# Patient Record
Sex: MALE | Race: WHITE | NOT HISPANIC OR LATINO | Employment: OTHER | URBAN - METROPOLITAN AREA
[De-identification: names, ages, dates, MRNs, and addresses within clinical notes are randomized per-mention and may not be internally consistent; named-entity substitution may affect disease eponyms.]

---

## 2017-02-13 ENCOUNTER — RECORDS - HEALTHEAST (OUTPATIENT)
Dept: LAB | Facility: CLINIC | Age: 82
End: 2017-02-13

## 2017-02-13 LAB
CHOLEST SERPL-MCNC: 167 MG/DL
FASTING STATUS PATIENT QL REPORTED: ABNORMAL
HDLC SERPL-MCNC: 49 MG/DL
LDLC SERPL CALC-MCNC: 77 MG/DL
TRIGL SERPL-MCNC: 204 MG/DL

## 2018-06-25 ENCOUNTER — RECORDS - HEALTHEAST (OUTPATIENT)
Dept: LAB | Facility: CLINIC | Age: 83
End: 2018-06-25

## 2018-06-25 LAB
ALBUMIN SERPL-MCNC: 3.6 G/DL (ref 3.5–5)
ALP SERPL-CCNC: 73 U/L (ref 45–120)
ALT SERPL W P-5'-P-CCNC: 50 U/L (ref 0–45)
ANION GAP SERPL CALCULATED.3IONS-SCNC: 10 MMOL/L (ref 5–18)
AST SERPL W P-5'-P-CCNC: 44 U/L (ref 0–40)
BILIRUB SERPL-MCNC: 0.6 MG/DL (ref 0–1)
BUN SERPL-MCNC: 32 MG/DL (ref 8–28)
CALCIUM SERPL-MCNC: 9.4 MG/DL (ref 8.5–10.5)
CHLORIDE BLD-SCNC: 106 MMOL/L (ref 98–107)
CHOLEST SERPL-MCNC: 156 MG/DL
CO2 SERPL-SCNC: 26 MMOL/L (ref 22–31)
CREAT SERPL-MCNC: 1.31 MG/DL (ref 0.7–1.3)
ERYTHROCYTE [DISTWIDTH] IN BLOOD BY AUTOMATED COUNT: 14 % (ref 11–14.5)
FASTING STATUS PATIENT QL REPORTED: ABNORMAL
GFR SERPL CREATININE-BSD FRML MDRD: 52 ML/MIN/1.73M2
GLUCOSE BLD-MCNC: 77 MG/DL (ref 70–125)
HCT VFR BLD AUTO: 43.6 % (ref 40–54)
HDLC SERPL-MCNC: 44 MG/DL
HGB BLD-MCNC: 14.9 G/DL (ref 14–18)
LDLC SERPL CALC-MCNC: 77 MG/DL
MCH RBC QN AUTO: 32.1 PG (ref 27–34)
MCHC RBC AUTO-ENTMCNC: 34.2 G/DL (ref 32–36)
MCV RBC AUTO: 94 FL (ref 80–100)
PLATELET # BLD AUTO: 215 THOU/UL (ref 140–440)
PMV BLD AUTO: 11.3 FL (ref 8.5–12.5)
POTASSIUM BLD-SCNC: 5.3 MMOL/L (ref 3.5–5)
PROT SERPL-MCNC: 6.5 G/DL (ref 6–8)
RBC # BLD AUTO: 4.64 MILL/UL (ref 4.4–6.2)
SODIUM SERPL-SCNC: 142 MMOL/L (ref 136–145)
TRIGL SERPL-MCNC: 174 MG/DL
WBC: 8 THOU/UL (ref 4–11)

## 2018-09-17 ENCOUNTER — RECORDS - HEALTHEAST (OUTPATIENT)
Dept: ADMINISTRATIVE | Facility: OTHER | Age: 83
End: 2018-09-17

## 2018-10-05 ENCOUNTER — RECORDS - HEALTHEAST (OUTPATIENT)
Dept: ADMINISTRATIVE | Facility: OTHER | Age: 83
End: 2018-10-05

## 2018-10-12 ENCOUNTER — HOSPITAL ENCOUNTER (OUTPATIENT)
Dept: NUCLEAR MEDICINE | Facility: CLINIC | Age: 83
Discharge: HOME | End: 2018-10-12
Attending: INTERNAL MEDICINE

## 2018-10-12 ENCOUNTER — HOSPITAL ENCOUNTER (OUTPATIENT)
Dept: CARDIOLOGY | Facility: CLINIC | Age: 83
Discharge: HOME | End: 2018-10-12
Attending: INTERNAL MEDICINE

## 2018-10-12 DIAGNOSIS — R06.09 EXERTIONAL DYSPNEA: ICD-10-CM

## 2018-10-12 DIAGNOSIS — R06.09 OTHER FORMS OF DYSPNEA: ICD-10-CM

## 2018-10-12 LAB
CV STRESS CURRENT BP HE: NORMAL
CV STRESS CURRENT HR HE: 61
CV STRESS CURRENT HR HE: 65
CV STRESS CURRENT HR HE: 65
CV STRESS CURRENT HR HE: 66
CV STRESS CURRENT HR HE: 66
CV STRESS CURRENT HR HE: 76
CV STRESS CURRENT HR HE: 77
CV STRESS CURRENT HR HE: 78
CV STRESS CURRENT HR HE: 81
CV STRESS CURRENT HR HE: 85
CV STRESS CURRENT HR HE: 86
CV STRESS CURRENT HR HE: 87
CV STRESS CURRENT HR HE: 87
CV STRESS CURRENT HR HE: 88
CV STRESS CURRENT HR HE: 88
CV STRESS DEVIATION TIME HE: NORMAL
CV STRESS ECHO PERCENT HR HE: NORMAL
CV STRESS EXERCISE STAGE HE: NORMAL
CV STRESS FINAL RESTING BP HE: NORMAL
CV STRESS FINAL RESTING HR HE: 76
CV STRESS MAX HR HE: 88
CV STRESS MAX TREADMILL GRADE HE: 0
CV STRESS MAX TREADMILL SPEED HE: 0
CV STRESS PEAK DIA BP HE: NORMAL
CV STRESS PEAK SYS BP HE: NORMAL
CV STRESS PHASE HE: NORMAL
CV STRESS PROTOCOL HE: NORMAL
CV STRESS RESTING PT POSITION HE: NORMAL
CV STRESS ST DEVIATION AMOUNT HE: NORMAL
CV STRESS ST DEVIATION ELEVATION HE: NORMAL
CV STRESS ST EVELATION AMOUNT HE: NORMAL
CV STRESS TEST TYPE HE: NORMAL
CV STRESS TOTAL STAGE TIME MIN 1 HE: NORMAL
NUC STRESS EJECTION FRACTION: 70 %
STRESS ECHO BASELINE BP: NORMAL
STRESS ECHO BASELINE HR: 60
STRESS ECHO CALCULATED PERCENT HR: 67 %
STRESS ECHO LAST STRESS BP: NORMAL
STRESS ECHO LAST STRESS HR: 88

## 2018-11-01 ENCOUNTER — AMBULATORY - HEALTHEAST (OUTPATIENT)
Dept: CARDIOLOGY | Facility: CLINIC | Age: 83
End: 2018-11-01

## 2018-11-01 ENCOUNTER — RECORDS - HEALTHEAST (OUTPATIENT)
Dept: ADMINISTRATIVE | Facility: OTHER | Age: 83
End: 2018-11-01

## 2018-11-08 ENCOUNTER — OFFICE VISIT - HEALTHEAST (OUTPATIENT)
Dept: CARDIOLOGY | Facility: CLINIC | Age: 83
End: 2018-11-08

## 2018-11-08 DIAGNOSIS — R94.39 ABNORMAL NUCLEAR STRESS TEST: ICD-10-CM

## 2018-11-08 DIAGNOSIS — I10 ESSENTIAL HYPERTENSION: ICD-10-CM

## 2018-11-08 DIAGNOSIS — E78.5 DYSLIPIDEMIA: ICD-10-CM

## 2018-11-08 DIAGNOSIS — R06.09 DYSPNEA ON EXERTION: ICD-10-CM

## 2018-11-08 DIAGNOSIS — G47.33 OSA ON CPAP: ICD-10-CM

## 2018-11-08 ASSESSMENT — MIFFLIN-ST. JEOR: SCORE: 1869.63

## 2018-11-23 ENCOUNTER — COMMUNICATION - HEALTHEAST (OUTPATIENT)
Dept: CARDIOLOGY | Facility: CLINIC | Age: 83
End: 2018-11-23

## 2018-11-27 ENCOUNTER — HOSPITAL ENCOUNTER (OUTPATIENT)
Dept: CT IMAGING | Facility: CLINIC | Age: 83
Discharge: HOME | End: 2018-11-27
Attending: INTERNAL MEDICINE

## 2018-12-04 ENCOUNTER — HOSPITAL ENCOUNTER (OUTPATIENT)
Dept: CT IMAGING | Facility: CLINIC | Age: 83
Discharge: HOME | End: 2018-12-04
Attending: INTERNAL MEDICINE

## 2018-12-04 DIAGNOSIS — R06.09 DYSPNEA ON EXERTION: ICD-10-CM

## 2018-12-04 DIAGNOSIS — R94.39 ABNORMAL NUCLEAR STRESS TEST: ICD-10-CM

## 2018-12-04 DIAGNOSIS — R06.09 OTHER FORMS OF DYSPNEA: ICD-10-CM

## 2018-12-04 LAB
CREAT BLD-MCNC: 1 MG/DL
POC GFR AMER AF HE - HISTORICAL: >60 ML/MIN/1.73M2
POC GFR NON AMER AF HE - HISTORICAL: >60 ML/MIN/1.73M2

## 2018-12-04 ASSESSMENT — MIFFLIN-ST. JEOR
SCORE: 1842.41
SCORE: 1865.09

## 2018-12-05 ENCOUNTER — AMBULATORY - HEALTHEAST (OUTPATIENT)
Dept: CARDIOLOGY | Facility: CLINIC | Age: 83
End: 2018-12-05

## 2018-12-05 ENCOUNTER — COMMUNICATION - HEALTHEAST (OUTPATIENT)
Dept: CARDIOLOGY | Facility: CLINIC | Age: 83
End: 2018-12-05

## 2018-12-05 ENCOUNTER — SURGERY - HEALTHEAST (OUTPATIENT)
Dept: CARDIOLOGY | Facility: CLINIC | Age: 83
End: 2018-12-05

## 2018-12-05 ENCOUNTER — OFFICE VISIT - HEALTHEAST (OUTPATIENT)
Dept: CARDIOLOGY | Facility: CLINIC | Age: 83
End: 2018-12-05

## 2018-12-05 DIAGNOSIS — I25.119 CORONARY ARTERY DISEASE INVOLVING NATIVE CORONARY ARTERY OF NATIVE HEART WITH ANGINA PECTORIS (H): ICD-10-CM

## 2018-12-05 DIAGNOSIS — E78.5 DYSLIPIDEMIA: ICD-10-CM

## 2018-12-05 DIAGNOSIS — I10 ESSENTIAL HYPERTENSION: ICD-10-CM

## 2018-12-05 DIAGNOSIS — R06.09 DYSPNEA ON EXERTION: ICD-10-CM

## 2018-12-05 DIAGNOSIS — I71.21 ASCENDING AORTIC ANEURYSM (H): ICD-10-CM

## 2018-12-05 DIAGNOSIS — G47.33 OSA ON CPAP: ICD-10-CM

## 2018-12-05 DIAGNOSIS — R94.39 ABNORMAL NUCLEAR STRESS TEST: ICD-10-CM

## 2018-12-05 LAB
BSA FOR ECHO PROCEDURE: 2.42 M2
CCTA AORTIC ROOT ANNULUS: 4.3 CM
CV CALCIUM SCORE AGATSTON LM: 35
CV CALCIUM SCORING AGATSON LAD: 1492
CV CALCIUM SCORING AGATSTON CX: 24
CV CALCIUM SCORING AGATSTON RCA: 705
CV CALCIUM SCORING AGATSTON TOTAL: 2256
LEFT VENTRICLE HEART RATE: 70 BPM

## 2018-12-05 ASSESSMENT — MIFFLIN-ST. JEOR: SCORE: 1896.84

## 2018-12-10 ENCOUNTER — COMMUNICATION - HEALTHEAST (OUTPATIENT)
Dept: CARDIOLOGY | Facility: CLINIC | Age: 83
End: 2018-12-10

## 2018-12-10 ENCOUNTER — SURGERY - HEALTHEAST (OUTPATIENT)
Dept: CARDIOLOGY | Facility: CLINIC | Age: 83
End: 2018-12-10

## 2018-12-10 DIAGNOSIS — I25.119 CORONARY ARTERY DISEASE INVOLVING NATIVE CORONARY ARTERY OF NATIVE HEART WITH ANGINA PECTORIS (H): ICD-10-CM

## 2018-12-10 ASSESSMENT — MIFFLIN-ST. JEOR: SCORE: 1867.81

## 2018-12-14 ENCOUNTER — COMMUNICATION - HEALTHEAST (OUTPATIENT)
Dept: CARDIOLOGY | Facility: CLINIC | Age: 83
End: 2018-12-14

## 2018-12-17 ENCOUNTER — COMMUNICATION - HEALTHEAST (OUTPATIENT)
Dept: CARDIOLOGY | Facility: CLINIC | Age: 83
End: 2018-12-17

## 2018-12-18 ENCOUNTER — COMMUNICATION - HEALTHEAST (OUTPATIENT)
Dept: PULMONOLOGY | Facility: OTHER | Age: 83
End: 2018-12-18

## 2018-12-18 ENCOUNTER — RECORDS - HEALTHEAST (OUTPATIENT)
Dept: ADMINISTRATIVE | Facility: OTHER | Age: 83
End: 2018-12-18

## 2018-12-18 ENCOUNTER — AMBULATORY - HEALTHEAST (OUTPATIENT)
Dept: PULMONOLOGY | Facility: OTHER | Age: 83
End: 2018-12-18

## 2018-12-18 DIAGNOSIS — R06.02 SOB (SHORTNESS OF BREATH): ICD-10-CM

## 2019-01-25 ENCOUNTER — OFFICE VISIT - HEALTHEAST (OUTPATIENT)
Dept: PULMONOLOGY | Facility: OTHER | Age: 84
End: 2019-01-25

## 2019-01-25 ENCOUNTER — COMMUNICATION - HEALTHEAST (OUTPATIENT)
Dept: PULMONOLOGY | Facility: OTHER | Age: 84
End: 2019-01-25

## 2019-01-25 ENCOUNTER — RECORDS - HEALTHEAST (OUTPATIENT)
Dept: ADMINISTRATIVE | Facility: OTHER | Age: 84
End: 2019-01-25

## 2019-01-25 DIAGNOSIS — J98.6 ELEVATED HEMIDIAPHRAGM: ICD-10-CM

## 2019-01-25 DIAGNOSIS — T17.908A ASPIRATION INTO RESPIRATORY TRACT, INITIAL ENCOUNTER: ICD-10-CM

## 2019-01-25 DIAGNOSIS — R06.09 DYSPNEA ON EXERTION: ICD-10-CM

## 2019-01-25 ASSESSMENT — MIFFLIN-ST. JEOR: SCORE: 1907.73

## 2019-01-28 ENCOUNTER — COMMUNICATION - HEALTHEAST (OUTPATIENT)
Dept: CARDIOLOGY | Facility: CLINIC | Age: 84
End: 2019-01-28

## 2019-01-28 ENCOUNTER — AMBULATORY - HEALTHEAST (OUTPATIENT)
Dept: CARDIOLOGY | Facility: CLINIC | Age: 84
End: 2019-01-28

## 2019-01-28 ENCOUNTER — SURGERY - HEALTHEAST (OUTPATIENT)
Dept: CARDIOLOGY | Facility: CLINIC | Age: 84
End: 2019-01-28

## 2019-01-28 ENCOUNTER — HOSPITAL ENCOUNTER (OUTPATIENT)
Dept: RADIOLOGY | Facility: CLINIC | Age: 84
Discharge: HOME | End: 2019-01-28
Attending: INTERNAL MEDICINE

## 2019-01-28 ENCOUNTER — OFFICE VISIT - HEALTHEAST (OUTPATIENT)
Dept: CARDIOLOGY | Facility: CLINIC | Age: 84
End: 2019-01-28

## 2019-01-28 DIAGNOSIS — I71.21 ASCENDING AORTIC ANEURYSM (H): ICD-10-CM

## 2019-01-28 DIAGNOSIS — R06.09 DYSPNEA ON EXERTION: ICD-10-CM

## 2019-01-28 DIAGNOSIS — E78.5 DYSLIPIDEMIA: ICD-10-CM

## 2019-01-28 DIAGNOSIS — J98.6 ELEVATED HEMIDIAPHRAGM: ICD-10-CM

## 2019-01-28 DIAGNOSIS — R94.39 ABNORMAL NUCLEAR STRESS TEST: ICD-10-CM

## 2019-01-28 DIAGNOSIS — I25.119 CORONARY ARTERY DISEASE INVOLVING NATIVE CORONARY ARTERY OF NATIVE HEART WITH ANGINA PECTORIS (H): ICD-10-CM

## 2019-01-28 DIAGNOSIS — R06.09 OTHER FORMS OF DYSPNEA: ICD-10-CM

## 2019-01-28 DIAGNOSIS — I10 ESSENTIAL HYPERTENSION: ICD-10-CM

## 2019-01-28 ASSESSMENT — MIFFLIN-ST. JEOR: SCORE: 1901.38

## 2019-01-30 ENCOUNTER — COMMUNICATION - HEALTHEAST (OUTPATIENT)
Dept: CARDIOLOGY | Facility: CLINIC | Age: 84
End: 2019-01-30

## 2019-01-31 ENCOUNTER — SURGERY - HEALTHEAST (OUTPATIENT)
Dept: CARDIOLOGY | Facility: CLINIC | Age: 84
End: 2019-01-31

## 2019-01-31 ENCOUNTER — COMMUNICATION - HEALTHEAST (OUTPATIENT)
Dept: CARDIOLOGY | Facility: CLINIC | Age: 84
End: 2019-01-31

## 2019-01-31 ENCOUNTER — OFFICE VISIT - HEALTHEAST (OUTPATIENT)
Dept: CARDIOLOGY | Facility: CLINIC | Age: 84
End: 2019-01-31

## 2019-01-31 DIAGNOSIS — R94.39 ABNORMAL CARDIOVASCULAR STRESS TEST: ICD-10-CM

## 2019-01-31 DIAGNOSIS — I25.10 CORONARY ARTERY CALCIFICATION SEEN ON CAT SCAN: ICD-10-CM

## 2019-01-31 DIAGNOSIS — I27.20 PULMONARY HYPERTENSION (H): ICD-10-CM

## 2019-01-31 ASSESSMENT — MIFFLIN-ST. JEOR: SCORE: 1914.99

## 2019-02-04 ENCOUNTER — SURGERY - HEALTHEAST (OUTPATIENT)
Dept: CARDIOLOGY | Facility: CLINIC | Age: 84
End: 2019-02-04

## 2019-02-04 ASSESSMENT — MIFFLIN-ST. JEOR: SCORE: 1912.72

## 2019-02-05 ASSESSMENT — MIFFLIN-ST. JEOR: SCORE: 1888.23

## 2019-02-06 ENCOUNTER — COMMUNICATION - HEALTHEAST (OUTPATIENT)
Dept: CARDIOLOGY | Facility: CLINIC | Age: 84
End: 2019-02-06

## 2019-02-13 ENCOUNTER — OFFICE VISIT - HEALTHEAST (OUTPATIENT)
Dept: CARDIOLOGY | Facility: CLINIC | Age: 84
End: 2019-02-13

## 2019-02-13 DIAGNOSIS — I10 ESSENTIAL HYPERTENSION: ICD-10-CM

## 2019-02-13 DIAGNOSIS — E78.5 DYSLIPIDEMIA: ICD-10-CM

## 2019-02-13 DIAGNOSIS — I25.10 CORONARY ARTERY DISEASE INVOLVING NATIVE CORONARY ARTERY OF NATIVE HEART WITHOUT ANGINA PECTORIS: ICD-10-CM

## 2019-02-13 DIAGNOSIS — I25.119 CORONARY ARTERY DISEASE INVOLVING NATIVE CORONARY ARTERY OF NATIVE HEART WITH ANGINA PECTORIS (H): ICD-10-CM

## 2019-02-13 DIAGNOSIS — R06.09 DYSPNEA ON EXERTION: ICD-10-CM

## 2019-02-13 ASSESSMENT — MIFFLIN-ST. JEOR: SCORE: 1910.45

## 2019-03-04 ENCOUNTER — OFFICE VISIT - HEALTHEAST (OUTPATIENT)
Dept: PULMONOLOGY | Facility: OTHER | Age: 84
End: 2019-03-04

## 2019-03-04 DIAGNOSIS — G47.33 OSA (OBSTRUCTIVE SLEEP APNEA): ICD-10-CM

## 2019-03-08 ENCOUNTER — AMBULATORY - HEALTHEAST (OUTPATIENT)
Dept: CARDIAC REHAB | Facility: CLINIC | Age: 84
End: 2019-03-08

## 2019-03-08 DIAGNOSIS — R94.39 ABNORMAL NUCLEAR STRESS TEST: ICD-10-CM

## 2019-03-08 DIAGNOSIS — R06.09 DYSPNEA ON EXERTION: ICD-10-CM

## 2019-03-08 DIAGNOSIS — I25.10 CORONARY ARTERY CALCIFICATION SEEN ON CAT SCAN: ICD-10-CM

## 2019-03-08 DIAGNOSIS — Z95.5 S/P DRUG ELUTING CORONARY STENT PLACEMENT: ICD-10-CM

## 2019-03-11 ENCOUNTER — COMMUNICATION - HEALTHEAST (OUTPATIENT)
Dept: TELEHEALTH | Facility: CLINIC | Age: 84
End: 2019-03-11

## 2019-03-12 ENCOUNTER — AMBULATORY - HEALTHEAST (OUTPATIENT)
Dept: CARDIAC REHAB | Facility: CLINIC | Age: 84
End: 2019-03-12

## 2019-03-12 DIAGNOSIS — Z95.5 S/P DRUG ELUTING CORONARY STENT PLACEMENT: ICD-10-CM

## 2019-03-13 ENCOUNTER — AMBULATORY - HEALTHEAST (OUTPATIENT)
Dept: INTENSIVE CARE | Facility: CLINIC | Age: 84
End: 2019-03-13

## 2019-03-13 DIAGNOSIS — R09.02 HYPOXIA: ICD-10-CM

## 2019-03-14 ENCOUNTER — AMBULATORY - HEALTHEAST (OUTPATIENT)
Dept: CARDIAC REHAB | Facility: CLINIC | Age: 84
End: 2019-03-14

## 2019-03-14 DIAGNOSIS — Z95.5 S/P DRUG ELUTING CORONARY STENT PLACEMENT: ICD-10-CM

## 2019-03-18 ENCOUNTER — AMBULATORY - HEALTHEAST (OUTPATIENT)
Dept: INTENSIVE CARE | Facility: CLINIC | Age: 84
End: 2019-03-18

## 2019-03-18 DIAGNOSIS — R09.02 HYPOXIA: ICD-10-CM

## 2019-03-19 ENCOUNTER — AMBULATORY - HEALTHEAST (OUTPATIENT)
Dept: CARDIAC REHAB | Facility: CLINIC | Age: 84
End: 2019-03-19

## 2019-03-19 DIAGNOSIS — Z95.5 S/P DRUG ELUTING CORONARY STENT PLACEMENT: ICD-10-CM

## 2019-03-21 ENCOUNTER — AMBULATORY - HEALTHEAST (OUTPATIENT)
Dept: CARDIAC REHAB | Facility: CLINIC | Age: 84
End: 2019-03-21

## 2019-03-21 DIAGNOSIS — Z95.5 S/P DRUG ELUTING CORONARY STENT PLACEMENT: ICD-10-CM

## 2019-03-26 ENCOUNTER — OFFICE VISIT - HEALTHEAST (OUTPATIENT)
Dept: CARDIOLOGY | Facility: CLINIC | Age: 84
End: 2019-03-26

## 2019-03-26 ENCOUNTER — AMBULATORY - HEALTHEAST (OUTPATIENT)
Dept: CARDIAC REHAB | Facility: CLINIC | Age: 84
End: 2019-03-26

## 2019-03-26 DIAGNOSIS — G47.33 OSA ON CPAP: ICD-10-CM

## 2019-03-26 DIAGNOSIS — R06.09 DYSPNEA ON EXERTION: ICD-10-CM

## 2019-03-26 DIAGNOSIS — I25.10 CORONARY ARTERY DISEASE INVOLVING NATIVE CORONARY ARTERY OF NATIVE HEART WITHOUT ANGINA PECTORIS: ICD-10-CM

## 2019-03-26 DIAGNOSIS — I10 ESSENTIAL HYPERTENSION: ICD-10-CM

## 2019-03-26 DIAGNOSIS — Z95.5 S/P DRUG ELUTING CORONARY STENT PLACEMENT: ICD-10-CM

## 2019-03-26 ASSESSMENT — MIFFLIN-ST. JEOR: SCORE: 1910.45

## 2019-03-27 ENCOUNTER — COMMUNICATION - HEALTHEAST (OUTPATIENT)
Dept: CARDIOLOGY | Facility: CLINIC | Age: 84
End: 2019-03-27

## 2019-03-28 ENCOUNTER — AMBULATORY - HEALTHEAST (OUTPATIENT)
Dept: CARDIAC REHAB | Facility: CLINIC | Age: 84
End: 2019-03-28

## 2019-03-28 DIAGNOSIS — Z95.5 S/P DRUG ELUTING CORONARY STENT PLACEMENT: ICD-10-CM

## 2019-04-02 ENCOUNTER — AMBULATORY - HEALTHEAST (OUTPATIENT)
Dept: CARDIAC REHAB | Facility: CLINIC | Age: 84
End: 2019-04-02

## 2019-04-02 DIAGNOSIS — Z95.5 S/P DRUG ELUTING CORONARY STENT PLACEMENT: ICD-10-CM

## 2019-04-04 ENCOUNTER — AMBULATORY - HEALTHEAST (OUTPATIENT)
Dept: CARDIAC REHAB | Facility: CLINIC | Age: 84
End: 2019-04-04

## 2019-04-04 DIAGNOSIS — Z95.5 S/P DRUG ELUTING CORONARY STENT PLACEMENT: ICD-10-CM

## 2019-04-09 ENCOUNTER — AMBULATORY - HEALTHEAST (OUTPATIENT)
Dept: CARDIAC REHAB | Facility: CLINIC | Age: 84
End: 2019-04-09

## 2019-04-09 DIAGNOSIS — Z95.5 S/P CORONARY ARTERY STENT PLACEMENT: ICD-10-CM

## 2019-04-16 ENCOUNTER — AMBULATORY - HEALTHEAST (OUTPATIENT)
Dept: CARDIAC REHAB | Facility: CLINIC | Age: 84
End: 2019-04-16

## 2019-04-16 DIAGNOSIS — Z95.5 S/P CORONARY ARTERY STENT PLACEMENT: ICD-10-CM

## 2019-04-16 DIAGNOSIS — Z95.5 S/P DRUG ELUTING CORONARY STENT PLACEMENT: ICD-10-CM

## 2019-04-17 ENCOUNTER — COMMUNICATION - HEALTHEAST (OUTPATIENT)
Dept: PULMONOLOGY | Facility: OTHER | Age: 84
End: 2019-04-17

## 2019-04-18 ENCOUNTER — AMBULATORY - HEALTHEAST (OUTPATIENT)
Dept: CARDIAC REHAB | Facility: CLINIC | Age: 84
End: 2019-04-18

## 2019-04-18 DIAGNOSIS — Z95.5 S/P CORONARY ARTERY STENT PLACEMENT: ICD-10-CM

## 2019-04-18 DIAGNOSIS — Z95.5 S/P DRUG ELUTING CORONARY STENT PLACEMENT: ICD-10-CM

## 2019-04-23 ENCOUNTER — AMBULATORY - HEALTHEAST (OUTPATIENT)
Dept: CARDIAC REHAB | Facility: CLINIC | Age: 84
End: 2019-04-23

## 2019-04-23 DIAGNOSIS — Z95.5 S/P CORONARY ARTERY STENT PLACEMENT: ICD-10-CM

## 2019-04-25 ENCOUNTER — AMBULATORY - HEALTHEAST (OUTPATIENT)
Dept: CARDIAC REHAB | Facility: CLINIC | Age: 84
End: 2019-04-25

## 2019-04-25 DIAGNOSIS — Z95.5 S/P CORONARY ARTERY STENT PLACEMENT: ICD-10-CM

## 2019-04-26 ENCOUNTER — RECORDS - HEALTHEAST (OUTPATIENT)
Dept: ADMINISTRATIVE | Facility: OTHER | Age: 84
End: 2019-04-26

## 2019-04-26 ENCOUNTER — AMBULATORY - HEALTHEAST (OUTPATIENT)
Dept: CARDIOLOGY | Facility: CLINIC | Age: 84
End: 2019-04-26

## 2019-04-30 ENCOUNTER — OFFICE VISIT - HEALTHEAST (OUTPATIENT)
Dept: CARDIOLOGY | Facility: CLINIC | Age: 84
End: 2019-04-30

## 2019-04-30 ENCOUNTER — AMBULATORY - HEALTHEAST (OUTPATIENT)
Dept: CARDIAC REHAB | Facility: CLINIC | Age: 84
End: 2019-04-30

## 2019-04-30 DIAGNOSIS — E78.5 DYSLIPIDEMIA: ICD-10-CM

## 2019-04-30 DIAGNOSIS — I10 ESSENTIAL HYPERTENSION: ICD-10-CM

## 2019-04-30 DIAGNOSIS — I25.10 CORONARY ARTERY DISEASE INVOLVING NATIVE CORONARY ARTERY OF NATIVE HEART WITHOUT ANGINA PECTORIS: ICD-10-CM

## 2019-04-30 DIAGNOSIS — Z95.5 S/P CORONARY ARTERY STENT PLACEMENT: ICD-10-CM

## 2019-04-30 DIAGNOSIS — Z95.5 S/P DRUG ELUTING CORONARY STENT PLACEMENT: ICD-10-CM

## 2019-04-30 DIAGNOSIS — I51.89 DIASTOLIC DYSFUNCTION: ICD-10-CM

## 2019-04-30 DIAGNOSIS — I71.21 ASCENDING AORTIC ANEURYSM (H): ICD-10-CM

## 2019-04-30 ASSESSMENT — MIFFLIN-ST. JEOR: SCORE: 1914.99

## 2019-05-02 ENCOUNTER — AMBULATORY - HEALTHEAST (OUTPATIENT)
Dept: CARDIAC REHAB | Facility: CLINIC | Age: 84
End: 2019-05-02

## 2019-05-02 DIAGNOSIS — Z95.5 S/P DRUG ELUTING CORONARY STENT PLACEMENT: ICD-10-CM

## 2019-05-02 DIAGNOSIS — Z95.5 S/P CORONARY ARTERY STENT PLACEMENT: ICD-10-CM

## 2019-05-07 ENCOUNTER — AMBULATORY - HEALTHEAST (OUTPATIENT)
Dept: CARDIAC REHAB | Facility: CLINIC | Age: 84
End: 2019-05-07

## 2019-05-07 DIAGNOSIS — Z95.5 S/P CORONARY ARTERY STENT PLACEMENT: ICD-10-CM

## 2019-05-07 DIAGNOSIS — Z95.5 S/P DRUG ELUTING CORONARY STENT PLACEMENT: ICD-10-CM

## 2019-05-14 ENCOUNTER — AMBULATORY - HEALTHEAST (OUTPATIENT)
Dept: CARDIAC REHAB | Facility: CLINIC | Age: 84
End: 2019-05-14

## 2019-05-14 DIAGNOSIS — Z95.5 S/P CORONARY ARTERY STENT PLACEMENT: ICD-10-CM

## 2019-05-16 ENCOUNTER — AMBULATORY - HEALTHEAST (OUTPATIENT)
Dept: CARDIAC REHAB | Facility: CLINIC | Age: 84
End: 2019-05-16

## 2019-05-16 DIAGNOSIS — Z95.5 S/P CORONARY ARTERY STENT PLACEMENT: ICD-10-CM

## 2019-05-16 DIAGNOSIS — Z95.5 S/P DRUG ELUTING CORONARY STENT PLACEMENT: ICD-10-CM

## 2019-05-21 ENCOUNTER — AMBULATORY - HEALTHEAST (OUTPATIENT)
Dept: CARDIAC REHAB | Facility: CLINIC | Age: 84
End: 2019-05-21

## 2019-05-21 DIAGNOSIS — Z95.5 S/P CORONARY ARTERY STENT PLACEMENT: ICD-10-CM

## 2019-05-23 ENCOUNTER — AMBULATORY - HEALTHEAST (OUTPATIENT)
Dept: CARDIAC REHAB | Facility: CLINIC | Age: 84
End: 2019-05-23

## 2019-05-23 DIAGNOSIS — Z95.5 S/P CORONARY ARTERY STENT PLACEMENT: ICD-10-CM

## 2019-05-28 ENCOUNTER — AMBULATORY - HEALTHEAST (OUTPATIENT)
Dept: CARDIAC REHAB | Facility: CLINIC | Age: 84
End: 2019-05-28

## 2019-05-28 DIAGNOSIS — Z95.5 S/P DRUG ELUTING CORONARY STENT PLACEMENT: ICD-10-CM

## 2019-05-28 DIAGNOSIS — Z95.5 S/P CORONARY ARTERY STENT PLACEMENT: ICD-10-CM

## 2019-05-30 ENCOUNTER — AMBULATORY - HEALTHEAST (OUTPATIENT)
Dept: CARDIAC REHAB | Facility: CLINIC | Age: 84
End: 2019-05-30

## 2019-05-30 DIAGNOSIS — Z95.5 S/P CORONARY ARTERY STENT PLACEMENT: ICD-10-CM

## 2019-06-04 ENCOUNTER — RECORDS - HEALTHEAST (OUTPATIENT)
Dept: LAB | Facility: CLINIC | Age: 84
End: 2019-06-04

## 2019-06-04 LAB
ALBUMIN SERPL-MCNC: 3.6 G/DL (ref 3.5–5)
ALP SERPL-CCNC: 69 U/L (ref 45–120)
ALT SERPL W P-5'-P-CCNC: 33 U/L (ref 0–45)
ANION GAP SERPL CALCULATED.3IONS-SCNC: 8 MMOL/L (ref 5–18)
AST SERPL W P-5'-P-CCNC: 34 U/L (ref 0–40)
BILIRUB SERPL-MCNC: 0.4 MG/DL (ref 0–1)
BUN SERPL-MCNC: 20 MG/DL (ref 8–28)
CALCIUM SERPL-MCNC: 9.3 MG/DL (ref 8.5–10.5)
CHLORIDE BLD-SCNC: 99 MMOL/L (ref 98–107)
CHOLEST SERPL-MCNC: 138 MG/DL
CO2 SERPL-SCNC: 25 MMOL/L (ref 22–31)
CREAT SERPL-MCNC: 0.89 MG/DL (ref 0.7–1.3)
ERYTHROCYTE [DISTWIDTH] IN BLOOD BY AUTOMATED COUNT: 13.1 % (ref 11–14.5)
FASTING STATUS PATIENT QL REPORTED: NORMAL
GFR SERPL CREATININE-BSD FRML MDRD: >60 ML/MIN/1.73M2
GLUCOSE BLD-MCNC: 116 MG/DL (ref 70–125)
HCT VFR BLD AUTO: 45.4 % (ref 40–54)
HDLC SERPL-MCNC: 58 MG/DL
HGB BLD-MCNC: 15.1 G/DL (ref 14–18)
LDLC SERPL CALC-MCNC: 55 MG/DL
MCH RBC QN AUTO: 31 PG (ref 27–34)
MCHC RBC AUTO-ENTMCNC: 33.3 G/DL (ref 32–36)
MCV RBC AUTO: 93 FL (ref 80–100)
PLATELET # BLD AUTO: 222 THOU/UL (ref 140–440)
PMV BLD AUTO: 10.1 FL (ref 8.5–12.5)
POTASSIUM BLD-SCNC: 4.5 MMOL/L (ref 3.5–5)
PROT SERPL-MCNC: 6.2 G/DL (ref 6–8)
RBC # BLD AUTO: 4.87 MILL/UL (ref 4.4–6.2)
SODIUM SERPL-SCNC: 132 MMOL/L (ref 136–145)
TRIGL SERPL-MCNC: 126 MG/DL
WBC: 7.5 THOU/UL (ref 4–11)

## 2019-07-12 ENCOUNTER — OFFICE VISIT - HEALTHEAST (OUTPATIENT)
Dept: SLEEP MEDICINE | Facility: CLINIC | Age: 84
End: 2019-07-12

## 2019-07-12 DIAGNOSIS — G47.8 SLEEP DYSFUNCTION WITH SLEEP STAGE DISTURBANCE: ICD-10-CM

## 2019-07-12 DIAGNOSIS — G47.33 OBSTRUCTIVE SLEEP APNEA: ICD-10-CM

## 2019-07-12 ASSESSMENT — MIFFLIN-ST. JEOR: SCORE: 1928.6

## 2019-08-12 ENCOUNTER — COMMUNICATION - HEALTHEAST (OUTPATIENT)
Dept: CARDIOLOGY | Facility: CLINIC | Age: 84
End: 2019-08-12

## 2019-08-12 DIAGNOSIS — R06.09 DYSPNEA ON EXERTION: ICD-10-CM

## 2019-08-12 DIAGNOSIS — R94.39 ABNORMAL NUCLEAR STRESS TEST: ICD-10-CM

## 2019-08-22 ENCOUNTER — COMMUNICATION - HEALTHEAST (OUTPATIENT)
Dept: CARDIOLOGY | Facility: CLINIC | Age: 84
End: 2019-08-22

## 2019-09-16 ENCOUNTER — COMMUNICATION - HEALTHEAST (OUTPATIENT)
Dept: CARDIOLOGY | Facility: CLINIC | Age: 84
End: 2019-09-16

## 2019-10-08 ENCOUNTER — RECORDS - HEALTHEAST (OUTPATIENT)
Dept: LAB | Facility: CLINIC | Age: 84
End: 2019-10-08

## 2019-10-08 LAB
ANION GAP SERPL CALCULATED.3IONS-SCNC: 12 MMOL/L (ref 5–18)
BUN SERPL-MCNC: 21 MG/DL (ref 8–28)
CALCIUM SERPL-MCNC: 8.6 MG/DL (ref 8.5–10.5)
CHLORIDE BLD-SCNC: 103 MMOL/L (ref 98–107)
CHOLEST SERPL-MCNC: 126 MG/DL
CO2 SERPL-SCNC: 22 MMOL/L (ref 22–31)
CREAT SERPL-MCNC: 1.6 MG/DL (ref 0.7–1.3)
FASTING STATUS PATIENT QL REPORTED: ABNORMAL
GFR SERPL CREATININE-BSD FRML MDRD: 41 ML/MIN/1.73M2
GLUCOSE BLD-MCNC: 116 MG/DL (ref 70–125)
HDLC SERPL-MCNC: 54 MG/DL
LDLC SERPL CALC-MCNC: 40 MG/DL
MAGNESIUM SERPL-MCNC: 1.8 MG/DL (ref 1.8–2.6)
POTASSIUM BLD-SCNC: 4.3 MMOL/L (ref 3.5–5)
SODIUM SERPL-SCNC: 137 MMOL/L (ref 136–145)
TRIGL SERPL-MCNC: 162 MG/DL

## 2019-11-15 ENCOUNTER — AMBULATORY - HEALTHEAST (OUTPATIENT)
Dept: OTHER | Facility: CLINIC | Age: 84
End: 2019-11-15

## 2019-11-18 ENCOUNTER — RECORDS - HEALTHEAST (OUTPATIENT)
Dept: ADMINISTRATIVE | Facility: OTHER | Age: 84
End: 2019-11-18

## 2019-11-18 ENCOUNTER — HOME CARE/HOSPICE - HEALTHEAST (OUTPATIENT)
Dept: HOME HEALTH SERVICES | Facility: HOME HEALTH | Age: 84
End: 2019-11-18

## 2019-11-18 ENCOUNTER — AMBULATORY - HEALTHEAST (OUTPATIENT)
Dept: CARDIOLOGY | Facility: CLINIC | Age: 84
End: 2019-11-18

## 2019-11-19 ENCOUNTER — HOME CARE/HOSPICE - HEALTHEAST (OUTPATIENT)
Dept: HOME HEALTH SERVICES | Facility: HOME HEALTH | Age: 84
End: 2019-11-19

## 2019-11-20 ENCOUNTER — HOME CARE/HOSPICE - HEALTHEAST (OUTPATIENT)
Dept: HOME HEALTH SERVICES | Facility: HOME HEALTH | Age: 84
End: 2019-11-20

## 2019-11-20 RX ORDER — OXYBUTYNIN CHLORIDE 5 MG/1
5 TABLET, EXTENDED RELEASE ORAL DAILY
Status: SHIPPED | COMMUNITY
Start: 2019-10-29 | End: 2024-10-04

## 2019-11-21 ENCOUNTER — OFFICE VISIT - HEALTHEAST (OUTPATIENT)
Dept: CARDIOLOGY | Facility: CLINIC | Age: 84
End: 2019-11-21

## 2019-11-21 ENCOUNTER — HOME CARE/HOSPICE - HEALTHEAST (OUTPATIENT)
Dept: HOME HEALTH SERVICES | Facility: HOME HEALTH | Age: 84
End: 2019-11-21

## 2019-11-21 DIAGNOSIS — I71.21 ASCENDING AORTIC ANEURYSM (H): ICD-10-CM

## 2019-11-21 DIAGNOSIS — I25.10 CORONARY ARTERY DISEASE INVOLVING NATIVE CORONARY ARTERY OF NATIVE HEART WITHOUT ANGINA PECTORIS: ICD-10-CM

## 2019-11-21 DIAGNOSIS — R06.09 DYSPNEA ON EXERTION: ICD-10-CM

## 2019-11-21 DIAGNOSIS — G47.33 OSA ON CPAP: ICD-10-CM

## 2019-11-21 DIAGNOSIS — I10 ESSENTIAL HYPERTENSION: ICD-10-CM

## 2019-11-21 DIAGNOSIS — E78.5 DYSLIPIDEMIA: ICD-10-CM

## 2019-11-21 LAB
ANION GAP SERPL CALCULATED.3IONS-SCNC: 8 MMOL/L (ref 5–18)
BUN SERPL-MCNC: 17 MG/DL (ref 8–28)
CALCIUM SERPL-MCNC: 8.8 MG/DL (ref 8.5–10.5)
CHLORIDE BLD-SCNC: 102 MMOL/L (ref 98–107)
CO2 SERPL-SCNC: 24 MMOL/L (ref 22–31)
CREAT SERPL-MCNC: 0.87 MG/DL (ref 0.7–1.3)
GFR SERPL CREATININE-BSD FRML MDRD: >60 ML/MIN/1.73M2
GLUCOSE BLD-MCNC: 111 MG/DL (ref 70–125)
POTASSIUM BLD-SCNC: 4.7 MMOL/L (ref 3.5–5)
SODIUM SERPL-SCNC: 134 MMOL/L (ref 136–145)

## 2019-11-21 ASSESSMENT — MIFFLIN-ST. JEOR: SCORE: 1928.6

## 2019-11-25 ENCOUNTER — COMMUNICATION - HEALTHEAST (OUTPATIENT)
Dept: CARDIOLOGY | Facility: CLINIC | Age: 84
End: 2019-11-25

## 2020-08-29 ENCOUNTER — COMMUNICATION - HEALTHEAST (OUTPATIENT)
Dept: CARDIOLOGY | Facility: CLINIC | Age: 85
End: 2020-08-29

## 2020-08-29 DIAGNOSIS — I10 ESSENTIAL HYPERTENSION: ICD-10-CM

## 2020-09-01 ENCOUNTER — RECORDS - HEALTHEAST (OUTPATIENT)
Dept: ADMINISTRATIVE | Facility: OTHER | Age: 85
End: 2020-09-01

## 2020-09-02 ENCOUNTER — OFFICE VISIT - HEALTHEAST (OUTPATIENT)
Dept: CARDIOLOGY | Facility: CLINIC | Age: 85
End: 2020-09-02

## 2020-09-02 ENCOUNTER — COMMUNICATION - HEALTHEAST (OUTPATIENT)
Dept: CARDIOLOGY | Facility: CLINIC | Age: 85
End: 2020-09-02

## 2020-09-02 DIAGNOSIS — I50.32 CHRONIC DIASTOLIC CHF (CONGESTIVE HEART FAILURE), NYHA CLASS 3 (H): ICD-10-CM

## 2020-09-15 ENCOUNTER — RECORDS - HEALTHEAST (OUTPATIENT)
Dept: LAB | Facility: CLINIC | Age: 85
End: 2020-09-15

## 2020-09-15 LAB
ALBUMIN SERPL-MCNC: 3.9 G/DL (ref 3.5–5)
ALP SERPL-CCNC: 65 U/L (ref 45–120)
ALT SERPL W P-5'-P-CCNC: 34 U/L (ref 0–45)
ANION GAP SERPL CALCULATED.3IONS-SCNC: 11 MMOL/L (ref 5–18)
AST SERPL W P-5'-P-CCNC: 37 U/L (ref 0–40)
BASOPHILS # BLD AUTO: 0.1 THOU/UL (ref 0–0.2)
BASOPHILS NFR BLD AUTO: 1 % (ref 0–2)
BILIRUB SERPL-MCNC: 0.6 MG/DL (ref 0–1)
BUN SERPL-MCNC: 20 MG/DL (ref 8–28)
CALCIUM SERPL-MCNC: 9 MG/DL (ref 8.5–10.5)
CHLORIDE BLD-SCNC: 96 MMOL/L (ref 98–107)
CO2 SERPL-SCNC: 22 MMOL/L (ref 22–31)
CREAT SERPL-MCNC: 1.07 MG/DL (ref 0.7–1.3)
EOSINOPHIL # BLD AUTO: 0.2 THOU/UL (ref 0–0.4)
EOSINOPHIL NFR BLD AUTO: 3 % (ref 0–6)
ERYTHROCYTE [DISTWIDTH] IN BLOOD BY AUTOMATED COUNT: 13.2 % (ref 11–14.5)
GFR SERPL CREATININE-BSD FRML MDRD: >60 ML/MIN/1.73M2
GLUCOSE BLD-MCNC: 86 MG/DL (ref 70–125)
HCT VFR BLD AUTO: 46 % (ref 40–54)
HGB BLD-MCNC: 15.5 G/DL (ref 14–18)
IMM GRANULOCYTES # BLD: 0.1 THOU/UL
IMM GRANULOCYTES NFR BLD: 1 %
LYMPHOCYTES # BLD AUTO: 2.7 THOU/UL (ref 0.8–4.4)
LYMPHOCYTES NFR BLD AUTO: 32 % (ref 20–40)
MCH RBC QN AUTO: 31.9 PG (ref 27–34)
MCHC RBC AUTO-ENTMCNC: 33.7 G/DL (ref 32–36)
MCV RBC AUTO: 95 FL (ref 80–100)
MONOCYTES # BLD AUTO: 0.9 THOU/UL (ref 0–0.9)
MONOCYTES NFR BLD AUTO: 10 % (ref 2–10)
NEUTROPHILS # BLD AUTO: 4.6 THOU/UL (ref 2–7.7)
NEUTROPHILS NFR BLD AUTO: 54 % (ref 50–70)
PLATELET # BLD AUTO: 234 THOU/UL (ref 140–440)
PMV BLD AUTO: 10.1 FL (ref 8.5–12.5)
POTASSIUM BLD-SCNC: 5.2 MMOL/L (ref 3.5–5)
PROT SERPL-MCNC: 6.8 G/DL (ref 6–8)
RBC # BLD AUTO: 4.86 MILL/UL (ref 4.4–6.2)
SODIUM SERPL-SCNC: 129 MMOL/L (ref 136–145)
WBC: 8.5 THOU/UL (ref 4–11)

## 2020-12-18 ENCOUNTER — COMMUNICATION - HEALTHEAST (OUTPATIENT)
Dept: CARDIOLOGY | Facility: CLINIC | Age: 85
End: 2020-12-18

## 2020-12-23 ENCOUNTER — RECORDS - HEALTHEAST (OUTPATIENT)
Dept: LAB | Facility: CLINIC | Age: 85
End: 2020-12-23

## 2020-12-23 LAB
ALBUMIN SERPL-MCNC: 3.6 G/DL (ref 3.5–5)
ALP SERPL-CCNC: 68 U/L (ref 45–120)
ALT SERPL W P-5'-P-CCNC: 27 U/L (ref 0–45)
ANION GAP SERPL CALCULATED.3IONS-SCNC: 11 MMOL/L (ref 5–18)
AST SERPL W P-5'-P-CCNC: 33 U/L (ref 0–40)
BILIRUB SERPL-MCNC: 0.5 MG/DL (ref 0–1)
BUN SERPL-MCNC: 17 MG/DL (ref 8–28)
CALCIUM SERPL-MCNC: 8.6 MG/DL (ref 8.5–10.5)
CHLORIDE BLD-SCNC: 100 MMOL/L (ref 98–107)
CHOLEST SERPL-MCNC: 133 MG/DL
CO2 SERPL-SCNC: 23 MMOL/L (ref 22–31)
CREAT SERPL-MCNC: 0.9 MG/DL (ref 0.7–1.3)
FASTING STATUS PATIENT QL REPORTED: NORMAL
GFR SERPL CREATININE-BSD FRML MDRD: >60 ML/MIN/1.73M2
GLUCOSE BLD-MCNC: 139 MG/DL (ref 70–125)
HDLC SERPL-MCNC: 56 MG/DL
LDLC SERPL CALC-MCNC: 48 MG/DL
POTASSIUM BLD-SCNC: 4.3 MMOL/L (ref 3.5–5)
PROT SERPL-MCNC: 6.4 G/DL (ref 6–8)
SODIUM SERPL-SCNC: 134 MMOL/L (ref 136–145)
TRIGL SERPL-MCNC: 146 MG/DL

## 2021-01-05 ENCOUNTER — COMMUNICATION - HEALTHEAST (OUTPATIENT)
Dept: CARDIOLOGY | Facility: CLINIC | Age: 86
End: 2021-01-05

## 2021-01-05 DIAGNOSIS — R06.09 DYSPNEA ON EXERTION: ICD-10-CM

## 2021-01-12 ENCOUNTER — COMMUNICATION - HEALTHEAST (OUTPATIENT)
Dept: CARDIOLOGY | Facility: CLINIC | Age: 86
End: 2021-01-12

## 2021-01-12 DIAGNOSIS — I10 ESSENTIAL HYPERTENSION: ICD-10-CM

## 2021-01-12 DIAGNOSIS — I25.10 CORONARY ARTERY DISEASE INVOLVING NATIVE CORONARY ARTERY OF NATIVE HEART WITHOUT ANGINA PECTORIS: ICD-10-CM

## 2021-01-12 RX ORDER — ROSUVASTATIN CALCIUM 20 MG/1
20 TABLET, COATED ORAL AT BEDTIME
Qty: 90 TABLET | Refills: 2 | Status: SHIPPED | OUTPATIENT
Start: 2021-01-12 | End: 2021-08-31

## 2021-01-12 RX ORDER — VALSARTAN AND HYDROCHLOROTHIAZIDE 160; 25 MG/1; MG/1
1 TABLET ORAL DAILY
Qty: 90 TABLET | Refills: 2 | Status: SHIPPED | OUTPATIENT
Start: 2021-01-12 | End: 2021-08-31

## 2021-02-18 ENCOUNTER — AMBULATORY - HEALTHEAST (OUTPATIENT)
Dept: NURSING | Facility: CLINIC | Age: 86
End: 2021-02-18

## 2021-03-08 ENCOUNTER — TELEPHONE (OUTPATIENT)
Dept: SLEEP MEDICINE | Facility: CLINIC | Age: 86
End: 2021-03-08

## 2021-03-08 DIAGNOSIS — G47.33 OBSTRUCTIVE SLEEP APNEA (ADULT) (PEDIATRIC): Primary | ICD-10-CM

## 2021-03-11 ENCOUNTER — AMBULATORY - HEALTHEAST (OUTPATIENT)
Dept: NURSING | Facility: CLINIC | Age: 86
End: 2021-03-11

## 2021-03-12 ENCOUNTER — PATIENT OUTREACH (OUTPATIENT)
Dept: CARE COORDINATION | Facility: CLINIC | Age: 86
End: 2021-03-12

## 2021-03-12 DIAGNOSIS — Z65.9 PSYCHOSOCIAL PROBLEM: Primary | ICD-10-CM

## 2021-03-23 ENCOUNTER — PATIENT OUTREACH (OUTPATIENT)
Dept: CARE COORDINATION | Facility: CLINIC | Age: 86
End: 2021-03-23

## 2021-03-23 ENCOUNTER — AMBULATORY - HEALTHEAST (OUTPATIENT)
Dept: CARDIOLOGY | Facility: CLINIC | Age: 86
End: 2021-03-23

## 2021-03-23 ENCOUNTER — RECORDS - HEALTHEAST (OUTPATIENT)
Dept: ADMINISTRATIVE | Facility: OTHER | Age: 86
End: 2021-03-23

## 2021-03-23 DIAGNOSIS — Z65.9 PSYCHOSOCIAL PROBLEM: Primary | ICD-10-CM

## 2021-03-23 SDOH — SOCIAL STABILITY: SOCIAL NETWORK: ARE YOU MARRIED, WIDOWED, DIVORCED, SEPARATED, NEVER MARRIED, OR LIVING WITH A PARTNER?: MARRIED

## 2021-03-23 SDOH — SOCIAL STABILITY: SOCIAL NETWORK: HOW OFTEN DO YOU ATTENT MEETINGS OF THE CLUB OR ORGANIZATION YOU BELONG TO?: NEVER

## 2021-03-23 SDOH — ECONOMIC STABILITY: TRANSPORTATION INSECURITY
IN THE PAST 12 MONTHS, HAS THE LACK OF TRANSPORTATION KEPT YOU FROM MEDICAL APPOINTMENTS OR FROM GETTING MEDICATIONS?: NO

## 2021-03-23 SDOH — HEALTH STABILITY: MENTAL HEALTH
STRESS IS WHEN SOMEONE FEELS TENSE, NERVOUS, ANXIOUS, OR CAN'T SLEEP AT NIGHT BECAUSE THEIR MIND IS TROUBLED. HOW STRESSED ARE YOU?: ONLY A LITTLE

## 2021-03-23 SDOH — SOCIAL STABILITY: SOCIAL NETWORK
DO YOU BELONG TO ANY CLUBS OR ORGANIZATIONS SUCH AS CHURCH GROUPS UNIONS, FRATERNAL OR ATHLETIC GROUPS, OR SCHOOL GROUPS?: NO

## 2021-03-23 SDOH — SOCIAL STABILITY: SOCIAL NETWORK: HOW OFTEN DO YOU ATTEND CHURCH OR RELIGIOUS SERVICES?: NEVER

## 2021-03-23 SDOH — SOCIAL STABILITY: SOCIAL NETWORK: HOW OFTEN DO YOU GET TOGETHER WITH FRIENDS OR RELATIVES?: NEVER

## 2021-03-23 SDOH — ECONOMIC STABILITY: INCOME INSECURITY: HOW HARD IS IT FOR YOU TO PAY FOR THE VERY BASICS LIKE FOOD, HOUSING, MEDICAL CARE, AND HEATING?: NOT HARD AT ALL

## 2021-03-23 SDOH — ECONOMIC STABILITY: FOOD INSECURITY: WITHIN THE PAST 12 MONTHS, YOU WORRIED THAT YOUR FOOD WOULD RUN OUT BEFORE YOU GOT MONEY TO BUY MORE.: NEVER TRUE

## 2021-03-23 SDOH — ECONOMIC STABILITY: TRANSPORTATION INSECURITY
IN THE PAST 12 MONTHS, HAS LACK OF TRANSPORTATION KEPT YOU FROM MEETINGS, WORK, OR FROM GETTING THINGS NEEDED FOR DAILY LIVING?: NO

## 2021-03-23 SDOH — HEALTH STABILITY: PHYSICAL HEALTH: ON AVERAGE, HOW MANY DAYS PER WEEK DO YOU ENGAGE IN MODERATE TO STRENUOUS EXERCISE (LIKE A BRISK WALK)?: 0 DAYS

## 2021-03-23 SDOH — ECONOMIC STABILITY: FOOD INSECURITY: WITHIN THE PAST 12 MONTHS, THE FOOD YOU BOUGHT JUST DIDN'T LAST AND YOU DIDN'T HAVE MONEY TO GET MORE.: NEVER TRUE

## 2021-03-23 SDOH — SOCIAL STABILITY: SOCIAL INSECURITY: WITHIN THE LAST YEAR, HAVE YOU BEEN AFRAID OF YOUR PARTNER OR EX-PARTNER?: NO

## 2021-03-23 SDOH — SOCIAL STABILITY: SOCIAL INSECURITY: WITHIN THE LAST YEAR, HAVE YOU BEEN HUMILIATED OR EMOTIONALLY ABUSED IN OTHER WAYS BY YOUR PARTNER OR EX-PARTNER?: NO

## 2021-03-23 SDOH — SOCIAL STABILITY: SOCIAL NETWORK: IN A TYPICAL WEEK, HOW MANY TIMES DO YOU TALK ON THE PHONE WITH FAMILY, FRIENDS, OR NEIGHBORS?: NEVER

## 2021-03-23 SDOH — HEALTH STABILITY: PHYSICAL HEALTH: ON AVERAGE, HOW MANY MINUTES DO YOU ENGAGE IN EXERCISE AT THIS LEVEL?: 0 MIN

## 2021-03-23 SDOH — SOCIAL STABILITY: SOCIAL INSECURITY
WITHIN THE LAST YEAR, HAVE TO BEEN RAPED OR FORCED TO HAVE ANY KIND OF SEXUAL ACTIVITY BY YOUR PARTNER OR EX-PARTNER?: NO

## 2021-03-23 SDOH — SOCIAL STABILITY: SOCIAL INSECURITY
WITHIN THE LAST YEAR, HAVE YOU BEEN KICKED, HIT, SLAPPED, OR OTHERWISE PHYSICALLY HURT BY YOUR PARTNER OR EX-PARTNER?: NO

## 2021-03-23 NOTE — LETTER
[facility]   [address], [place] [address]  March 23, 2021    Chava Lincoln  [address]  [facility] [address]      Dear Chava,    I am a clinic care coordinator who works with Erasmo Michael MD at [facility]. I wanted to thank you for spending the time to talk with me.  Below is a description of clinic care coordination and how I can further assist you.      The goal of clinic care coordination is to help you manage your health and improve access to the health care system in the most efficient manner. The team can assist you in meeting your health care goals by providing education, coordinating services, strengthening the communication among your providers and supporting you with any resource needs.    Please feel free to contact me at [phone] with any questions or concerns. We are focused on providing you with the highest-quality healthcare experience possible and that all starts with you.     Sincerely,   ZAK Sprague  Clinic Care Coordinator  [phone]  [email or web address]

## 2021-03-23 NOTE — LETTER
[facility]  Complex Care Plan  About Me:    Patient Name:  Chava Lincoln    YOB: 1929  Age:         89 year old   Jes MRN:    [id number] Telephone Information:  Home Phone [phone]   Mobile Not on file.       Address:  [address]  [facility] [address] Email address:  No e-mail address on record      Emergency Contact(s)    Name Relationship Lgl Grd Work Phone Home Phone Mobile Phone           Primary language:  English     needed? Data Unavailable   Jes Language Services:  [phone] op. 1  Other communication barriers:    Preferred Method of Communication:     Current living arrangement: I live in a private home with spouse  Mobility Status/ Medical Equipment: Independent w/Device    Health Maintenance  Health Maintenance Reviewed: Up to date    My Access Plan  Medical Emergency 911   Primary Clinic Line   - [facility] [phone]   Behavioral Health Crisis Line The National Suicide Prevention Lifeline at 1-967.457.6416 or 913             My Care Team Members  Patient Care Team       Relationship Specialty Notifications Start End    Erasmo Michael MD PCP - General Family Medicine  3/12/21     Phone: [phone] Fax: [phone]         [facility] [address]  [place] [address]    Katalina Benitez, LSW Lead Care Coordinator   3/23/21             My Care Plans  Self Management and Treatment Plan  Goals and (Comments)  Goals        General    1.Functional (pt-stated)              Advance Care Plans/Directives Type:   Type Advanced Care Plans/Directives: Advanced Directive - On File    My Medical and Care Information  Problem List   There is no problem list on file for this patient.     Current Medications and Allergies:  See printed Medication Report.    Care Coordination Start Date: 3/23/2021   Frequency of Care Coordination: monthly   Form Last Updated: 03/23/2021

## 2021-03-23 NOTE — PROGRESS NOTES
Clinic Care Coordination Contact    Clinic Care Coordination Contact  OUTREACH    Referral Information:  Referral Source: PCP     [place] Utilization:   Clinic Utilization: [facility]     Clinical Concerns:  Current Medical Concerns:  JANET ARREOLA spoke to patient's wife today to discuss patient needs. Patient's wife is pt's main caretaker. She helps him with all ADL's, medication, feeding, and using the bathroom. The patient has a chair lift and utilizes a wheelchair outside/inside of the home. Patient's wife is experiencing some care taker burnout and would like to get some in home care, she is thinking some PCA would be helpful. Patient is very dependent on wife for support. JANET ARREOLA discussed looking into [facility] benefits for patient for long term care; PCA. JANET ARREOLA will discuss with CC about getting patient a home care evaluation for support.  Current Behavioral Concerns: Patient mood is positive.   Education Provided to patient: JANET ARREOLA called and spoke with patient; introduced self, discussed role of Care Coordination, and explained reason for call.  Pain  Pain (GOAL):: No  Health Maintenance Reviewed: Up to date  Clinical Pathway: None    Medication Management:  Wife manages medications     Functional Status:  Dependent ADLs:: Bathing, Dressing, Eating, Grooming, Incontinence, Transfers  Dependent IADLs:: Cleaning, Cooking, Laundry, Shopping, Transportation, Meal Preparation, Money Management, Medication Management  Bed or wheelchair confined:: No  Mobility Status: Independent w/Device  Fallen 2 or more times in the past year?: No  Any fall with injury in the past year?: No    Living Situation:  Current living arrangement:: I live in a private home with spouse  Type of residence:: Private home - stairs    Lifestyle & Psychosocial Needs:  Lifestyle     Physical activity     Days per week: 0 days     Minutes per session: 0 min     Stress: Only a little     Social Needs     Financial resource strain:  Not hard at all     Food insecurity     Worry: Never true     Inability: Never true     Transportation needs     Medical: No     Non-medical: No     Diet:: Regular  Inadequate nutrition (GOAL):: No  Tube Feeding: No  Inadequate activity/exercise (GOAL):: No  Significant changes in sleep pattern (GOAL): No  Transportation means:: Family     Tenriism or spiritual beliefs that impact treatment:: No  Mental health DX:: No  Mental health management concern (GOAL):: No  Informal Support system:: Family, Friends   Socioeconomic History     Marital status:      Spouse name: Santa     Number of children: 0     Years of education: Not on file     Highest education level: 12th grade   Occupational History     Occupation: Retired    Relationships     Social connections     Talks on phone: Never     Gets together: Never     Attends U[employer] service: Never     Active member of club or organization: No     Attends meetings of clubs or organizations: Never     Relationship status:      Intimate partner violence     Fear of current or ex partner: No     Emotionally abused: No     Physically abused: No     Forced sexual activity: No     Tobacco Use     Smoking status: Never Smoker     Smokeless tobacco: Never Used   Substance and Sexual Activity     Alcohol use: Not Currently     Drug use: Never     Sexual activity: Not Currently     Partners: Female        Resources and Interventions:  Current Resources: None, wife is patient's main caretaker.     Community Resources: None  Supplies used at home:: Incontinence Supplies, Wipes  Equipment Currently Used at Home: walker, rolling, grab bar, tub/shower, wheelchair, manual  Employment Status: disabled, retired)      Advance Care Plan/Directive  Advanced Care Plans/Directives on file:: Yes  Type Advanced Care Plans/Directives: Advanced Directive - On File  Advanced Care Plan/Directive Status: Not Applicable    Referrals Placed: PCA, Home Care     Goals:   Goals        General     1.Functional (pt-stated)           Patient/Caregiver understanding: Patient verbalized understanding, engaged in AIDET communication during patient encounter.    Outreach Frequency: monthly      Plan:   -Wife to look into [facility] benefits for PCA  - CC SW to get a home care evaluation with CC's help      ZAK Sprague  Clinic Care Coordinator  [phone]  [email or web address]

## 2021-03-24 ENCOUNTER — OFFICE VISIT - HEALTHEAST (OUTPATIENT)
Dept: CARDIOLOGY | Facility: CLINIC | Age: 86
End: 2021-03-24

## 2021-03-24 DIAGNOSIS — I71.21 ASCENDING AORTIC ANEURYSM (H): ICD-10-CM

## 2021-03-24 DIAGNOSIS — E78.5 DYSLIPIDEMIA: ICD-10-CM

## 2021-03-24 DIAGNOSIS — I50.32 CHRONIC DIASTOLIC CHF (CONGESTIVE HEART FAILURE), NYHA CLASS 3 (H): ICD-10-CM

## 2021-03-24 DIAGNOSIS — I10 ESSENTIAL HYPERTENSION: ICD-10-CM

## 2021-03-24 DIAGNOSIS — I25.10 CORONARY ARTERY DISEASE INVOLVING NATIVE CORONARY ARTERY OF NATIVE HEART WITHOUT ANGINA PECTORIS: ICD-10-CM

## 2021-03-24 DIAGNOSIS — E66.01 MORBID OBESITY (H): ICD-10-CM

## 2021-03-24 LAB
ANION GAP SERPL CALCULATED.3IONS-SCNC: 10 MMOL/L (ref 5–18)
BUN SERPL-MCNC: 24 MG/DL (ref 8–28)
CALCIUM SERPL-MCNC: 8.8 MG/DL (ref 8.5–10.5)
CHLORIDE BLD-SCNC: 97 MMOL/L (ref 98–107)
CO2 SERPL-SCNC: 26 MMOL/L (ref 22–31)
CREAT SERPL-MCNC: 1.03 MG/DL (ref 0.7–1.3)
ERYTHROCYTE [DISTWIDTH] IN BLOOD BY AUTOMATED COUNT: 12.6 % (ref 11–14.5)
GFR SERPL CREATININE-BSD FRML MDRD: >60 ML/MIN/1.73M2
GLUCOSE BLD-MCNC: 151 MG/DL (ref 70–125)
HCT VFR BLD AUTO: 46.5 % (ref 40–54)
HGB BLD-MCNC: 15.5 G/DL (ref 14–18)
MCH RBC QN AUTO: 31.4 PG (ref 27–34)
MCHC RBC AUTO-ENTMCNC: 33.3 G/DL (ref 32–36)
MCV RBC AUTO: 94 FL (ref 80–100)
PLATELET # BLD AUTO: 223 THOU/UL (ref 140–440)
PMV BLD AUTO: 10.3 FL (ref 8.5–12.5)
POTASSIUM BLD-SCNC: 3.7 MMOL/L (ref 3.5–5)
RBC # BLD AUTO: 4.94 MILL/UL (ref 4.4–6.2)
SODIUM SERPL-SCNC: 133 MMOL/L (ref 136–145)
WBC: 7.8 THOU/UL (ref 4–11)

## 2021-03-24 RX ORDER — NYSTATIN 100000/G
1 POWDER (GRAM) TOPICAL PRN
Status: SHIPPED | COMMUNITY
Start: 2021-03-24 | End: 2021-09-20

## 2021-04-09 ENCOUNTER — COMMUNICATION - HEALTHEAST (OUTPATIENT)
Dept: SCHEDULING | Facility: CLINIC | Age: 86
End: 2021-04-09

## 2021-04-12 ENCOUNTER — OFFICE VISIT - HEALTHEAST (OUTPATIENT)
Dept: GERIATRICS | Facility: CLINIC | Age: 86
End: 2021-04-12

## 2021-04-12 ENCOUNTER — RECORDS - HEALTHEAST (OUTPATIENT)
Dept: LAB | Facility: CLINIC | Age: 86
End: 2021-04-12

## 2021-04-12 DIAGNOSIS — R60.0 BILATERAL LOWER EXTREMITY EDEMA: ICD-10-CM

## 2021-04-12 DIAGNOSIS — J96.01 ACUTE HYPOXEMIC RESPIRATORY FAILURE (H): ICD-10-CM

## 2021-04-12 DIAGNOSIS — E66.01 MORBID OBESITY (H): ICD-10-CM

## 2021-04-12 DIAGNOSIS — N18.30 STAGE 3 CHRONIC KIDNEY DISEASE, UNSPECIFIED WHETHER STAGE 3A OR 3B CKD (H): ICD-10-CM

## 2021-04-12 DIAGNOSIS — I27.20 PULMONARY HYPERTENSION (H): ICD-10-CM

## 2021-04-12 DIAGNOSIS — I50.33 ACUTE ON CHRONIC HEART FAILURE WITH PRESERVED EJECTION FRACTION (H): ICD-10-CM

## 2021-04-12 LAB
SARS-COV-2 PCR COMMENT: NORMAL
SARS-COV-2 RNA SPEC QL NAA+PROBE: NEGATIVE
SARS-COV-2 VIRUS SPECIMEN SOURCE: NORMAL

## 2021-04-12 ASSESSMENT — MIFFLIN-ST. JEOR: SCORE: 1919.97

## 2021-04-13 ENCOUNTER — COMMUNICATION - HEALTHEAST (OUTPATIENT)
Dept: GERIATRICS | Facility: CLINIC | Age: 86
End: 2021-04-13

## 2021-04-13 LAB
ANION GAP SERPL CALCULATED.3IONS-SCNC: 11 MMOL/L (ref 5–18)
BUN SERPL-MCNC: 32 MG/DL (ref 8–28)
CALCIUM SERPL-MCNC: 9 MG/DL (ref 8.5–10.5)
CHLORIDE BLD-SCNC: 92 MMOL/L (ref 98–107)
CO2 SERPL-SCNC: 28 MMOL/L (ref 22–31)
CREAT SERPL-MCNC: 1.05 MG/DL (ref 0.7–1.3)
ERYTHROCYTE [DISTWIDTH] IN BLOOD BY AUTOMATED COUNT: 12.7 % (ref 11–14.5)
GFR SERPL CREATININE-BSD FRML MDRD: >60 ML/MIN/1.73M2
GLUCOSE BLD-MCNC: 160 MG/DL (ref 70–125)
HCT VFR BLD AUTO: 43.3 % (ref 40–54)
HGB BLD-MCNC: 14.9 G/DL (ref 14–18)
MAGNESIUM SERPL-MCNC: 1.9 MG/DL (ref 1.8–2.6)
MCH RBC QN AUTO: 32 PG (ref 27–34)
MCHC RBC AUTO-ENTMCNC: 34.4 G/DL (ref 32–36)
MCV RBC AUTO: 93 FL (ref 80–100)
PLATELET # BLD AUTO: 250 THOU/UL (ref 140–440)
PMV BLD AUTO: 10.3 FL (ref 8.5–12.5)
POTASSIUM BLD-SCNC: 4 MMOL/L (ref 3.5–5)
RBC # BLD AUTO: 4.65 MILL/UL (ref 4.4–6.2)
SODIUM SERPL-SCNC: 131 MMOL/L (ref 136–145)
WBC: 10.6 THOU/UL (ref 4–11)

## 2021-04-14 ENCOUNTER — OFFICE VISIT - HEALTHEAST (OUTPATIENT)
Dept: GERIATRICS | Facility: CLINIC | Age: 86
End: 2021-04-14

## 2021-04-14 DIAGNOSIS — N18.30 STAGE 3 CHRONIC KIDNEY DISEASE, UNSPECIFIED WHETHER STAGE 3A OR 3B CKD (H): ICD-10-CM

## 2021-04-14 DIAGNOSIS — I27.20 PULMONARY HYPERTENSION (H): ICD-10-CM

## 2021-04-14 DIAGNOSIS — J96.01 ACUTE HYPOXEMIC RESPIRATORY FAILURE (H): ICD-10-CM

## 2021-04-14 DIAGNOSIS — E66.01 MORBID OBESITY (H): ICD-10-CM

## 2021-04-14 DIAGNOSIS — R60.0 BILATERAL LOWER EXTREMITY EDEMA: ICD-10-CM

## 2021-04-14 DIAGNOSIS — I50.33 ACUTE ON CHRONIC HEART FAILURE WITH PRESERVED EJECTION FRACTION (H): ICD-10-CM

## 2021-04-14 ASSESSMENT — MIFFLIN-ST. JEOR: SCORE: 1941.75

## 2021-04-15 ENCOUNTER — AMBULATORY - HEALTHEAST (OUTPATIENT)
Dept: GERIATRICS | Facility: CLINIC | Age: 86
End: 2021-04-15

## 2021-04-15 ENCOUNTER — COMMUNICATION - HEALTHEAST (OUTPATIENT)
Dept: CARDIOLOGY | Facility: CLINIC | Age: 86
End: 2021-04-15

## 2021-04-15 DIAGNOSIS — I10 ESSENTIAL HYPERTENSION: ICD-10-CM

## 2021-04-15 RX ORDER — METOPROLOL TARTRATE 50 MG
50 TABLET ORAL 2 TIMES DAILY
Qty: 180 TABLET | Refills: 0 | Status: SHIPPED | OUTPATIENT
Start: 2021-04-15 | End: 2021-08-30

## 2021-04-16 ENCOUNTER — AMBULATORY - HEALTHEAST (OUTPATIENT)
Dept: PHYSICAL MEDICINE AND REHAB | Facility: CLINIC | Age: 86
End: 2021-04-16

## 2021-04-16 ENCOUNTER — RECORDS - HEALTHEAST (OUTPATIENT)
Dept: ADMINISTRATIVE | Facility: OTHER | Age: 86
End: 2021-04-16

## 2021-04-16 ENCOUNTER — RECORDS - HEALTHEAST (OUTPATIENT)
Dept: LAB | Facility: CLINIC | Age: 86
End: 2021-04-16

## 2021-04-16 DIAGNOSIS — M54.2 CERVICALGIA: ICD-10-CM

## 2021-04-18 LAB — BACTERIA SPEC CULT: NORMAL

## 2021-04-20 ENCOUNTER — OFFICE VISIT - HEALTHEAST (OUTPATIENT)
Dept: CARDIOLOGY | Facility: CLINIC | Age: 86
End: 2021-04-20

## 2021-04-20 ENCOUNTER — COMMUNICATION - HEALTHEAST (OUTPATIENT)
Dept: CARDIOLOGY | Facility: CLINIC | Age: 86
End: 2021-04-20

## 2021-04-20 DIAGNOSIS — R06.09 DYSPNEA ON EXERTION: ICD-10-CM

## 2021-04-20 DIAGNOSIS — I10 ESSENTIAL HYPERTENSION: ICD-10-CM

## 2021-04-20 DIAGNOSIS — I50.32 CHRONIC HEART FAILURE WITH PRESERVED EJECTION FRACTION (H): ICD-10-CM

## 2021-04-20 RX ORDER — MULTIVIT-MIN/FA/LYCOPEN/LUTEIN .4-300-25
1 TABLET ORAL DAILY
Status: SHIPPED | COMMUNITY
Start: 2021-04-11 | End: 2022-06-02

## 2021-04-20 RX ORDER — ACETAMINOPHEN 500 MG
1000 TABLET ORAL AT BEDTIME
Status: SHIPPED | COMMUNITY
Start: 2021-04-20

## 2021-04-20 RX ORDER — IPRATROPIUM BROMIDE AND ALBUTEROL 20; 100 UG/1; UG/1
1 SPRAY, METERED RESPIRATORY (INHALATION) PRN
Status: SHIPPED | COMMUNITY
Start: 2021-04-19 | End: 2022-06-02

## 2021-04-29 ENCOUNTER — HOSPITAL ENCOUNTER (OUTPATIENT)
Dept: PHYSICAL MEDICINE AND REHAB | Facility: CLINIC | Age: 86
Discharge: HOME | End: 2021-04-29
Attending: FAMILY MEDICINE

## 2021-04-29 DIAGNOSIS — M47.816 LUMBAR FACET JOINT SYNDROME: ICD-10-CM

## 2021-04-29 DIAGNOSIS — G89.29 CHRONIC BILATERAL LOW BACK PAIN WITHOUT SCIATICA: ICD-10-CM

## 2021-04-29 DIAGNOSIS — M47.816 LUMBAR FACET ARTHROPATHY: ICD-10-CM

## 2021-04-29 DIAGNOSIS — M54.50 CHRONIC BILATERAL LOW BACK PAIN WITHOUT SCIATICA: ICD-10-CM

## 2021-04-29 DIAGNOSIS — M54.2 NECK PAIN: ICD-10-CM

## 2021-04-29 ASSESSMENT — MIFFLIN-ST. JEOR: SCORE: 1912.72

## 2021-04-30 ENCOUNTER — COMMUNICATION - HEALTHEAST (OUTPATIENT)
Dept: PHYSICAL MEDICINE AND REHAB | Facility: CLINIC | Age: 86
End: 2021-04-30

## 2021-05-05 ENCOUNTER — PATIENT OUTREACH (OUTPATIENT)
Dept: CARE COORDINATION | Facility: CLINIC | Age: 86
End: 2021-05-05

## 2021-05-05 DIAGNOSIS — Z65.9 PSYCHOSOCIAL PROBLEM: Primary | ICD-10-CM

## 2021-05-05 NOTE — PROGRESS NOTES
Clinic Care Coordination Contact  [facility]       Clinical Data: Care Coordinator Outreach  Outreach attempted x 1.  Left message on patient's voicemail with call back information and requested return call.  Plan:  Care Coordinator will do no further outreaches at this time. Patient is not in scope, CC SW will ensure CC will follow up if patient/patient's has questions or concerns.       ZAK Sprague  Clinic Care Coordinator  [phone]  [email or web address]

## 2021-05-17 ENCOUNTER — RECORDS - HEALTHEAST (OUTPATIENT)
Dept: LAB | Facility: CLINIC | Age: 86
End: 2021-05-17

## 2021-05-17 LAB
ANION GAP SERPL CALCULATED.3IONS-SCNC: 11 MMOL/L (ref 5–18)
BNP SERPL-MCNC: 84 PG/ML (ref 0–93)
BUN SERPL-MCNC: 22 MG/DL (ref 8–28)
CALCIUM SERPL-MCNC: 8.5 MG/DL (ref 8.5–10.5)
CHLORIDE BLD-SCNC: 89 MMOL/L (ref 98–107)
CO2 SERPL-SCNC: 24 MMOL/L (ref 22–31)
CREAT SERPL-MCNC: 1 MG/DL (ref 0.7–1.3)
ERYTHROCYTE [DISTWIDTH] IN BLOOD BY AUTOMATED COUNT: 12.4 % (ref 11–14.5)
GFR SERPL CREATININE-BSD FRML MDRD: >60 ML/MIN/1.73M2
GLUCOSE BLD-MCNC: 180 MG/DL (ref 70–125)
HCT VFR BLD AUTO: 40.6 % (ref 40–54)
HGB BLD-MCNC: 13.9 G/DL (ref 14–18)
MCH RBC QN AUTO: 30.8 PG (ref 27–34)
MCHC RBC AUTO-ENTMCNC: 34.2 G/DL (ref 32–36)
MCV RBC AUTO: 90 FL (ref 80–100)
PLATELET # BLD AUTO: 224 THOU/UL (ref 140–440)
PMV BLD AUTO: 10.5 FL (ref 8.5–12.5)
POTASSIUM BLD-SCNC: 4.3 MMOL/L (ref 3.5–5)
RBC # BLD AUTO: 4.52 MILL/UL (ref 4.4–6.2)
SODIUM SERPL-SCNC: 124 MMOL/L (ref 136–145)
WBC: 8.5 THOU/UL (ref 4–11)

## 2021-05-18 ENCOUNTER — RECORDS - HEALTHEAST (OUTPATIENT)
Dept: ADMINISTRATIVE | Facility: OTHER | Age: 86
End: 2021-05-18

## 2021-05-18 ENCOUNTER — RECORDS - HEALTHEAST (OUTPATIENT)
Dept: CARDIOLOGY | Facility: CLINIC | Age: 86
End: 2021-05-18

## 2021-05-24 ENCOUNTER — COMMUNICATION - HEALTHEAST (OUTPATIENT)
Dept: CARDIOLOGY | Facility: CLINIC | Age: 86
End: 2021-05-24

## 2021-05-26 VITALS — TEMPERATURE: 97.7 F | HEART RATE: 56 BPM | DIASTOLIC BLOOD PRESSURE: 70 MMHG | SYSTOLIC BLOOD PRESSURE: 160 MMHG

## 2021-05-27 ENCOUNTER — AMBULATORY - HEALTHEAST (OUTPATIENT)
Dept: CARDIOLOGY | Facility: CLINIC | Age: 86
End: 2021-05-27

## 2021-05-27 ENCOUNTER — OFFICE VISIT - HEALTHEAST (OUTPATIENT)
Dept: CARDIOLOGY | Facility: CLINIC | Age: 86
End: 2021-05-27

## 2021-05-27 DIAGNOSIS — I50.32 CHRONIC HEART FAILURE WITH PRESERVED EJECTION FRACTION (H): ICD-10-CM

## 2021-05-27 LAB
ANION GAP SERPL CALCULATED.3IONS-SCNC: 9 MMOL/L (ref 5–18)
BUN SERPL-MCNC: 22 MG/DL (ref 8–28)
CALCIUM SERPL-MCNC: 8.7 MG/DL (ref 8.5–10.5)
CHLORIDE BLD-SCNC: 90 MMOL/L (ref 98–107)
CO2 SERPL-SCNC: 27 MMOL/L (ref 22–31)
CREAT SERPL-MCNC: 0.93 MG/DL (ref 0.7–1.3)
GFR SERPL CREATININE-BSD FRML MDRD: >60 ML/MIN/1.73M2
GLUCOSE BLD-MCNC: 144 MG/DL (ref 70–125)
POTASSIUM BLD-SCNC: 3.9 MMOL/L (ref 3.5–5)
SODIUM SERPL-SCNC: 126 MMOL/L (ref 136–145)

## 2021-05-27 NOTE — PROGRESS NOTES
Chava Lincoln has participated in 7  sessions of Phase II Cardiac Rehab.    Progress Report:   Cardiac Rehab Treatment Progress Report 3/12/2019 3/14/2019 3/19/2019 3/21/2019 3/26/2019   Weight 279 lbs 3 oz 281 lbs 6 oz 282 lbs 3 oz 281 lbs 282 lbs 3 oz   Pre Exercise  HR 57 66 54 58 68   Pre Exercise /60 124/60 102/62 118/68 124/64   Pre Blood Sugar (mg/dl) - - - - -   Nustep Peak Heart Rate 66 75 64 67 78   Nustep Peak Blood Pressure 148/78 142/70 152/70 140/68 134/72   Heart Rate 60 61 58 61 65   Post Exercise /60 128/76 126/60 112/70 122/62   ECG SB/SR/SA w/1'AVB SR/SA with 1'AVB SR/SA with 1'AVB SR/SA with 1'AVB SR/SA w/1'AVB   Total Exercise Minutes 26.5 31.5 32.5 35.5 32.5         Current Status:  Symptomatic Fatigue, SOB - O2's 88-91% on RA with exertion- pt will be having home O2 evaluation per his pulmonologist. L knee pain Resting O2 91% and Therapists Comments: Pt is deconditioned but is progressing well. Denies any chest pain and progressing as tolerated.     If Physician recommends change in treatment plan, please place orders.        __________________________________________________      _____________  Signature                                                                                                  Date

## 2021-05-27 NOTE — PROGRESS NOTES
4/17/19 @ 0957 MARK Melendez called from a Pulmonary Clinic stating that pt is currently not being seen. Call RN back about Mauricio.     @1005 Called RN back and she stated that Mauricio's wife, called the pulmonary clinic with concerns with O2 sats from CR. Pt had 1 reading of O2 sats at 86%-continue to monitor and if he continues to have < 88% with exertion than notify pulmonary RN- Nora at [phone]. (The regular RN for his Pulmonary MD is out of office currently)    Upon speaking with Nora-AMRK from Pulmonary clinic noted they are not currently follow this patient anymore. If we continue to have concerns to notify his Cardiologist: Dr. Sheridan and see if they want to do further testing for O2 needs.    Zaira SLADE, EP  [phone]

## 2021-05-27 NOTE — PROGRESS NOTES
"Thank you for asking the [facility] Heart Care team to see Chava Lincoln.      Assessment/Plan:   Exertional dyspnea, somewhat improved since the PCI of the large ramus branch. We again discussed that Mauricio has several reasons to be short of breath, including his obesity, right diaphragm hemiparalysis, RAMON, history of pulmonary emboli with residual low oxygen saturations, and his coronary disease. I again reviewed that treatment of his distal right coronary would be difficult due to the severe calcification and tortuosity of the artery.  I thought that the risk for a severe complication with this high risk procedure would be around 4-5%, up from the typical 1-2%. We discussed that PCI will not prevent a heart attack going forward, but would simply be for symptom management. I estimated that he would see as much incremental symptomatic benefit from PCI of the RCA as he did from the ramus (similar sized territories).     At this time Mauricio feels comfortable with his activity level and is not interested in going through another procedure. He is set to f/u with Dr. Sheridan and will call if he has any further questions. Mauricio has recovered well from his ramus intervention.      Current History:   Chava Lincoln is a 89 y.o. with severe back and hip pain with weak legs s/p a \"spinal ablation\" procedure in the past year. He has a history of pulmonary emboli and is on anticoagulation. He also has a paralized right hemidiaphragm, tracheobronchomalacia, mild COPD and severe sleep apnea. Mauricio is compliant with his CPAP but is tired all of the time anyway. He underwent a stress test for complaints of increased dyspnea on exertion, primarily on the stairs, but no angina. The stress test showed inferior and inferior lateral ischmia with right ventricular enhancement, EF 70%. Angiogram in December showed diffuse severe coronary calcification. His right coronary was tortuous with a moderate-severe mid RCA stenosis and a severe " distal RCA stenosis at the bifurcation. There was moderate ostial left main disease, a hazy proximal LAD lesion and severe disease in the ostial and proximal ramus, which is a large artery that feeds most of the lateral wall. Mauricio was hypoxic when he presented to his angiogram and his wife noted that his oxygen levels are always low. Mauricio was discussed at cath conference. Echo has shown that his systolic function is normal and he has not clinically suffered from heart failure. He underwent PCI of the large ramus February 4th without complication.     He returns for f/u from his PCI today. Mauricio notes some improvement in his exertional dyspnea since his PCI. He still has dyspnea with stairs but had returned to riding his exercise bike in the basement. There continues to be no chest pain.     Past Medical History:     Past Medical History:   Diagnosis Date     Coronary artery disease due to calcified coronary lesion      GERD (gastroesophageal reflux disease) 7/2/2014     Hypertension      RAMON on CPAP 4/17/2017     Prostate cancer (H)      Prostate cancer (H)        Past Surgical History:     Past Surgical History:   Procedure Laterality Date     CV CORONARY ANGIOGRAM N/A 12/10/2018    Procedure: Coronary Angiogram;  Surgeon: Tana Duncan MD;  Location: [facility] Cath Lab;  Service: Cardiology     CV LEFT HEART CATHETERIZATION WO LEFT VETRICULOGRAM Left 12/10/2018    Procedure: Left Heart Catheterization Without Left Ventriculogram;  Surgeon: Tana Duncan MD;  Location: [facility] Cath Lab;  Service: Cardiology     CV LEFT HEART CATHETERIZATION WO LEFT VETRICULOGRAM Left 2/4/2019    Procedure: Left Heart Catheterization Without Left Ventriculogram;  Surgeon: Tana Duncan MD;  Location: [facility] Cath Lab;  Service: Cardiology     CV RIGHT HEART CATH N/A 2/4/2019    Procedure: Right Heart Catheterization;  Surgeon: Tana Duncan MD;  Location: [facility] Cath Lab;  Service: Cardiology     HERNIA  "REPAIR       JOINT REPLACEMENT       KNEE SURGERY Bilateral        Family History:     Family History   Problem Relation Age of Onset     Diabetes Mother      Stroke Father      Heart disease Brother      Coronary artery disease Brother        Social History:    reports that  has never smoked. he has never used smokeless tobacco. He reports that he drinks alcohol. He reports that he does not use drugs.    Meds:     Current Outpatient Medications   Medication Sig     aspirin 81 MG EC tablet Take 81 mg by mouth every evening.      clopidogrel (PLAVIX) 75 mg tablet Take 1 tablet (75 mg total) by mouth daily.     isosorbide mononitrate (IMDUR) 30 MG 24 hr tablet Take 1 tablet (30 mg total) by mouth daily.     losartan-hydrochlorothiazide (HYZAAR) 100-25 mg per tablet Take 1 tablet by mouth daily.     metoprolol tartrate (LOPRESSOR) 25 MG tablet Take 1 tablet (25 mg total) by mouth 2 (two) times a day.     mirabegron (MYRBETRIQ) 50 mg Tb24 ER tablet Take 50 mg by mouth daily.     multivitamin with minerals (THERA-M) 9 mg iron-400 mcg Tab tablet Take 1 tablet by mouth daily.     oxybutynin (DITROPAN XL) 15 MG 24 hr tablet Take 15 mg by mouth daily.     ranitidine (ZANTAC) 150 MG capsule Take 150 mg by mouth every evening.     rosuvastatin (CRESTOR) 20 MG tablet Take 1 tablet (20 mg total) by mouth at bedtime.       Allergies:   Lipitor [atorvastatin]; Lisinopril; Morphine; Sulfa (sulfonamide antibiotics); and Vicodin [hydrocodone-acetaminophen]    Review of Systems:   Review of Systems:   General: WNL  Eyes: WNL  Ears/Nose/Throat: WNL  Lungs: Shortness of Breath  Heart: Shortness of Breath with activity, Leg Swelling  Stomach: WNL  Bladder: Frequent Urination at Night  Muscle/Joints: WNL  Skin: WNL  Nervous System: Loss of Balance  Mental Health: WNL     Blood: WNL       Objective:      Physical Exam  @LASTENCWT:3@  5' 9\" (1.753 m)  @BMI:3@  /76 (Patient Site: Left Arm, Patient Position: Sitting, Cuff Size: Adult " "Regular)   Pulse 62   Resp 16   Ht 5' 9\" (1.753 m)   Wt (!) 280 lb (127 kg)   BMI 41.35 kg/m      General Appearance:   Alert, cooperative and in no acute distress.   HEENT:  No scleral icterus; the mucous membranes were pink and moist.   Neck: JVP flat. No thyromegaly. No HJR   Chest: The spine was straight. The chest was symmetric.   Lungs:   Respirations unlabored; the lungs are clear to auscultation.   Cardiovascular:   S1 and S2 normal and without murmur. No clicks or rubs. No carotid bruits noted. Right DP, PT, and radial pulses 2+. Left DP, PT, and radial pulses 2+.   Abdomen:  No organomegaly, masses, bruits, or tenderness. Bowels sounds are present   Extremities: No cyanosis, clubbing, or edema.   Skin: No xanthelasma.   Neurologic: Mood and affect are appropriate.         Lab Review   Lab Results   Component Value Date     02/05/2019     02/04/2019     12/10/2018    K 3.7 02/05/2019    K 4.0 02/04/2019    K 4.0 12/10/2018     02/05/2019     02/04/2019     12/10/2018    CO2 22 02/05/2019    CO2 25 02/04/2019    CO2 25 12/10/2018    BUN 20 02/05/2019    BUN 21 02/04/2019    BUN 17 12/10/2018    CREATININE 0.90 02/05/2019    CREATININE 0.98 02/04/2019    CREATININE 0.84 12/10/2018    CALCIUM 9.1 02/05/2019    CALCIUM 9.2 02/04/2019    CALCIUM 9.6 12/10/2018     Lab Results   Component Value Date    WBC 6.8 02/04/2019    WBC 7.3 12/10/2018    WBC 12.0 (H) 09/23/2018    WBC 9.0 05/22/2015    HGB 13.4 (L) 02/05/2019    HGB 14.0 02/04/2019    HGB 14.4 02/04/2019    HCT 43.1 02/04/2019    HCT 44.0 12/10/2018    HCT 43.7 09/23/2018    MCV 94 02/04/2019    MCV 95 12/10/2018    MCV 96 09/23/2018     02/05/2019     02/04/2019     12/10/2018     Lab Results   Component Value Date    CHOL 114 02/05/2019    CHOL 151 12/10/2018    CHOL 156 06/25/2018    TRIG 99 02/05/2019    TRIG 103 12/10/2018    TRIG 174 (H) 06/25/2018    HDL 41 02/05/2019    HDL 51 " 12/10/2018    HDL 44 06/25/2018     Lab Results   Component Value Date    BNP 30 09/23/2018     (H) 11/21/2016         Tana Duncan M.D.

## 2021-05-27 NOTE — TELEPHONE ENCOUNTER
"----- Message from Bonnie Abraham sent at 3/27/2019  8:38 AM CDT -----  Contact: Santa (wife)  Caller: Chava    Primary cardiologist: Dr. Sheridan    Detailed reason for call: Santa states that Chava has a dermatology appt today at 10:00am and sometimes they \"zap\" an area, because he has a lot of skin cancer. Are there any concerns given he's just had a stent? Please return call.     New or active symptoms? See above    Best phone number: [phone]  Best time to contact: Before 10:00am  Ok to leave a detailed message? Yes  Device? No  "

## 2021-05-27 NOTE — PROGRESS NOTES
ITP ASSESSMENT   Assessment Day: 30 Day    Session Number: 9  Precautions: Deconditioned, B knee pain, back pain, L shoulder pain    Diagnosis: Stent    Risk Stratification: High    Referring Provider: Erasmo Michael MD   ITP sent to Dr. Galindo  EXERCISE  Exercise Assessment: Reassessment                              Exercise Plan  Goals Next 30 days  ADL'S: Climb stairs with less SOB, fatigue.    Leisure: Resume mowing lawn with riding .    Work: Retired      Education Goals: Patient can state cardiac s/s and appropriate emergency response.;Has system for taking medication.;Medication review                          Goals Met  Initial ADL's goals met: Goal met- pt has resumed showering/toweling off with less help from wife.    Initial Leisure goals met: Goal met- pt has started walking/biking 2-3x/week for 5-10 mins.    Intial Work goals met: Retired      Exercise Prescription  Exercise Mode: Treadmill;Bike;Nustep;Arm Erg.;Stairs;Hallway Walking    Frequency: 2x/week    Duration: 25-40 mins    Intensity / THR: 20-30 beats above resting heart rate    RPE 11-14  Progression / Met level: 2.6-3    Resistive Training?: No      Current Exercise (mins/week): 90      Interventions  Home Exercise:  Mode: Walking, biking    Frequency: 2-3x/week    Duration: 5-10 mins      Education Material : Individual education and counseling      Education Completed  Exercise Education Completed: Cardiac Anatomy;Signs and Symptoms;Medication review;Home Exercise;Emergency Plan;RPE;Warm up/cool down;FITT Principles;BP/HR Reponse to exercise;Stretching;Strength training;Benefits of Exercise;End point of exercise              Exercise Follow-up/Discharge  Follow up/Discharge: Pt has reached peak of 2.5 METs on Nustep.  He reports walking and biking 2-4x/week for around 5 mins per bout.           NUTRITION  Nutrition Assessment: Reassessment      Nutrition Risk Factors:  Nutrition Risk Factors:  "Dyslipidemia;Overweight  Cholesterol: 114  LDL: 53  HDL: 41  Triglycerides: 99      Nutrition Plan  Interventions  Diet Consult: Declined    Other Nutrition Intervention: Diet Class;Therapist/Pt Discussion;Educational Videos;Provide with Written Material    Initial Rate Your Plate Score: 54      Education Completed  Nutrition Education Completed: Risk factor overview;Low sodium diet      Goals  Nutrition Goals (Next 30 days): Patient can identify their risk factors for CAD;Patient will follow a low sodium diet      Goals Met  Nutrition Goals Met: Reviewed Dietitian schedule;Provided Rate your Plate Survey      Height, Weight, and  BMI  Weight: 282 lb 9.6 oz (128.2 kg)  Height: 5' 9\" (1.753 m)  BMI: 41.71      Nutrition Follow-up  Follow-up/Discharge: Pt declines diet consult.       Other Risk Factors  Other Risk Factor Assessment: Reassessment      HTN Risk Factor: Hypertension      Pre Exercise BP: 122/70  Post Exercise BP: 122/70      Hypertension Plan  Goals  HTN Goals: Follow low sodium diet      Goals Met  HTN Goals Met: Take medication as prescribed;Exercises regularly      HTN Interventions  HTN Interventions: Therapist/patient discussion;Provide written material      HTN Education Completed  HTN Education Completed: Low sodium diet;Medication review;Risk factor overview      Tobacco Risk Factor: NA           PSYCHOSOCIAL  Psychosocial Assessment: Reassessment       [place] Q of L Summary Score: 25      EVA-D Score: 13      Psychosocial Risk Factor: NA      Psychosocial Plan  Interventions  Interventions: Individual education and counseling      Education Completed  Education Completed: Relaxation/Coping Techniques      Goals  Goals (Next 30 days): Improvement in [place] CO[employer] score        Psychosocial Follow-up  Follow-up/Discharge: Pt denies regular stress.  He identifies his wife as his support system.           Patient involved in Goal setting?: Yes      Signature: " _____________________________________________________________    Date: __________________    Time: __________________

## 2021-05-27 NOTE — TELEPHONE ENCOUNTER
Wife Santa called to see if Chava needed to have oxygen for when he is at cardiac rehab.said his O2 sats are dropping after exercise. She is not sure what is needed or what his sats dropped to. Call placed to Cardiac rehab at [facility] and talked with Zaira, she said his sats start out around 92- 93 % and go down to 90, 89. Just one time they went down to 86 % and he recovered quickly. Instructed to call if this continues and we can do a walk test to see if he qualifies for O2 or they can follow with cardiology. Wife said they thought they did not need to follow up with the pulmonary clinic any more.

## 2021-05-28 ENCOUNTER — RECORDS - HEALTHEAST (OUTPATIENT)
Dept: ADMINISTRATIVE | Facility: CLINIC | Age: 86
End: 2021-05-28

## 2021-05-28 NOTE — PROGRESS NOTES
Chava Lincoln has participated in 17 sessions of Phase II Cardiac Rehab.    Progress Report: Dr. Sheridan  Cardiac Rehab Treatment Progress Report 4/16/2019 4/18/2019 4/23/2019 4/25/2019 4/30/2019   Weight 283 lbs 5 oz 283 lbs 8 oz 284 lbs 6 oz 284 lbs 284 lbs   Pre Exercise  HR 54 52 61 66 56   Pre Exercise /72 144/72 136/70 130/68 116/68   Pre Blood Sugar (mg/dl) - - - - -   Treadmill Peak HR - - - - 89   Nustep Peak Heart Rate 62 64 72 80 66   Nustep Peak Blood Pressure 132/64 162/80 154/62 142/70 166/68   Heart Rate 53 51 47 66 59   Post Exercise /60 124/76 132/64 128/68 142/80   ECG SR/SA/1'AVB with rare PVC's  SR/SA/1'AVB with PVC's SR/1'AVB with PACs SR/1'AVB with rare PVCs SR/1'AVB with PAC's, PVC's-rare   Total Exercise Minutes 43.25 43.5 38.5 40 40   Current Status:  Symptomatic SOB O2 sats 86-90% with exercise however pt recovers fairly  quickly.    If Physician recommends change in treatment plan, please place orders.    Continue cardiac rehab to 36 sessions.      __________________________________________________      _____________  Signature                                                                                                  Date

## 2021-05-28 NOTE — PROGRESS NOTES
Pt is s/p stent 2/4/19 and completed his 15th session today.  His O2 decreased to 87% during 3.5 min hallwalk, which increased back to 89-90% after 1 min of rest.  His O2 stayed above 90% while doing 35 mins on the recumbent stepper.  O2 at rest was 92%.  HR and BP were stable at rest and during ex.  Will notify pt's clinic about lower O2 per their request.    Amadou Kuhn  Cardiac Rehab Therapist

## 2021-05-28 NOTE — PROGRESS NOTES
ITP ASSESSMENT   Assessment Day: 60 Day    Session Number: 17  Precautions: Falls Risk/Balance, Deconditioned, B knee pain, back pain, L shoulder pain    Diagnosis: Stent    Risk Stratification: High    Referring Provider: Dr. Duncan  ITP: Dr. Galindo  EXERCISE  Exercise Assessment: Reassessment                         Exercise Plan  Goals Next 30 days  ADL'S: Continue to build up stamina for activities and stairs. Mock stairs in CR as pt tolerates    Leisure: Resume mowing yard with rider.     Work: Retired      Education Goals: Medication review (Medication review-Pt does not know meds, review with wife.)    Education Goals Met: Has system for taking medication.;Patient can state cardiac s/s and appropriate emergency response. (Wife has a system for patients medication)                          Goals Met  30 day ADL'S goals met: Goal not met: Pt has done the stairs but he is always SOB he states and it has not really gotten better.    30 day Leisure goals met: Goal not met: pt has not resumed rider-mowing yet.    30 day Work goals met: Retired    30 Day Progression: Pt has reached a 2.3 METs on Nustep, 3METs on Steps, 2-3METs for hallwalking. LTG is 3-4METs      Initial ADL's goals met: Goal met- pt has resumed showering/toweling off with less help from wife.    Initial Leisure goals met: Goal met- pt has started walking/biking 2-3x/week for 5-10 mins.    Intial Work goals met: Retired      Exercise Prescription  Exercise Mode: Treadmill;Bike;Nustep;Arm Erg.;Stairs;Hallway Walking    Frequency: 2x/week    Duration: 30-45minutes    Intensity / THR: 20-30 beats above resting heart rate    RPE 11-14  Progression / Met level: 2.3-4    Resistive Training?: No      Current Exercise (mins/week): 100      Interventions  Home Exercise:  Mode: Walking/TM, Biking, Seated Elliptical    Frequency: 2-3x/week    Duration: 10 minutes overall goal is to do short increments 2-3x/day      Education Material : Individual education  "and counseling;Provide written material;Educational videos;Offer educational classes      Education Completed  Exercise Education Completed: Cardiac Anatomy;Signs and Symptoms;Medication review;Home Exercise;Emergency Plan;RPE;Warm up/cool down;FITT Principles;BP/HR Reponse to exercise;Stretching;Strength training;Benefits of Exercise;End point of exercise              Exercise Follow-up/Discharge  Follow up/Discharge: Pt recently purchased a TM and Seated Elliptical.    NUTRITION  Nutrition Assessment: Reassessment    Nutrition Risk Factors:  Nutrition Risk Factors: Dyslipidemia;Overweight  Cholesterol: 2/5/19: 114  LDL: 53  HDL: 41  Triglycerides: 99    Nutrition Plan  Interventions  Diet Consult: Declined    Other Nutrition Intervention: Diet Class;Therapist/Pt Discussion;Provide with Written Material    Initial Rate Your Plate Score: 54    Follow-Up Rate Your Plate Score: 54      Education Completed  Nutrition Education Completed: Risk factor overview;Low sodium diet;Low Saturated fat diet      Goals  Nutrition Goals (Next 30 days): Patient can identify their risk factors for CAD;Patient knows appropriate portion size (Pt not interested in losing weight.)      Goals Met  Nutrition Goals Met: Reviewed Dietitian schedule;Provided Rate your Plate Survey;Completed Nutritional Risk Screen;Patient states following a low saturated fat diet;Patient follows a low sodium diet      Height, Weight, and  BMI  Weight: 284 lb (128.8 kg)  Height: 5' 9\" (1.753 m)  BMI: 41.92      Nutrition Follow-up  Follow-up/Discharge: Pt states he eats heart healthy 50% of the time. He very rarely adds salt to his foods but his wife is the cook and watches for more heart healthy food options. Pt is not interested in losing weight at this time-he wants to maintain and if he loses weight he is okay with that too.          Other Risk Factors  Other Risk Factor Assessment: Reassessment      HTN Risk Factor: Hypertension      Pre Exercise BP: " 116/68  Post Exercise BP: 128/68      Hypertension Plan  Goals  HTN Goals: Patient demonstrates understanding of HTN, no goals identified for the next 30 days      Goals Met  HTN Goals Met: Take medication as prescribed;Exercises regularly;Follow low sodium diet      HTN Interventions  HTN Interventions: Therapist/patient discussion;Provide written material;Diet consult;Offer educational classes      HTN Education Completed  HTN Education Completed: Low sodium diet;Medication review;Risk factor overview      Tobacco Risk Factor: NA       PSYCHOSOCIAL  Psychosocial Assessment: Reassessment       [place] COOP Q of L Summary Score: 25 (pre)      EVA-D Score: 13 (pre )      Psychosocial Risk Factor: NA      Psychosocial Plan  Interventions  Interventions: Individual education and counseling;Provide written material;Offer educational videos and classes      Education Completed  Education Completed: Relaxation/Coping Techniques      Goals  Goals (Next 30 days): Improvement in [place] COOP score;Patient demonstrates understanding of stress, no goals identified for the next 30 days      Goals Met  Goals Met: Identified Support system;Oriented to stress management classes;Identify stressors;Practicing stress management skills      Psychosocial Follow-up  Follow-up/Discharge: Pt denies regular stress. He enjoys sitting in his chair.             Patient involved in Goal setting?: Yes-pt wants to continue to progress with CR to LTG of 3-4

## 2021-05-29 ENCOUNTER — RECORDS - HEALTHEAST (OUTPATIENT)
Dept: ADMINISTRATIVE | Facility: CLINIC | Age: 86
End: 2021-05-29

## 2021-05-29 NOTE — PROGRESS NOTES
"ITP ASSESSMENT   Assessment Day: 120 Day (Discharge over the phone per pt wishes)    Session Number: 25  Precautions: Falls Risk/Balance, Deconditioned, B knee pain, back pain, L shoulder    Diagnosis: Stent    Risk Stratification: High    Referring Provider: Erasmo Michael MD   ITP: Dr. Galindo  EXERCISE  Exercise Assessment: Discharge       Not assessed-pt d/c'd over the phone                          Exercise Plan  Goals Next 30 days  ADL'S: Pt to exercise 3x/week at home    Leisure: Resume mowing lawn with riding ower    Work: Retired      Education Goals: Medication review    Education Goals Met: Has system for taking medication.;Patient can state cardiac s/s and appropriate emergency response.                          Goals Met  90 Day Progress: Pt has reached a 2.4-3 MET level and is wishing to be d/c'd at this time. Pt feels he was reaching a plateau and is going to continue with his exercise at home. Pt will listen to his body when resuming activities. Pt feels he has increased his endurance \"a little\" but didn't feel much improvement more recently and wishes to be d/c'd. Didn't address specific goals he just feels he is ready to be done .      60 day ADL'S goals met: Pt states he feels his stamina has improved \"a little\", tolerating home exercise better    60 day Leisure goals met: Pt has not tried mowing yet    60 day Work goals met: Retired    60 Day Progression: Pt has reached 2.4 MET level on nustep      30 day ADL'S goals met: Goal not met: Pt has done the stairs but he is always SOB he states and it has not really gotten better.    30 day Leisure goals met: Goal not met: pt has not resumed rider-mowing yet.    30 day Work goals met: Retired    30 Day Progression: Pt has reached a 2.3 METs on Nustep, 3METs on Steps, 2-3METs for hallwalking. LTG is 3-4METs      Initial ADL's goals met: Goal met- pt has resumed showering/toweling off with less help from wife.    Initial Leisure goals met: Goal met- " pt has started walking/biking 2-3x/week for 5-10 mins.    Intial Work goals met: Retired        Exercise Prescription    Intensity / THR: 20-30 beats above resting heart rate    RPE 11-14  Progression / Met level: 2.5-3    Resistive Training?: No      Current Exercise (mins/week): 110      Interventions  Home Exercise:  Mode: TM, stationary bike, Elliptical    Frequency: 4-6x/week    Duration: 15-20 minutes 2x/day      Education Material : Individual education and counseling;Provide written material;Educational videos;Offer educational classes      Education Completed  Exercise Education Completed: Cardiac Anatomy;Signs and Symptoms;Medication review;Home Exercise;Emergency Plan;RPE;Warm up/cool down;FITT Principles;BP/HR Reponse to exercise;Stretching;Strength training;Benefits of Exercise;End point of exercise              Exercise Follow-up/Discharge  Follow up/Discharge: Pt called and wishes to be d/c'd at this time. Pt feels he got what he wanted out of cardiac rehab and feels he was reaching a plateau and continue with his exercise at home. Pt reached a 3 MET level on the stairs <1 minute and a 2.4 on the Nustep. Pt reaching a plateau and is limited with modalities due to knees, deconditioning, age. pt also f/u with PMD about leg heaviness and is going to be wearing compression stockings.    NUTRITION  Nutrition Assessment: Discharge      Nutrition Risk Factors:  Nutrition Risk Factors: Dyslipidemia;Overweight      Nutrition Plan  Interventions  Diet Consult: Completed (per dietician?)    Other Nutrition Intervention: Diet Class;Therapist/Pt Discussion;Provide with Written Material    Initial Rate Your Plate Score: 54    Follow-Up Rate Your Plate Score: 54      Education Completed  Nutrition Education Completed: Risk factor overview;Low sodium diet;Low Saturated fat diet          Goals Met  Nutrition Goals Met: Reviewed Dietitian schedule;Provided Rate your Plate Survey;Completed Nutritional Risk  "Screen;Patient states following a low saturated fat diet;Patient follows a low sodium diet      Height, Weight, and  BMI  Weight: 284 lb 9.6 oz (129.1 kg)  Height: 5' 9\" (1.753 m)  BMI: 42.01      Nutrition Follow-up  Follow-up/Discharge: Pt states he eats heart healthy 50% of the time. He very rarely adds salt to his foods but his wife is the cook and watches for more heart healthy food options. Pt is not interested in losing weight at this time-he wants to maintain and if he loses weight he is okay with that too.          Other Risk Factors  Other Risk Factor Assessment: Discharge      HTN Risk Factor: Hypertension      Pre Exercise BP: 132/72  Post Exercise BP: 122/76 (O2 93% on RA)      Hypertension Plan  Goals  HTN Goals: Patient demonstrates understanding of HTN, no goals identified for the next 30 days      Goals Met  HTN Goals Met: Take medication as prescribed;Exercises regularly;Follow low sodium diet      HTN Interventions  HTN Interventions: Therapist/patient discussion;Provide written material;Diet consult;Offer educational classes      HTN Education Completed  HTN Education Completed: Low sodium diet;Medication review;Risk factor overview      Tobacco Risk Factor: NA       PSYCHOSOCIAL  Psychosocial Assessment: Discharge       Not assessed due to pt d/c'd over the phone  Psychosocial Risk Factor: NA      Psychosocial Plan  Interventions  Interventions: Individual education and counseling;Provide written material;Offer educational videos and classes      Education Completed  Education Completed: Relaxation/Coping Techniques          Goals Met  Goals Met: Identified Support system;Oriented to stress management classes;Identify stressors;Practicing stress management skills      Psychosocial Follow-up  Follow-up/Discharge: Pt enjoys watching sports and was pleased with cardiac rehab and enjoyed participating but feels like it was becoming harder to get here and he is reaching a plateau. Pt is looking " forward to summer to being outside in the sunshine.              Patient involved in Goal setting?: Yes (Over the phone just reviewed HEP.)

## 2021-05-29 NOTE — PROGRESS NOTES
"ITP ASSESSMENT   Assessment Day: 90 Day    Session Number: 24  Precautions: Falls Risk/Balance, Deconditioned, B knee pain, back pain, L shoulder pain    Diagnosis: Stent    Risk Stratification: High    Referring Provider: Erasmo Mihcael MD   ITP:Dr. Galindo  EXERCISE  Exercise Assessment: Reassessment                              Exercise Plan  Goals Next 30 days  ADL'S: Pt to exercise 3x/week at home    Leisure: Resume mowing lawn with riding ower    Work: Retired      Education Goals: Medication review    Education Goals Met: Has system for taking medication.;Patient can state cardiac s/s and appropriate emergency response.                          Goals Met  60 day ADL'S goals met: Pt states he feels his stamina has improved \"a little\", tolerating home exercise better    60 day Leisure goals met: Pt has not tried mowing lawn yet.    60 day Work goals met: Retired      30 day ADL'S goals met: Goal not met: Pt has done the stairs but he is always SOB he states and it has not really gotten better.    30 day Leisure goals met: Goal not met: pt has not resumed rider-mowing yet.    30 day Work goals met: Retired    30 Day Progression: Pt has reached a 2.3 METs on Nustep, 3METs on Steps, 2-3METs for hallwalking. LTG is 3-4METs      Initial ADL's goals met: Goal met- pt has resumed showering/toweling off with less help from wife.    Initial Leisure goals met: Goal met- pt has started walking/biking 2-3x/week for 5-10 mins.    Intial Work goals met: Retired      Exercise Prescription  Exercise Mode: Treadmill;Bike;Nustep;Arm Erg.;Stairs;Hallway Walking    Frequency: 2x/week    Duration: 30-45 min    Intensity / THR: 20-30 beats above resting heart rate    RPE 11-14  Progression / Met level: 2.5-3    Resistive Training?: No      Current Exercise (mins/week): 110      Interventions  Home Exercise:  Mode: TM, stationary bike, seated elliptical    Frequency: 2.3-3.3    Duration: 25-30 min total duration      Education " "Material : Individual education and counseling;Provide written material;Educational videos;Offer educational classes      Education Completed  Exercise Education Completed: Cardiac Anatomy;Signs and Symptoms;Medication review;Home Exercise;Emergency Plan;RPE;Warm up/cool down;FITT Principles;BP/HR Reponse to exercise;Stretching;Strength training;Benefits of Exercise;End point of exercise              Exercise Follow-up/Discharge  Follow up/Discharge: Pt states he is using his treadmill, seated elliptical and stationary bike at home   NUTRITION  Nutrition Assessment: Reassessment      Nutrition Risk Factors:  Nutrition Risk Factors: Dyslipidemia;Overweight  Cholesterol: 2/5/19: 114  LDL: 53  HDL: 41  Triglycerides: 99      Nutrition Plan  Interventions  Diet Consult: Completed (per dietician?)    Other Nutrition Intervention: Diet Class;Therapist/Pt Discussion;Provide with Written Material    Initial Rate Your Plate Score: 54    Follow-Up Rate Your Plate Score: 54      Education Completed  Nutrition Education Completed: Risk factor overview;Low sodium diet;Low Saturated fat diet      Goals  Nutrition Goals (Next 30 days): Patient can identify their risk factors for CAD;Patient knows appropriate portion size      Goals Met  Nutrition Goals Met: Reviewed Dietitian schedule;Provided Rate your Plate Survey;Completed Nutritional Risk Screen;Patient states following a low saturated fat diet;Patient follows a low sodium diet      Height, Weight, and  BMI  Weight: 283 lb 6.4 oz (128.5 kg)  Height: 5' 9\" (1.753 m)  BMI: 41.83      Nutrition Follow-up  Follow-up/Discharge: Pt states he eats heart healthy 50% of the time. He very rarely adds salt to his foods but his wife is the cook and watches for more heart healthy food options. Pt is not interested in losing weight at this time-he wants to maintain and if he loses weight he is okay with that too.          Other Risk Factors  Other Risk Factor Assessment: " Reassessment      HTN Risk Factor: Hypertension      Pre Exercise BP: 128/72  Post Exercise BP: 118/60      Hypertension Plan  Goals  HTN Goals: Patient demonstrates understanding of HTN, no goals identified for the next 30 days      Goals Met  HTN Goals Met: Take medication as prescribed;Exercises regularly;Follow low sodium diet      HTN Interventions  HTN Interventions: Therapist/patient discussion;Provide written material;Diet consult;Offer educational classes      HTN Education Completed  HTN Education Completed: Low sodium diet;Medication review;Risk factor overview      Tobacco Risk Factor: NA       PSYCHOSOCIAL  Psychosocial Assessment: Reassessment         Psychosocial Risk Factor: NA      Psychosocial Plan  Interventions  Interventions: Individual education and counseling;Provide written material;Offer educational videos and classes      Education Completed  Education Completed: Relaxation/Coping Techniques      Goals  Goals (Next 30 days): Improvement in [place] CO[place] score;Patient demonstrates understanding of stress, no goals identified for the next 30 days      Goals Met  Goals Met: Identified Support system;Oriented to stress management classes;Identify stressors;Practicing stress management skills      Psychosocial Follow-up  Follow-up/Discharge: Pt likes to watch baseball on tv for relaxation             Patient involved in Goal setting?: Yes      Signature: _____________________________________________________________    Date: __________________    Time: __________________

## 2021-05-30 NOTE — PROGRESS NOTES
Order for Durable Medical Equipment was processed and equipment ordered.     DME provider: [facility]    Date Faxed: 7/12/2019    Ordering Provider: Pipo Segovia DO    PAP Order Type: Mask/Supplies    Fax Number: IN HOUSE DME: [id number]

## 2021-05-30 NOTE — PROGRESS NOTES
Dear Dr. Monique, Jose Zimmerman Md  [address] [facility] [address]  [place] [address]    Thank you for the opportunity to participate in the care of Mr. Chava Lincoln.    He is a 89 y.o. male who comes to the clinic for the transfer of care of his obstructive sleep apnea.  The patient was actually diagnosed here at our sleep facility on 06/04/2007.  His apnea hypopnea index was grossly elevated at 32 events per hour with the lowest O2 sat of 84%.  The patient subsequently was getting his sleep medicine care at [facility].  Now the patient wishes to switch his sleep medicine care back to [place].  The patient also like to get supplies from the [place] [facility] instead of [facility].  The patient has been using his CPAP machine religiously and his review of system is otherwise negative.     Ideal Sleep-Wake Cycle(devoid of societal pressure):    Patient would try to initiate sleep at around 9:30 PM with a sleep latency of variable length. The patient would have 4 awakenings. Final wake up time is around 6 a.m.    Compliance Download data for 30 days:  Pressure setting: CPAP 14 CWP  Residual AHI: 4.4 events per hour  Leak: Minimal  Compliance: 100%  Mask Tolerance: Good  Skin irritation: None    Past Medical History  Past Medical History:   Diagnosis Date     Coronary artery disease due to calcified coronary lesion      GERD (gastroesophageal reflux disease) 7/2/2014     Hypertension      RAMON on CPAP 4/17/2017     Prostate cancer (H)      Prostate cancer (H)         Past Surgical History  Past Surgical History:   Procedure Laterality Date     CV CORONARY ANGIOGRAM N/A 12/10/2018    Procedure: Coronary Angiogram;  Surgeon: Tana Duncan MD;  Location: [facility];  Service: Cardiology     CV LEFT HEART CATHETERIZATION WO LEFT VETRICULOGRAM Left 12/10/2018    Procedure: Left Heart Catheterization Without Left Ventriculogram;  Surgeon: aTna Duncan MD;  Location:  [facility] Cath Lab;  Service: Cardiology     CV LEFT HEART CATHETERIZATION WO LEFT VETRICULOGRAM Left 2/4/2019    Procedure: Left Heart Catheterization Without Left Ventriculogram;  Surgeon: Tana Duncan MD;  Location: [facility] Cath Lab;  Service: Cardiology     CV RIGHT HEART CATH N/A 2/4/2019    Procedure: Right Heart Catheterization;  Surgeon: Tana Duncan MD;  Location: [facility] Cath Lab;  Service: Cardiology     HERNIA REPAIR       JOINT REPLACEMENT       KNEE SURGERY Bilateral         Meds  Current Outpatient Medications   Medication Sig Dispense Refill     aspirin 81 MG EC tablet Take 81 mg by mouth every evening.        clopidogrel (PLAVIX) 75 mg tablet Take 1 tablet (75 mg total) by mouth daily. 90 tablet 3     isosorbide mononitrate (IMDUR) 30 MG 24 hr tablet Take 1 tablet (30 mg total) by mouth daily. 90 tablet 2     losartan-hydrochlorothiazide (HYZAAR) 100-25 mg per tablet Take 1 tablet by mouth daily.       metoprolol tartrate (LOPRESSOR) 25 MG tablet Take 1 tablet (25 mg total) by mouth 2 (two) times a day. 180 tablet 3     mirabegron (MYRBETRIQ) 50 mg Tb24 ER tablet Take 50 mg by mouth daily.       multivitamin with minerals (THERA-M) 9 mg iron-400 mcg Tab tablet Take 1 tablet by mouth daily.       oxybutynin (DITROPAN XL) 15 MG 24 hr tablet Take 15 mg by mouth daily.       rosuvastatin (CRESTOR) 20 MG tablet Take 1 tablet (20 mg total) by mouth at bedtime. 90 tablet 3     No current facility-administered medications for this visit.         Allergies  Lipitor [atorvastatin]; Lisinopril; Morphine; Sulfa (sulfonamide antibiotics); and Vicodin [hydrocodone-acetaminophen]     Social History  Social History     Socioeconomic History     Marital status:      Spouse name: Not on file     Number of children: Not on file     Years of education: Not on file     Highest education level: Not on file   Occupational History     Not on file   Social Needs     Financial resource strain: Not on  file     Food insecurity:     Worry: Not on file     Inability: Not on file     Transportation needs:     Medical: Not on file     Non-medical: Not on file   Tobacco Use     Smoking status: Never Smoker     Smokeless tobacco: Never Used   Substance and Sexual Activity     Alcohol use: Yes     Comment: 2-3 beer/ month     Drug use: No     Sexual activity: Not on file   Lifestyle     Physical activity:     Days per week: Not on file     Minutes per session: Not on file     Stress: Not on file   Relationships     Social connections:     Talks on phone: Not on file     Gets together: Not on file     Attends Sabianist service: Not on file     Active member of club or organization: Not on file     Attends meetings of clubs or organizations: Not on file     Relationship status: Not on file     Intimate partner violence:     Fear of current or ex partner: Not on file     Emotionally abused: Not on file     Physically abused: Not on file     Forced sexual activity: Not on file   Other Topics Concern     Not on file   Social History Narrative     Not on file        Family History  Family History   Problem Relation Age of Onset     Diabetes Mother      Stroke Father      Heart disease Brother      Coronary artery disease Brother         Review of Systems:  Constitutional: Negative except as noted in HPI.   Eyes: Negative except as noted in HPI.   ENT: Negative except as noted in HPI.   Cardiovascular: Negative except as noted in HPI.   Respiratory: Negative except as noted in HPI.   Gastrointestinal: Negative except as noted in HPI.   Genitourinary: Negative except as noted in HPI.   Musculoskeletal: Negative except as noted in HPI.   Integumentary: Negative except as noted in HPI.   Neurological: Negative except as noted in HPI.   Psychiatric: Negative except as noted in HPI.   Endocrine: Negative except as noted in HPI.   Hematologic/Lymphatic: Negative except as noted in HPI.      STOP BANG 7/12/2019   Do you snore loudly  "(louder than talking or loud enough to be heard through closed doors)? 0   Do you often feel tired, fatigued, or sleepy during daytime? 1   Has anyone observed you stop breathing in your sleep? 1   Do you have or are you being treated for high blood pressure? 1   BMI more than 35 kg/m2 1   Age over 50 years old? 1   Neck circumference greater than 16 inches? 1   Gender male? 1   Total Score 7     Epworths Sleepiness Scale 7/12/2019   Sitting and reading 1   Watching TV 1   Sitting, inactive in a public place (e.g. a theatre or a meeting) 0   As a passenger in a car for an hour without a break 0   Lying down to rest in the afternoon when circumstances permit 2   Sitting and talking to someone 0   Sitting quietly after a lunch without alcohol 1   In a car, while stopped for a few minutes in traffic 0   Total score 5     Rooming 7/12/2019   Usual bedtime 9:30PM   Sleep Latency VARIES(NOT EASILY)   Awakenings 4 TIMES   Wake Up Time 6AM   Weekends SAME   Energy Drinks 0   Coffee 0   Cola 1-2 PER WEEK   Difficulty falling asleep No   Excessive daytime tiredness Yes   Excessive daytime sleepiness No   Dozing off while driving No   Shift Worker No   Sleep Walking? No   Sleep Talking? No   Kicking or punching? No   Restless legs symptoms No       Physical Exam:  /50   Pulse (!) 55   Ht 5' 9\" (1.753 m)   Wt (!) 284 lb (128.8 kg)   SpO2 93%   BMI 41.94 kg/m    BMI:Body mass index is 41.94 kg/m .   GEN: NAD, morbidly obese  Head: Normocephalic.  EYES: PERRLA, EOMI  ENT: Oropharynx is clear, Ag class 4+ airway.   Nasal mucosa is moist without erythema  Neck : Thyroid is within normal limits.Neck Circ 19.75\"  CV: Regular rate and rhythm, S1 & S2 positive.  LUNGS: Bilateral breathsounds heard.   ABDOMEN: Positive bowel sounds in all quadrants, soft, no rebound or guarding  MUSCULOSKELETAL: Bilateral trace leg swelling  SKIN: warm, dry, no rashes  Neurological: Alert, oriented to time, place, and person.  Psych: " normal mood, normal affect     Labs/Studies:     Lab Results   Component Value Date    WBC 7.5 06/04/2019    HGB 15.1 06/04/2019    HCT 45.4 06/04/2019    MCV 93 06/04/2019     06/04/2019         Chemistry        Component Value Date/Time     (L) 06/04/2019 1054    K 4.5 06/04/2019 1054    CL 99 06/04/2019 1054    CO2 25 06/04/2019 1054    BUN 20 06/04/2019 1054    CREATININE 0.89 06/04/2019 1054     06/04/2019 1054        Component Value Date/Time    CALCIUM 9.3 06/04/2019 1054    ALKPHOS 69 06/04/2019 1054    AST 34 06/04/2019 1054    ALT 33 06/04/2019 1054    BILITOT 0.4 06/04/2019 1054            No results found for: FERRITIN  Lab Results   Component Value Date    TSH 3.45 02/13/2017         Assessment and Plan:  In summary Chava Lincoln is a 89 y.o. year old male here for transfer of care.  1.  Obstructive sleep apnea  I will have the patient sign a transfer of durable medical equipment care to [facility] as per his request.  I will keep him on the same pressure settings for now since he is doing well.  I welcome the patient to follow-up with me every 2 years.  2.  Other sleep disturbance    Patient verbalized understanding of these issues, agrees with the plan and all questions were answered today. Patient was given an opportuntity to voice any other symptoms or concerns not listed above. Patient did not have any other symptoms or concerns.      Pipo Segovia DO  Board Certified in Internal Medicine and Sleep Medicine  [facility].    (Note created with Dragon voice recognition and unintended spelling errors and word substitutions may occur)

## 2021-05-31 NOTE — TELEPHONE ENCOUNTER
----- Message from Fay Paolo sent at 8/22/2019 10:03 AM CDT -----  Contact: Wife  General phone call:    Caller: Wife  Primary cardiologist: Dr. Sheridan  Detailed reason for call: Patient would like to have his teeth cleaned he had his procedure around 2/10/19.  Can he have this done and is there any process he would need to follow?  New or active symptoms?   Best phone number: [phone]  Best time to contact: Anytime  Ok to leave a detailed message? Yes  Device? no    Additional Info:

## 2021-05-31 NOTE — TELEPHONE ENCOUNTER
STACIE stating no antibiotics needed, and he should continue Plavix and aspirin but may bleed a little more during cleaning.  Asked pt to call [phone] if he has further questions.  augustina

## 2021-06-01 ENCOUNTER — RECORDS - HEALTHEAST (OUTPATIENT)
Dept: ADMINISTRATIVE | Facility: CLINIC | Age: 86
End: 2021-06-01

## 2021-06-01 NOTE — TELEPHONE ENCOUNTER
----- Message from Nora Rhodes sent at 9/16/2019  8:47 AM CDT -----  General phone call:    Caller: Pt's wife  Primary cardiologist: Fatimah  Detailed reason for call: Does pt need to take antibiotics before dental work?  New or active symptoms?   Best phone number: Xavi Pratt @ [phone]  Best time to contact:   Ok to leave a detailed message?   Device? No    Additional Info:

## 2021-06-01 NOTE — TELEPHONE ENCOUNTER
LM stating no abx needed prior to dental visits after stent placement.  LM asking for call back with questions.  -omid

## 2021-06-02 VITALS — WEIGHT: 280.4 LBS | BODY MASS INDEX: 41.41 KG/M2

## 2021-06-02 VITALS — BODY MASS INDEX: 41.38 KG/M2 | HEIGHT: 69 IN | WEIGHT: 279.4 LBS

## 2021-06-02 VITALS — HEIGHT: 69 IN | BODY MASS INDEX: 40.74 KG/M2 | WEIGHT: 275.1 LBS

## 2021-06-02 VITALS — HEIGHT: 69 IN | WEIGHT: 280 LBS | BODY MASS INDEX: 41.47 KG/M2

## 2021-06-02 VITALS — BODY MASS INDEX: 41.18 KG/M2 | HEIGHT: 69 IN | WEIGHT: 278 LBS

## 2021-06-02 VITALS — BODY MASS INDEX: 42 KG/M2 | WEIGHT: 284.4 LBS

## 2021-06-02 VITALS — WEIGHT: 280 LBS | HEIGHT: 69 IN | BODY MASS INDEX: 41.47 KG/M2

## 2021-06-02 VITALS — HEIGHT: 69 IN | WEIGHT: 270.6 LBS | BODY MASS INDEX: 40.08 KG/M2

## 2021-06-02 VITALS — BODY MASS INDEX: 41.64 KG/M2 | WEIGHT: 282 LBS

## 2021-06-02 VITALS — WEIGHT: 277 LBS | HEIGHT: 69 IN | BODY MASS INDEX: 41.03 KG/M2

## 2021-06-02 VITALS — WEIGHT: 283.3 LBS | BODY MASS INDEX: 41.84 KG/M2

## 2021-06-02 VITALS — HEIGHT: 69 IN | WEIGHT: 281 LBS | BODY MASS INDEX: 41.62 KG/M2

## 2021-06-02 VITALS — BODY MASS INDEX: 41.67 KG/M2 | WEIGHT: 282.2 LBS

## 2021-06-02 VITALS — WEIGHT: 282.6 LBS | BODY MASS INDEX: 41.73 KG/M2

## 2021-06-02 VITALS — BODY MASS INDEX: 41.56 KG/M2 | WEIGHT: 281.4 LBS

## 2021-06-02 VITALS — BODY MASS INDEX: 41.5 KG/M2 | WEIGHT: 281 LBS

## 2021-06-02 VITALS — BODY MASS INDEX: 41.87 KG/M2 | WEIGHT: 283.5 LBS

## 2021-06-02 VITALS — WEIGHT: 279.2 LBS | BODY MASS INDEX: 41.23 KG/M2

## 2021-06-02 VITALS — HEIGHT: 69 IN | WEIGHT: 271 LBS | BODY MASS INDEX: 40.14 KG/M2

## 2021-06-02 VITALS — BODY MASS INDEX: 41.57 KG/M2 | WEIGHT: 281.5 LBS

## 2021-06-02 VITALS — BODY MASS INDEX: 41.53 KG/M2 | WEIGHT: 281.2 LBS

## 2021-06-02 VITALS — WEIGHT: 265 LBS | BODY MASS INDEX: 39.25 KG/M2 | HEIGHT: 69 IN

## 2021-06-02 VITALS — BODY MASS INDEX: 41.97 KG/M2 | WEIGHT: 284.2 LBS

## 2021-06-03 VITALS — WEIGHT: 283.6 LBS | BODY MASS INDEX: 41.88 KG/M2

## 2021-06-03 VITALS — WEIGHT: 284 LBS | BODY MASS INDEX: 41.94 KG/M2

## 2021-06-03 VITALS — BODY MASS INDEX: 41.59 KG/M2 | WEIGHT: 281.6 LBS

## 2021-06-03 VITALS
RESPIRATION RATE: 20 BRPM | HEIGHT: 69 IN | DIASTOLIC BLOOD PRESSURE: 68 MMHG | BODY MASS INDEX: 42.06 KG/M2 | HEART RATE: 52 BPM | SYSTOLIC BLOOD PRESSURE: 142 MMHG | WEIGHT: 284 LBS

## 2021-06-03 VITALS — WEIGHT: 283.4 LBS | BODY MASS INDEX: 41.85 KG/M2

## 2021-06-03 VITALS — WEIGHT: 284.6 LBS | BODY MASS INDEX: 42.03 KG/M2

## 2021-06-03 VITALS — BODY MASS INDEX: 42.06 KG/M2 | HEIGHT: 69 IN | WEIGHT: 284 LBS

## 2021-06-03 VITALS — HEIGHT: 69 IN | BODY MASS INDEX: 41.62 KG/M2 | WEIGHT: 281 LBS

## 2021-06-05 VITALS
RESPIRATION RATE: 16 BRPM | BODY MASS INDEX: 44.16 KG/M2 | HEART RATE: 72 BPM | SYSTOLIC BLOOD PRESSURE: 116 MMHG | HEIGHT: 68 IN | DIASTOLIC BLOOD PRESSURE: 62 MMHG

## 2021-06-05 VITALS
DIASTOLIC BLOOD PRESSURE: 81 MMHG | SYSTOLIC BLOOD PRESSURE: 148 MMHG | TEMPERATURE: 97.7 F | BODY MASS INDEX: 43.29 KG/M2 | RESPIRATION RATE: 18 BRPM | OXYGEN SATURATION: 94 % | HEART RATE: 77 BPM | WEIGHT: 285.6 LBS | HEIGHT: 68 IN

## 2021-06-05 VITALS
HEIGHT: 69 IN | HEART RATE: 68 BPM | SYSTOLIC BLOOD PRESSURE: 124 MMHG | RESPIRATION RATE: 16 BRPM | BODY MASS INDEX: 41.94 KG/M2 | DIASTOLIC BLOOD PRESSURE: 50 MMHG

## 2021-06-05 VITALS
SYSTOLIC BLOOD PRESSURE: 139 MMHG | WEIGHT: 290.4 LBS | TEMPERATURE: 97.8 F | OXYGEN SATURATION: 92 % | HEART RATE: 82 BPM | BODY MASS INDEX: 44.01 KG/M2 | HEIGHT: 68 IN | DIASTOLIC BLOOD PRESSURE: 70 MMHG | RESPIRATION RATE: 18 BRPM

## 2021-06-05 VITALS — HEIGHT: 68 IN | BODY MASS INDEX: 43.04 KG/M2 | WEIGHT: 284 LBS

## 2021-06-07 ENCOUNTER — COMMUNICATION - HEALTHEAST (OUTPATIENT)
Dept: CARDIOLOGY | Facility: CLINIC | Age: 86
End: 2021-06-07

## 2021-06-07 ENCOUNTER — OFFICE VISIT - HEALTHEAST (OUTPATIENT)
Dept: CARDIOLOGY | Facility: CLINIC | Age: 86
End: 2021-06-07

## 2021-06-07 DIAGNOSIS — I71.21 ASCENDING AORTIC ANEURYSM (H): ICD-10-CM

## 2021-06-07 DIAGNOSIS — I25.10 CORONARY ARTERY CALCIFICATION SEEN ON CAT SCAN: ICD-10-CM

## 2021-06-07 DIAGNOSIS — I50.32 CHRONIC HEART FAILURE WITH PRESERVED EJECTION FRACTION (H): ICD-10-CM

## 2021-06-07 DIAGNOSIS — I10 ESSENTIAL HYPERTENSION: ICD-10-CM

## 2021-06-07 DIAGNOSIS — G47.33 OSA ON CPAP: ICD-10-CM

## 2021-06-07 DIAGNOSIS — E66.01 MORBID OBESITY (H): ICD-10-CM

## 2021-06-07 DIAGNOSIS — E78.5 DYSLIPIDEMIA: ICD-10-CM

## 2021-06-07 DIAGNOSIS — R06.09 DYSPNEA ON EXERTION: ICD-10-CM

## 2021-06-07 LAB
ANION GAP SERPL CALCULATED.3IONS-SCNC: 11 MMOL/L (ref 5–18)
BUN SERPL-MCNC: 31 MG/DL (ref 8–28)
CALCIUM SERPL-MCNC: 8.3 MG/DL (ref 8.5–10.5)
CHLORIDE BLD-SCNC: 95 MMOL/L (ref 98–107)
CO2 SERPL-SCNC: 23 MMOL/L (ref 22–31)
CREAT SERPL-MCNC: 1.23 MG/DL (ref 0.7–1.3)
GFR SERPL CREATININE-BSD FRML MDRD: 55 ML/MIN/1.73M2
GLUCOSE BLD-MCNC: 124 MG/DL (ref 70–125)
POTASSIUM BLD-SCNC: 4.8 MMOL/L (ref 3.5–5)
SODIUM SERPL-SCNC: 129 MMOL/L (ref 136–145)

## 2021-06-07 RX ORDER — FUROSEMIDE 20 MG
30 TABLET ORAL DAILY
Qty: 135 TABLET | Refills: 3 | Status: SHIPPED | OUTPATIENT
Start: 2021-06-07 | End: 2021-09-20

## 2021-06-07 RX ORDER — ISOSORBIDE MONONITRATE 30 MG/1
30 TABLET, EXTENDED RELEASE ORAL DAILY
Qty: 90 TABLET | Refills: 2 | Status: SHIPPED | OUTPATIENT
Start: 2021-06-07 | End: 2022-09-06

## 2021-06-08 ENCOUNTER — COMMUNICATION - HEALTHEAST (OUTPATIENT)
Dept: CARDIOLOGY | Facility: CLINIC | Age: 86
End: 2021-06-08

## 2021-06-08 DIAGNOSIS — I50.30 (HFPEF) HEART FAILURE WITH PRESERVED EJECTION FRACTION (H): ICD-10-CM

## 2021-06-11 NOTE — PROGRESS NOTES
Review of systems done with patient over the phone. Positive for shortness of breath, cough, leg swelling, constipation, muscle weakness, loss balance, daytime sleepiness, and anxiety. All other review of systems within normal limits.

## 2021-06-13 NOTE — TELEPHONE ENCOUNTER
----- Message -----  From: Yonatan Sheridan MD  Sent: 12/21/2020  10:34 AM CST  To: Graciela Sapp RN    It is depended on the anesthesia.  If it is located or lumbar approach for anesthesia, I think the patient is fine to have it done. But it is general anesthesia, he is always has some risk due to his age and significant coronary artery disease.  If he has no chest pain and this procedure is important for him to improve life quality, he may be able to have it done.  Thanks  Jered  ---------------------------------------------------------------------------------------------------  Discussed with pt and spouse.  Both verbalized understanding and stated they are still in planning / discussion stage, but this info is good to know.  augustina

## 2021-06-13 NOTE — TELEPHONE ENCOUNTER
Wife Santa LVM asking if ok for pt to have [employer] bladder control therapy.  Wife states it will be an implanted device, and they are more worried if ok for pt to have sedation given cardiac history.  Informed Santa that Dr Sheridan is out of the office and we will call back with recommendations next week.  Santa states procedure is not scheduled as of yet, but they do have another appt 12/28/20 to discuss.  -rocio

## 2021-06-14 NOTE — TELEPHONE ENCOUNTER
Santa URRUTIA stating she they have been unable to get refills of heart medications.    Called pt spouse - not home.  Discussed with pt - they thought meds were on auto fill and they are not.  Meds were refilled by refill nurse this morning.  -omid

## 2021-06-16 PROBLEM — I10 ESSENTIAL HYPERTENSION: Status: ACTIVE | Noted: 2018-11-08

## 2021-06-16 PROBLEM — E78.5 DYSLIPIDEMIA: Status: ACTIVE | Noted: 2018-11-08

## 2021-06-16 PROBLEM — G47.33 OSA ON CPAP: Status: ACTIVE | Noted: 2017-04-17

## 2021-06-16 PROBLEM — I25.10 CORONARY ARTERY DISEASE INVOLVING NATIVE CORONARY ARTERY OF NATIVE HEART WITHOUT ANGINA PECTORIS: Status: ACTIVE | Noted: 2018-12-05

## 2021-06-16 PROBLEM — R60.0 BILATERAL LOWER EXTREMITY EDEMA: Status: ACTIVE | Noted: 2021-04-08

## 2021-06-16 PROBLEM — I71.21 ASCENDING AORTIC ANEURYSM (H): Status: ACTIVE | Noted: 2018-12-05

## 2021-06-16 PROBLEM — E66.01 MORBID OBESITY (H): Status: ACTIVE | Noted: 2021-03-24

## 2021-06-16 PROBLEM — R55 NEAR SYNCOPE: Status: ACTIVE | Noted: 2018-09-23

## 2021-06-16 PROBLEM — N18.30 CHRONIC KIDNEY DISEASE, STAGE 3 (H): Status: ACTIVE | Noted: 2021-04-12

## 2021-06-16 PROBLEM — I50.30 (HFPEF) HEART FAILURE WITH PRESERVED EJECTION FRACTION (H): Status: ACTIVE | Noted: 2021-04-20

## 2021-06-16 NOTE — TELEPHONE ENCOUNTER
[facility] Nurse Triage Telephone Note      Provider: Haylee Bob MD  Facility: [facility] Type: TCU    Caller: Saranya  Call Back Number:  [phone]    Allergies: Lipitor [atorvastatin], Lisinopril, Morphine, Sulfa (sulfonamide antibiotics), and Vicodin [hydrocodone-acetaminophen]    Reason for call: Nurse calling to report Heme 2, BMP, and Mg levels.  Notable meds:  EC ASA 81mg daily, Plavix 75mg daily, Lasix 40mg daily, Isosorbide mononitrate 30mg daily, Valsartan/hydrochlorothiazide 160-25mg daily, Metoprolol 50mg two times a day.       Verbal Order/Direction given by Provider: No new orders.      Provider giving order: Haylee Bob MD    Verbal order given to: Saranya Carr RN

## 2021-06-16 NOTE — PATIENT INSTRUCTIONS - HE
Chava Lincoln,    It was a pleasure to see you today at the [facility].     My recommendations after this visit include:  - No changes to your medications.    - My nurse will set up [employer] to get meals provided for you.   - Continue to monitor for signs of retaining fluid (increasing weights, shortness of breath, swelling) and call with any concerns.   - If you have any questions or concerns, please call [phone] to talk with my nurse.    Claudine Arriola, CNP

## 2021-06-16 NOTE — PROGRESS NOTES
[facility] Care note      Patient: Chava Lincoln  MRN: [id number]      [facility] [[id number]]  Reason for Visit     Chief Complaint   Patient presents with     H & P       Code Status     FULL CODE    Assessment     Acute hypoxic respiratory failure  Acute CHF exacerbation with underlying history of chronic heart failure with preserved ejection fraction  Chronic lymphedema  Pulmonary hypertension  PABLITO  Obstructive sleep apnea on CPAP  Hypertensive urgency  Morbid obesity with a BMI of 45  Chronic hyponatremia  Generalized weakness    Plan     Patient has been admitted to the TCU for strengthening and rehab.  He was admitted with acute hypoxic respiratory failure requiring supplementary oxygen.  Currently discharged on 2 L with advised to wean oxygen as tolerated.  He was noted to have CHF exacerbation with worsening lymphedema.  Cardiology was consulted.  CT chest were negative for PE did show pulmonary hypertension and tracheobronchomalacia.  He was aggressively diuresed with improvement.  Currently on medical management with oral diuretics.  Weights to be closely monitored.  Current weights are 285 pounds his weight at home was greater than 300 pounds as per him.  He does report some degree of noncompliance on his part at home.  He is on high doses of diuretics.  Monitor trends.  He also developed PABLITO secondary to aggressive diuresis he will require monitoring of BMPs.  In addition he has hypertensive urgency which was corrected he has underlying history of hypertension and continues on his home medications.  We will monitor blood pressure trends  So far blood pressures appear to be stable on multiple medications  Monitor weights closely with underlying history of morbid obesity BMI was 45  Patient has chronic hyponatremia with a baseline sodium of 130 will monitor closely.  He is also on anticoagulation due to underlying history of PE  Remains debilitated and wheelchair-bound at  baseline but is working with therapy to get stronger.  He is hoping to discontinue his oxygen soon and go home.  He will do an outpatient follow-up with cardiology.  Recheck labs to monitor electrolytes and kidney function    History     Patient is a very pleasant 89 y.o. male who is admitted to TCU  Patient presented to the emergency room with increasing weakness and weight gain of 20 pounds.  He was noted to be hypoxic respiratory failure requiring oxygen  Currently has been discharged on 2 L to be TCU.  He was admitted with CHF exacerbation.  Cardiology was consulted.  Chest CT was negative for PE.  He also had some findings consistent with tracheobronchomalacia.  He responded well to diuresis.  He has been discharged on oral diuretics.  Unfortunately patient developed renal insufficiency secondary to aggressive diuresis.  However creatinine did improve and will require monitoring.  Due to profound weakness and difficulty with ambulation a TCU stay has been encouraged for the patient.  He also had high blood pressure on admission of 190/82 which was corrected.  We will blood pressures to be monitored  So far blood pressures appear to be stable.      Past Medical History     Active Ambulatory (Non-Hospital) Problems    Diagnosis     Acute on chronic heart failure with preserved ejection fraction (H)     Acute kidney injury (H)     Acute hypoxemic respiratory failure (H)     Bilateral lower extremity edema     Morbid obesity (H)     Coronary artery calcification seen on CAT scan     Pulmonary hypertension (H)     History of pulmonary embolism     Hypoxia     Coronary artery disease involving native coronary artery of native heart without angina pectoris     Ascending aortic aneurysm (H)     Essential hypertension     Dyslipidemia     Dyspnea on exertion     Near syncope     RAMON on CPAP     GERD (gastroesophageal reflux disease)     Past Medical History:   Diagnosis Date     Coronary artery disease due to calcified  coronary lesion      GERD (gastroesophageal reflux disease) 7/2/2014     Hypertension      RAMON on CPAP 4/17/2017     Prostate cancer (H)      Prostate cancer (H)        Past Social History     Reviewed, and he  reports that he has never smoked. He has never used smokeless tobacco. He reports current alcohol use. He reports that he does not use drugs.    Family History     Reviewed, and family history includes Coronary artery disease in his brother; Diabetes in his mother; Heart disease in his brother; Stroke in his father.    Medication List   Post Discharge Medication Reconciliation Status: discharge medications reconciled, continue medications without change   Current Outpatient Medications on File Prior to Visit   Medication Sig Dispense Refill     aspirin 81 MG EC tablet Take 81 mg by mouth every evening.        clopidogreL (PLAVIX) 75 mg tablet Take 1 tablet (75 mg total) by mouth daily. 90 tablet 2     furosemide (LASIX) 40 MG tablet Take 40 mg by mouth daily.       isosorbide mononitrate (IMDUR) 30 MG 24 hr tablet Take 1 tablet (30 mg total) by mouth daily. 90 tablet 2     leuprolide, 6 month, (ELIGARD, 6 MONTH,) 45 mg syringe Inject 45 mg under the skin. Every six months       metoprolol tartrate (LOPRESSOR) 50 MG tablet Take 1 tablet (50 mg total) by mouth 2 (two) times a day. 60 tablet 0     mirabegron (MYRBETRIQ) 50 mg Tb24 ER tablet Take 50 mg by mouth daily.       multivitamin with minerals (THERA-M) 9 mg iron-400 mcg Tab tablet Take 1 tablet by mouth daily.       nystatin (NYSTOP) powder Apply 1 application topically 2 (two) times a day.        oxybutynin (DITROPAN XL) 15 MG 24 hr tablet Take 15 mg by mouth daily.       oxybutynin (DITROPAN XL) 5 MG ER tablet Take 5 mg by mouth daily. Take with 15 mg of oxybutynin  Indications: a condition where the urge to urinate results in urine leakage       rosuvastatin (CRESTOR) 20 MG tablet Take 1 tablet (20 mg total) by mouth at bedtime. 90 tablet 2      valsartan-hydrochlorothiazide (DIOVAN-HCT) 160-25 mg per tablet Take 1 tablet by mouth daily. 90 tablet 2     Current Facility-Administered Medications on File Prior to Visit   Medication Dose Route Frequency Provider Last Rate Last Admin     [DISCONTINUED] aspirin EC tablet 81 mg  81 mg Oral QPM Ryan Joyce MD   81 mg at 04/10/21 1957     [DISCONTINUED] bisacodyL suppository 10 mg (DULCOLAX)  10 mg Rectal Daily PRN Ryan Joyce MD         [DISCONTINUED] clopidogreL tablet 75 mg (PLAVIX)  75 mg Oral DAILY Ryan Joyce MD   75 mg at 04/11/21 0830     [DISCONTINUED] furosemide tablet 40 mg (LASIX)  40 mg Oral DAILY Madi Ritter MD   40 mg at 04/11/21 0830     [DISCONTINUED] heparin (PF) ANTICOAGULANT subcutaneous injection 5,000 Units  5,000 Units Subcutaneous Q12H 09-21 Ryan Joyce MD   5,000 Units at 04/11/21 0831     [DISCONTINUED] hydrALAZINE injection 5 mg (APRESOLINE)  5 mg Intravenous Q6H PRN Ryan Joyce MD         [DISCONTINUED] isosorbide mononitrate 24 hr tablet 30 mg (IMDUR)  30 mg Oral DAILY Ryan Joyce MD   30 mg at 04/11/21 0831     [DISCONTINUED] magnesium hydroxide suspension 30 mL (MILK OF MAG)  30 mL Oral Daily PRN Ryan Joyce MD         [DISCONTINUED] metoprolol tartrate tablet 50 mg (LOPRESSOR)  50 mg Oral BID Madi Ritter MD   50 mg at 04/11/21 0830     [DISCONTINUED] mirabegron ER tablet 50 mg (MYRBETRIQ)  50 mg Oral DAILY Ryan Joyce MD   50 mg at 04/11/21 0829     [DISCONTINUED] multivitamin with minerals 9 mg iron-400 mcg tablet 1 tablet (THERA-M)  1 tablet Oral DAILY Ryan Joyce MD   1 tablet at 04/11/21 0830     [DISCONTINUED] ondansetron injection 4 mg (ZOFRAN)  4 mg Intravenous Q4H PRN Ryan Joyce MD         [DISCONTINUED] ondansetron tablet 8 mg (ZOFRAN)  8 mg Oral Q8H PRN Ryan Joyce MD         [DISCONTINUED] oxybutynin ER tablet 20 mg (DITROPAN XL)  20 mg Oral QHS Ryan Joyce MD    20 mg at 04/10/21 2001     [DISCONTINUED] polyethylene glycol packet 17 g (MIRALAX)  17 g Oral DAILY Ryan Joyce MD   17 g at 04/11/21 0831     [DISCONTINUED] rosuvastatin tablet 20 mg (CRESTOR)  20 mg Oral QHS Ryan Joyce MD   20 mg at 04/10/21 1957     [DISCONTINUED] sodium chloride flush 10 mL (NS)  10 mL Intravenous PRN Colleen Friend MD         [DISCONTINUED] sodium chloride flush 2.5 mL (NS)  2.5 mL Intravenous Line Care Ryan Joyce MD   2.5 mL at 04/11/21 0831     [DISCONTINUED] valsartan-hydrochlorothiazide (DIOVAN HCT) 160-25 mg combo dose   Oral DAILY Ryan Joyce MD   Given at 04/11/21 0829       Allergies     Allergies   Allergen Reactions     Lipitor [Atorvastatin] Myalgia     Lisinopril Cough     Morphine Other (See Comments)     hypotension     Sulfa (Sulfonamide Antibiotics) Hives     Vicodin [Hydrocodone-Acetaminophen] Unknown       Review of Systems   A comprehensive review of 14 systems was done. Pertinent findings noted here and in history of present illness. All the rest negative.  Constitutional: Negative.  Negative for fever, chills, he reports activity change, appetite change and fatigue.   HENT: Negative for congestion and facial swelling.    Eyes: Negative for photophobia, redness and visual disturbance.   Respiratory: Negative for cough and chest tightness.    Cardiovascular: Negative for chest pain, palpitations and has chronic leg swelling.   Gastrointestinal: Negative for nausea, diarrhea, constipation, blood in stool and abdominal distention.   Genitourinary: Negative.    Musculoskeletal: Negative.  He has difficulty with ambulation due to increasing shortness of breath  Skin: Negative.    Neurological: Negative for dizziness, tremors, syncope, weakness, light-headedness and headaches.   Hematological: Does not bruise/bleed easily.   Psychiatric/Behavioral: Negative.        Physical Exam     Recent Vitals 4/11/2021   Height -   Weight -   BSA (m2) -   BP  116/57   Pulse 54   Temp 97.4   Temp src 1   SpO2 91   Some recent data might be hidden   Weight is 285 pounds  Patient on 2 L of oxygen.  He is morbidly obese  Constitutional: Oriented to person, place, and time and appears well-developed.   HEENT:  Normocephalic and atraumatic.  Eyes: Conjunctivae and EOM are normal. Pupils are equal, round, and reactive to light. No discharge.  No scleral icterus. Nose normal. Mouth/Throat: Oropharynx is clear and moist. No oropharyngeal exudate.    NECK: Normal range of motion. Neck supple. No JVD present. No tracheal deviation present. No thyromegaly present.   CARDIOVASCULAR: Normal rate, regular rhythm and intact distal pulses.  Exam reveals no gallop and no friction rub.  Systolic murmur present.  PULMONARY: Effort normal and breath sounds normal. No respiratory distress.No Wheezing or rales.  ABDOMEN: Soft. Bowel sounds are normal. No distension and no mass.  There is no tenderness. There is no rebound and no guarding. No HSM.  MUSCULOSKELETAL: Normal range of motion.  2+ lower extremity edema and no tenderness. Mild kyphosis, no tenderness.  LYMPH NODES: Has no cervical, supraclavicular, axillary and groin adenopathy.   NEUROLOGICAL: Alert and oriented to person, place, and time. No cranial nerve deficit.  Normal muscle tone. Coordination normal.   GENITOURINARY: Deferred exam.  SKIN: Skin is warm and dry. No rash noted. No erythema. No pallor.   EXTREMITIES: No cyanosis, no clubbing, has 2+ lower extremity edema. No Deformity.  PSYCHIATRIC: Normal mood, affect and behavior.      Lab Results     Recent Results (from the past 240 hour(s))   ECG 12 lead nursing unit performed    Collection Time: 04/08/21 10:48 AM   Result Value Ref Range    SYSTOLIC BLOOD PRESSURE 190 mmHg    DIASTOLIC BLOOD PRESSURE 82 mmHg    VENTRICULAR RATE 61 BPM    ATRIAL RATE 61 BPM    P-R INTERVAL 328 ms    QRS DURATION 94 ms    Q-T INTERVAL 442 ms    QTC CALCULATION (BEZET) 444 ms    P Axis 48  degrees    R AXIS -23 degrees    T AXIS 76 degrees    MUSE DIAGNOSIS       Sinus rhythm with 1st degree A-V block  Low voltage QRS  Inferior infarct (cited on or before 16-JUL-1998)  Abnormal ECG  When compared with ECG of 04-FEB-2019 10:50,  No significant change was found  Confirmed by SEE ED PROVIDER NOTE FOR, ECG INTERPRETATION ([id number]),  KENYATTA GOMEZ ([id number]) on 4/8/2021 10:53:09 AM     Troponin I    Collection Time: 04/08/21 10:55 AM   Result Value Ref Range    Troponin I 0.01 0.00 - 0.29 ng/mL   Basic metabolic panel    Collection Time: 04/08/21 10:55 AM   Result Value Ref Range    Sodium 131 (L) 136 - 145 mmol/L    Potassium 3.8 3.5 - 5.0 mmol/L    Chloride 93 (L) 98 - 107 mmol/L    CO2 30 22 - 31 mmol/L    Anion Gap, Calculation 8 5 - 18 mmol/L    Glucose 135 (H) 70 - 125 mg/dL    Calcium 8.7 8.5 - 10.5 mg/dL    BUN 21 8 - 28 mg/dL    Creatinine 0.98 0.70 - 1.30 mg/dL    GFR MDRD Af Amer >60 >60 mL/min/1.73m2    GFR MDRD Non Af Amer >60 >60 mL/min/1.73m2   BNP(B-type Natriuretic Peptide)    Collection Time: 04/08/21 10:55 AM   Result Value Ref Range    BNP 58 0 - 93 pg/mL   CBC    Collection Time: 04/08/21 10:55 AM   Result Value Ref Range    WBC 8.1 4.0 - 11.0 thou/uL    RBC 4.78 4.40 - 6.20 mill/uL    Hemoglobin 15.4 14.0 - 18.0 g/dL    Hematocrit 44.3 40.0 - 54.0 %    MCV 93 80 - 100 fL    MCH 32.2 27.0 - 34.0 pg    MCHC 34.8 32.0 - 36.0 g/dL    RDW 12.4 11.0 - 14.5 %    Platelets 213 140 - 440 thou/uL    MPV 9.6 8.5 - 12.5 fL   Influenza A/B and SARS-CoV2 PCR Symptomatic    Collection Time: 04/08/21 10:55 AM    Specimen: Respiratory   Result Value Ref Range    SARS-CoV-2 PCR Result Negative Negative, Invalid    Influenza A Negative Negative, Invalid    Influenza B Negative Negative, Invalid   D-dimer, Quantitative    Collection Time: 04/08/21 11:31 AM   Result Value Ref Range    D-Dimer, Quant 0.78 (H) <=0.50 FEU ug/mL   Echo Complete    Collection Time: 04/08/21  3:22 PM   Result Value Ref Range     MV E' med thalia 7.16 cm/s    BSA 2.53 m2    Hieght 68 in    Weight 4,704 lbs    /72 mmHg    HR 77 bpm    Height 68.0 in    Weight 294 lbs   Troponin I    Collection Time: 04/08/21  5:19 PM   Result Value Ref Range    Troponin I 0.01 0.00 - 0.29 ng/mL   Troponin I    Collection Time: 04/08/21 11:25 PM   Result Value Ref Range    Troponin I 0.02 0.00 - 0.29 ng/mL   Basic metabolic panel    Collection Time: 04/09/21  4:38 AM   Result Value Ref Range    Sodium 130 (L) 136 - 145 mmol/L    Potassium 4.0 3.5 - 5.0 mmol/L    Chloride 91 (L) 98 - 107 mmol/L    CO2 28 22 - 31 mmol/L    Anion Gap, Calculation 11 5 - 18 mmol/L    Glucose 119 70 - 125 mg/dL    Calcium 8.8 8.5 - 10.5 mg/dL    BUN 26 8 - 28 mg/dL    Creatinine 1.45 (H) 0.70 - 1.30 mg/dL    GFR MDRD Af Amer 55 (L) >60 mL/min/1.73m2    GFR MDRD Non Af Amer 46 (L) >60 mL/min/1.73m2   Basic metabolic panel    Collection Time: 04/10/21  6:18 AM   Result Value Ref Range    Sodium 131 (L) 136 - 145 mmol/L    Potassium 3.5 3.5 - 5.0 mmol/L    Chloride 92 (L) 98 - 107 mmol/L    CO2 29 22 - 31 mmol/L    Anion Gap, Calculation 10 5 - 18 mmol/L    Glucose 123 70 - 125 mg/dL    Calcium 8.7 8.5 - 10.5 mg/dL    BUN 37 (H) 8 - 28 mg/dL    Creatinine 1.46 (H) 0.70 - 1.30 mg/dL    GFR MDRD Af Amer 55 (L) >60 mL/min/1.73m2    GFR MDRD Non Af Amer 45 (L) >60 mL/min/1.73m2   Platelet Count - every other day x 3    Collection Time: 04/10/21  6:18 AM   Result Value Ref Range    Platelets 211 140 - 440 thou/uL   Potassium    Collection Time: 04/10/21  6:17 PM   Result Value Ref Range    Potassium 4.4 3.5 - 5.0 mmol/L   Basic metabolic panel    Collection Time: 04/11/21  6:23 AM   Result Value Ref Range    Sodium 132 (L) 136 - 145 mmol/L    Potassium 3.8 3.5 - 5.0 mmol/L    Chloride 93 (L) 98 - 107 mmol/L    CO2 29 22 - 31 mmol/L    Anion Gap, Calculation 10 5 - 18 mmol/L    Glucose 108 70 - 125 mg/dL    Calcium 8.9 8.5 - 10.5 mg/dL    BUN 39 (H) 8 - 28 mg/dL    Creatinine 1.32 (H)  0.70 - 1.30 mg/dL    GFR MDRD Af Amer >60 >60 mL/min/1.73m2    GFR MDRD Non Af Amer 51 (L) >60 mL/min/1.73m2            Imaging Results     Echo Complete    Result Date: 4/8/2021    Extremely poor visualization   Probably normal LV systolic function   RV appears to be dilated   Valves were not visualized   There is no obvious pericardial effusion      Xr Chest 1 View Portable    Result Date: 4/8/2021  EXAM: XR CHEST 1 VIEW PORTABLE LOCATION: [facility] DATE/TIME: 4/8/2021 11:50 AM INDICATION: Shortness of breath. COMPARISON: Cardiac CT 12/04/2018.     Redemonstrated moderately elevated right hemidiaphragm with mild basilar atelectasis. Remaining lungs are clear. No pulmonary edema. No significant pleural effusion. No pneumothorax. Normal heart size. Aortic atherosclerosis.     Cta Chest Pe Run    Result Date: 4/8/2021  EXAM: CTA CHEST PE RUN LOCATION: [facility] DATE/TIME: 4/8/2021 2:19 PM INDICATION: Dyspnea, chronic Shortness of breath. COMPARISON: 09/23/2018. TECHNIQUE: CT chest pulmonary angiogram during arterial phase injection of IV contrast. Multiplanar reformats and MIP reconstructions were performed. Dose reduction techniques were used. CONTRAST: Iopamidol ([id number]-370) 75mL FINDINGS: ANGIOGRAM CHEST: No pulmonary embolism. Pulmonary arterial enlargement with the pulmonary trunk measuring 3.8 cm. Nonaneurysmal aorta, though suboptimal opacification for dissection evaluation. LUNGS AND PLEURA: Narrowed trachea and mainstem airways. Mild basilar atelectasis. No pleural effusion or pneumothorax. MEDIASTINUM/AXILLAE: No adenopathy. No pericardial effusion. Severe mitral annular calcifications. CORONARY ARTERY CALCIFICATION: Severe. UPPER ABDOMEN: Elevated right hemidiaphragm. Hepatic steatosis. MUSCULOSKELETAL: Degenerative changes spine.     1.  No pulmonary embolism. 2.  Moderate pulmonary trunk enlargement raising concern for pulmonary hypertension. 3.   Narrowed trachea and mainstem airway suggesting tracheobronchomalacia. 4.  Chronically elevated right hemidiaphragm. Dysfunctional or paralyzed diaphragm possible. 5.  Severe coronary calcifications. 6.  Hepatic steatosis.           Electronically signed by  LEWIS Roberts

## 2021-06-16 NOTE — PROGRESS NOTES
[facility] Care note      Patient: Chava Lincoln  MRN: [id number]      [facility] [[id number]]  Reason for Visit     Chief Complaint   Patient presents with     Review Of Multiple Medical Conditions   Follow-up on discharge planning as well as hypoxic respiratory failure    Code Status     FULL CODE    Assessment     Acute hypoxic respiratory failure patient refusing supplementary oxygen.  Worsening hyponatremia recheck sodium is 131  Discharge planning patient feels he is ready to go home today  Acute CHF exacerbation with underlying history of chronic heart failure with preserved ejection fraction  Weight gain of 5 pounds noted today  Chronic lymphedema  Pulmonary hypertension  PABLITO  Obstructive sleep apnea on CPAP  Hypertensive urgency  Morbid obesity with a BMI of 45  Chronic hyponatremia  Generalized weakness    Plan     Patient has been admitted to the TCU for strengthening and rehab.  He was admitted with acute hypoxic respiratory failure requiring supplementary oxygen.  Currently discharged on 2 L with advised to wean oxygen as tolerated.  He was noted to have CHF exacerbation with worsening lymphedema.  He has been discharged to the TCU for strengthening and rehab.  At baseline he is quite weak debilitated with limited endurance.  Today seen at the request of nursing due to multiple concerns.  Patient has been refusing his oxygen.  He told me he is ready to be weaned off.  Unfortunately he is not weaning himself off he is just refusing to use oxygen.  Oxygen saturations are low and he appears to be quite short of breath with minimal exertion.  Unfortunately remains obstinate and says that he will not use oxygen.  In addition he continues on diuretics with weights being stable he has profound lymphedema noted on exam.  Recheck sodium is 131.  We did encourage him to consider fluid reduction and restriction but he is not very keen.  Discharge planning reviewed apparently  patient called his wife and reported that he was coming home today.  No discharge plan was set into motion.  However on questioning he feels that he is ready.  He had just finished walking from getting himself weight and could barely hold a conversation.  At this point I strongly believe that this patient is not ready to go home.  If he goes home he is at high risk of rebounding back to the hospital he wants to be full code.  His wife has been contacted to an urgent care conference with  consultation requested.  Care plan reviewed with staff also.  Patient is adamant that he is going home.  Total time in this visit is 35 minutes with 25-minute spent face-to-face reviewing discharge planning refusal of oxygen and risk of going home without oxygen as well as abnormal labs and weight gain reviewed with the patient    MEDICAL EQUIPMENT NEEDS:  Walker/patient refusing home oxygen     DISCHARGE PLAN/FACE TO FACE:  I certify that services are/were furnished while this patient was under the care of a physician and that a physician or an allowed non-physician practitioner (NPP), had a face-to-face encounter that meets the physician face-to-face encounter requirements. The encounter was in whole, or in part, related to the primary reason for home health. The patient is confined to his/her home and needs intermittent skilled nursing, physical therapy, speech-language pathology, or the continued need for occupational therapy. A plan of care has been established by a physician and is periodically reviewed by a physician.  Date of Face-to-Face Encounter:  4/14/2021     I certify that, based on my findings, the following services are medically necessary home health services: [facility] HHA/RN and PT/OT to evaluate and treat    My clinical findings support the need for the above skilled services because: patient will be discharging to home. Patient will need assistance with medication management and performing IADLs and ADLs  effectively and safely.     Patient to re-establish plan of care with their PCP within 7 days after leaving TCU.     History     Patient is a very pleasant 89 y.o. male who is admitted to TCU  Patient presented to the emergency room with increasing weakness and weight gain of 20 pounds.  He was noted to be hypoxic respiratory failure requiring oxygen  Currently has been discharged on 2 L to be TCU.  Today noted to be quite short of breath.  He had just finished walking after being weight and noted not to be using his oxygen.  Encouragement given patient is adamantly stated that he will not use oxygen.  He feels as a restrained that is preventing him from going home  He was admitted with CHF exacerbation.  Cardiology was consulted.  Chest CT was negative for PE.  He also had some findings consistent with tracheobronchomalacia.  He responded well to diuresis.  He has been discharged on oral diuretics.  He continues to have significant lymphedema weights remain high although patient feels he is lost a lot of weight  However recheck weight is 290 pounds which is up by 5 pounds.  Unfortunately patient developed renal insufficiency secondary to aggressive diuresis.  However creatinine did improve and will require monitoring.  Recheck labs do show improvement however he has hyponatremia which is worsened sodium is now 131  Due to profound weakness and difficulty with ambulation a TCU stay has been encouraged for the patient.  He also had high blood pressure on admission of 190/82 which was corrected.  We will blood pressures to be monitored  So far blood pressures appear to be stable.  Patient also is requesting a discharge home he feels he is ready.  Staff is reporting that he is profoundly fatigued and short of breath      Past Medical History     Active Ambulatory (Non-Hospital) Problems    Diagnosis     Chronic kidney disease, stage 3     Acute on chronic heart failure with preserved ejection fraction (H)     Acute  kidney injury (H)     Acute hypoxemic respiratory failure (H)     Bilateral lower extremity edema     Morbid obesity (H)     Coronary artery calcification seen on CAT scan     Pulmonary hypertension (H)     History of pulmonary embolism     Hypoxia     Coronary artery disease involving native coronary artery of native heart without angina pectoris     Ascending aortic aneurysm (H)     Essential hypertension     Dyslipidemia     Dyspnea on exertion     Near syncope     RAMON on CPAP     GERD (gastroesophageal reflux disease)     Past Medical History:   Diagnosis Date     Coronary artery disease due to calcified coronary lesion      GERD (gastroesophageal reflux disease) 7/2/2014     Hypertension      RAMON on CPAP 4/17/2017     Prostate cancer (H)      Prostate cancer (H)        Past Social History     Reviewed, and he  reports that he has never smoked. He has never used smokeless tobacco. He reports current alcohol use. He reports that he does not use drugs.    Family History     Reviewed, and family history includes Coronary artery disease in his brother; Diabetes in his mother; Heart disease in his brother; Stroke in his father.    Medication List   Post Discharge Medication Reconciliation Status: discharge medications reconciled, continue medications without change   Current Outpatient Medications on File Prior to Visit   Medication Sig Dispense Refill     aspirin 81 MG EC tablet Take 81 mg by mouth every evening.        clopidogreL (PLAVIX) 75 mg tablet Take 1 tablet (75 mg total) by mouth daily. 90 tablet 2     furosemide (LASIX) 40 MG tablet Take 40 mg by mouth daily.       isosorbide mononitrate (IMDUR) 30 MG 24 hr tablet Take 1 tablet (30 mg total) by mouth daily. 90 tablet 2     leuprolide, 6 month, (ELIGARD, 6 MONTH,) 45 mg syringe Inject 45 mg under the skin. Every six months       metoprolol tartrate (LOPRESSOR) 50 MG tablet Take 1 tablet (50 mg total) by mouth 2 (two) times a day. 60 tablet 0     mirabegron  (MYRBETRIQ) 50 mg Tb24 ER tablet Take 50 mg by mouth daily.       multivitamin with minerals (THERA-M) 9 mg iron-400 mcg Tab tablet Take 1 tablet by mouth daily.       nystatin (NYSTOP) powder Apply 1 application topically 2 (two) times a day.        oxybutynin (DITROPAN XL) 15 MG 24 hr tablet Take 15 mg by mouth daily.       oxybutynin (DITROPAN XL) 5 MG ER tablet Take 5 mg by mouth daily. Take with 15 mg of oxybutynin  Indications: a condition where the urge to urinate results in urine leakage       rosuvastatin (CRESTOR) 20 MG tablet Take 1 tablet (20 mg total) by mouth at bedtime. 90 tablet 2     valsartan-hydrochlorothiazide (DIOVAN-HCT) 160-25 mg per tablet Take 1 tablet by mouth daily. 90 tablet 2     No current facility-administered medications on file prior to visit.        Allergies     Allergies   Allergen Reactions     Lipitor [Atorvastatin] Myalgia     Lisinopril Cough     Morphine Other (See Comments)     hypotension     Sulfa (Sulfonamide Antibiotics) Hives     Vicodin [Hydrocodone-Acetaminophen] Unknown       Review of Systems   A comprehensive review of 14 systems was done. Pertinent findings noted here and in history of present illness. All the rest negative.  Constitutional: Negative.  Negative for fever, chills, he reports activity change, appetite change and fatigue.   HENT: Negative for congestion and facial swelling.    Eyes: Negative for photophobia, redness and visual disturbance.   Respiratory: Negative for cough and chest tightness.    Cardiovascular: Negative for chest pain, palpitations and has chronic leg swelling.   Gastrointestinal: Negative for nausea, diarrhea, constipation, blood in stool and abdominal distention.   Genitourinary: Negative.    Musculoskeletal: Negative.  He has difficulty with ambulation due to increasing shortness of breath  Skin: Negative.    Neurological: Negative for dizziness, tremors, syncope, weakness, light-headedness and headaches.   Hematological: Does  "not bruise/bleed easily.   Psychiatric/Behavioral: Negative.        Physical Exam     Recent Vitals 4/14/2021   Height 5' 8\"   Weight 290 lbs 6 oz   BSA (m2) 2.51 m2   /70   Pulse 82   Temp 97.8   Temp src -   SpO2 92   Some recent data might be hidden   Weight is 285 pounds  Patient on 2 L of oxygen.  He is morbidly obese but refuses  Constitutional: Oriented to person, place, and time and appears well-developed.   HEENT:  Normocephalic and atraumatic.  Eyes: Conjunctivae and EOM are normal. Pupils are equal, round, and reactive to light. No discharge.  No scleral icterus. Nose normal. Mouth/Throat: Oropharynx is clear and moist. No oropharyngeal exudate.    NECK: Normal range of motion. Neck supple. No JVD present. No tracheal deviation present. No thyromegaly present.   CARDIOVASCULAR: Normal rate, regular rhythm and intact distal pulses.  Exam reveals no gallop and no friction rub.  Systolic murmur present.  PULMONARY: Effort normal and breath sounds normal. No respiratory distress.No Wheezing or rales.  ABDOMEN: Soft. Bowel sounds are normal. No distension and no mass.  There is no tenderness. There is no rebound and no guarding. No HSM.  MUSCULOSKELETAL: Normal range of motion.  2+ lower extremity edema and no tenderness. Mild kyphosis, no tenderness.  LYMPH NODES: Has no cervical, supraclavicular, axillary and groin adenopathy.   NEUROLOGICAL: Alert and oriented to person, place, and time. No cranial nerve deficit.  Normal muscle tone. Coordination normal.   GENITOURINARY: Deferred exam.  SKIN: Skin is warm and dry. No rash noted. No erythema. No pallor.   EXTREMITIES: No cyanosis, no clubbing, has 2+ lower extremity edema. No Deformity.  PSYCHIATRIC: Normal mood, affect and behavior.      Lab Results     Recent Results (from the past 240 hour(s))   ECG 12 lead nursing unit performed    Collection Time: 04/08/21 10:48 AM   Result Value Ref Range    SYSTOLIC BLOOD PRESSURE 190 mmHg    DIASTOLIC BLOOD " PRESSURE 82 mmHg    VENTRICULAR RATE 61 BPM    ATRIAL RATE 61 BPM    P-R INTERVAL 328 ms    QRS DURATION 94 ms    Q-T INTERVAL 442 ms    QTC CALCULATION (BEZET) 444 ms    P Axis 48 degrees    R AXIS -23 degrees    T AXIS 76 degrees    MUSE DIAGNOSIS       Sinus rhythm with 1st degree A-V block  Low voltage QRS  Inferior infarct (cited on or before 16-JUL-1998)  Abnormal ECG  When compared with ECG of 04-FEB-2019 10:50,  No significant change was found  Confirmed by SEE ED PROVIDER NOTE FOR, ECG INTERPRETATION ([id number]),  KENYATTA GOMEZ ([id number]) on 4/8/2021 10:53:09 AM     Troponin I    Collection Time: 04/08/21 10:55 AM   Result Value Ref Range    Troponin I 0.01 0.00 - 0.29 ng/mL   Basic metabolic panel    Collection Time: 04/08/21 10:55 AM   Result Value Ref Range    Sodium 131 (L) 136 - 145 mmol/L    Potassium 3.8 3.5 - 5.0 mmol/L    Chloride 93 (L) 98 - 107 mmol/L    CO2 30 22 - 31 mmol/L    Anion Gap, Calculation 8 5 - 18 mmol/L    Glucose 135 (H) 70 - 125 mg/dL    Calcium 8.7 8.5 - 10.5 mg/dL    BUN 21 8 - 28 mg/dL    Creatinine 0.98 0.70 - 1.30 mg/dL    GFR MDRD Af Amer >60 >60 mL/min/1.73m2    GFR MDRD Non Af Amer >60 >60 mL/min/1.73m2   BNP(B-type Natriuretic Peptide)    Collection Time: 04/08/21 10:55 AM   Result Value Ref Range    BNP 58 0 - 93 pg/mL   CBC    Collection Time: 04/08/21 10:55 AM   Result Value Ref Range    WBC 8.1 4.0 - 11.0 thou/uL    RBC 4.78 4.40 - 6.20 mill/uL    Hemoglobin 15.4 14.0 - 18.0 g/dL    Hematocrit 44.3 40.0 - 54.0 %    MCV 93 80 - 100 fL    MCH 32.2 27.0 - 34.0 pg    MCHC 34.8 32.0 - 36.0 g/dL    RDW 12.4 11.0 - 14.5 %    Platelets 213 140 - 440 thou/uL    MPV 9.6 8.5 - 12.5 fL   Influenza A/B and SARS-CoV2 PCR Symptomatic    Collection Time: 04/08/21 10:55 AM    Specimen: Respiratory   Result Value Ref Range    SARS-CoV-2 PCR Result Negative Negative, Invalid    Influenza A Negative Negative, Invalid    Influenza B Negative Negative, Invalid   D-dimer, Quantitative     Collection Time: 04/08/21 11:31 AM   Result Value Ref Range    D-Dimer, Quant 0.78 (H) <=0.50 FEU ug/mL   Echo Complete    Collection Time: 04/08/21  3:22 PM   Result Value Ref Range    MV E' med thalia 7.16 cm/s    BSA 2.53 m2    Hieght 68 in    Weight 4,704 lbs    /72 mmHg    HR 77 bpm    Height 68.0 in    Weight 294 lbs   Troponin I    Collection Time: 04/08/21  5:19 PM   Result Value Ref Range    Troponin I 0.01 0.00 - 0.29 ng/mL   Troponin I    Collection Time: 04/08/21 11:25 PM   Result Value Ref Range    Troponin I 0.02 0.00 - 0.29 ng/mL   Basic metabolic panel    Collection Time: 04/09/21  4:38 AM   Result Value Ref Range    Sodium 130 (L) 136 - 145 mmol/L    Potassium 4.0 3.5 - 5.0 mmol/L    Chloride 91 (L) 98 - 107 mmol/L    CO2 28 22 - 31 mmol/L    Anion Gap, Calculation 11 5 - 18 mmol/L    Glucose 119 70 - 125 mg/dL    Calcium 8.8 8.5 - 10.5 mg/dL    BUN 26 8 - 28 mg/dL    Creatinine 1.45 (H) 0.70 - 1.30 mg/dL    GFR MDRD Af Amer 55 (L) >60 mL/min/1.73m2    GFR MDRD Non Af Amer 46 (L) >60 mL/min/1.73m2   Basic metabolic panel    Collection Time: 04/10/21  6:18 AM   Result Value Ref Range    Sodium 131 (L) 136 - 145 mmol/L    Potassium 3.5 3.5 - 5.0 mmol/L    Chloride 92 (L) 98 - 107 mmol/L    CO2 29 22 - 31 mmol/L    Anion Gap, Calculation 10 5 - 18 mmol/L    Glucose 123 70 - 125 mg/dL    Calcium 8.7 8.5 - 10.5 mg/dL    BUN 37 (H) 8 - 28 mg/dL    Creatinine 1.46 (H) 0.70 - 1.30 mg/dL    GFR MDRD Af Amer 55 (L) >60 mL/min/1.73m2    GFR MDRD Non Af Amer 45 (L) >60 mL/min/1.73m2   Platelet Count - every other day x 3    Collection Time: 04/10/21  6:18 AM   Result Value Ref Range    Platelets 211 140 - 440 thou/uL   Potassium    Collection Time: 04/10/21  6:17 PM   Result Value Ref Range    Potassium 4.4 3.5 - 5.0 mmol/L   Basic metabolic panel    Collection Time: 04/11/21  6:23 AM   Result Value Ref Range    Sodium 132 (L) 136 - 145 mmol/L    Potassium 3.8 3.5 - 5.0 mmol/L    Chloride 93 (L) 98 - 107  mmol/L    CO2 29 22 - 31 mmol/L    Anion Gap, Calculation 10 5 - 18 mmol/L    Glucose 108 70 - 125 mg/dL    Calcium 8.9 8.5 - 10.5 mg/dL    BUN 39 (H) 8 - 28 mg/dL    Creatinine 1.32 (H) 0.70 - 1.30 mg/dL    GFR MDRD Af Amer >60 >60 mL/min/1.73m2    GFR MDRD Non Af Amer 51 (L) >60 mL/min/1.73m2   COVID-19 Virus PCR MRF    Collection Time: 04/11/21  8:30 PM    Specimen: Respiratory   Result Value Ref Range    COVID-19 VIRUS SPECIMEN SOURCE Nasopharyngeal     2019-nCOV       Test received-See reflex to IDDL test SARS CoV2 (COVID-19) Virus RT-PCR   SARS-CoV-2 (COVID-19)-PCR    Collection Time: 04/11/21  8:30 PM    Specimen: Respiratory   Result Value Ref Range    SARS-CoV-2 Virus Specimen Source Nasopharyngeal     SARS-CoV-2 PCR Result NEGATIVE     SARS-COV-2 PCR COMMENT       Testing was performed using the [employer] SARS-CoV-2 Assay on the [employer]   Basic Metabolic Panel    Collection Time: 04/13/21 10:55 AM   Result Value Ref Range    Sodium 131 (L) 136 - 145 mmol/L    Potassium 4.0 3.5 - 5.0 mmol/L    Chloride 92 (L) 98 - 107 mmol/L    CO2 28 22 - 31 mmol/L    Anion Gap, Calculation 11 5 - 18 mmol/L    Glucose 160 (H) 70 - 125 mg/dL    Calcium 9.0 8.5 - 10.5 mg/dL    BUN 32 (H) 8 - 28 mg/dL    Creatinine 1.05 0.70 - 1.30 mg/dL    GFR MDRD Af Amer >60 >60 mL/min/1.73m2    GFR MDRD Non Af Amer >60 >60 mL/min/1.73m2   Magnesium    Collection Time: 04/13/21 10:55 AM   Result Value Ref Range    Magnesium 1.9 1.8 - 2.6 mg/dL   HM2(CBC w/o Differential)    Collection Time: 04/13/21 10:55 AM   Result Value Ref Range    WBC 10.6 4.0 - 11.0 thou/uL    RBC 4.65 4.40 - 6.20 mill/uL    Hemoglobin 14.9 14.0 - 18.0 g/dL    Hematocrit 43.3 40.0 - 54.0 %    MCV 93 80 - 100 fL    MCH 32.0 27.0 - 34.0 pg    MCHC 34.4 32.0 - 36.0 g/dL    RDW 12.7 11.0 - 14.5 %    Platelets 250 140 - 440 thou/uL    MPV 10.3 8.5 - 12.5 fL              Electronically signed by  LEWIS Roberts

## 2021-06-17 NOTE — TELEPHONE ENCOUNTER
Spouse Santa reports abnormal labs and pt is getting weaker, having trouble getting out of the chair.  Labs were ordered by PCP at 5/17/21 visit.  Advised spouse Santa to contact PCP.  Santa verbalized understanding and had no questions.      Pt was hospitalized 4/08 - 4/11/2021 for elevated blood pressure and shortness of breath, discharged to TCU.  Pt did leave TCU 4/14/21 prior to recommendation with no plan in place.  Pt currently at home with spouse Santa as caregiver.  Santa states they do have home care coming in.    Dr Fatimah Avila was [phone].  Spouse is contacting PCP.  Did you have anything to add?  Pt will see you 6/7/21.  -omid

## 2021-06-17 NOTE — PATIENT INSTRUCTIONS - HE
Patient Instructions by Claudine Arriola CNP at 2/13/2019  2:50 PM     Author: Claudine Arriola CNP Service: -- Author Type: Nurse Practitioner    Filed: 2/13/2019  3:04 PM Encounter Date: 2/13/2019 Status: Signed    : Claudine Arriola CNP (Nurse Practitioner)         Medication     o Take all your medications as prescribed  o Do not stop any medications without talking with a healthcare provider    Exercise      o Physical activity is important for overall health  o Set a goal of 150 minutes of exercise each week  o For example, 30 minutes of exercise 5 days each week.    o These 30 minutes can be broken into shorter periods of 15 minutes twice daily or 10 minutes three times daily  o Start any exercise program slowly and work towards the goal of 150 minutes each week  o For example, you may start with 10 minutes and plan to add a few minutes each week as you get stronger   o Examples of exercise include walking, swimming, or biking  o Remember to stretch and stay hydrated with exercise    Diet     o A heart healthy diet includes:  o A variety of fruits and vegetables  o Whole grains  o Low-fat dairy (fat-free, 1% fat, and low-fat)  o Lean meats and poultry without skin   o Fish (eat fish 2 times each week)  o Nuts  o Limit saturated fat to about 13 grams each day (based on a 2000 calorie diet)  o Limit red meat  o Limit sugars (sweets and sugary beverages)  o Limit your portion sizes  o Do not add salt to your food when cooking or at the table  o Limit alcohol intake (no more than 1 drink each day for women or 2 drinks each day for men)    Weight Loss     o Work on losing weight with diet and exercise  o You BMI (body mass index) should be between 18.5-24.9  o This is a calculation of your weight and height  o Please ask your healthcare provider for your BMI    Manage Other Chronic Health Conditions     o Control cholesterol  o Eat a diet low in saturated fat  o Exercise   o Take a  statin medication as prescribed  o Manage blood pressure  o Eat a diet low in sodium  o Exercise  o Reduce stress  o Lose weight   o Take blood pressure medications as prescribed  o Control blood sugars if diabetic  o Monitor sugars and carbohydrates in your diet  o Lose weight   o Take diabetes medications as prescribed  o Follow-up with your primary care provider to make sure your blood sugars are well controlled    Stress Reduction     o Find time each day to relax  o Reading, listening to music, yoga, meditation, exercise, spending time with friends and family, volunteering   o Get 6-8 hours of sleep each night    Smoking Cessation     o Smoking causes numerous health problems including coronary artery disease  o It is never too late to quit  o Set realistic goals for quitting  o Decrease the number of cigarettes used each week  o Use nicotine gum or patches to help you quit    Information from the American Heart Association.  Please visit their website at www.heart.org    Patient Education     Eating Heart-Healthy Foods  Eating has a big impact on your heart health. In fact, eating healthier can improve several of your heart risks at once. For instance, it helps you manage weight, cholesterol, and blood pressure. Here are ideas to help you make heart-healthy changes without giving up all the foods and flavors you love.  Getting started    Talk with your healthcare provider about eating plans, such as the DASH or Mediterranean diet. You may also be referred to a dietitian.    Change a few things at a time. Give yourself time to get used to a few eating changes before adding more.    Work to create a tasty, healthy eating plan that you can stick to for the rest of your life.    Goals for healthy eating  Below are some tips to improve your eating habits:    Limit saturated fats and trans fats. Saturated fats raise your levels of cholesterol, so keep these fats to a minimum. They are found in foods such as fatty  meats, whole milk, cheese, and palm and coconut oils. Avoid trans fats because they lower good cholesterol as well as raise bad cholesterol. Trans fats are most often found in processed foods.    Reduce sodium (salt) intake. Eating too much salt may increase your blood pressure. Limit your sodium intake to 2,300 milligrams (mg) per day (the amount in 1 teaspoon of salt), or less if your healthcare provider recommends it. Dining out less often and eating fewer processed foods are two great ways to decrease the amount of salt you consume.    Managing calories. A calorie is a unit of energy. Your body burns calories for fuel, but if you eat more calories than your body burns, the extras are stored as fat. Your healthcare provider can help you create a diet plan to manage your calories. This will likely include eating healthier foods as well as exercising regularly. To help you track your progress, keep a diary to record what you eat and how often you exercise.  Choose the right foods  Aim to make these foods staples of your diet. If you have diabetes, you may have different recommendations than what is listed here:    Fruits and vegetables provide plenty of nutrients without a lot of calories. At meals, fill half your plate with these foods. Split the other half of your plate between whole grains and lean protein.    Whole grains are high in fiber and rich in vitamins and nutrients. Good choices include whole-wheat bread, pasta, and brown rice.    Lean proteins give you nutrition with less fat. Good choices include fish, skinless chicken, and beans.    Low-fat or nonfat dairy provides nutrients without a lot of fat. Try low-fat or nonfat milk, cheese, or yogurt.    Healthy fats can be good for you in small amounts. These are unsaturated fats, such as olive oil, nuts, and fish. Try to have at least 2 servings per week of fatty fish, such as salmon, sardines, mackerel, rainbow trout, and albacore tuna. These contain  omega-3 fatty acids, which are good for your heart. Flaxseed is another source of a heart-healthy fat.  More on heart-healthy eating  Read food labels  Healthy eating starts at the grocery store. Be sure to pay attention to food labels on packaged foods. Look for products that are high in fiber and protein, and low in saturated fat, cholesterol, and sodium. Avoid products that contain trans fat. And pay close attention to serving size. For instance, if you plan to eat two servings, double all the numbers on the label.  Prepare food right  A key part of healthy cooking is cutting down on added fat and salt. Look on the internet for lower-fat, lower-sodium recipes. Also, try these tips:    Remove fat from meat and skin from poultry before cooking.    Skim fat from the surface of soups and sauces.    Broil, boil, bake, steam, grill, and microwave food without added fats.    Choose ingredients that spice up your food without adding calories, fat, or sodium. Try these items: horseradish, hot sauce, lemon, mustard, nonfat salad dressings, and vinegar. For salt-free herbs and spices, try basil, cilantro, cinnamon, pepper, and rosemary.  Date Last Reviewed: 10/1/2017    8280-5604 [employer]. [address] [place] [address], [place] [address]. All rights reserved. This information is not intended as a substitute for professional medical care. Always follow your healthcare professional's instructions.

## 2021-06-17 NOTE — TELEPHONE ENCOUNTER
----- Message -----  From: Yonatan Sheridan MD  Sent: 5/24/2021  11:09 AM CDT  To: Graciela Sapp RN    Could you schedule the patient to see RAC?  His sodium level is low which can cause weakness nad mental status change.  I think he should be evaluated in office with labs.  Thanks  Jered    === Recommendations discussed with wife Santa.  Santa verbalized understanding and had no questions, she is just leaving for her own MD appt and would appreciate a call back to schedule.  Message sent to scheduling.  Santa also updates that she is awaiting call back from PCP too.  -omid

## 2021-06-17 NOTE — PATIENT INSTRUCTIONS - HE
Mauricio,     It was very nice to meet you and Santa.  I think that overall you are doing okay considering the circumstances.  As long as you can sit and lay down without pain, there is not anything else we need to do further unless you would like to.  At this point any further treatment would likely be exercise/rehabilitation based.  This would involve having you work with an outpatient physical therapist to increase your strength and your general conditioning.  This would help you to be able to stand with better balance and to walk more easily.  This would take quite a bit of hard work on your part, to do the exercises on a regular basis.       I do not think osteopathic manipulation is a reasonable option at this time given the positioning that you would have to be in for this type of treatment.  If you do want to try this, I would recommend trying the physical therapy first and then we could schedule you for a trial of this type of treatment.  This would have to be done with either Dr. Sol or Dr. Solitario.        A nurse will call you next week to see if you have decided if you would like to move forward with physical therapy.Please do not hesitate to contact the clinic at [phone] if you have any questions/concerns or any worsening of your pain prior to that time. You are also welcome to contact me via [facility].  Thank you!  Dr. Rodriguez

## 2021-06-17 NOTE — TELEPHONE ENCOUNTER
Nurse navigation to please call the patient (and his wife, Santa) and let him know that if he wants to trial the shoulder blocks to see if he's a candidate for the Cool Leaf procedure to his left shoulder, we can offer him this.  Dr. Solitario says he can do the procedure with him lying supine.  If they would like to proceed, please place order for the blocks.

## 2021-06-17 NOTE — PROGRESS NOTES
ASSESSMENT: Chava Lincoln (YOLY Martínez) is a 89 y.o. male  with a BMI of Body mass index is 43.18 kg/m . with past medical history significant for prostate cancer, coronary artery disease with congestive heart failure with chronic kidney disease (stage III), pulmonary hypertension, history of pulmonary embolism, hypertension, obstructive sleep apnea, GERD who presents today for new patient evaluation of acute neck pain without radicular arm pain.  This is thought to be from irritation of the cervical facet joint secondary to cervical facet arthropathy.  This pain has essentially resolved at this point.  He is also here today for evaluation of chronic bilateral low back pain without radicular/sciatic type leg symptoms.  This is again likely secondary to lumbar facet joint syndrome secondary to lumbar facet arthropathy.  There may be an element of lumbar stenosis given that he does have some claudication type symptoms.  He does have significant weakness of the left hip flexor compared to the right in addition to weakness of the distal lower extremities, but is without any red flag symptoms.    PSP:  Dr. Rodriguez (Referred by [facility])    TRISH:  54 %  WHO-5:  9 (The patient is not interested in behavioral health therapy)    PLAN:  A shared decision making model was used.  The patient's values and choices were respected.  The following represents what was discussed and decided upon by the physician and the patient.  His wife, Santa, was present for the entire encounter.    1.  DIAGNOSTIC TESTS:    -No records are received from [facility].  -There is no need for any imaging at this time.  It is very unlikely the patient would be able to lay in an MRI scanner.  Even to be able to lay down for a CT scan would be quite some effort, especially to have him get to the hospital to do this.  We will hold off on any advanced imaging at this time.  He does not have any red flag symptoms that would warrant any imaging.   Imaging would likely not change the treatment plan at this time.  2.  PHYSICAL THERAPY: Outpatient physical therapy was offered to work on a general conditioning and rehabilitation program for the spine.  This could also teach him exercises for the neck.  His wife thinks that this would be quite difficult for him to be able to do outpatient physical therapy.  I did explain that the goal of outpatient physical therapy would be to teach him a home exercise program and then have him do these exercises on a regular basis over the long-term.  By doing them consistently, he would be able to increase his strength in general overall conditioning.  However this will take quite a bit of hard work on his part to do these exercises consistently.  He and his wife will consider this.  If they would like an order for outpatient physical therapy, 1 can be provided.  3.  MEDICATIONS: The patient's medication options are quite limited at this time given his history of heart disease and congestive heart failure (he is currently on Plavix).  We could potentially consider gabapentin in the future if he has pain even when he is sitting down or laying down.  But given his history of kidney disease, would hold off on any medications as long as he can be pain-free when he is sitting or laying down.  -Can continue with over-the-counter Tylenol as needed.  4.  INTERVENTIONS: No further injections are recommended at this time given that the patient does not have any pain with sitting or laying down.  If he develops pain even with sitting or laying down, then injections could be considered.  Would likely start with bilateral L4-5 and L5-S1 facet joint injections.  Usually advanced imaging would be ordered, but given his age and how difficult it would be for him to participate in advanced imaging, he would likely be safe for the facet joint injections without any advanced imaging.  Per the new Medicare requirements, he would require bilateral  L3, L4, L5 medial branch blocks.  He may benefit from just proceeding to radiofrequency ablation as opposed to trying the facet joint injections.  Again it would be quite difficult for him to be able to position on the table.  -His wife is asking if there would be any options for the left shoulder pain.  He has been offered a left total shoulder replacement, but now he has so many comorbidities that surgery is not likely an option.  Did discuss the cool leaf ablation procedure.  However he may have to lay down for this.  I will check into the details on how patient is a physician for this procedure to see if this would be an option for him.  5.  PATIENT EDUCATION:   -He was reassured that the neck pain will likely not come back, especially if he can stay out of the hospital and stay at home in his own bed using his own pillow.  If it does come back in the future, he should do what he just did this last time which is to continue to gently move his neck.  If that does not resolve the pain after a few days, then he should contact the spine clinic so that further evaluation can be done.  They were told that this neck pain is likely secondary to arthritis in the neck with those joints becoming irritated.  He did the exact right thing and continuing to gently move the joints to prevent them from stiffening up further and allow them to calm down.  -Discussed with patient that given that he does still have a reasonable quality of life given the fact that he can sit without any pain.  This allows him to read, watch TV, do other seated activities without significant difficulty, that no other treatment is necessarily required.  If he does wish to have treatment, the first-line treatment would be a rehabilitation program based on an exercise program.  If he does not want to do this, that decision will be respected, especially given how old he is and how hard it would be for him to get out.  -Discussed that other injections for  the back are unlikely at this time.  Again if he has pain even with sitting down or laying down, then that might make him a candidate for injections.  -They were potentially interested in osteopathic manipulation.  I discussed that this typically works better in combination with the rehabilitation program.  Given the position that he has to be in for the treatment, he might not be able to tolerate it very well, as it does require being able to move into different positions typically.  I would also need him to be treated by one of my partners, given our size difference.  We will hold off on this for now.  -All of his and his wife's questions were answered to their satisfaction today.  They were in agreement with the treatment plan.  They did express gratitude for the care received today.  6.  FOLLOW-UP:   Nurse navigation is asked to call them next week to check in and see if they have decided anything with regards to the treatment plan.  The nurse navigation team is asked to relay this information to the physicians and then further recommendations can be made based on their decision.  If there are any questions/concerns or any significant worsening of pain prior to that time, the patient is asked to call the clinic via the nurse navigation line or via [employer].     A total of 52 minutes was spent in the care of this patient.      SUBJECTIVE:  Chava Lincoln (AKA Mauricio)  Is a 89 y.o. male who presents today for new patient evaluation of low back pain and neck pain.  They initially made this appointment because he was having acute neck pain.  He was recently hospitalized for a CHF exacerbation.  He then required a 3-day stay in the TCU afterwards before he was able to go home.  He notes that he had very severe neck pain when he was in the PCU.  However after about 4 to 5 days after being home and continuing to gently move his neck, he reports that his neck pain seems to be resolved now.  He is very concerned about  "this coming back in the future and what he should do if it does come back in the future.    They would also like to discuss if anything further can be done for his back pain.  They had previously treated with [facility], the last time know about 5 years ago.  He had undergone a lumbar radiofrequency ablation without significant relief.  His wife actually thinks that his right leg became permanently weak following that procedure.    He has back pain in the very low part of his back going across to both sides.  It may be slightly worse on the right side compared to the left.  He only has the pain when he is standing or when he is walking.  He reports that he cannot walk very much.  He says he uses a walker around the house to go to the bathroom in the kitchen.  However he cannot walk more than a couple of minutes before he must sit down.  He reports that he has complete resolution of his pain when he is sitting or when he is laying down.  The pain is just in his low back.  He denies any radiation of pain going down into the legs.  He denies any numbness tingling or weakness in the legs.  He was using diclofenac for several years but then had to stop secondary to his kidney disease.  He also now cannot take anything with diclofenac because he is on Plavix.  He did do physical therapy several years ago for the low back.  He has not continued to do home exercise program.  He has not had any other injections for the back pain other than the radiofrequency ablation about 5 years ago with some orthopedics.  He has not had any history of low back surgeries.  [facility] told him there is really nothing else I could do for his back.    His wife does wish to mention that he has very bad shoulder pain.  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  He has been offered a left shoulder replacement surgery but now he is unable to have the surgery.  Between the shoulder pain and his poor balance, is very difficult for " him to get around, as he does need to lean on things to help with his balance and his overall general deconditioning/weakness.    Medications:  Reviewed and correct in the chart.      Allergies: Reviewed and he has a following reactions: Sulfa which causes hives, morphine which causes hypotension, Vicodin which causes difficulty breathing, lisinopril which causes a cough, Lipitor which causes muscle aches.    PMH:  Reviewed and significant for prostate cancer, coronary artery disease, congestive heart failure, pulmonary hypertension, pulmonary embolism, abdominal aortic aneurysm, chronic kidney disease, stage III, COPD, obstructive sleep apnea.    PSH:  Reviewed and significant for cryotherapy treatment for his prostate cancer, coronary angioplasty with cardiac stents, hernia repairs x2, bilateral total knee arthroplasty.    Family History:  Reviewed and significant for diabetes, heart disease, hypertension.    Social History: Patient is .  He has not had any alcohol in several years, though he used to drink beer.  He denies any tobacco or illicit drug use.    ROS: Positive for sexual dysfunction, chronic leg/feet swelling, shortness of breath, wheezing, joint pain, itching, imbalance, easy bruising, excessive tiredness.  He does have bladder dysfunction but this has been ongoing ever since his prostate cancer and has been treated by urology.  This is not worse than baseline.   Specifically negative for bowel dysfunction, fevers,chills, appetite changes, unexplained weight loss.  Otherwise 13 systems reviewed are negative.  Please see the patient's intake questionnaire from today for details.      OBJECTIVE:  PHYSICAL EXAMINATION:    CONSTITUTIONAL:  Vital signs as above.  No acute distress.  The patient is well nourished and well groomed.  PSYCHIATRIC:  The patient is awake, alert, oriented to person, place, time and answering questions appropriately with clear speech.    SKIN:  Skin over the face, bilateral  lower extremities, and posterior torso is clean, dry, intact without rashes.  He was wearing shorts today so the skin over the thighs and the knees was clean and dry.  He was wearing compression stockings which were not removed for the exam today.  GAIT: The patient's gait was not able to be assessed as he did not have a walker.  I did see him stand with significant assistance from his wife.  He was able to shuffle between the wheelchair and the examination table.  STANDING EXAMINATION: He was not able to stand long enough to perform a standing examination.  MUSCLE STRENGTH: He had 4/5 strength for the left hip flexor but 5/5 strength for the right hip flexor.  He had 5/5 strength of bilateral knee flexors and extensors.  He had 5/5 strength of bilateral ankle dorsiflexors.  He has 4/5 strength of the bilateral ankle evertors and invertors.  He did not have full range of motion with these maneuvers, but was able to maintain some sort of contraction against resistance.  NEUROLOGICAL:  Trace patellar, medial hamstring, and achilles reflexes bilaterally.   No ankle clonus bilaterally. Sensation to light touch is intact in the proximal bilateral L4, L5, and S1 dermatomes.  VASCULAR: He was wearing compression stockings which were not removed for the exam today.  He did appear to have pitting edema of the bilateral lower extremities.  MUSCULOSKELETAL: He did have tenderness over the bilateral SI joints, slightly more tender over the right side compared to the left.  Minimal tenderness of the bilateral lumbar paraspinal muscles.

## 2021-06-17 NOTE — TELEPHONE ENCOUNTER
----- Message from Yasmin Zhao sent at 5/24/2021  8:22 AM CDT -----  General phone call:  PCP DID BLOOD PANEL, THIS SHOWS SODIUM LOW AND GLUCOSE HIGH. PATIENT IS A LOT MORE WEAK AND TROUBLE GETTING OUT OF CHAIRS AND THINGS.   PLEASE CALL    Caller: WIFE  Primary cardiologist: DR NELSON  Detailed reason for call: SEE ABOVE  New or active symptoms? YES, SEE ABOVE  Best phone number: [phone] CELL  Best time to contact: ANY TIME  Ok to leave a detailed message? YES  Device? NO    Additional Info:

## 2021-06-17 NOTE — TELEPHONE ENCOUNTER
Call placed to pt with treatment plan recommendations. Spoke with pt's wife, Santa. She stated understanding. They will discuss this and if the pt would like to proceed they will [facility] to have orders placed.

## 2021-06-18 NOTE — LETTER
Letter by Jose Sanchez MD at      Author: Jose Sanchez MD Service: -- Author Type: --    Filed:  Encounter Date: 1/25/2019 Status: (Other)       Erasmo Michael MD  [address] ,  [id number]  [facility] [address]                                  January 25, 2019    Patient: Chava Lincoln   MR Number: [id number]   YOB: 1929   Date of Visit: 1/25/2019     Dear Dr. Fernanda MD:    Thank you for referring Chava Lincoln to me for evaluation. Below are the relevant portions of my assessment and plan of care.    If you have questions, please do not hesitate to call me. I look forward to following Chava along with you.    Sincerely,        Jose Sanchez MD          CC  MD Daniel Chavira Theodore Joseph, MD  1/25/2019  4:48 PM  Sign at close encounter  Assessment and Plan:Chava Lincoln is a 89 y.o. with a past medical history significant for RAMON (uses home CPAP), coronary artery disease and chronically elevated right hemidiaphragm who presents to clinic today by request of his cardiologist for evaluation of a pulmonary cause of his dyspnea on exertion.  He states that he does not have shortness of breath at rest, however does have it walking up stairs around the room.  He is to have coronary stenting procedure, however would like pulmonary evaluation before hand.  He is already using a home CPAP for his obstructive sleep apnea.  There was a question of possible pulmonary arterial hypertension on a previous CT scan, however no corroborating evidence of this was found on echocardiogram.  CT scan did not tracheobronchomalacia, however I doubt this would be causing his shortness of breath with exertion.  Be more likely to cause shortness of breath at night, however is likely already being treated by his CPAP.  He has evidence of right basilar opacities that could be atelectasis and/or aspiration.  Reportedly he did have a swallow eval positive  for dysphasia had a previous hospitalization and was given swallowing precaution recommendations by the speech-language pathologist which he is following.  He denies any symptoms of heartburn, however he may also be having nocturnal reflux aspiration.  He has never had thoracic surgery, however his elevated right hemidiaphragm could be from hemidiaphragm paralysis of another cause.  He has mild COPD with a mildly decreased diffusing capacity on his pulmonary function tests that are not responsive to inhaled bronchodilators.  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    For possible nocturnal reflux aspiration:  - Take zantac nightly  - Avoid eating or drinking within 3 hours of bedtime  - Elevate back at angle with wedge/reflux pillow    Fluoroscopic sniff test ordered to evaluate for hemidiaphragm    Return to follow-up in our clinic in ~3 weeks    Regarding his anticipated cardiac procedures, the patient COPD and decreased diffusing capacity are only mild and doubtfully contributing significantly to his dyspnea.  He should have his home CPAP brought to any hospital stay to be used at night, whenever he is sleeping, and perioperatively.  If home CPAP not available, could consider payal-procedure BiPAP starting with settings of 15/8.    CCx: Dyspnea on exertion    HPI: Mr. Lincoln reports chronic dyspnea on exertion that has been unchanged for several years.  He has not noted any modifying factors.  He denies shortness of breath at rest, heartburn, coughing, chest pain or other symptoms.  He is using his CPAP at night for his sleep apnea.    PMH:  Past Medical History:   Diagnosis Date   ? GERD (gastroesophageal reflux disease) 7/2/2014   ? Hypertension    ? RAMON on CPAP 4/17/2017   ? Prostate cancer (H)    ? Prostate cancer (H)        PSH:  Past Surgical History:   Procedure Laterality Date   ? CV CORONARY ANGIOGRAM N/A 12/10/2018     Procedure: Coronary Angiogram;  Surgeon: Tana Duncan MD;  Location: [facility];  Service: Cardiology   ? CV LEFT HEART CATHETERIZATION WO LEFT VETRICULOGRAM Left 12/10/2018    Procedure: Left Heart Catheterization Without Left Ventriculogram;  Surgeon: Tana Duncan MD;  Location: [facility];  Service: Cardiology   ? HERNIA REPAIR     ? JOINT REPLACEMENT     ? KNEE SURGERY Bilateral        SH:  Social History     Socioeconomic History   ? Marital status:      Spouse name: Not on file   ? Number of children: Not on file   ? Years of education: Not on file   ? Highest education level: Not on file   Social Needs   ? Financial resource strain: Not on file   ? Food insecurity - worry: Not on file   ? Food insecurity - inability: Not on file   ? Transportation needs - medical: Not on file   ? Transportation needs - non-medical: Not on file   Occupational History   ? Not on file   Tobacco Use   ? Smoking status: Never Smoker   ? Smokeless tobacco: Never Used   Substance and Sexual Activity   ? Alcohol use: Yes     Comment: 2-3 beer/ month   ? Drug use: No   ? Sexual activity: Not on file   Other Topics Concern   ? Not on file   Social History Narrative   ? 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  He was stationed in [place] in the [employer], did not have exposure to combat or known toxic inhalants there.  He has never smoked nor lived with a smoker.    Family history:  Family History   Problem Relation Age of Onset   ? Diabetes Mother    ? Stroke Father    ? Heart disease Brother    ? Coronary artery disease Brother    Brother had emphysema and nephew had lung    ROS:  A review of 12 organ systems was performed with pertinent positives and negatives noted in the HPI.    Current Meds:  Current Outpatient Medications   Medication Sig   ? aspirin 81 MG EC tablet Take 81 mg by mouth every evening.    ? clopidogrel (PLAVIX) 75  "mg tablet TAKE 1 TABLET(75 MG) BY MOUTH DAILY   ? isosorbide mononitrate (IMDUR) 30 MG 24 hr tablet Take 1 tablet (30 mg total) by mouth daily.   ? losartan-hydrochlorothiazide (HYZAAR) 100-25 mg per tablet Take 1 tablet by mouth daily.   ? mirabegron (MYRBETRIQ) 50 mg Tb24 ER tablet Take 50 mg by mouth daily.   ? multivitamin with minerals (THERA-M) 9 mg iron-400 mcg Tab tablet Take 1 tablet by mouth daily.   ? oxybutynin (DITROPAN XL) 15 MG 24 hr tablet Take 15 mg by mouth daily.   ? rosuvastatin (CRESTOR) 20 MG tablet TAKE 1 TABLET(20 MG) BY MOUTH AT BEDTIME   ? ranitidine (ZANTAC) 150 MG tablet Take 1 tablet (150 mg total) by mouth at bedtime.       Labs:  No results found for this or any previous visit (from the past 72 hour(s)).    I have personally reviewed all imaging and PFT data available pertinent to this visit.    PFTs:  1/25/2019: consistent with mild obstructive ventilatory defect, not responsive to inhaled bronchodilators, as well as mildly decreased diffusing capacity    Physical Exam:  BP 96/74   Pulse 72   Resp 20   Ht 5' 9\" (1.753 m)   Wt (!) 279 lb 6.4 oz (126.7 kg)   SpO2 91% Comment: RA  BMI 41.26 kg/m     General - Well nourished  Ears/Mouth - OP pink moist, no thrush  Neck - no cervical lymphadenopathy  Lungs - Clear to ausculation bilaterally   CVS - regular rhythm with no murmurs, rubs or gallups  Abdomen - soft, NT, ND, NABS  Ext - no cyanosis, clubbing or edema  Skin - no rash  Psychology - alert and oriented, answers appropriate        Electronically signed by:    Dimitry Sanchez MD  [facility]       "

## 2021-06-21 NOTE — LETTER
Letter by Haylee Bob MBBS at      Author: Haylee Bob MBBS Service: -- Author Type: --    Filed:  Encounter Date: 4/12/2021 Status: (Other)         Patient: Chava Lincoln   MR Number: [id number]   YOB: 1929   Date of Visit: 4/12/2021       [facility] note      Patient: Chava Lincoln  MRN: [id number]      [facility] [[id number]]  Reason for Visit     Chief Complaint   Patient presents with   ? H & P       Code Status     FULL CODE    Assessment     Acute hypoxic respiratory failure  Acute CHF exacerbation with underlying history of chronic heart failure with preserved ejection fraction  Chronic lymphedema  Pulmonary hypertension  PABLITO  Obstructive sleep apnea on CPAP  Hypertensive urgency  Morbid obesity with a BMI of 45  Chronic hyponatremia  Generalized weakness    Plan     Patient has been admitted to the TCU for strengthening and rehab.  He was admitted with acute hypoxic respiratory failure requiring supplementary oxygen.  Currently discharged on 2 L with advised to wean oxygen as tolerated.  He was noted to have CHF exacerbation with worsening lymphedema.  Cardiology was consulted.  CT chest were negative for PE did show pulmonary hypertension and tracheobronchomalacia.  He was aggressively diuresed with improvement.  Currently on medical management with oral diuretics.  Weights to be closely monitored.  Current weights are 285 pounds his weight at home was greater than 300 pounds as per him.  He does report some degree of noncompliance on his part at home.  He is on high doses of diuretics.  Monitor trends.  He also developed PABLITO secondary to aggressive diuresis he will require monitoring of BMPs.  In addition he has hypertensive urgency which was corrected he has underlying history of hypertension and continues on his home medications.  We will monitor blood pressure trends  So far blood pressures appear to be stable on multiple medications  Monitor  weights closely with underlying history of morbid obesity BMI was 45  Patient has chronic hyponatremia with a baseline sodium of 130 will monitor closely.  He is also on anticoagulation due to underlying history of PE  Remains debilitated and wheelchair-bound at baseline but is working with therapy to get stronger.  He is hoping to discontinue his oxygen soon and go home.  He will do an outpatient follow-up with cardiology.  Recheck labs to monitor electrolytes and kidney function    History     Patient is a very pleasant 89 y.o. male who is admitted to TCU  Patient presented to the emergency room with increasing weakness and weight gain of 20 pounds.  He was noted to be hypoxic respiratory failure requiring oxygen  Currently has been discharged on 2 L to be TCU.  He was admitted with CHF exacerbation.  Cardiology was consulted.  Chest CT was negative for PE.  He also had some findings consistent with tracheobronchomalacia.  He responded well to diuresis.  He has been discharged on oral diuretics.  Unfortunately patient developed renal insufficiency secondary to aggressive diuresis.  However creatinine did improve and will require monitoring.  Due to profound weakness and difficulty with ambulation a TCU stay has been encouraged for the patient.  He also had high blood pressure on admission of 190/82 which was corrected.  We will blood pressures to be monitored  So far blood pressures appear to be stable.      Past Medical History     Active Ambulatory (Non-Hospital) Problems    Diagnosis   ? Acute on chronic heart failure with preserved ejection fraction (H)   ? Acute kidney injury (H)   ? Acute hypoxemic respiratory failure (H)   ? Bilateral lower extremity edema   ? Morbid obesity (H)   ? Coronary artery calcification seen on CAT scan   ? Pulmonary hypertension (H)   ? History of pulmonary embolism   ? Hypoxia   ? Coronary artery disease involving native coronary artery of native heart without angina pectoris   ?  Ascending aortic aneurysm (H)   ? Essential hypertension   ? Dyslipidemia   ? Dyspnea on exertion   ? Near syncope   ? RAMNO on CPAP   ? GERD (gastroesophageal reflux disease)     Past Medical History:   Diagnosis Date   ? Coronary artery disease due to calcified coronary lesion    ? GERD (gastroesophageal reflux disease) 7/2/2014   ? Hypertension    ? RAMON on CPAP 4/17/2017   ? Prostate cancer (H)    ? Prostate cancer (H)        Past Social History     Reviewed, and he  reports that he has never smoked. He has never used smokeless tobacco. He reports current alcohol use. He reports that he does not use drugs.    Family History     Reviewed, and family history includes Coronary artery disease in his brother; Diabetes in his mother; Heart disease in his brother; Stroke in his father.    Medication List   Post Discharge Medication Reconciliation Status: discharge medications reconciled, continue medications without change   Current Outpatient Medications on File Prior to Visit   Medication Sig Dispense Refill   ? aspirin 81 MG EC tablet Take 81 mg by mouth every evening.      ? clopidogreL (PLAVIX) 75 mg tablet Take 1 tablet (75 mg total) by mouth daily. 90 tablet 2   ? furosemide (LASIX) 40 MG tablet Take 40 mg by mouth daily.     ? isosorbide mononitrate (IMDUR) 30 MG 24 hr tablet Take 1 tablet (30 mg total) by mouth daily. 90 tablet 2   ? leuprolide, 6 month, (ELIGARD, 6 MONTH,) 45 mg syringe Inject 45 mg under the skin. Every six months     ? metoprolol tartrate (LOPRESSOR) 50 MG tablet Take 1 tablet (50 mg total) by mouth 2 (two) times a day. 60 tablet 0   ? mirabegron (MYRBETRIQ) 50 mg Tb24 ER tablet Take 50 mg by mouth daily.     ? multivitamin with minerals (THERA-M) 9 mg iron-400 mcg Tab tablet Take 1 tablet by mouth daily.     ? nystatin (NYSTOP) powder Apply 1 application topically 2 (two) times a day.      ? oxybutynin (DITROPAN XL) 15 MG 24 hr tablet Take 15 mg by mouth daily.     ? oxybutynin (DITROPAN XL)  5 MG ER tablet Take 5 mg by mouth daily. Take with 15 mg of oxybutynin  Indications: a condition where the urge to urinate results in urine leakage     ? rosuvastatin (CRESTOR) 20 MG tablet Take 1 tablet (20 mg total) by mouth at bedtime. 90 tablet 2   ? valsartan-hydrochlorothiazide (DIOVAN-HCT) 160-25 mg per tablet Take 1 tablet by mouth daily. 90 tablet 2     Current Facility-Administered Medications on File Prior to Visit   Medication Dose Route Frequency Provider Last Rate Last Admin   ? [DISCONTINUED] aspirin EC tablet 81 mg  81 mg Oral QPM Ryan Joyce MD   81 mg at 04/10/21 1957   ? [DISCONTINUED] bisacodyL suppository 10 mg (DULCOLAX)  10 mg Rectal Daily PRN Ryan Joyce MD       ? [DISCONTINUED] clopidogreL tablet 75 mg (PLAVIX)  75 mg Oral DAILY Ryan Joyce MD   75 mg at 04/11/21 0830   ? [DISCONTINUED] furosemide tablet 40 mg (LASIX)  40 mg Oral DAILY Madi Ritter MD   40 mg at 04/11/21 0830   ? [DISCONTINUED] heparin (PF) ANTICOAGULANT subcutaneous injection 5,000 Units  5,000 Units Subcutaneous Q12H 09-21 Ryan Joyce MD   5,000 Units at 04/11/21 0831   ? [DISCONTINUED] hydrALAZINE injection 5 mg (APRESOLINE)  5 mg Intravenous Q6H PRN Ryan Joyce MD       ? [DISCONTINUED] isosorbide mononitrate 24 hr tablet 30 mg (IMDUR)  30 mg Oral DAILY Ryan Joyce MD   30 mg at 04/11/21 0831   ? [DISCONTINUED] magnesium hydroxide suspension 30 mL (MILK OF MAG)  30 mL Oral Daily PRN Ryan Joyce MD       ? [DISCONTINUED] metoprolol tartrate tablet 50 mg (LOPRESSOR)  50 mg Oral BID Madi Ritter MD   50 mg at 04/11/21 0830   ? [DISCONTINUED] mirabegron ER tablet 50 mg (MYRBETRIQ)  50 mg Oral DAILY Ryan Joyce MD   50 mg at 04/11/21 0829   ? [DISCONTINUED] multivitamin with minerals 9 mg iron-400 mcg tablet 1 tablet (THERA-M)  1 tablet Oral DAILY Ryan Joyce MD   1 tablet at 04/11/21 0830   ? [DISCONTINUED] ondansetron injection 4 mg  (ZOFRAN)  4 mg Intravenous Q4H PRN Ryan Joyce MD       ? [DISCONTINUED] ondansetron tablet 8 mg (ZOFRAN)  8 mg Oral Q8H PRN Ryan Joyce MD       ? [DISCONTINUED] oxybutynin ER tablet 20 mg (DITROPAN XL)  20 mg Oral QHS Ryan Joyce MD   20 mg at 04/10/21 2001   ? [DISCONTINUED] polyethylene glycol packet 17 g (MIRALAX)  17 g Oral DAILY Ryan Joyce MD   17 g at 04/11/21 0831   ? [DISCONTINUED] rosuvastatin tablet 20 mg (CRESTOR)  20 mg Oral QHS Ryan Joyce MD   20 mg at 04/10/21 1957   ? [DISCONTINUED] sodium chloride flush 10 mL (NS)  10 mL Intravenous PRN Colleen Friend MD       ? [DISCONTINUED] sodium chloride flush 2.5 mL (NS)  2.5 mL Intravenous Line Care Ryan Joyce MD   2.5 mL at 04/11/21 0831   ? [DISCONTINUED] valsartan-hydrochlorothiazide (DIOVAN HCT) 160-25 mg combo dose   Oral DAILY Ryan Joyce MD   Given at 04/11/21 0829       Allergies     Allergies   Allergen Reactions   ? Lipitor [Atorvastatin] Myalgia   ? Lisinopril Cough   ? Morphine Other (See Comments)     hypotension   ? Sulfa (Sulfonamide Antibiotics) Hives   ? Vicodin [Hydrocodone-Acetaminophen] Unknown       Review of Systems   A comprehensive review of 14 systems was done. Pertinent findings noted here and in history of present illness. All the rest negative.  Constitutional: Negative.  Negative for fever, chills, he reports activity change, appetite change and fatigue.   HENT: Negative for congestion and facial swelling.    Eyes: Negative for photophobia, redness and visual disturbance.   Respiratory: Negative for cough and chest tightness.    Cardiovascular: Negative for chest pain, palpitations and has chronic leg swelling.   Gastrointestinal: Negative for nausea, diarrhea, constipation, blood in stool and abdominal distention.   Genitourinary: Negative.    Musculoskeletal: Negative.  He has difficulty with ambulation due to increasing shortness of breath  Skin: Negative.     Neurological: Negative for dizziness, tremors, syncope, weakness, light-headedness and headaches.   Hematological: Does not bruise/bleed easily.   Psychiatric/Behavioral: Negative.        Physical Exam     Recent Vitals 4/11/2021   Height -   Weight -   BSA (m2) -   /57   Pulse 54   Temp 97.4   Temp src 1   SpO2 91   Some recent data might be hidden   Weight is 285 pounds  Patient on 2 L of oxygen.  He is morbidly obese  Constitutional: Oriented to person, place, and time and appears well-developed.   HEENT:  Normocephalic and atraumatic.  Eyes: Conjunctivae and EOM are normal. Pupils are equal, round, and reactive to light. No discharge.  No scleral icterus. Nose normal. Mouth/Throat: Oropharynx is clear and moist. No oropharyngeal exudate.    NECK: Normal range of motion. Neck supple. No JVD present. No tracheal deviation present. No thyromegaly present.   CARDIOVASCULAR: Normal rate, regular rhythm and intact distal pulses.  Exam reveals no gallop and no friction rub.  Systolic murmur present.  PULMONARY: Effort normal and breath sounds normal. No respiratory distress.No Wheezing or rales.  ABDOMEN: Soft. Bowel sounds are normal. No distension and no mass.  There is no tenderness. There is no rebound and no guarding. No HSM.  MUSCULOSKELETAL: Normal range of motion.  2+ lower extremity edema and no tenderness. Mild kyphosis, no tenderness.  LYMPH NODES: Has no cervical, supraclavicular, axillary and groin adenopathy.   NEUROLOGICAL: Alert and oriented to person, place, and time. No cranial nerve deficit.  Normal muscle tone. Coordination normal.   GENITOURINARY: Deferred exam.  SKIN: Skin is warm and dry. No rash noted. No erythema. No pallor.   EXTREMITIES: No cyanosis, no clubbing, has 2+ lower extremity edema. No Deformity.  PSYCHIATRIC: Normal mood, affect and behavior.      Lab Results     Recent Results (from the past 240 hour(s))   ECG 12 lead nursing unit performed    Collection Time: 04/08/21  10:48 AM   Result Value Ref Range    SYSTOLIC BLOOD PRESSURE 190 mmHg    DIASTOLIC BLOOD PRESSURE 82 mmHg    VENTRICULAR RATE 61 BPM    ATRIAL RATE 61 BPM    P-R INTERVAL 328 ms    QRS DURATION 94 ms    Q-T INTERVAL 442 ms    QTC CALCULATION (BEZET) 444 ms    P Axis 48 degrees    R AXIS -23 degrees    T AXIS 76 degrees    MUSE DIAGNOSIS       Sinus rhythm with 1st degree A-V block  Low voltage QRS  Inferior infarct (cited on or before 16-JUL-1998)  Abnormal ECG  When compared with ECG of 04-FEB-2019 10:50,  No significant change was found  Confirmed by SEE ED PROVIDER NOTE FOR, ECG INTERPRETATION ([id number]),  KENYATTA GOMEZ ([id number]) on 4/8/2021 10:53:09 AM     Troponin I    Collection Time: 04/08/21 10:55 AM   Result Value Ref Range    Troponin I 0.01 0.00 - 0.29 ng/mL   Basic metabolic panel    Collection Time: 04/08/21 10:55 AM   Result Value Ref Range    Sodium 131 (L) 136 - 145 mmol/L    Potassium 3.8 3.5 - 5.0 mmol/L    Chloride 93 (L) 98 - 107 mmol/L    CO2 30 22 - 31 mmol/L    Anion Gap, Calculation 8 5 - 18 mmol/L    Glucose 135 (H) 70 - 125 mg/dL    Calcium 8.7 8.5 - 10.5 mg/dL    BUN 21 8 - 28 mg/dL    Creatinine 0.98 0.70 - 1.30 mg/dL    GFR MDRD Af Amer >60 >60 mL/min/1.73m2    GFR MDRD Non Af Amer >60 >60 mL/min/1.73m2   BNP(B-type Natriuretic Peptide)    Collection Time: 04/08/21 10:55 AM   Result Value Ref Range    BNP 58 0 - 93 pg/mL   CBC    Collection Time: 04/08/21 10:55 AM   Result Value Ref Range    WBC 8.1 4.0 - 11.0 thou/uL    RBC 4.78 4.40 - 6.20 mill/uL    Hemoglobin 15.4 14.0 - 18.0 g/dL    Hematocrit 44.3 40.0 - 54.0 %    MCV 93 80 - 100 fL    MCH 32.2 27.0 - 34.0 pg    MCHC 34.8 32.0 - 36.0 g/dL    RDW 12.4 11.0 - 14.5 %    Platelets 213 140 - 440 thou/uL    MPV 9.6 8.5 - 12.5 fL   Influenza A/B and SARS-CoV2 PCR Symptomatic    Collection Time: 04/08/21 10:55 AM    Specimen: Respiratory   Result Value Ref Range    SARS-CoV-2 PCR Result Negative Negative, Invalid    Influenza A Negative  Negative, Invalid    Influenza B Negative Negative, Invalid   D-dimer, Quantitative    Collection Time: 04/08/21 11:31 AM   Result Value Ref Range    D-Dimer, Quant 0.78 (H) <=0.50 FEU ug/mL   Echo Complete    Collection Time: 04/08/21  3:22 PM   Result Value Ref Range    MV E' med thalia 7.16 cm/s    BSA 2.53 m2    Hieght 68 in    Weight 4,704 lbs    /72 mmHg    HR 77 bpm    Height 68.0 in    Weight 294 lbs   Troponin I    Collection Time: 04/08/21  5:19 PM   Result Value Ref Range    Troponin I 0.01 0.00 - 0.29 ng/mL   Troponin I    Collection Time: 04/08/21 11:25 PM   Result Value Ref Range    Troponin I 0.02 0.00 - 0.29 ng/mL   Basic metabolic panel    Collection Time: 04/09/21  4:38 AM   Result Value Ref Range    Sodium 130 (L) 136 - 145 mmol/L    Potassium 4.0 3.5 - 5.0 mmol/L    Chloride 91 (L) 98 - 107 mmol/L    CO2 28 22 - 31 mmol/L    Anion Gap, Calculation 11 5 - 18 mmol/L    Glucose 119 70 - 125 mg/dL    Calcium 8.8 8.5 - 10.5 mg/dL    BUN 26 8 - 28 mg/dL    Creatinine 1.45 (H) 0.70 - 1.30 mg/dL    GFR MDRD Af Amer 55 (L) >60 mL/min/1.73m2    GFR MDRD Non Af Amer 46 (L) >60 mL/min/1.73m2   Basic metabolic panel    Collection Time: 04/10/21  6:18 AM   Result Value Ref Range    Sodium 131 (L) 136 - 145 mmol/L    Potassium 3.5 3.5 - 5.0 mmol/L    Chloride 92 (L) 98 - 107 mmol/L    CO2 29 22 - 31 mmol/L    Anion Gap, Calculation 10 5 - 18 mmol/L    Glucose 123 70 - 125 mg/dL    Calcium 8.7 8.5 - 10.5 mg/dL    BUN 37 (H) 8 - 28 mg/dL    Creatinine 1.46 (H) 0.70 - 1.30 mg/dL    GFR MDRD Af Amer 55 (L) >60 mL/min/1.73m2    GFR MDRD Non Af Amer 45 (L) >60 mL/min/1.73m2   Platelet Count - every other day x 3    Collection Time: 04/10/21  6:18 AM   Result Value Ref Range    Platelets 211 140 - 440 thou/uL   Potassium    Collection Time: 04/10/21  6:17 PM   Result Value Ref Range    Potassium 4.4 3.5 - 5.0 mmol/L   Basic metabolic panel    Collection Time: 04/11/21  6:23 AM   Result Value Ref Range    Sodium 132  (L) 136 - 145 mmol/L    Potassium 3.8 3.5 - 5.0 mmol/L    Chloride 93 (L) 98 - 107 mmol/L    CO2 29 22 - 31 mmol/L    Anion Gap, Calculation 10 5 - 18 mmol/L    Glucose 108 70 - 125 mg/dL    Calcium 8.9 8.5 - 10.5 mg/dL    BUN 39 (H) 8 - 28 mg/dL    Creatinine 1.32 (H) 0.70 - 1.30 mg/dL    GFR MDRD Af Amer >60 >60 mL/min/1.73m2    GFR MDRD Non Af Amer 51 (L) >60 mL/min/1.73m2            Imaging Results     Echo Complete    Result Date: 4/8/2021    Extremely poor visualization   Probably normal LV systolic function   RV appears to be dilated   Valves were not visualized   There is no obvious pericardial effusion      Xr Chest 1 View Portable    Result Date: 4/8/2021  EXAM: XR CHEST 1 VIEW PORTABLE LOCATION: [facility] DATE/TIME: 4/8/2021 11:50 AM INDICATION: Shortness of breath. COMPARISON: Cardiac CT 12/04/2018.     Redemonstrated moderately elevated right hemidiaphragm with mild basilar atelectasis. Remaining lungs are clear. No pulmonary edema. No significant pleural effusion. No pneumothorax. Normal heart size. Aortic atherosclerosis.     Cta Chest Pe Run    Result Date: 4/8/2021  EXAM: CTA CHEST PE RUN LOCATION: [facility] DATE/TIME: 4/8/2021 2:19 PM INDICATION: Dyspnea, chronic Shortness of breath. COMPARISON: 09/23/2018. TECHNIQUE: CT chest pulmonary angiogram during arterial phase injection of IV contrast. Multiplanar reformats and MIP reconstructions were performed. Dose reduction techniques were used. CONTRAST: Iopamidol ([id number]-370) 75mL FINDINGS: ANGIOGRAM CHEST: No pulmonary embolism. Pulmonary arterial enlargement with the pulmonary trunk measuring 3.8 cm. Nonaneurysmal aorta, though suboptimal opacification for dissection evaluation. LUNGS AND PLEURA: Narrowed trachea and mainstem airways. Mild basilar atelectasis. No pleural effusion or pneumothorax. MEDIASTINUM/AXILLAE: No adenopathy. No pericardial effusion. Severe mitral annular calcifications.  CORONARY ARTERY CALCIFICATION: Severe. UPPER ABDOMEN: Elevated right hemidiaphragm. Hepatic steatosis. MUSCULOSKELETAL: Degenerative changes spine.     1.  No pulmonary embolism. 2.  Moderate pulmonary trunk enlargement raising concern for pulmonary hypertension. 3.  Narrowed trachea and mainstem airway suggesting tracheobronchomalacia. 4.  Chronically elevated right hemidiaphragm. Dysfunctional or paralyzed diaphragm possible. 5.  Severe coronary calcifications. 6.  Hepatic steatosis.           Electronically signed by  LEWIS Roberts

## 2021-06-21 NOTE — LETTER
Letter by Dorothea San RN at      Author: Dorothea San RN Service: -- Author Type: --    Filed:  Encounter Date: 4/20/2021 Status: (Other)         Dear Chava,    I have attached an application for [employer]. Please fill out highlighted sections and mail back to me in the envelope provided. This meal service is free and can deliver to your home, but may take up to a month to start once application submitted. Please call me if you have any questions.    Sincerely,  Roopa San RN  [phone]

## 2021-06-21 NOTE — LETTER
Letter by Haylee Bob MBBS at      Author: Haylee Bob MBBS Service: -- Author Type: --    Filed:  Encounter Date: 4/14/2021 Status: (Other)         [facility] TCU  [address] [address]                                  April 14, 2021    Patient: Chava Lincoln   MR Number: [id number]   YOB: 1929   Date of Visit: 4/14/2021     Dear Dr. Craig:    Thank you for referring Chava Lincoln to me for evaluation. Below are the relevant portions of my assessment and plan of care.    If you have questions, please do not hesitate to call me. I look forward to following Chava along with you.    Sincerely,        LEWIS Roberts          CC  No Recipients  Haylee Bob MBBS  4/14/2021  4:41 PM  [facility] note      Patient: Chava Lincoln  MRN: [id number]      [facility] [[id number]]  Reason for Visit     Chief Complaint   Patient presents with   ? Review Of Multiple Medical Conditions   Follow-up on discharge planning as well as hypoxic respiratory failure    Code Status     FULL CODE    Assessment     Acute hypoxic respiratory failure patient refusing supplementary oxygen.  Worsening hyponatremia recheck sodium is 131  Discharge planning patient feels he is ready to go home today  Acute CHF exacerbation with underlying history of chronic heart failure with preserved ejection fraction  Weight gain of 5 pounds noted today  Chronic lymphedema  Pulmonary hypertension  PABLITO  Obstructive sleep apnea on CPAP  Hypertensive urgency  Morbid obesity with a BMI of 45  Chronic hyponatremia  Generalized weakness    Plan     Patient has been admitted to the TCU for strengthening and rehab.  He was admitted with acute hypoxic respiratory failure requiring supplementary oxygen.  Currently discharged on 2 L with advised to wean oxygen as tolerated.  He was noted to have CHF exacerbation with worsening lymphedema.  He has been  discharged to the TCU for strengthening and rehab.  At baseline he is quite weak debilitated with limited endurance.  Today seen at the request of nursing due to multiple concerns.  Patient has been refusing his oxygen.  He told me he is ready to be weaned off.  Unfortunately he is not weaning himself off he is just refusing to use oxygen.  Oxygen saturations are low and he appears to be quite short of breath with minimal exertion.  Unfortunately remains obstinate and says that he will not use oxygen.  In addition he continues on diuretics with weights being stable he has profound lymphedema noted on exam.  Recheck sodium is 131.  We did encourage him to consider fluid reduction and restriction but he is not very keen.  Discharge planning reviewed apparently patient called his wife and reported that he was coming home today.  No discharge plan was set into motion.  However on questioning he feels that he is ready.  He had just finished walking from getting himself weight and could barely hold a conversation.  At this point I strongly believe that this patient is not ready to go home.  If he goes home he is at high risk of rebounding back to the hospital he wants to be full code.  His wife has been contacted to an urgent care conference with  consultation requested.  Care plan reviewed with staff also.  Patient is adamant that he is going home.  Total time in this visit is 35 minutes with 25-minute spent face-to-face reviewing discharge planning refusal of oxygen and risk of going home without oxygen as well as abnormal labs and weight gain reviewed with the patient    MEDICAL EQUIPMENT NEEDS:  Walker/patient refusing home oxygen     DISCHARGE PLAN/FACE TO FACE:  I certify that services are/were furnished while this patient was under the care of a physician and that a physician or an allowed non-physician practitioner (NPP), had a face-to-face encounter that meets the physician face-to-face encounter  requirements. The encounter was in whole, or in part, related to the primary reason for home health. The patient is confined to his/her home and needs intermittent skilled nursing, physical therapy, speech-language pathology, or the continued need for occupational therapy. A plan of care has been established by a physician and is periodically reviewed by a physician.  Date of Face-to-Face Encounter:  4/14/2021     I certify that, based on my findings, the following services are medically necessary home health services: [facility] HHA/RN and PT/OT to evaluate and treat    My clinical findings support the need for the above skilled services because: patient will be discharging to home. Patient will need assistance with medication management and performing IADLs and ADLs effectively and safely.     Patient to re-establish plan of care with their PCP within 7 days after leaving TCU.     History     Patient is a very pleasant 89 y.o. male who is admitted to TCU  Patient presented to the emergency room with increasing weakness and weight gain of 20 pounds.  He was noted to be hypoxic respiratory failure requiring oxygen  Currently has been discharged on 2 L to be TCU.  Today noted to be quite short of breath.  He had just finished walking after being weight and noted not to be using his oxygen.  Encouragement given patient is adamantly stated that he will not use oxygen.  He feels as a restrained that is preventing him from going home  He was admitted with CHF exacerbation.  Cardiology was consulted.  Chest CT was negative for PE.  He also had some findings consistent with tracheobronchomalacia.  He responded well to diuresis.  He has been discharged on oral diuretics.  He continues to have significant lymphedema weights remain high although patient feels he is lost a lot of weight  However recheck weight is 290 pounds which is up by 5 pounds.  Unfortunately patient developed renal insufficiency secondary to aggressive  diuresis.  However creatinine did improve and will require monitoring.  Recheck labs do show improvement however he has hyponatremia which is worsened sodium is now 131  Due to profound weakness and difficulty with ambulation a TCU stay has been encouraged for the patient.  He also had high blood pressure on admission of 190/82 which was corrected.  We will blood pressures to be monitored  So far blood pressures appear to be stable.  Patient also is requesting a discharge home he feels he is ready.  Staff is reporting that he is profoundly fatigued and short of breath      Past Medical History     Active Ambulatory (Non-Hospital) Problems    Diagnosis   ? Chronic kidney disease, stage 3   ? Acute on chronic heart failure with preserved ejection fraction (H)   ? Acute kidney injury (H)   ? Acute hypoxemic respiratory failure (H)   ? Bilateral lower extremity edema   ? Morbid obesity (H)   ? Coronary artery calcification seen on CAT scan   ? Pulmonary hypertension (H)   ? History of pulmonary embolism   ? Hypoxia   ? Coronary artery disease involving native coronary artery of native heart without angina pectoris   ? Ascending aortic aneurysm (H)   ? Essential hypertension   ? Dyslipidemia   ? Dyspnea on exertion   ? Near syncope   ? RAMON on CPAP   ? GERD (gastroesophageal reflux disease)     Past Medical History:   Diagnosis Date   ? Coronary artery disease due to calcified coronary lesion    ? GERD (gastroesophageal reflux disease) 7/2/2014   ? Hypertension    ? RAMON on CPAP 4/17/2017   ? Prostate cancer (H)    ? Prostate cancer (H)        Past Social History     Reviewed, and he  reports that he has never smoked. He has never used smokeless tobacco. He reports current alcohol use. He reports that he does not use drugs.    Family History     Reviewed, and family history includes Coronary artery disease in his brother; Diabetes in his mother; Heart disease in his brother; Stroke in his father.    Medication List   Post  Discharge Medication Reconciliation Status: discharge medications reconciled, continue medications without change   Current Outpatient Medications on File Prior to Visit   Medication Sig Dispense Refill   ? aspirin 81 MG EC tablet Take 81 mg by mouth every evening.      ? clopidogreL (PLAVIX) 75 mg tablet Take 1 tablet (75 mg total) by mouth daily. 90 tablet 2   ? furosemide (LASIX) 40 MG tablet Take 40 mg by mouth daily.     ? isosorbide mononitrate (IMDUR) 30 MG 24 hr tablet Take 1 tablet (30 mg total) by mouth daily. 90 tablet 2   ? leuprolide, 6 month, (ELIGARD, 6 MONTH,) 45 mg syringe Inject 45 mg under the skin. Every six months     ? metoprolol tartrate (LOPRESSOR) 50 MG tablet Take 1 tablet (50 mg total) by mouth 2 (two) times a day. 60 tablet 0   ? mirabegron (MYRBETRIQ) 50 mg Tb24 ER tablet Take 50 mg by mouth daily.     ? multivitamin with minerals (THERA-M) 9 mg iron-400 mcg Tab tablet Take 1 tablet by mouth daily.     ? nystatin (NYSTOP) powder Apply 1 application topically 2 (two) times a day.      ? oxybutynin (DITROPAN XL) 15 MG 24 hr tablet Take 15 mg by mouth daily.     ? oxybutynin (DITROPAN XL) 5 MG ER tablet Take 5 mg by mouth daily. Take with 15 mg of oxybutynin  Indications: a condition where the urge to urinate results in urine leakage     ? rosuvastatin (CRESTOR) 20 MG tablet Take 1 tablet (20 mg total) by mouth at bedtime. 90 tablet 2   ? valsartan-hydrochlorothiazide (DIOVAN-HCT) 160-25 mg per tablet Take 1 tablet by mouth daily. 90 tablet 2     No current facility-administered medications on file prior to visit.        Allergies     Allergies   Allergen Reactions   ? Lipitor [Atorvastatin] Myalgia   ? Lisinopril Cough   ? Morphine Other (See Comments)     hypotension   ? Sulfa (Sulfonamide Antibiotics) Hives   ? Vicodin [Hydrocodone-Acetaminophen] Unknown       Review of Systems   A comprehensive review of 14 systems was done. Pertinent findings noted here and in history of present  "illness. All the rest negative.  Constitutional: Negative.  Negative for fever, chills, he reports activity change, appetite change and fatigue.   HENT: Negative for congestion and facial swelling.    Eyes: Negative for photophobia, redness and visual disturbance.   Respiratory: Negative for cough and chest tightness.    Cardiovascular: Negative for chest pain, palpitations and has chronic leg swelling.   Gastrointestinal: Negative for nausea, diarrhea, constipation, blood in stool and abdominal distention.   Genitourinary: Negative.    Musculoskeletal: Negative.  He has difficulty with ambulation due to increasing shortness of breath  Skin: Negative.    Neurological: Negative for dizziness, tremors, syncope, weakness, light-headedness and headaches.   Hematological: Does not bruise/bleed easily.   Psychiatric/Behavioral: Negative.        Physical Exam     Recent Vitals 4/14/2021   Height 5' 8\"   Weight 290 lbs 6 oz   BSA (m2) 2.51 m2   /70   Pulse 82   Temp 97.8   Temp src -   SpO2 92   Some recent data might be hidden   Weight is 285 pounds  Patient on 2 L of oxygen.  He is morbidly obese but refuses  Constitutional: Oriented to person, place, and time and appears well-developed.   HEENT:  Normocephalic and atraumatic.  Eyes: Conjunctivae and EOM are normal. Pupils are equal, round, and reactive to light. No discharge.  No scleral icterus. Nose normal. Mouth/Throat: Oropharynx is clear and moist. No oropharyngeal exudate.    NECK: Normal range of motion. Neck supple. No JVD present. No tracheal deviation present. No thyromegaly present.   CARDIOVASCULAR: Normal rate, regular rhythm and intact distal pulses.  Exam reveals no gallop and no friction rub.  Systolic murmur present.  PULMONARY: Effort normal and breath sounds normal. No respiratory distress.No Wheezing or rales.  ABDOMEN: Soft. Bowel sounds are normal. No distension and no mass.  There is no tenderness. There is no rebound and no guarding. No " HSM.  MUSCULOSKELETAL: Normal range of motion.  2+ lower extremity edema and no tenderness. Mild kyphosis, no tenderness.  LYMPH NODES: Has no cervical, supraclavicular, axillary and groin adenopathy.   NEUROLOGICAL: Alert and oriented to person, place, and time. No cranial nerve deficit.  Normal muscle tone. Coordination normal.   GENITOURINARY: Deferred exam.  SKIN: Skin is warm and dry. No rash noted. No erythema. No pallor.   EXTREMITIES: No cyanosis, no clubbing, has 2+ lower extremity edema. No Deformity.  PSYCHIATRIC: Normal mood, affect and behavior.      Lab Results     Recent Results (from the past 240 hour(s))   ECG 12 lead nursing unit performed    Collection Time: 04/08/21 10:48 AM   Result Value Ref Range    SYSTOLIC BLOOD PRESSURE 190 mmHg    DIASTOLIC BLOOD PRESSURE 82 mmHg    VENTRICULAR RATE 61 BPM    ATRIAL RATE 61 BPM    P-R INTERVAL 328 ms    QRS DURATION 94 ms    Q-T INTERVAL 442 ms    QTC CALCULATION (BEZET) 444 ms    P Axis 48 degrees    R AXIS -23 degrees    T AXIS 76 degrees    MUSE DIAGNOSIS       Sinus rhythm with 1st degree A-V block  Low voltage QRS  Inferior infarct (cited on or before 16-JUL-1998)  Abnormal ECG  When compared with ECG of 04-FEB-2019 10:50,  No significant change was found  Confirmed by SEE ED PROVIDER NOTE FOR, ECG INTERPRETATION ([id number]),  KENYATTA GOMEZ ([id number]) on 4/8/2021 10:53:09 AM     Troponin I    Collection Time: 04/08/21 10:55 AM   Result Value Ref Range    Troponin I 0.01 0.00 - 0.29 ng/mL   Basic metabolic panel    Collection Time: 04/08/21 10:55 AM   Result Value Ref Range    Sodium 131 (L) 136 - 145 mmol/L    Potassium 3.8 3.5 - 5.0 mmol/L    Chloride 93 (L) 98 - 107 mmol/L    CO2 30 22 - 31 mmol/L    Anion Gap, Calculation 8 5 - 18 mmol/L    Glucose 135 (H) 70 - 125 mg/dL    Calcium 8.7 8.5 - 10.5 mg/dL    BUN 21 8 - 28 mg/dL    Creatinine 0.98 0.70 - 1.30 mg/dL    GFR MDRD Af Amer >60 >60 mL/min/1.73m2    GFR MDRD Non Af Amer >60 >60 mL/min/1.73m2    BNP(B-type Natriuretic Peptide)    Collection Time: 04/08/21 10:55 AM   Result Value Ref Range    BNP 58 0 - 93 pg/mL   CBC    Collection Time: 04/08/21 10:55 AM   Result Value Ref Range    WBC 8.1 4.0 - 11.0 thou/uL    RBC 4.78 4.40 - 6.20 mill/uL    Hemoglobin 15.4 14.0 - 18.0 g/dL    Hematocrit 44.3 40.0 - 54.0 %    MCV 93 80 - 100 fL    MCH 32.2 27.0 - 34.0 pg    MCHC 34.8 32.0 - 36.0 g/dL    RDW 12.4 11.0 - 14.5 %    Platelets 213 140 - 440 thou/uL    MPV 9.6 8.5 - 12.5 fL   Influenza A/B and SARS-CoV2 PCR Symptomatic    Collection Time: 04/08/21 10:55 AM    Specimen: Respiratory   Result Value Ref Range    SARS-CoV-2 PCR Result Negative Negative, Invalid    Influenza A Negative Negative, Invalid    Influenza B Negative Negative, Invalid   D-dimer, Quantitative    Collection Time: 04/08/21 11:31 AM   Result Value Ref Range    D-Dimer, Quant 0.78 (H) <=0.50 FEU ug/mL   Echo Complete    Collection Time: 04/08/21  3:22 PM   Result Value Ref Range    MV E' med thalia 7.16 cm/s    BSA 2.53 m2    Hieght 68 in    Weight 4,704 lbs    /72 mmHg    HR 77 bpm    Height 68.0 in    Weight 294 lbs   Troponin I    Collection Time: 04/08/21  5:19 PM   Result Value Ref Range    Troponin I 0.01 0.00 - 0.29 ng/mL   Troponin I    Collection Time: 04/08/21 11:25 PM   Result Value Ref Range    Troponin I 0.02 0.00 - 0.29 ng/mL   Basic metabolic panel    Collection Time: 04/09/21  4:38 AM   Result Value Ref Range    Sodium 130 (L) 136 - 145 mmol/L    Potassium 4.0 3.5 - 5.0 mmol/L    Chloride 91 (L) 98 - 107 mmol/L    CO2 28 22 - 31 mmol/L    Anion Gap, Calculation 11 5 - 18 mmol/L    Glucose 119 70 - 125 mg/dL    Calcium 8.8 8.5 - 10.5 mg/dL    BUN 26 8 - 28 mg/dL    Creatinine 1.45 (H) 0.70 - 1.30 mg/dL    GFR MDRD Af Amer 55 (L) >60 mL/min/1.73m2    GFR MDRD Non Af Amer 46 (L) >60 mL/min/1.73m2   Basic metabolic panel    Collection Time: 04/10/21  6:18 AM   Result Value Ref Range    Sodium 131 (L) 136 - 145 mmol/L    Potassium  3.5 3.5 - 5.0 mmol/L    Chloride 92 (L) 98 - 107 mmol/L    CO2 29 22 - 31 mmol/L    Anion Gap, Calculation 10 5 - 18 mmol/L    Glucose 123 70 - 125 mg/dL    Calcium 8.7 8.5 - 10.5 mg/dL    BUN 37 (H) 8 - 28 mg/dL    Creatinine 1.46 (H) 0.70 - 1.30 mg/dL    GFR MDRD Af Amer 55 (L) >60 mL/min/1.73m2    GFR MDRD Non Af Amer 45 (L) >60 mL/min/1.73m2   Platelet Count - every other day x 3    Collection Time: 04/10/21  6:18 AM   Result Value Ref Range    Platelets 211 140 - 440 thou/uL   Potassium    Collection Time: 04/10/21  6:17 PM   Result Value Ref Range    Potassium 4.4 3.5 - 5.0 mmol/L   Basic metabolic panel    Collection Time: 04/11/21  6:23 AM   Result Value Ref Range    Sodium 132 (L) 136 - 145 mmol/L    Potassium 3.8 3.5 - 5.0 mmol/L    Chloride 93 (L) 98 - 107 mmol/L    CO2 29 22 - 31 mmol/L    Anion Gap, Calculation 10 5 - 18 mmol/L    Glucose 108 70 - 125 mg/dL    Calcium 8.9 8.5 - 10.5 mg/dL    BUN 39 (H) 8 - 28 mg/dL    Creatinine 1.32 (H) 0.70 - 1.30 mg/dL    GFR MDRD Af Amer >60 >60 mL/min/1.73m2    GFR MDRD Non Af Amer 51 (L) >60 mL/min/1.73m2   COVID-19 Virus PCR MRF    Collection Time: 04/11/21  8:30 PM    Specimen: Respiratory   Result Value Ref Range    COVID-19 VIRUS SPECIMEN SOURCE Nasopharyngeal     2019-nCOV       Test received-See reflex to IDDL test SARS CoV2 (COVID-19) Virus RT-PCR   SARS-CoV-2 (COVID-19)-PCR    Collection Time: 04/11/21  8:30 PM    Specimen: Respiratory   Result Value Ref Range    SARS-CoV-2 Virus Specimen Source Nasopharyngeal     SARS-CoV-2 PCR Result NEGATIVE     SARS-COV-2 PCR COMMENT       Testing was performed using the Aptima SARS-CoV-2 Assay on the [employer]   Basic Metabolic Panel    Collection Time: 04/13/21 10:55 AM   Result Value Ref Range    Sodium 131 (L) 136 - 145 mmol/L    Potassium 4.0 3.5 - 5.0 mmol/L    Chloride 92 (L) 98 - 107 mmol/L    CO2 28 22 - 31 mmol/L    Anion Gap, Calculation 11 5 - 18 mmol/L    Glucose 160 (H) 70 - 125 mg/dL    Calcium 9.0 8.5 -  10.5 mg/dL    BUN 32 (H) 8 - 28 mg/dL    Creatinine 1.05 0.70 - 1.30 mg/dL    GFR MDRD Af Amer >60 >60 mL/min/1.73m2    GFR MDRD Non Af Amer >60 >60 mL/min/1.73m2   Magnesium    Collection Time: 04/13/21 10:55 AM   Result Value Ref Range    Magnesium 1.9 1.8 - 2.6 mg/dL   HM2(CBC w/o Differential)    Collection Time: 04/13/21 10:55 AM   Result Value Ref Range    WBC 10.6 4.0 - 11.0 thou/uL    RBC 4.65 4.40 - 6.20 mill/uL    Hemoglobin 14.9 14.0 - 18.0 g/dL    Hematocrit 43.3 40.0 - 54.0 %    MCV 93 80 - 100 fL    MCH 32.0 27.0 - 34.0 pg    MCHC 34.4 32.0 - 36.0 g/dL    RDW 12.7 11.0 - 14.5 %    Platelets 250 140 - 440 thou/uL    MPV 10.3 8.5 - 12.5 fL              Electronically signed by  LEWIS Roberts

## 2021-06-22 NOTE — PROGRESS NOTES
Chava Lincoln  [address]  [place] [address]  [phone] (home)     Primary cardiologist:  Dr. Sheridan  PCP:  Erasmo Michael MD  Procedure:  Coronary Angiogram with Possible Percutaneous Coronary Intervention  H&P completed by:  Dr. Sheridan 12/5/18  Case MD:  Dr. Duncan or Dr. Laughlin  Admit date and time:  12/10/18 @ 06:30  Case start time:  08:30  Ordering MD:  Dr. Sheridan  Diagnosis:  Abnormal Nuclear Stress Test  Anticoagulation: None  CPAP: Yes  Bypass Grafts: No  Renal Issues: No  Allergies: lisinopril, morphine, sulfa, vicodin, lipitor  Diabetic?: No  Device?: No      Angiogram Teaching    Reason for Visit:  Patient seen for pre-procedure education in preparation for: Coronary Angiogram with Possible Percutaneous Coronary Intervention    Procedure Prep:  Cardiologist note dated: 12/5/18  EKG results obtained, dated: on admission  Pertinent test results obtained - Viewable in Epic, dated: 10/12/18 NM stress test, CT Angiogram Coronary  Hemogram results obtained: on admission  Basic Metabolic Panel results obtained: on admisssion  Lipid Profile results obtained:on admission    Patient Education  Explained indications/risks for diagnostic evaluation, including one or more of the following:  coronary angiogram, less than 0.1% of acute myocardial infarction and CVA or death or any of the following to the degree that it could threaten life:  allergic reaction, arrhythmia, renal failure, hemorrhage, thrombosis and infection  Explained indications/risks for therapeutic interventions, including one or more of the following: stent, 2% or less risk of MI, less than 1% risk of CVA, emergency heart surgery, death, less than 4 % risk of vascular injury requiring surgical repair or blood transfusion, 20-30% risk of restonosis with a bare metal stent, less than 10% risk of restonosis with a drug-eluting stent, 0.5-1% chance of stent thrombosis and may need clopidogrel (Plavix) form greater than 1 year.  These risks are  in addition to baseline risks associated with a Diagnostic Evaluation.  Patient states understanding of procedure and risks and agrees to proceed.    Pre-procedure instructions  Patient instructed to be NPO after midnight.  Patient instructed to arrange for transportation home following procedure.  Patient instructed to have a responsible adult with them for 24 hours post-procedure.  Post-procedure follow up process.  Conscious sedation discussed.    Pre-procedure medication instructions  Patient instructed on antiplatelet medication.  Continue medications as scheduled, with a small amount of water on the day of the procedure unless indicated.  Patient instructed to take 325 mg of Aspirin am of procedure: Yes  Other medication: instructed to hold multivitamins and minerals a.m. of the procedure.    *PATIENTS RECORDS AVAILABLE IN [place] UNLESS OTHERWISE INDICATED*    *Order set was entered on this date: 12/5/18      Patient Active Problem List   Diagnosis     Pneumonia     Acute respiratory failure with hypoxemia (H)     GERD (gastroesophageal reflux disease)     RAMON on CPAP     Near syncope     Exertional dyspnea     Essential hypertension     Dyslipidemia     Coronary artery disease involving native coronary artery of native heart with angina pectoris (H)     Ascending aortic aneurysm (H)       Current Outpatient Medications   Medication Sig Dispense Refill     aspirin 81 MG EC tablet Take 81 mg by mouth every evening.        isosorbide mononitrate (IMDUR) 30 MG 24 hr tablet Take 1 tablet (30 mg total) by mouth daily. 30 tablet 6     losartan-hydrochlorothiazide (HYZAAR) 100-25 mg per tablet Take 1 tablet by mouth daily.       mirabegron (MYRBETRIQ) 50 mg Tb24 ER tablet Take 50 mg by mouth daily.       multivitamin with minerals (THERA-M) 9 mg iron-400 mcg Tab tablet Take 1 tablet by mouth daily.       oxybutynin (DITROPAN XL) 15 MG 24 hr tablet Take 15 mg by mouth daily.       simvastatin (ZOCOR) 40 MG tablet Take  40 mg by mouth bedtime.       No current facility-administered medications for this visit.        Allergies   Allergen Reactions     Lisinopril Cough     Morphine Other (See Comments)     hypotension     Sulfa (Sulfonamide Antibiotics) Hives     Vicodin [Hydrocodone-Acetaminophen] Unknown       Plan  Patient's wife, Santa, will be  the day of the procedure and will stay with patient for 24 hours after.  Patient ready for procedure    Amina Le RN

## 2021-06-23 ENCOUNTER — RECORDS - HEALTHEAST (OUTPATIENT)
Dept: LAB | Facility: CLINIC | Age: 86
End: 2021-06-23

## 2021-06-23 LAB
ANION GAP SERPL CALCULATED.3IONS-SCNC: 12 MMOL/L (ref 5–18)
BUN SERPL-MCNC: 21 MG/DL (ref 8–28)
CALCIUM SERPL-MCNC: 8.6 MG/DL (ref 8.5–10.5)
CHLORIDE BLD-SCNC: 95 MMOL/L (ref 98–107)
CO2 SERPL-SCNC: 23 MMOL/L (ref 22–31)
CREAT SERPL-MCNC: 0.87 MG/DL (ref 0.7–1.3)
GFR SERPL CREATININE-BSD FRML MDRD: >60 ML/MIN/1.73M2
GLUCOSE BLD-MCNC: 128 MG/DL (ref 70–125)
POTASSIUM BLD-SCNC: 4.4 MMOL/L (ref 3.5–5)
SODIUM SERPL-SCNC: 130 MMOL/L (ref 136–145)

## 2021-06-23 NOTE — PROGRESS NOTES
"Mauricio and his wife Santa came to clinic today to discuss his options for treatment of his coronary disease.     To review, Mauricio is a 89 year old man with severe back and hip pain and now with weak legs s/p a \"spinal ablation\" procedure in the past year. He has a history of pulmonary emboli and is on anticoagulation long term. He also has a paralized right hemidiaphragm, tracheobronchomalacia, mild COPD and severe sleep apnea. Mauricio is compliant with his CPAP but is tired all of the time anyway. He underwent a stress test for complaints of increased dyspnea on exertion, primarily on the stairs. The stress test showed inferior and inferior lateral ischmia with right ventricular enhancement, EF 70%.     I did his angiogram in December and he had diffuse severe coronary calcification. His right coronary was tortuous with a moderate-severe mid RCA stenosis and a severe distal RCA stenosis at the bifurcation. There was moderate ostial left main disease, a hazy proximal LAD lesion and severe disease in the ostial and proximal ramus, which is a large artery that feeds most of the lateral wall. Mauricio was hypoxic when he presented to his angiogram and his wife noted that his oxygen levels are always low. Mauricio was discussed at cath conference and I subsequent spoke with he and his wife on the phone. At that point Mauricio notes that he was most limited by his hip and back pain. He was going to see pulmonary and f/u after that. Mauricio saw Dr. Sheridan this week and requested coronary intervention. He and Santa come to see me today to discuss this further.     I reviewed with Mauricio that his systolic function is normal and he has not suffered from heart failure. He has no angina. There is exertional dyspnea, particularly when going up the stairs. We discussed that Mauricio has several reasons to be short of breath, including his obesity, right diaphragm hemiparalysis, RAMON, history of pulmonary emboli with residual low oxygen saturations, and his " coronary disease. I reviewed that treatment of his distal right coronary would be very difficult due to the severe calcification and tortuosity of the artery. Should we pursue PCI it would be of the large ramus and would require rotational atherectomy. I thought that the risk for a severe complication with this high risk procedure would be around 4-5%, up from the typical 1-2%. I would IVUS the left main and LAD first to look for severe narrowings there first, prior to treating the ramus. I estimate that there is about 50% chance that this will give him about a 50% improvement in his exertional dyspnea. We discussed that PCI will not prevent a heart attack going forward, but would simply be for symptom management.    Mauricio was back and forth as to wether he wanted to proceed on Monday morning. He will talk with his wife further and call within the next 24 hours with his decision.     Total time discussing options and care with the patient was 50 minutes

## 2021-06-23 NOTE — TELEPHONE ENCOUNTER
Pt called back and stated that he decided to proceed with the procedure as discussed with Dr. Duncan on 2/4, 6:30 am arrival time.     Pre-procedure education was completed by Amina GARCIA RN on 1/28. Instructions reviewed with pt and his spouse again on 1/31. Order set is placed.

## 2021-06-23 NOTE — PATIENT INSTRUCTIONS - HE
Take zantac nightly  Avoid eating or drinking within 3 hours of bedtime  Elevate back at angle with wedge/reflux pillow    Fluoroscopic sniff test    Return to follow-up in our clinic in ~3 weeks

## 2021-06-23 NOTE — TELEPHONE ENCOUNTER
Spoke to Santa and explained that Dr. Duncan would like to see Mauricio in clinic. Apt was re-scheduled.   Mauricio and Santa have a trip to [place] scheduled on 2/14. Instructed to contact an airline if changes to itinerary need to be made and [facility] will provide a letter supporting medical reasons to postpone the trip.

## 2021-06-23 NOTE — TELEPHONE ENCOUNTER
"  ----- Message -----  From: Tana Duncan MD  Sent: 2/8/2019   8:23 AM  To: David Hoyos RN    Sorry, I was out of town the past two days. How is he now? EG    Called and spoke with wife and patient. Pt is doing well at home. Leg is less sore today. Wife and patient agree groin/puncture site looks good. Still has some \"bloating\" of abdomen. Has been urinating and moving his bowels. No new bruising or concerns. Chronic back pain, unchanged/similar as before angiogram. Breathing is stable, \"described as usual\" by patient.  Lower extremities are improved as he has been wearing his bilateral knee-hi lety hose during the daytime.Pt up moving around some at home, and getting easier to do so. No new concerns or issues. Wife Santa will call if anything changes. Plans on OV with [facility] 2/13 prior to trip to [place] next week. MARK WEBER  "

## 2021-06-23 NOTE — PROGRESS NOTES
Assessment and Plan:Chava Lincoln is a 89 y.o. with a past medical history significant for RAMON (uses home CPAP), coronary artery disease and chronically elevated right hemidiaphragm who presents to clinic today by request of his cardiologist for evaluation of a pulmonary cause of his dyspnea on exertion.  He states that he does not have shortness of breath at rest, however does have it walking up stairs around the room.  He is to have coronary stenting procedure, however would like pulmonary evaluation before hand.  He is already using a home CPAP for his obstructive sleep apnea.  There was a question of possible pulmonary arterial hypertension on a previous CT scan, however no corroborating evidence of this was found on echocardiogram.  CT scan did not tracheobronchomalacia, however I doubt this would be causing his shortness of breath with exertion.  Be more likely to cause shortness of breath at night, however is likely already being treated by his CPAP.  He has evidence of right basilar opacities that could be atelectasis and/or aspiration.  Reportedly he did have a swallow eval positive for dysphasia had a previous hospitalization and was given swallowing precaution recommendations by the speech-language pathologist which he is following.  He denies any symptoms of heartburn, however he may also be having nocturnal reflux aspiration.  He has never had thoracic surgery, however his elevated right hemidiaphragm could be from hemidiaphragm paralysis of another cause.  He has mild COPD with a mildly decreased diffusing capacity on his pulmonary function tests that are not responsive to inhaled bronchodilators.  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    For possible nocturnal reflux aspiration:  - Take zantac nightly  - Avoid eating or drinking within 3 hours of bedtime  - Elevate back at angle with wedge/reflux  pillow    Fluoroscopic sniff test ordered to evaluate for hemidiaphragm    Return to follow-up in our clinic in ~3 weeks    Regarding his anticipated cardiac procedures, the patient COPD and decreased diffusing capacity are only mild and doubtfully contributing significantly to his dyspnea.  He should have his home CPAP brought to any hospital stay to be used at night, whenever he is sleeping, and perioperatively.  If home CPAP not available, could consider payal-procedure BiPAP starting with settings of 15/8.    CCx: Dyspnea on exertion    HPI: Mr. Lincoln reports chronic dyspnea on exertion that has been unchanged for several years.  He has not noted any modifying factors.  He denies shortness of breath at rest, heartburn, coughing, chest pain or other symptoms.  He is using his CPAP at night for his sleep apnea.    PMH:  Past Medical History:   Diagnosis Date     GERD (gastroesophageal reflux disease) 7/2/2014     Hypertension      RAMON on CPAP 4/17/2017     Prostate cancer (H)      Prostate cancer (H)        PSH:  Past Surgical History:   Procedure Laterality Date     CV CORONARY ANGIOGRAM N/A 12/10/2018    Procedure: Coronary Angiogram;  Surgeon: Tana Duncan MD;  Location: [facility] Cath Lab;  Service: Cardiology     CV LEFT HEART CATHETERIZATION WO LEFT VETRICULOGRAM Left 12/10/2018    Procedure: Left Heart Catheterization Without Left Ventriculogram;  Surgeon: Tana Duncan MD;  Location: [facility] Cath Lab;  Service: Cardiology     HERNIA REPAIR       JOINT REPLACEMENT       KNEE SURGERY Bilateral        SH:  Social History     Socioeconomic History     Marital status:      Spouse name: Not on file     Number of children: Not on file     Years of education: Not on file     Highest education level: Not on file   Social Needs     Financial resource strain: Not on file     Food insecurity - worry: Not on file     Food insecurity - inability: Not on file     Transportation needs - medical: Not  on file     Transportation needs - non-medical: Not on file   Occupational History     Not on file   Tobacco Use     Smoking status: Never Smoker     Smokeless tobacco: Never Used   Substance and Sexual Activity     Alcohol use: Yes     Comment: 2-3 beer/ month     Drug use: No     Sexual activity: Not on file   Other Topics Concern     Not on file   Social History Narrative     Not on file   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  He was stationed in [place] in the [employer], did not have exposure to combat or known toxic inhalants there.  He has never smoked nor lived with a smoker.    Family history:  Family History   Problem Relation Age of Onset     Diabetes Mother      Stroke Father      Heart disease Brother      Coronary artery disease Brother    Brother had emphysema and nephew had lung    ROS:  A review of 12 organ systems was performed with pertinent positives and negatives noted in the HPI.    Current Meds:  Current Outpatient Medications   Medication Sig     aspirin 81 MG EC tablet Take 81 mg by mouth every evening.      clopidogrel (PLAVIX) 75 mg tablet TAKE 1 TABLET(75 MG) BY MOUTH DAILY     isosorbide mononitrate (IMDUR) 30 MG 24 hr tablet Take 1 tablet (30 mg total) by mouth daily.     losartan-hydrochlorothiazide (HYZAAR) 100-25 mg per tablet Take 1 tablet by mouth daily.     mirabegron (MYRBETRIQ) 50 mg Tb24 ER tablet Take 50 mg by mouth daily.     multivitamin with minerals (THERA-M) 9 mg iron-400 mcg Tab tablet Take 1 tablet by mouth daily.     oxybutynin (DITROPAN XL) 15 MG 24 hr tablet Take 15 mg by mouth daily.     rosuvastatin (CRESTOR) 20 MG tablet TAKE 1 TABLET(20 MG) BY MOUTH AT BEDTIME     ranitidine (ZANTAC) 150 MG tablet Take 1 tablet (150 mg total) by mouth at bedtime.       Labs:  No results found for this or any previous visit (from the past 72 hour(s)).    I have personally reviewed all imaging and PFT data available pertinent  "to this visit.    PFTs:  1/25/2019: consistent with mild obstructive ventilatory defect, not responsive to inhaled bronchodilators, as well as mildly decreased diffusing capacity    Physical Exam:  BP 96/74   Pulse 72   Resp 20   Ht 5' 9\" (1.753 m)   Wt (!) 279 lb 6.4 oz (126.7 kg)   SpO2 91% Comment: MIRIAN  BMI 41.26 kg/m    General - Well nourished  Ears/Mouth - OP pink moist, no thrush  Neck - no cervical lymphadenopathy  Lungs - Clear to ausculation bilaterally   CVS - regular rhythm with no murmurs, rubs or gallups  Abdomen - soft, NT, ND, NABS  Ext - no cyanosis, clubbing or edema  Skin - no rash  Psychology - alert and oriented, answers appropriate        Electronically signed by:    Dimitry Sanchez MD  [facility] Pulmonary and Critical Care Medicine  "

## 2021-06-23 NOTE — TELEPHONE ENCOUNTER
----- Message from Julissa Gan sent at 1/30/2019  8:19 AM CST -----  Contact: pts wife santa  General phone call:    Caller: santa  Primary cardiologist: dr hernandez  Detailed reason for call: pts angio was cx because dr hernandez wanted to see him first. Santa does not want to bring him out in this weather. Their appt is today at 1120. Is it OK to just talk over the phone? Santa is wondering  New or active symptoms? n/a  Best phone number: [phone]  Best time to contact: any  Ok to leave a detailed message? yes  Device? no    Additional Info:

## 2021-06-23 NOTE — TELEPHONE ENCOUNTER
Patient had COR angiogram with PCI by [facility] on 2/4.    Called and spoke with wife Santa. Wife concerned as patient wasn't able to wear a pair of his usual shorts due to his abdomen/too tight. Patient able to wear a looser fitting pair of shorts at home.     Wife has inspected his abdomen, not-tender, no swelling or abnormal swelling by right groin area/angiogram site. Pt denies pain in his abdomen. Wife and patient agree that his right leg color, temperature and sensation are unchanged. Pt able to get up and move around at home. Wife has inspected groin site, no different since leaving the hospital. Pt and wife both deny issues urinating. Denies shortness of breath or difficulty breathing. Scale at home not accurate as it is 15-20 lbs lower than hospital scale. Wife will monitor.     Discussion of bloat/excess fluid from IVF's while in the hospital. Monitoring and discussion of staying hydrated while at home, and making sure patient urinating enough throughout the day. No drinking to excess, but enough to be hydrated to help get rid of dye from angiogram and also excess fluid from body. Also staying regular with bowels.    Wife will continue to monitor at home for any changes or concerns; any difficulty breathing, chest pains, abnormal swelling of feet or ankles, inability to urinate or change in urination, change in size of abdomen, and any change to right groin site. Discussion of monitoring for signs of fluid retention.Will call if any further concerns or questions. Given writers' extension [phone]. Pt has appointment with KLL next week 2/13/19. Dr. Duncan any other recommendations?

## 2021-06-23 NOTE — TELEPHONE ENCOUNTER
Dr. Duncan requested to cancel angiogram on 1/29 and recommended pt to be seen in clinic this week. Called pt. He agreed with the plan.Transferred to schedulers to set up apt with Dr. Duncan to discuss treatment options. Cath lab is notified.

## 2021-06-24 NOTE — PROGRESS NOTES
ITP ASSESSMENT   Assessment Day: Initial    Session Number: 1/2  Precautions: Deconditioned, Yandel. knees, back pain, L shoulder    Diagnosis: Stent    Risk Stratification: High    Referring Provider: Tana Duncan MD   ITP sent to Dr. Galindo  EXERCISE  Exercise Assessment: Initial       6 Minute Walk Test   Pre   Pre Exercise HR: 59                    Pre Exercise BP: 118/70      Peak  Peak HR: 84                   Peak BP: 150/72    Peak feet: 300    Peak O2 SAT: 87 (91-92% in recovery)    Peak RPE: 13    Peak MPH: 0.57      Symptoms:  Peak Symptoms: overall fatigue, slight SOB, leg fatigue      5 mins. Post  5 Min Post HR: 60    5 Min Post BP: 112/70                           Exercise Plan  Goals Next 30 days  ADL'S: Resume shower/bathing/toweling off with less help from wife    Leisure: Statrt walking program 1-2x/week for 5 min.    Work: Retired      Education Goals: Patient can state cardiac s/s and appropriate emergency response.;Has system for taking medication.;Medication review      Exercise Prescription  Exercise Mode: Treadmill;Bike;Nustep;Arm Erg.;Stairs;Hallway Walking    Frequency: 2x/week    Duration: 15-30 min.    Intensity / THR: 20-30 beats above resting heart rate    RPE 11-14  Progression / Met level: 1.5-2.5    Resistive Training?: No      Current Exercise (mins/week): 1      Interventions  Home Exercise:  Mode: walk    Frequency: 1-2x/week    Duration: 5-10 min.      Education Material : Individual education and counseling      Education Completed  Exercise Education Completed: Cardiac Anatomy;Signs and Symptoms;Medication review;Home Exercise;Emergency Plan;RPE;Warm up/cool down;FITT Principles;BP/HR Reponse to exercise;Stretching;Strength training;Benefits of Exercise;End point of exercise              Exercise Follow-up/Discharge  Follow up/Discharge: Encourage home exercise program   NUTRITION  Nutrition Assessment: Initial      Nutrition Risk Factors:  Nutrition Risk Factors:  "Dyslipidemia;Overweight  Cholesterol: 114  LDL: 53  HDL: 41  Triglycerides: 99      Nutrition Plan  Interventions  Other Nutrition Intervention: Therapist/Pt Discussion      Education Completed  Nutrition Education Completed: Risk factor overview;Low sodium diet      Goals  Nutrition Goals (Next 30 days): Patient can identify their risk factors for CAD;Patient will follow a low sodium diet      Goals Met  Nutrition Goals Met: Reviewed Dietitian schedule;Provided Rate your Plate Survey      Height, Weight, and  BMI  Weight: 280 lb 6.4 oz (127.2 kg)  Height: 5' 9\" (1.753 m)  BMI: 41.39      Nutrition Follow-up  Follow-up/Discharge: Encourage appt. with dietician         Other Risk Factors  Other Risk Factor Assessment: Initial      HTN Risk Factor: Hypertension      Pre Exercise BP: 118/70  Post Exercise BP: 112/70      Hypertension Plan  Goals  HTN Goals: Follow low sodium diet;Exercises regularly      Goals Met  HTN Goals Met: Take medication as prescribed      HTN Interventions  HTN Interventions: Therapist/patient discussion      HTN Education Completed  HTN Education Completed: Low sodium diet;Medication review;Risk factor overview      Tobacco Risk Factor: NA         PSYCHOSOCIAL  Psychosocial Assessment: Initial       [place] RENAE Q of L Summary Score: 25      EVA-D Score: 13      Psychosocial Risk Factor: NA      Psychosocial Plan  Interventions  If EVA-D > 15 send letter to MD  Interventions: Individual education and counseling      Education Completed  Education Completed: Relaxation/Coping Techniques      Goals  Goals (Next 30 days): Improvement in [employer] COOP score      Psychosocial Follow-up  Follow-up/Discharge: Patient denies stress.  Does not turn to anyone for emotional support states \"doesn't really need any\".  Does enjoy reading and watching TV.       Patient involved in Goal setting?: Yes      Signature: _____________________________________________________________    Date: " __________________    Time: __________________

## 2021-06-24 NOTE — PROGRESS NOTES
Assessment and Plan:  Chava Lincoln is a 89 y.o. M with a past medical history significant for RAMON (uses home CPAP), coronary artery disease and chronically elevated right hemidiaphragm who presents to clinic today by request of his cardiologist for evaluation of a pulmonary cause of his dyspnea on exertion.  He states that he does not have shortness of breath at rest, however does have it walking up stairs around the room. He is already using a home CPAP for his obstructive sleep apnea.  There was a question of possible pulmonary arterial hypertension on a previous CT scan, however no corroborating evidence of this was found on echocardiogram.  CT scan did show tracheobronchomalacia, however I doubt this would be causing his shortness of breath with exertion.  I would be more likely to cause shortness of breath at night, however is likely already being treated by his CPAP.  He has evidence of right basilar opacities that could be atelectasis and/or aspiration.  Reportedly he did have a swallow eval positive for dysphasia had a previous hospitalization and was given swallowing precaution recommendations by the speech-language pathologist which he is following.  He denies any symptoms of heartburn, however he may also be having nocturnal reflux aspiration.  He has mild COPD with a mildly decreased diffusing capacity on his pulmonary function tests that are not responsive to inhaled bronchodilators.  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    He had a chronically elevated right hemidiaphragm on imaging, prompting us to order a fluoroscopic sniff test which was positive for right hemidiaphragm paralysis with paradoxical movement during inspiration.  This is likely contributing to his symptoms, and per his relative who accompanies him today, may have been present his whole life.  He has never had thoracic surgery.  I discussed these  "findings with him, and the fact that hemidiaphragm plication surgeries exists that may help with symptoms, however given his age, comorbidities, and mildness of his symptoms, may not likely be worth the risk involved.  The patient and family were in agreement.  Furthermore, he would likely have to wait at least a year after his recent coronary stenting procedure to be able to hold his Plavix.     1/28/2019 fluoroscopic sniff test: \"The right hemidiaphragm is elevated. Paradoxical movement of the right diaphragm with inhalation.\"    Continue precautions for nocturnal reflux aspiration:  - Take zantac nightly  - Avoid eating or drinking within 3 hours of bedtime  - Elevate back at angle with wedge/reflux pillow    The patient wishes to transfer his sleep clinic care for his CPAP to our facility, and an ambulatory referral was ordered.    Return to follow-up in our clinic on an as-needed basis, no need to schedule return visit at this time        CCx: Dyspnea on exertion    HPI: Patient states that his breathing is not significantly changed since last visit.  He does have mild dyspnea on exertion, however does not significantly limiting his activity.  He denies fevers, chills, coughing, or other respiratory symptoms    ROS:  A review of 12 organ systems was performed with pertinent positives and negatives noted in the HPI.      Current Meds:  Current Outpatient Medications   Medication Sig     aspirin 81 MG EC tablet Take 81 mg by mouth every evening.      clopidogrel (PLAVIX) 75 mg tablet Take 1 tablet (75 mg total) by mouth daily.     isosorbide mononitrate (IMDUR) 30 MG 24 hr tablet Take 1 tablet (30 mg total) by mouth daily.     losartan-hydrochlorothiazide (HYZAAR) 100-25 mg per tablet Take 1 tablet by mouth daily.     metoprolol tartrate (LOPRESSOR) 25 MG tablet Take 1 tablet (25 mg total) by mouth 2 (two) times a day.     mirabegron (MYRBETRIQ) 50 mg Tb24 ER tablet Take 50 mg by mouth daily.     multivitamin " with minerals (THERA-M) 9 mg iron-400 mcg Tab tablet Take 1 tablet by mouth daily.     oxybutynin (DITROPAN XL) 15 MG 24 hr tablet Take 15 mg by mouth daily.     ranitidine (ZANTAC) 150 MG capsule Take 150 mg by mouth every evening.     rosuvastatin (CRESTOR) 20 MG tablet Take 1 tablet (20 mg total) by mouth at bedtime.       Labs:  No results found for this or any previous visit (from the past 72 hour(s)).    I have personally reviewed all pertinent imaging studies and PFT results unless otherwise noted.    Imaging studies:  No results found.      Physical Exam:  /72   Pulse (!) 56   Resp 24   Wt (!) 281 lb 3.2 oz (127.6 kg)   SpO2 92% Comment: RA  BMI 41.53 kg/m    General - Well nourished  Ears/Mouth -  OP pink moist, no thrush  Neck - no cervical lymphadenopathy  Lungs - Clear to ausculation bilaterally   CVS - regular rhythm with no murmurs, rubs or gallups  Abdomen - soft, NT, ND, NABS  Ext - no cyanosis, clubbing or edema  Skin - no rash  Psychology - alert and oriented, answers appropriate        Electronically signed by:    Dimitry Sanchez MD  [facility] Pulmonary and Critical Care Medicine

## 2021-06-24 NOTE — PROGRESS NOTES
Cardiac Rehab  Phase II Assessment    Assessment Date: 3/8/19    Diagnosis: CAD  Date of Onset: 2/2019  Procedure: KATHY x 1 to ramus  Date of Onset: 2/4/19  ICD/Pacemaker: No Parameters: na  Post-op Complications: none  ECG History: SR/SA/1'AVB EF%:70  Past Medical History:   Past Medical History:   Diagnosis Date     Coronary artery disease due to calcified coronary lesion      GERD (gastroesophageal reflux disease) 7/2/2014     Hypertension      RAMON on CPAP 4/17/2017     Prostate cancer (H)      Prostate cancer (H)      Past Surgical History:   Procedure Laterality Date     CV CORONARY ANGIOGRAM N/A 12/10/2018    Procedure: Coronary Angiogram;  Surgeon: Tana Duncan MD;  Location: [facility];  Service: Cardiology     CV LEFT HEART CATHETERIZATION WO LEFT VETRICULOGRAM Left 12/10/2018    Procedure: Left Heart Catheterization Without Left Ventriculogram;  Surgeon: Tana Duncan MD;  Location: [facility];  Service: Cardiology     CV LEFT HEART CATHETERIZATION WO LEFT VETRICULOGRAM Left 2/4/2019    Procedure: Left Heart Catheterization Without Left Ventriculogram;  Surgeon: Tana Duncan MD;  Location: [facility];  Service: Cardiology     CV RIGHT HEART CATH N/A 2/4/2019    Procedure: Right Heart Catheterization;  Surgeon: Tana Duncan MD;  Location: [facility];  Service: Cardiology     HERNIA REPAIR       JOINT REPLACEMENT       KNEE SURGERY Bilateral      Physical Assessment  Precautions/ Physical Limitations: Yandel. Knees, back, L shoulder  Oxygen: No  O2 Sats: 91 Lung Sounds: clear Edema: +3 Yandel. Edema in ankles  Incisions: na  Sleeping Pattern: fair   Appetite: fair   Nutrition Risk Screen: Goal for pt. is weight loss.    Pain  Location: na  Characteristics:na  Intensity: (0-10 scale) 0  Current Pain Management: na  Intervention: na  Response: na    Psychosocial/ Emotional Health  1. In the past 12 months, have you been in a relationship where you have been  "abused physically, emotionally, sexually or financially? No  notified: NA  2. Who do you turn to for emotional support?: self, \"does not really need any\"  3. Do you have cultural or spiritual needs? No  4. Have there been any major life changes in the past 12 months? No    Referral Information  Primary Physician: Erasmo Michael MD  Cardiologist: Dr. Sheridan, Dr. Duncan  Surgeon: christina    Home exercise/Equipment: treadmill, bike, elliptical    Patient's long-term goal(s): Weight loss, walking program    1. Living Accommodations: Home Steps: Yes      Support people at home: wife   2. Marital Status:   3. Family is able to assist with cares      Latter day/Community involvement: attends [employer]  4. Recreation/Hobbies: reading, watch TV          "

## 2021-06-25 NOTE — TELEPHONE ENCOUNTER
Dr Fatimah Pratt gave pt 30 mg furosemide this morning, and has viewed lab results on MyChart.  She is asking if she should continue with 30 mg daily, or go back to 20 mg daily?  -omid

## 2021-06-25 NOTE — TELEPHONE ENCOUNTER
===View-only below this line===  ----- Message -----  From: Yonatan Sheridan MD  Sent: 6/8/2021   4:51 PM CDT  To: Graciela Sapp RN    Continue Lasix 30 mg daily.  Repeat BMP in 10 days.  Thank  Jered    === Recommendations discussed with spouse Santa.  Santa verbalized understanding and pt will have lab done at PCP appt coming up ~ 6/23/21.  BMP ordered, and faxed to PCP.  augustina

## 2021-06-26 NOTE — PROGRESS NOTES
Progress Notes by Yonatan Sheridan MD at 12/5/2018  8:50 AM     Author: Yonatan Sheridan MD Service: -- Author Type: Physician    Filed: 12/5/2018  9:28 AM Encounter Date: 12/5/2018 Status: Signed    : Yonatan Sheridan MD (Physician)           Click to link to [facility] HEART [facility] NOTE    Thank you, Dr. Michael, for asking me to see Chava Lincoln in consultation at [facility] to evaluate abnormal nuclear stress test.      Assessment/Plan:   1.  Coronary artery disease, dyspnea on exertion: The patient's coronary CT angiogram was reported as significant the stenosis in LAD and RCA, also had abnormal nuclear stress test.  Discussed his coronary CT angiogram of findings with the patient and his wife.  Recommended coronary angiogram with possible PCI.  We discussed the benefit and possible complications. He is aware that the risks of the procedure include but are not limited to: death, myocardial infarction, stroke, kidney dysfunction, vessel trauma, hemorrhage, need for emergency corrective surgery, allergy, and dysrhythmia.  The procedures will be done as soon as possible.  Meantime continue aspirin, statin, isosorbide mononitrate.    2.  Essential hypertension: His blood pressure has been controlled with losartan-HCTZ 100-25 mg daily.    3.  Dyslipidemia: Continue simvastatin.  Last LDL was 77    4.  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    5.  Morbid obesity and obstructive sleep apnea: Lifestyle modification and continue CPAP.    Thank you for the opportunity to be involved in the care of Chvaa Lincoln. If you have any questions, please feel free to contact me.  I will see the patient again in 2 months.    Much or all of the text in this note was generated through the use of Dragon Dictate voice-to-text software. Errors in spelling or words which seem out of context are unintentional.   Sound alike errors, in particular, may have escaped editing.        History of Present Illness:   It is my pleasure to see Chava Lincoln at the [facility] clinic for evaluation of Follow-up and discussing coronary CT angiogram report. Chava Lincoln is a 89 y.o. male with a medical history of essential hypertension, dyslipidemia, morbid obesity, obstructive sleep apnea on CPAP.    The patient was admitted to hospital at the end of September due to dizziness.  His nuclear stress test was reported a medium size of inducible myocardial ischemia in inferior lateral wall.  There is no wall motion abnormality, normal left ventricular ejection fraction.      The patient had a coronary CT angiogram for further evaluation of abnormal nuclear stress test as we discussed last visit.  His coronary CT angiogram was reported total calcium score 2256, severe LAD and RCA stenosis.    The patient and his wife state that the patient has dyspnea on exertion for many years.  He is not able to do much activities as before.  He had no chest pain.  He had no palpitations.  He has occasional lightheadedness, but no syncope.  He had no orthopnea, PND.  His mild leg edema has been stable.  His blood pressure and heart rate are controlled well.      Past Medical History:     Patient Active Problem List   Diagnosis   ? Pneumonia   ? Acute respiratory failure with hypoxemia (H)   ? GERD (gastroesophageal reflux disease)   ? RAMON on CPAP   ? Near syncope   ? Exertional dyspnea   ? Essential hypertension   ? Dyslipidemia   ? Coronary artery disease involving native coronary artery of native heart with angina pectoris (H)       Past Surgical History:     Past Surgical History:   Procedure Laterality Date   ? HERNIA REPAIR     ? JOINT REPLACEMENT     ? KNEE SURGERY Bilateral        Family History:     Family History   Problem Relation Age of Onset   ? Diabetes Mother    ? Stroke Father    ? Heart disease Brother    ? Coronary artery disease Brother        Social History:    reports that  has never  "smoked. he has never used smokeless tobacco. He reports that he drinks alcohol. He reports that he does not use drugs.    Review of Systems:   General: WNL  Eyes: WNL  Ears/Nose/Throat: WNL  Lungs: WNL  Heart: WNL  Stomach: WNL  Bladder: WNL  Muscle/Joints: WNL  Skin: WNL  Nervous System: WNL  Mental Health: WNL     Blood: WNL    Meds:     Current Outpatient Medications:   ?  aspirin 81 MG EC tablet, Take 81 mg by mouth every evening. , Disp: , Rfl:   ?  isosorbide mononitrate (IMDUR) 30 MG 24 hr tablet, Take 1 tablet (30 mg total) by mouth daily., Disp: 30 tablet, Rfl: 6  ?  losartan-hydrochlorothiazide (HYZAAR) 100-25 mg per tablet, Take 1 tablet by mouth daily., Disp: , Rfl:   ?  mirabegron (MYRBETRIQ) 50 mg Tb24 ER tablet, Take 50 mg by mouth daily., Disp: , Rfl:   ?  multivitamin with minerals (THERA-M) 9 mg iron-400 mcg Tab tablet, Take 1 tablet by mouth daily., Disp: , Rfl:   ?  oxybutynin (DITROPAN XL) 15 MG 24 hr tablet, Take 15 mg by mouth daily., Disp: , Rfl:   ?  simvastatin (ZOCOR) 40 MG tablet, Take 40 mg by mouth bedtime., Disp: , Rfl:   No current facility-administered medications for this visit.      Allergies:   Lisinopril; Morphine; Sulfa (sulfonamide antibiotics); and Vicodin [hydrocodone-acetaminophen]    Objective:      Physical Exam  (!) 277 lb (125.6 kg)  5' 9\" (1.753 m)  Body mass index is 40.91 kg/m .  /66 (Patient Site: Left Arm, Patient Position: Sitting, Cuff Size: Adult Large)   Pulse 72   Resp 16   Ht 5' 9\" (1.753 m)   Wt (!) 277 lb (125.6 kg)   BMI 40.91 kg/m      General Appearance:   Awake, Alert, No acute distress.   HEENT:  Pupil equal, reactive to light. No scleral icterus; the mucous membranes were moist. No oral ulcers or thrush.    Neck: No cervical bruits. No JVD. No thyromegaly. No lymph node enlargement or tenderness.   Chest: The spine was straight. The chest was symmetric.   Lungs:   Respirations unlabored. Lungs are clear to auscultation. No crackles. No " wheezing.   Cardiovascular:   RRR, normal first and second heart sounds with no murmurs. No rubs or gallops.    Abdomen:  Obese. Soft. No tenderness. Non-distended. Bowels sounds are present   Extremities:  Obese.  Equal posterior tibial pulses.  Mild leg edema.   Skin: No rashes or ulcers. Warm, Dry.   Musculoskeletal: No tenderness. No deformity.   Neurologic: Mood and affect are appropriate. No focal deficits.         EKG:  Personally reivewed  Sinus bradycardia with 1st degree A-V block   Inferior infarct (cited on or before 16-JUL-1998)   Poor R wave progression   Abnormal ECG   When compared with ECG of 21-NOV-2016 15:33,   Vent. rate has decreased BY  32 BPM     Cardiac Imaging Studies  Echocardiogram on September 23, 2018:    Normal left ventricular size and systolic function.    Left ventricle ejection fraction is normal. The calculated left ventricular ejection fraction is 58%.    Normal right ventricular size and systolic function.    Mild mitral regurgitation.    When compared to the previous study dated 11/22/2016, no significant change.    Limited visualization due to body habitus.    Nuclear stress test on October 12, 2018:    The pharmacologic nuclear stress test is abnormal.    There is a medium sized area of ischemia in the inferior and inferolateral segment(s) of the left ventricle.    Right ventricular enhancement is seen on the stress images which may be a marker for RV strain, which can be due to heart failure, pulmonary hypertension, or multivessel coronary disease.    The left ventricular ejection fraction is 70%.    There is no prior study available.    The patient is at an intermediate risk of future cardiac ischemic events.    Coronary CT angiogram on 12-4-2018:    The total Agatston calcium score is 2256. A calcium score in this range places the individual in the 100th percentile when compared to an age and gender matched control group and implies a very high risk of cardiac events in the  next ten years.    Right dominant coronary artery system.    Technically difficult study due to poor visualization. Heavy calcification in multiple coronary vessels. There appears to be severe coronary artery stenosis in the proximal to mid left anterior descending, circumflex artery and proximal to mid right coronary artery. The distal portion of the right coronary artery was not well visualized. Moderate coronary stenosis involving the left main.    Mildly dilated aortic root at 4.2 cm.      Lab Review   Lab Results   Component Value Date     09/24/2018    K 4.2 09/24/2018     09/24/2018    CO2 24 09/24/2018    BUN 35 (H) 09/24/2018    CREATININE 1.05 09/24/2018    CALCIUM 8.8 09/24/2018     Lab Results   Component Value Date    WBC 12.0 (H) 09/23/2018    WBC 9.0 05/22/2015    HGB 14.4 09/23/2018    HCT 43.7 09/23/2018    MCV 96 09/23/2018     09/23/2018     Lab Results   Component Value Date    CHOL 156 06/25/2018    TRIG 174 (H) 06/25/2018    HDL 44 06/25/2018     Lab Results   Component Value Date    TROPONINI 0.01 09/23/2018     Lab Results   Component Value Date    BNP 30 09/23/2018     Lab Results   Component Value Date    TSH 3.45 02/13/2017

## 2021-06-26 NOTE — PROGRESS NOTES
Progress Notes by Yonatan Sheridan MD at 11/8/2018 10:30 AM     Author: Yonatan Sheridan MD Service: -- Author Type: Physician    Filed: 11/8/2018 10:54 AM Encounter Date: 11/8/2018 Status: Signed    : Yonatan Sheridan MD (Physician)           Click to link to [facility] HEART [facility] NOTE    Thank you, Dr. Michael, for asking me to see Chava YOUNG Dopremasourav in consultation at [facility] to evaluate abnormal nuclear stress test.      Assessment/Plan:   1.  Abnormal nuclear stress test, dyspnea on exertion: The patient has dyspnea on exertion for many years which has been stable.  He had one episodes of dizziness several weeks back which needed hospitalization.  He had a nuclear stress test on October 12, 2018 which was reported a medium size of inducible myocardial ischemia in inferolateral wall.  We discussed further cardiac evaluation.  The patient has several risk of factors including his age, hypertension, hyperlipidemia.  Due to abnormal nuclear stress test, it is reasonable to have further evaluation of coronary anatomy we discussed to rule out a significant coronary artery disease.  Conventional coronary angiogram with possible PCI versus a coronary CT angiogram.  Since the patient has no chest pain, the patient and his wife prefer to have noninvasive evaluation.  Coronary CT angiogram is requested for further evaluation.  The patient and his wife has a travel plan to [place] next Tuesday.  Most likely he will not have time to complete his coronary CTA angiogram prior to the trip.  Isosorbide mononitrate 30 mg daily is added into his regimen.    2.  Essential hypertension: His blood pressure has been controlled with losartan-HCTZ 100-25 mg daily.    3.  Dyslipidemia: Continue simvastatin.  Last LDL was 77    4.  Morbid obesity and obstructive sleep apnea: Lifestyle modification and continue CPAP.    Thank you for the opportunity to be involved in the care of Chava Lincoln. If  you have any questions, please feel free to contact me.  I will see the patient again in 4 weeks.    Much or all of the text in this note was generated through the use of Dragon Dictate voice-to-text software. Errors in spelling or words which seem out of context are unintentional.   Sound alike errors, in particular, may have escaped editing.       History of Present Illness:   It is my pleasure to see Chava Lincoln at the [facility] clinic for evaluation of Abnormal Stress Test. Chava Lincoln is a 89 y.o. male with a medical history of essential hypertension, dyslipidemia, morbid obesity, obstructive sleep apnea on CPAP.    The patient was admitted to hospital at the end of September due to dizziness.  He had evaluation at hospital which was not impressive.  The patient had a nuclear stress test as of the patient.  His nuclear stress test that was reported medium size of inducible myocardial ischemia in inferior lateral wall.  There is no wall motion abnormality, normal left ventricular ejection fraction.  The patient is referred to cardiology clinic for further evaluation.    The patient and his wife state that the patient has dyspnea on exertion for many years, which has been stable.  He had no chest pain.  He had no palpitations.  He has occasional lightheadedness, but no syncope.  He had no orthopnea, PND.  He has mild leg edema which has been stable.  His blood pressure and heart rate are controlled well.  He does not have a regular exercise.    Past Medical History:     Patient Active Problem List   Diagnosis   ? Pneumonia   ? Acute respiratory failure with hypoxemia (H)   ? GERD (gastroesophageal reflux disease)   ? RAMON on CPAP   ? Near syncope   ? Exertional dyspnea   ? Essential hypertension   ? Dyslipidemia       Past Surgical History:     Past Surgical History:   Procedure Laterality Date   ? HERNIA REPAIR     ? JOINT REPLACEMENT     ? KNEE SURGERY Bilateral        Family History:  "    Family History   Problem Relation Age of Onset   ? Diabetes Mother    ? Stroke Father    ? Heart disease Brother    ? Coronary artery disease Brother        Social History:    reports that he has never smoked. He has never used smokeless tobacco. He reports that he drinks alcohol. He reports that he does not use illicit drugs.    Review of Systems:   General: WNL  Eyes: WNL  Ears/Nose/Throat: WNL  Lungs: WNL  Heart: WNL  Stomach: WNL  Bladder: WNL  Muscle/Joints: WNL  Skin: WNL  Nervous System: WNL  Mental Health: WNL     Blood: WNL    Meds:     Current Outpatient Prescriptions:   ?  aspirin 81 MG EC tablet, Take 81 mg by mouth every evening. , Disp: , Rfl:   ?  losartan-hydrochlorothiazide (HYZAAR) 100-25 mg per tablet, Take 1 tablet by mouth daily., Disp: , Rfl:   ?  mirabegron (MYRBETRIQ) 50 mg Tb24 ER tablet, Take 50 mg by mouth daily., Disp: , Rfl:   ?  multivitamin with minerals (THERA-M) 9 mg iron-400 mcg Tab tablet, Take 1 tablet by mouth daily., Disp: , Rfl:   ?  oxybutynin (DITROPAN XL) 15 MG 24 hr tablet, Take 15 mg by mouth daily., Disp: , Rfl:   ?  simvastatin (ZOCOR) 40 MG tablet, Take 40 mg by mouth bedtime., Disp: , Rfl:   ?  isosorbide mononitrate (IMDUR) 30 MG 24 hr tablet, Take 1 tablet (30 mg total) by mouth daily., Disp: 30 tablet, Rfl: 6     Allergies:   Lisinopril; Morphine; Sulfa (sulfonamide antibiotics); and Vicodin [hydrocodone-acetaminophen]    Objective:      Physical Exam  (!) 271 lb (122.9 kg)  5' 9\" (1.753 m)  Body mass index is 40.02 kg/(m^2).  /68 (Patient Site: Right Arm, Patient Position: Sitting, Cuff Size: Adult Large)  Pulse 72  Resp 16  Ht 5' 9\" (1.753 m)  Wt (!) 271 lb (122.9 kg)  BMI 40.02 kg/m2    General Appearance:   Awake, Alert, No acute distress.   HEENT:  Pupil equal, reactive to light. No scleral icterus; the mucous membranes were moist. No oral ulcers or thrush.    Neck: No cervical bruits. No JVD. No thyromegaly. No lymph node enlargement or " tenderness.   Chest: The spine was straight. The chest was symmetric.   Lungs:   Respirations unlabored. Lungs are clear to auscultation. No crackles. No wheezing.   Cardiovascular:   RRR, normal first and second heart sounds with no murmurs. No rubs or gallops.    Abdomen:  Soft. No tenderness. Non-distended. Bowels sounds are present   Extremities:  Obese.  Equal posterior tibial pulses.  Mild leg edema.   Skin: No rashes or ulcers. Warm, Dry.   Musculoskeletal: No tenderness. No deformity.   Neurologic: Mood and affect are appropriate. No focal deficits.         EKG:  Personally reivewed  Sinus bradycardia with 1st degree A-V block   Inferior infarct (cited on or before 16-JUL-1998)   Poor R wave progression   Abnormal ECG   When compared with ECG of 21-NOV-2016 15:33,   Vent. rate has decreased BY  32 BPM     Cardiac Imaging Studies  Echocardiogram on September 23, 2018:    Normal left ventricular size and systolic function.    Left ventricle ejection fraction is normal. The calculated left ventricular ejection fraction is 58%.    Normal right ventricular size and systolic function.    Mild mitral regurgitation.    When compared to the previous study dated 11/22/2016, no significant change.    Limited visualization due to body habitus.    Nuclear stress test on October 12, 2018:    The pharmacologic nuclear stress test is abnormal.    There is a medium sized area of ischemia in the inferior and inferolateral segment(s) of the left ventricle.    Right ventricular enhancement is seen on the stress images which may be a marker for RV strain, which can be due to heart failure, pulmonary hypertension, or multivessel coronary disease.    The left ventricular ejection fraction is 70%.    There is no prior study available.    The patient is at an intermediate risk of future cardiac ischemic events.      Lab Review   Lab Results   Component Value Date     09/24/2018    K 4.2 09/24/2018     09/24/2018    CO2  24 09/24/2018    BUN 35 (H) 09/24/2018    CREATININE 1.05 09/24/2018    CALCIUM 8.8 09/24/2018     Lab Results   Component Value Date    WBC 12.0 (H) 09/23/2018    WBC 9.0 05/22/2015    HGB 14.4 09/23/2018    HCT 43.7 09/23/2018    MCV 96 09/23/2018     09/23/2018     Lab Results   Component Value Date    CHOL 156 06/25/2018    TRIG 174 (H) 06/25/2018    HDL 44 06/25/2018     Lab Results   Component Value Date    TROPONINI 0.01 09/23/2018     Lab Results   Component Value Date    BNP 30 09/23/2018     Lab Results   Component Value Date    TSH 3.45 02/13/2017

## 2021-06-26 NOTE — TELEPHONE ENCOUNTER
Dr Sheridan - please review labs done 6/23/21.  Pt is currently taking 30 mg furosemide daily, and spouse reports he is feeling much better and is able to get out and around every day this week so far.    Any new recommendations?  -omid

## 2021-06-26 NOTE — PATIENT INSTRUCTIONS - HE
Chava Lincoln,    It was a pleasure to see you today at the [facility].     My recommendations after this visit include:    Blood work to check sodium and chloride today  Relax dietary sodium restrictions  Keep appointment with Dr. Sheridan next month    AMRITA Rose MD, FACC, [facility]

## 2021-06-27 NOTE — PROGRESS NOTES
Progress Notes by Yonatan Sheridan MD at 1/28/2019 11:30 AM     Author: Yonatan Sheridan MD Service: -- Author Type: Physician    Filed: 1/28/2019 11:56 AM Encounter Date: 1/28/2019 Status: Signed    : Yonatan Sheridan MD (Physician)           Click to link to [facility] HEART [facility] NOTE    Thank you, Dr. Michael, for asking me to see Chava Lincoln in consultation at [facility] to evaluate abnormal nuclear stress test.      Assessment/Plan:   1.  Coronary artery disease, at least moderate ostial show left main disease, moderate to severe distal LAD, severe D1, severe ramus, severe mid to distal RCA: The patient is not a candidate for open heart surgery.  Discussed medical treatment versus PCI.  The patient would like to have PCI done plus medical treatment we discussed that the benefit and possible complications from procedure.  The patient and his wife understand.  He is aware that the risks of the procedure include but are not limited to: death, myocardial infarction, stroke, kidney dysfunction, vessel trauma, hemorrhage, need for emergency corrective surgery, allergy, and dysrhythmia.  The patient will have PCI to left coronary system and then stage for the right side.  The procedure will be done tomorrow.  Meantime continue aspirin, Plavix, Crestor, isosorbide mononitrate.  Metoprolol 25 mg twice a day is added for better blood pressure and heart rate control.    2.  Essential hypertension: His blood pressure has been controlled with losartan-HCTZ 100-25 mg daily.  Add metoprolol 25 mg twice a day    3.  Dyslipidemia: Continue Crestor 20 mg nightly.  Last LDL was 77    4.  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    5.  Morbid obesity and obstructive sleep apnea: Lifestyle modification and continue CPAP.    Thank you for the opportunity to be involved in the care of Chava Lincoln. If you have any questions, please feel free to contact me.  I will see the  patient again in 3 months.    Much or all of the text in this note was generated through the use of Dragon Dictate voice-to-text software. Errors in spelling or words which seem out of context are unintentional.   Sound alike errors, in particular, may have escaped editing.       History of Present Illness:   It is my pleasure to see Chava Lincoln at the [facility] for routine cardiology follow-up post coronary angiogram. Chava Lincoln is a 89 y.o. male with a medical history of significant coronary artery disease, essential hypertension, dyslipidemia, morbid obesity, obstructive sleep apnea on CPAP.    The patient states that he still has shortness of breath on exertion, fatigue, not able to do much.  He had no chest pain, palpitations, orthopnea, PND.  His mild bilateral leg edema has been stable.  His blood pressure and heart rate are controlled.  So far, he has no side effects from his current medications.      He had coronary angiogram on December 10, 2018 which was reported significant multiple vessel disease.  He was evaluated for CABG versus PCI.  Dr. Ventura evaluated the patient and did not recommend CABG due to high risk.    Past Medical History:     Patient Active Problem List   Diagnosis   ? Pneumonia   ? Acute respiratory failure with hypoxemia (H)   ? GERD (gastroesophageal reflux disease)   ? RAMON on CPAP   ? Near syncope   ? Dyspnea on exertion   ? Essential hypertension   ? Dyslipidemia   ? Coronary artery disease involving native coronary artery of native heart with angina pectoris (H)   ? Ascending aortic aneurysm (H)   ? Abnormal cardiovascular stress test   ? Coronary artery calcification seen on CAT scan   ? Pulmonary hypertension (H)   ? History of pulmonary embolism   ? Hypoxia   ? Abnormal nuclear stress test       Past Surgical History:     Past Surgical History:   Procedure Laterality Date   ? CV CORONARY ANGIOGRAM N/A 12/10/2018    Procedure: Coronary  Angiogram;  Surgeon: Tana Duncan MD;  Location: [facility] Cath Lab;  Service: Cardiology   ? CV LEFT HEART CATHETERIZATION WO LEFT VETRICULOGRAM Left 12/10/2018    Procedure: Left Heart Catheterization Without Left Ventriculogram;  Surgeon: Tana Duncan MD;  Location: [facility] Cath Lab;  Service: Cardiology   ? HERNIA REPAIR     ? JOINT REPLACEMENT     ? KNEE SURGERY Bilateral        Family History:     Family History   Problem Relation Age of Onset   ? Diabetes Mother    ? Stroke Father    ? Heart disease Brother    ? Coronary artery disease Brother        Social History:    reports that  has never smoked. he has never used smokeless tobacco. He reports that he drinks alcohol. He reports that he does not use drugs.    Review of Systems:   General: WNL  Eyes: WNL  Ears/Nose/Throat: WNL  Lungs: WNL  Heart: WNL  Stomach: WNL  Bladder: WNL  Muscle/Joints: WNL  Skin: WNL  Nervous System: WNL  Mental Health: WNL     Blood: WNL    Meds:     Current Outpatient Medications:   ?  aspirin 81 MG EC tablet, Take 81 mg by mouth every evening. , Disp: , Rfl:   ?  clopidogrel (PLAVIX) 75 mg tablet, Take 1 tablet (75 mg total) by mouth daily., Disp: 90 tablet, Rfl: 3  ?  isosorbide mononitrate (IMDUR) 30 MG 24 hr tablet, Take 1 tablet (30 mg total) by mouth daily., Disp: 90 tablet, Rfl: 2  ?  losartan-hydrochlorothiazide (HYZAAR) 100-25 mg per tablet, Take 1 tablet by mouth daily., Disp: , Rfl:   ?  mirabegron (MYRBETRIQ) 50 mg Tb24 ER tablet, Take 50 mg by mouth daily., Disp: , Rfl:   ?  multivitamin with minerals (THERA-M) 9 mg iron-400 mcg Tab tablet, Take 1 tablet by mouth daily., Disp: , Rfl:   ?  oxybutynin (DITROPAN XL) 15 MG 24 hr tablet, Take 15 mg by mouth daily., Disp: , Rfl:   ?  ranitidine (ZANTAC) 150 MG tablet, TAKE 1 TABLET(150 MG) BY MOUTH AT BEDTIME, Disp: 90 tablet, Rfl: 3  ?  rosuvastatin (CRESTOR) 20 MG tablet, Take 1 tablet (20 mg total) by mouth at bedtime., Disp: 90 tablet, Rfl: 3  ?   "metoprolol tartrate (LOPRESSOR) 25 MG tablet, Take 1 tablet (25 mg total) by mouth 2 (two) times a day., Disp: 60 tablet, Rfl: 11     Allergies:   Lipitor [atorvastatin]; Lisinopril; Morphine; Sulfa (sulfonamide antibiotics); and Vicodin [hydrocodone-acetaminophen]    Objective:      Physical Exam  (!) 278 lb (126.1 kg)  5' 9\" (1.753 m)  Body mass index is 41.05 kg/m .  /86 (Patient Site: Left Arm, Patient Position: Sitting, Cuff Size: Adult Large)   Pulse 82   Resp 18   Ht 5' 9\" (1.753 m)   Wt (!) 278 lb (126.1 kg)   BMI 41.05 kg/m      General Appearance:   Awake, Alert, No acute distress.   HEENT:  Pupil equal, reactive to light. No scleral icterus; the mucous membranes were moist. No oral ulcers or thrush.    Neck: No cervical bruits. No JVD. No thyromegaly. No lymph node enlargement or tenderness.   Chest: The spine was straight. The chest was symmetric.   Lungs:   Respirations unlabored. Lungs are clear to auscultation. No crackles. No wheezing.   Cardiovascular:   RRR, normal first and second heart sounds with no murmurs. No rubs or gallops.    Abdomen:  Obese. Soft. No tenderness. Non-distended. Bowels sounds are present   Extremities:  Obese.  Equal posterior tibial pulses.  Mild leg edema.   Skin: No rashes or ulcers. Warm, Dry.   Musculoskeletal: No tenderness. No deformity.   Neurologic: Mood and affect are appropriate. No focal deficits.         EKG:  Personally reivewed  Sinus bradycardia with 1st degree A-V block   Inferior infarct (cited on or before 16-JUL-1998)   Poor R wave progression   Abnormal ECG   When compared with ECG of 21-NOV-2016 15:33,   Vent. rate has decreased BY  32 BPM     Cardiac Imaging Studies  Echocardiogram on September 23, 2018:    Normal left ventricular size and systolic function.    Left ventricle ejection fraction is normal. The calculated left ventricular ejection fraction is 58%.    Normal right ventricular size and systolic function.    Mild mitral " regurgitation.    When compared to the previous study dated 11/22/2016, no significant change.    Limited visualization due to body habitus.    Nuclear stress test on October 12, 2018:    The pharmacologic nuclear stress test is abnormal.    There is a medium sized area of ischemia in the inferior and inferolateral segment(s) of the left ventricle.    Right ventricular enhancement is seen on the stress images which may be a marker for RV strain, which can be due to heart failure, pulmonary hypertension, or multivessel coronary disease.    The left ventricular ejection fraction is 70%.    There is no prior study available.    The patient is at an intermediate risk of future cardiac ischemic events.    Coronary CT angiogram on 12-4-2018:    The total Agatston calcium score is 2256. A calcium score in this range places the individual in the 100th percentile when compared to an age and gender matched control group and implies a very high risk of cardiac events in the next ten years.    Right dominant coronary artery system.    Technically difficult study due to poor visualization. Heavy calcification in multiple coronary vessels. There appears to be severe coronary artery stenosis in the proximal to mid left anterior descending, circumflex artery and proximal to mid right coronary artery. The distal portion of the right coronary artery was not well visualized. Moderate coronary stenosis involving the left main.    Mildly dilated aortic root at 4.2 cm.    Coronary angiogram on 12-:    At least moderate ostial left main disease that is severely calcified. There is some ectasia distal to the stenosis.    Proximal LAD calcification without severe stenosis. Moderate-severe distal LAD disease. Severe first diagonal disease in a very small artery.    Severe ostial and proximal ramus disease. The ramus feeds a large area of the lateral wall.    Severe mid and distal (bifurcation) RCA disease. Very calcified and  tortuous.    LV EDP 13 mmHg    Lab Review   Lab Results   Component Value Date     12/10/2018    K 4.0 12/10/2018     12/10/2018    CO2 25 12/10/2018    BUN 17 12/10/2018    CREATININE 0.84 12/10/2018    CALCIUM 9.6 12/10/2018     Lab Results   Component Value Date    WBC 7.3 12/10/2018    WBC 9.0 05/22/2015    HGB 14.7 12/10/2018    HCT 44.0 12/10/2018    MCV 95 12/10/2018     12/10/2018     Lab Results   Component Value Date    CHOL 151 12/10/2018    TRIG 103 12/10/2018    HDL 51 12/10/2018     Lab Results   Component Value Date    TROPONINI 0.01 09/23/2018     Lab Results   Component Value Date    BNP 30 09/23/2018     Lab Results   Component Value Date    TSH 3.45 02/13/2017

## 2021-06-27 NOTE — PROGRESS NOTES
Progress Notes by Amina Le RN at 1/28/2019 12:18 PM     Author: Amina eL RN Service: -- Author Type: Registered Nurse    Filed: 1/29/2019  3:44 PM Encounter Date: 1/28/2019 Status: Signed    : Amina Le RN (Registered Nurse)       Chava [facility] [id number]  [phone] ([place])     Primary cardiologist:  Dr. Sheridan  PCP:  Erasmo Michael MD  Procedure:  Coronary Angiogram With Percutaneous Coronary Intervention  H&P completed by:  Dr. Sheridan 1/28/19  Case MD:  Dr. Duncan  Admit date and time:  1/29/19@ 06:30  Case start time:  08:30  Ordering MD:    Diagnosis: Abnormal Nuclear Stress Test  Anticoagulation: None  CPAP: Yes  Bypass Grafts: No  Renal Issues: No  Allergies:  lisinopril, morphine, sulfa, vicodin, lipitor  Diabetic?: No  Device?: No     Angiogram Teaching    Reason for Visit:  Patient seen for pre-procedure education in preparation for:  Coronary Angiogram With Percutaneous Coronary Intervention    Procedure Prep:  Cardiologist note dated: 1/28/19  EKG results obtained, dated: on admission  Pertinent test results obtained - Viewable in Epic, dated:  Viewable in Epic, dated: 12/10/18 Coronary angiogram, 10/12/18 NM stress test, CT Angiogram Coronary  Hemogram results obtained: on admission  Basic Metabolic Panel results obtained: on admission  Lipid Profile results obtained: on admission    Patient Education  Explained indications/risks for diagnostic evaluation, including one or more of the following:  coronary angiogram, less than 0.1% of acute myocardial infarction and CVA or death or any of the following to the degree that it could threaten life:  allergic reaction, arrhythmia, renal failure, hemorrhage, thrombosis and infection  Explained indications/risks for therapeutic interventions, including one or more of the following: PTCA, stent, 2% or less risk of MI, less than 1% risk of CVA, emergency heart surgery, death, less than  4 % risk of vascular injury requiring surgical repair or blood transfusion, 20-30% risk of restonosis with a bare metal stent, less than 10% risk of restonosis with a drug-eluting stent, 0.5-1% chance of stent thrombosis and may need clopidogrel (Plavix) form greater than 1 year.  These risks are in addition to baseline risks associated with a Diagnostic Evaluation.  Patient states understanding of procedure and risks and agrees to proceed.    Pre-procedure instructions  Patient instructed to be NPO after midnight.  Patient instructed to arrange for transportation home following procedure.  Patient instructed to have a responsible adult with them for 24 hours post-procedure.  Post-procedure follow up process.  Conscious sedation discussed.    Pre-procedure medication instructions  Patient instructed on antiplatelet medication.  Continue medications as scheduled, with a small amount of water on the day of the procedure unless indicated.  Patient instructed to take 325 mg of Aspirin am of procedure: Yes  Other medication: instructed to hold multivitamins and minerals  a.m. of the procedure.    *PATIENTS RECORDS AVAILABLE IN [employer] UNLESS OTHERWISE INDICATED*    *Order set was entered on this date: 1/28/19      Patient Active Problem List   Diagnosis   ? Pneumonia   ? Acute respiratory failure with hypoxemia (H)   ? GERD (gastroesophageal reflux disease)   ? RAMON on CPAP   ? Near syncope   ? Dyspnea on exertion   ? Essential hypertension   ? Dyslipidemia   ? Coronary artery disease involving native coronary artery of native heart with angina pectoris (H)   ? Ascending aortic aneurysm (H)   ? Abnormal cardiovascular stress test   ? Coronary artery calcification seen on CAT scan   ? Pulmonary hypertension (H)   ? History of pulmonary embolism   ? Hypoxia   ? Abnormal nuclear stress test       Current Outpatient Medications   Medication Sig Dispense Refill   ? aspirin 81 MG EC tablet Take 81 mg by mouth every evening.      ?  clopidogrel (PLAVIX) 75 mg tablet Take 1 tablet (75 mg total) by mouth daily. 90 tablet 3   ? isosorbide mononitrate (IMDUR) 30 MG 24 hr tablet Take 1 tablet (30 mg total) by mouth daily. 90 tablet 2   ? losartan-hydrochlorothiazide (HYZAAR) 100-25 mg per tablet Take 1 tablet by mouth daily.     ? metoprolol tartrate (LOPRESSOR) 25 MG tablet Take 1 tablet (25 mg total) by mouth 2 (two) times a day. 60 tablet 11   ? mirabegron (MYRBETRIQ) 50 mg Tb24 ER tablet Take 50 mg by mouth daily.     ? multivitamin with minerals (THERA-M) 9 mg iron-400 mcg Tab tablet Take 1 tablet by mouth daily.     ? oxybutynin (DITROPAN XL) 15 MG 24 hr tablet Take 15 mg by mouth daily.     ? ranitidine (ZANTAC) 150 MG tablet TAKE 1 TABLET(150 MG) BY MOUTH AT BEDTIME 90 tablet 3   ? rosuvastatin (CRESTOR) 20 MG tablet Take 1 tablet (20 mg total) by mouth at bedtime. 90 tablet 3     No current facility-administered medications for this visit.        Allergies   Allergen Reactions   ? Lipitor [Atorvastatin] Myalgia   ? Lisinopril Cough   ? Morphine Other (See Comments)     hypotension   ? Sulfa (Sulfonamide Antibiotics) Hives   ? Vicodin [Hydrocodone-Acetaminophen] Unknown       Plan  Patient's wife,Santa, will be  the day of the procedure and will stay with patient for 24 hours after.  Patient ready for procedure    Amina Le RN                       =======================  Alix Nelson Kelly, RN             CA P. PCI W/EMG ON TUES 01/29/19   ADMIT AT 630AM   H&P 1/28     THANKS!   ALIX

## 2021-06-27 NOTE — PROGRESS NOTES
Progress Notes by Claudine Arriola CNP at 2/13/2019  2:50 PM     Author: Claudine Arriola CNP Service: -- Author Type: Nurse Practitioner    Filed: 2/13/2019  3:54 PM Encounter Date: 2/13/2019 Status: Signed    : Claudine Arriola CNP (Nurse Practitioner)                 Click to link to [facility] NOTE      Assessment/Recommendations   1.  Coronary artery disease: Coronary angiogram on February 4, 2019 with rotational atherectomy and drug-eluting stent to ramus.  Dual antiplatelet therapy is being used with aspirin indefinitely and clopidogrel for 1 year.  We discussed the importance of antiplatelet therapy and talking with his cardiologist prior to stopping these medications for any reason.  Puncture site is soft with healing bruises on thigh.  He does have pain of his right thigh but this is improving.      Risk factor modification and lifestyle management topics were discussed including managing comorbidities, weight loss, heart healthy diet and exercise.  He will begin cardiac rehab in March.    2.  Dyslipidemia: Cahva Lincoln is on high intensity statin therapy with rosuvastatin 20 mg daily.  His LDL is 53.  We discussed a diet low in saturated fat, weight loss, and exercise along with medication for better control of cholesterol.     3.  Hypertension: Blood pressure mildly elevated today 150/78.  Continue to monitor.    Riki will follow up with Dr. Duncan on March 26.     History of Present Illness    Chava Lincoln is seen at [facility] for post coronary intervention follow up.  Coronary angiogram on February 4, 2019 with rotational atherectomy and drug-eluting stent to ramus.  Dual antiplatelet therapy is being used with aspirin indefinitely and clopidogrel for 1 year.  He has a history of PE, paralyzed right hemidiaphragm, COPD, and sleep apnea.    He overall feels better since PCI.  He continues to have shortness of  breath with activity but denies worsening.  He has pain in his right thigh which is improving.  Bruising is improving.  He denies chest pain and lower extremity edema.      Results for orders placed during the hospital encounter of 02/04/19   Cardiac Catheterization [CATH01] 02/04/2019    Narrative   Planned IVUS investigation of left main and PCI with rotational   athterectomy of the circumflex in a non-surgical patient with dyspnea on   exertion.    IVUS left main and LAD showed no severely obstructive lesions    Ramus disease treated with rotational atherectomy and stented with a   Synergy KATHY under IVUS guidance with good angiographic results    Right heart catheterizations show LV EDP 22 mmHg and PCWP 30 mmHg, PA   mean 34 mmHg, RA 13 mmHg    Estimated blood loss was <20 ml.           Physical Examination Review of Systems   Vitals:    02/13/19 1441   BP: 150/78   Pulse: 61   Resp: 12     Body mass index is 41.35 kg/m .  Wt Readings from Last 3 Encounters:   02/13/19 (!) 280 lb (127 kg)   02/05/19 (!) 275 lb 1.6 oz (124.8 kg)   01/31/19 (!) 281 lb (127.5 kg)       General Appearance:     Alert, cooperative and in no acute distress.   ENT/Mouth: membranes moist, no oral lesions or bleeding gums.      EYES:  no scleral icterus, normal conjunctivae   Chest/Lungs:   lungs are clear to auscultation, no rales or wheezing, respirations unlabored   Cardiovascular:   Regular. Normal first and second heart sounds, no edema bilateral lower extremities    Abdomen:   Obese, soft, nontender, nondistended, bowel sounds present   Extremities: no cyanosis or clubbing   Skin:  Neurologic: warm, dry.  mood and affect are appropriate, alert and oriented x3     Puncture Site: Right femoral site is soft with bruising of his groin and thigh.  Radial pulses and Pedal pulses intact and symmetrical.  CMS intact.      General: Weight Gain  Eyes: WNL  Ears/Nose/Throat: WNL  Lungs: Shortness of Breath  Heart: Shortness of Breath with  activity, Leg Swelling  Stomach: WNL  Bladder: Frequent Urination at Night  Muscle/Joints: Muscle Weakness  Skin: WNL  Nervous System: Loss of Balance  Mental Health: WNL           Medical History  Surgical History Family History Social History   Past Medical History:   Diagnosis Date   ? Coronary artery disease due to calcified coronary lesion    ? GERD (gastroesophageal reflux disease) 7/2/2014   ? Hypertension    ? RAMON on CPAP 4/17/2017   ? Prostate cancer (H)    ? Prostate cancer (H)     Past Surgical History:   Procedure Laterality Date   ? CV CORONARY ANGIOGRAM N/A 12/10/2018    Procedure: Coronary Angiogram;  Surgeon: Tana Duncan MD;  Location: [facility] Cath Lab;  Service: Cardiology   ? CV LEFT HEART CATHETERIZATION WO LEFT VETRICULOGRAM Left 12/10/2018    Procedure: Left Heart Catheterization Without Left Ventriculogram;  Surgeon: Tana Duncan MD;  Location: [facility] Cath Lab;  Service: Cardiology   ? CV LEFT HEART CATHETERIZATION WO LEFT VETRICULOGRAM Left 2/4/2019    Procedure: Left Heart Catheterization Without Left Ventriculogram;  Surgeon: Tana Duncan MD;  Location: [facility] Cath Lab;  Service: Cardiology   ? CV RIGHT HEART CATH N/A 2/4/2019    Procedure: Right Heart Catheterization;  Surgeon: Tana Duncan MD;  Location: [facility] Cath Lab;  Service: Cardiology   ? HERNIA REPAIR     ? JOINT REPLACEMENT     ? KNEE SURGERY Bilateral     Family History   Problem Relation Age of Onset   ? Diabetes Mother    ? Stroke Father    ? Heart disease Brother    ? Coronary artery disease Brother     Social History     Socioeconomic History   ? Marital status:      Spouse name: Not on file   ? Number of children: Not on file   ? Years of education: Not on file   ? Highest education level: Not on file   Social Needs   ? Financial resource strain: Not on file   ? Food insecurity - worry: Not on file   ? Food insecurity - inability: Not on file   ? Transportation needs - medical: Not  on file   ? Transportation needs - non-medical: Not on file   Occupational History   ? Not on file   Tobacco Use   ? Smoking status: Never Smoker   ? Smokeless tobacco: Never Used   Substance and Sexual Activity   ? Alcohol use: Yes     Comment: 2-3 beer/ month   ? Drug use: No   ? Sexual activity: Not on file   Other Topics Concern   ? Not on file   Social History Narrative   ? Not on file          Medications  Allergies   Current Outpatient Medications   Medication Sig Dispense Refill   ? aspirin 81 MG EC tablet Take 81 mg by mouth every evening.      ? clopidogrel (PLAVIX) 75 mg tablet Take 1 tablet (75 mg total) by mouth daily. 90 tablet 3   ? isosorbide mononitrate (IMDUR) 30 MG 24 hr tablet Take 1 tablet (30 mg total) by mouth daily. 90 tablet 2   ? losartan-hydrochlorothiazide (HYZAAR) 100-25 mg per tablet Take 1 tablet by mouth daily.     ? metoprolol tartrate (LOPRESSOR) 25 MG tablet Take 1 tablet (25 mg total) by mouth 2 (two) times a day. 180 tablet 3   ? mirabegron (MYRBETRIQ) 50 mg Tb24 ER tablet Take 50 mg by mouth daily.     ? multivitamin with minerals (THERA-M) 9 mg iron-400 mcg Tab tablet Take 1 tablet by mouth daily.     ? oxybutynin (DITROPAN XL) 15 MG 24 hr tablet Take 15 mg by mouth daily.     ? ranitidine (ZANTAC) 150 MG capsule Take 150 mg by mouth every evening.     ? rosuvastatin (CRESTOR) 20 MG tablet Take 1 tablet (20 mg total) by mouth at bedtime. 90 tablet 3     No current facility-administered medications for this visit.       Allergies   Allergen Reactions   ? Lipitor [Atorvastatin] Myalgia   ? Lisinopril Cough   ? Morphine Other (See Comments)     hypotension   ? Sulfa (Sulfonamide Antibiotics) Hives   ? Vicodin [Hydrocodone-Acetaminophen] Unknown         Lab Results    Chemistry CBC BNP   Lab Results   Component Value Date    CREATININE 0.90 02/05/2019    BUN 20 02/05/2019     02/05/2019    K 3.7 02/05/2019     02/05/2019    CO2 22 02/05/2019     Creatinine (mg/dL)    Date Value   02/05/2019 0.90   02/04/2019 0.98   12/10/2018 0.84   09/24/2018 1.05    Lab Results   Component Value Date    WBC 6.8 02/04/2019    HGB 13.4 (L) 02/05/2019    HCT 43.1 02/04/2019    MCV 94 02/04/2019     02/05/2019    Lab Results   Component Value Date    BNP 30 09/23/2018     BNP (pg/mL)   Date Value   09/23/2018 30   11/21/2016 139 (H)              Claudine Arriola, [facility]

## 2021-06-27 NOTE — PROGRESS NOTES
Progress Notes by Yonatan Sheridan MD at 4/30/2019 11:10 AM     Author: Yonatan Sheridan MD Service: -- Author Type: Physician    Filed: 4/30/2019 11:29 AM Encounter Date: 4/30/2019 Status: Signed    : Yonatan Sheridan MD (Physician)           Click to link to [facility] HEART CARE NOTE       Assessment/Plan:   1.  Coronary artery disease s/p KATHY to Ramus, severe calcified RCA lesion with high risk of revascularization, no obstructive lesion in left main and LAD per IVUS: The patient's dyspnea on exertion is mildly improved.  He recognized his dyspnea on exertion is caused by multiple factors as we discussed before including morbid obesity, obstructive sleep apnea, deconditioning,  right diaphragm hemiparalysis, diastolic dysfunction and coronary artery disease.  Severe RCA disease will be treated medically due to high risk of for procedure per Dr. Leach.  Continue cardiac rehab to 36 sections.  Continue aspirin, Plavix, Crestor, isosorbide mononitrate and metoprolol.  We emphasized a low-salt diet.  May consider low-dose of diuretics if he continues to have dyspnea on exertion and mild leg edema.    2.  Essential hypertension: His blood pressure has been controlled with losartan-HCTZ 100-25 mg daily and metoprolol 25 mg twice a day    3.  Dyslipidemia: Continue Crestor 20 mg nightly.  Last LDL was 53.    4.  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    5.  Morbid obesity and obstructive sleep apnea: Lifestyle modification and continue CPAP.    Thank you for the opportunity to be involved in the care of Chava Lincoln. If you have any questions, please feel free to contact me.  I will see the patient again in 6 months and as needed.    Much or all of the text in this note was generated through the use of Dragon Dictate voice-to-text software. Errors in spelling or words which seem out of context are unintentional.   Sound alike errors, in particular, may have escaped editing.        History of Present Illness:   It is my pleasure to see Chava Lincoln at the [facility] clinic for routine cardiology follow up. Chava Lincoln is a 89 y.o. male with a medical history of significant coronary artery disease, s/p KATHY to Ramus, no obstructive disease in LM and LAD, severe calcification lesions in RCA, essential hypertension, dyslipidemia, morbid obesity,  right diaphragm hemiparalysis, obstructive sleep apnea on CPAP, .    The patient states that his dyspnea on exertion is mildly improved after PCI to ramus.  He said cardiac rehab is helpful.  He can do a little bit more than before.  He still has dyspnea on exertion but mildly improved.  He had no chest pain.  He still has mild fatigue.  He had no palpitations, orthopnea, PND.  His mild bilateral leg edema has been stable.  His blood pressure and heart rate are controlled.  So far, he has no side effects from his current medications.        Past Medical History:     Patient Active Problem List   Diagnosis   ? GERD (gastroesophageal reflux disease)   ? RAMON on CPAP   ? Near syncope   ? Dyspnea on exertion   ? Essential hypertension   ? Dyslipidemia   ? Coronary artery disease involving native coronary artery of native heart without angina pectoris   ? Ascending aortic aneurysm (H)   ? Coronary artery calcification seen on CAT scan   ? Pulmonary hypertension (H)   ? History of pulmonary embolism   ? Hypoxia       Past Surgical History:     Past Surgical History:   Procedure Laterality Date   ? CV CORONARY ANGIOGRAM N/A 12/10/2018    Procedure: Coronary Angiogram;  Surgeon: Tana Duncan MD;  Location: [facility] Cath Lab;  Service: Cardiology   ? CV LEFT HEART CATHETERIZATION WO LEFT VETRICULOGRAM Left 12/10/2018    Procedure: Left Heart Catheterization Without Left Ventriculogram;  Surgeon: Tana Duncan MD;  Location: [facility] Cath Lab;  Service: Cardiology   ? CV LEFT HEART CATHETERIZATION WO LEFT VETRICULOGRAM Left  2/4/2019    Procedure: Left Heart Catheterization Without Left Ventriculogram;  Surgeon: Tana Duncan MD;  Location: [facility] Cath Lab;  Service: Cardiology   ? CV RIGHT HEART CATH N/A 2/4/2019    Procedure: Right Heart Catheterization;  Surgeon: Tana Duncan MD;  Location: [facility] Cath Lab;  Service: Cardiology   ? HERNIA REPAIR     ? JOINT REPLACEMENT     ? KNEE SURGERY Bilateral        Family History:     Family History   Problem Relation Age of Onset   ? Diabetes Mother    ? Stroke Father    ? Heart disease Brother    ? Coronary artery disease Brother        Social History:    reports that he has never smoked. He has never used smokeless tobacco. He reports that he drinks alcohol. He reports that he does not use drugs.    Review of Systems:   General: WNL  Eyes: WNL  Ears/Nose/Throat: WNL  Lungs: Cough  Heart: Shortness of Breath with activity, Leg Swelling  Stomach: WNL  Bladder: Frequent Urination at Night  Muscle/Joints: Joint Pain  Skin: WNL  Nervous System: Loss of Balance  Mental Health: WNL     Blood: Easy Bruising    Meds:     Current Outpatient Medications:   ?  aspirin 81 MG EC tablet, Take 81 mg by mouth every evening. , Disp: , Rfl:   ?  clopidogrel (PLAVIX) 75 mg tablet, Take 1 tablet (75 mg total) by mouth daily., Disp: 90 tablet, Rfl: 3  ?  isosorbide mononitrate (IMDUR) 30 MG 24 hr tablet, Take 1 tablet (30 mg total) by mouth daily., Disp: 90 tablet, Rfl: 2  ?  losartan-hydrochlorothiazide (HYZAAR) 100-25 mg per tablet, Take 1 tablet by mouth daily., Disp: , Rfl:   ?  metoprolol tartrate (LOPRESSOR) 25 MG tablet, Take 1 tablet (25 mg total) by mouth 2 (two) times a day., Disp: 180 tablet, Rfl: 3  ?  mirabegron (MYRBETRIQ) 50 mg Tb24 ER tablet, Take 50 mg by mouth daily., Disp: , Rfl:   ?  multivitamin with minerals (THERA-M) 9 mg iron-400 mcg Tab tablet, Take 1 tablet by mouth daily., Disp: , Rfl:   ?  oxybutynin (DITROPAN XL) 15 MG 24 hr tablet, Take 15 mg by mouth daily., Disp: ,  "Rfl:   ?  ranitidine (ZANTAC) 150 MG capsule, Take 150 mg by mouth every evening., Disp: , Rfl:   ?  rosuvastatin (CRESTOR) 20 MG tablet, Take 1 tablet (20 mg total) by mouth at bedtime., Disp: 90 tablet, Rfl: 3     Allergies:   Lipitor [atorvastatin]; Lisinopril; Morphine; Sulfa (sulfonamide antibiotics); and Vicodin [hydrocodone-acetaminophen]    Objective:      Physical Exam  (!) 281 lb (127.5 kg)  5' 9\" (1.753 m)  Body mass index is 41.5 kg/m .  /70 (Patient Site: Right Arm, Patient Position: Sitting, Cuff Size: Adult Large)   Pulse 60   Resp 18   Ht 5' 9\" (1.753 m)   Wt (!) 281 lb (127.5 kg)   BMI 41.50 kg/m      General Appearance:   Awake, Alert, No acute distress.   HEENT:  Pupil equal, reactive to light. No scleral icterus; the mucous membranes were moist. No oral ulcers or thrush.    Neck: No cervical bruits. No JVD. No thyromegaly. No lymph node enlargement or tenderness.   Chest: The spine was straight. The chest was symmetric.   Lungs:   Respirations unlabored. Lungs are clear to auscultation. No crackles. No wheezing.   Cardiovascular:   RRR, normal first and second heart sounds with no murmurs. No rubs or gallops.    Abdomen:  Obese. Soft. No tenderness. Non-distended. Bowels sounds are present   Extremities:  Obese.  Equal posterior tibial pulses.  Mild bilateral leg edema.   Skin: No rashes or ulcers. Warm, Dry.   Musculoskeletal: No tenderness. No deformity.   Neurologic: Mood and affect are appropriate. No focal deficits.         EKG:  Personally reivewed  Sinus bradycardia with 1st degree A-V block   Inferior infarct (cited on or before 16-JUL-1998)   Poor R wave progression   Abnormal ECG   When compared with ECG of 21-NOV-2016 15:33,   Vent. rate has decreased BY  32 BPM     Cardiac Imaging Studies  Echocardiogram on September 23, 2018:    Normal left ventricular size and systolic function.    Left ventricle ejection fraction is normal. The calculated left ventricular ejection " fraction is 58%.    Normal right ventricular size and systolic function.    Mild mitral regurgitation.    When compared to the previous study dated 11/22/2016, no significant change.    Limited visualization due to body habitus.    Nuclear stress test on October 12, 2018:    The pharmacologic nuclear stress test is abnormal.    There is a medium sized area of ischemia in the inferior and inferolateral segment(s) of the left ventricle.    Right ventricular enhancement is seen on the stress images which may be a marker for RV strain, which can be due to heart failure, pulmonary hypertension, or multivessel coronary disease.    The left ventricular ejection fraction is 70%.    There is no prior study available.    The patient is at an intermediate risk of future cardiac ischemic events.    Coronary CT angiogram on 12-4-2018:    The total Agatston calcium score is 2256. A calcium score in this range places the individual in the 100th percentile when compared to an age and gender matched control group and implies a very high risk of cardiac events in the next ten years.    Right dominant coronary artery system.    Technically difficult study due to poor visualization. Heavy calcification in multiple coronary vessels. There appears to be severe coronary artery stenosis in the proximal to mid left anterior descending, circumflex artery and proximal to mid right coronary artery. The distal portion of the right coronary artery was not well visualized. Moderate coronary stenosis involving the left main.    Mildly dilated aortic root at 4.2 cm.    Coronary angiogram on 12-:    At least moderate ostial left main disease that is severely calcified. There is some ectasia distal to the stenosis.    Proximal LAD calcification without severe stenosis. Moderate-severe distal LAD disease. Severe first diagonal disease in a very small artery.    Severe ostial and proximal ramus disease. The ramus feeds a large area of the  lateral wall.    Severe mid and distal (bifurcation) RCA disease. Very calcified and tortuous.    LV EDP 13 mmHg    Coronary angiogram with PCI on 2-4-2019:    IVUS left main and LAD showed no severely obstructive lesions    Ramus disease treated with rotational atherectomy and stented with a Synergy KATHY under IVUS guidance with good angiographic results    Right heart catheterizations show LV EDP 22 mmHg and PCWP 30 mmHg, PA mean 34 mmHg, RA 13 mmHg    Lab Review   Lab Results   Component Value Date     02/05/2019    K 3.7 02/05/2019     02/05/2019    CO2 22 02/05/2019    BUN 20 02/05/2019    CREATININE 0.90 02/05/2019    CALCIUM 9.1 02/05/2019     Lab Results   Component Value Date    WBC 6.8 02/04/2019    WBC 9.0 05/22/2015    HGB 13.4 (L) 02/05/2019    HCT 43.1 02/04/2019    MCV 94 02/04/2019     02/05/2019     Lab Results   Component Value Date    CHOL 114 02/05/2019    TRIG 99 02/05/2019    HDL 41 02/05/2019     Lab Results   Component Value Date    TROPONINI 0.01 09/23/2018     Lab Results   Component Value Date    BNP 30 09/23/2018     Lab Results   Component Value Date    TSH 3.45 02/13/2017

## 2021-06-28 NOTE — PROGRESS NOTES
Progress Notes by Yonatan Sheridan MD at 11/21/2019 11:30 AM     Author: Yonatan Sheridan MD Service: -- Author Type: Physician    Filed: 11/21/2019 12:01 PM Encounter Date: 11/21/2019 Status: Signed    : Yonatan Sheridan MD (Physician)           Click to link to [facility] Heart [facility] HEART [facility] NOTE       Assessment/Plan:   1.  Coronary artery disease s/p KATHY to Ramus, severe calcified RCA lesion with high risk of revascularization, no obstructive lesion in left main and LAD per IVUS:  He has no chest pain.  His dyspnea on exertion has been stable.  Continue medical treatment.  Continue aspirin, Plavix, Crestor, isosorbide mononitrate and metoprolol.    2.  Dyspnea on exertion:  Caused by multiple factors as we discussed before including morbid obesity, obstructive sleep apnea, deconditioning,  right diaphragm hemiparalysis, diastolic dysfunction and coronary artery disease.    He has no obvious fluid retention.  Currently he is on hydrochlorothiazide 12.5 mg with valsartan daily.  Due to slightly elevated blood pressure and mild bilateral leg edema, increase the hydrochlorothiazide to 25 mg daily.  His previous labs are reviewed, creatinine was 1.6.  Repeat BMP today.     3.  Essential hypertension: His blood pressure is slightly high.  Changed valsartan-hydrochlorothiazide 106-12.5 mg daily to 160 mg - 25 mg daily for better blood pressure control and also mildly bilateral leg edema.  Continue metoprolol 25 mg twice a day     4.  Dyslipidemia: Continue Crestor 20 mg nightly.  Last LDL was 55.     5.  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     6.  Morbid obesity and obstructive sleep apnea: Lifestyle modification and continue CPAP.     Thank you for the opportunity to be involved in the care of Chava Lincoln. If you have any questions, please feel free to contact me.  I will see the patient again in 6 months and as needed.     Much or all of the text in this note was generated through the  use of Dragon Dictate voice-to-text software. Errors in spelling or words which seem out of context are unintentional.   Sound alike errors, in particular, may have escaped editing.         History of Present Illness:   It is my pleasure to see Chava Lincoln at the [facility] for routine cardiology follow up. Chava Lincoln is a 89 y.o. male with a medical history of significant coronary artery disease, s/p KATHY to Ramus, no obstructive disease in LM and LAD, severe calcification lesions in RCA, essential hypertension, dyslipidemia, diastolic dysfunction, morbid obesity,  right diaphragm hemiparalysis, obstructive sleep apnea on CPAP.     The patient states that his dyspnea on exertion is stable or slightly improved.  He is able to breathe through his nose.  He had no chest pain.  He still has mild fatigue.  He had no palpitations, orthopnea, PND.  His mild bilateral leg edema has been stable.    His weight has been stable.  His blood pressure and heart rate are controlled.  So far, he has no side effects from his current medications.       Past Medical History:     Patient Active Problem List   Diagnosis   ? GERD (gastroesophageal reflux disease)   ? RAMON on CPAP   ? Near syncope   ? Dyspnea on exertion   ? Essential hypertension   ? Dyslipidemia   ? Coronary artery disease involving native coronary artery of native heart without angina pectoris   ? Ascending aortic aneurysm (H)   ? Coronary artery calcification seen on CAT scan   ? Pulmonary hypertension (H)   ? History of pulmonary embolism   ? Hypoxia       Past Surgical History:     Past Surgical History:   Procedure Laterality Date   ? CV CORONARY ANGIOGRAM N/A 12/10/2018    Procedure: Coronary Angiogram;  Surgeon: Tana Duncan MD;  Location: [facility] Cath Lab;  Service: Cardiology   ? CV LEFT HEART CATHETERIZATION WO LEFT VETRICULOGRAM Left 12/10/2018    Procedure: Left Heart Catheterization Without Left Ventriculogram;  Surgeon:  Tana Duncan MD;  Location: [facility] Cath Lab;  Service: Cardiology   ? CV LEFT HEART CATHETERIZATION WO LEFT VETRICULOGRAM Left 2/4/2019    Procedure: Left Heart Catheterization Without Left Ventriculogram;  Surgeon: Tana Duncan MD;  Location: [facility] Cath Lab;  Service: Cardiology   ? CV RIGHT HEART CATH N/A 2/4/2019    Procedure: Right Heart Catheterization;  Surgeon: Tana Duncan MD;  Location: [facility] Cath Lab;  Service: Cardiology   ? HERNIA REPAIR     ? JOINT REPLACEMENT     ? KNEE SURGERY Bilateral        Family History:     Family History   Problem Relation Age of Onset   ? Diabetes Mother    ? Stroke Father    ? Heart disease Brother    ? Coronary artery disease Brother         Social History:    reports that he has never smoked. He has never used smokeless tobacco. He reports current alcohol use. He reports that he does not use drugs.    Review of Systems:   General: WNL  Eyes: WNL  Ears/Nose/Throat: WNL  Lungs: Shortness of Breath, Cough  Heart: Shortness of Breath with activity, Leg Swelling  Stomach: Constipation  Bladder: WNL  Muscle/Joints: Muscle Weakness, Muscle Pain  Skin: WNL  Nervous System: Daytime Sleepiness, Loss of Balance  Mental Health: WNL     Blood: WNL    Meds:     Current Outpatient Medications:   ?  aspirin 81 MG EC tablet, Take 81 mg by mouth every evening. , Disp: , Rfl:   ?  clopidogrel (PLAVIX) 75 mg tablet, Take 1 tablet (75 mg total) by mouth daily., Disp: 90 tablet, Rfl: 3  ?  isosorbide mononitrate (IMDUR) 30 MG 24 hr tablet, Take 1 tablet (30 mg total) by mouth daily., Disp: 90 tablet, Rfl: 3  ?  metoprolol tartrate (LOPRESSOR) 25 MG tablet, Take 1 tablet (25 mg total) by mouth 2 (two) times a day., Disp: 180 tablet, Rfl: 3  ?  mirabegron (MYRBETRIQ) 50 mg Tb24 ER tablet, Take 50 mg by mouth daily., Disp: , Rfl:   ?  multivitamin with minerals (THERA-M) 9 mg iron-400 mcg Tab tablet, Take 1 tablet by mouth daily., Disp: , Rfl:   ?  oxybutynin (DITROPAN  "XL) 15 MG 24 hr tablet, Take 15 mg by mouth daily., Disp: , Rfl:   ?  oxybutynin (DITROPAN XL) 5 MG ER tablet, Take 5 mg by mouth daily. Take with 15 mg of oxybutynin  Indications: a condition where the urge to urinate results in urine leakage, Disp: , Rfl:   ?  rosuvastatin (CRESTOR) 20 MG tablet, Take 1 tablet (20 mg total) by mouth at bedtime., Disp: 90 tablet, Rfl: 3  ?  valsartan-hydrochlorothiazide (DIOVAN-HCT) 160-25 mg per tablet, Take 1 tablet by mouth daily., Disp: 90 tablet, Rfl: 3    Allergies:   Lipitor [atorvastatin]; Lisinopril; Morphine; Sulfa (sulfonamide antibiotics); and Vicodin [hydrocodone-acetaminophen]      Objective:      Physical Exam  (!) 284 lb (128.8 kg)  5' 9\" (1.753 m)  Body mass index is 41.94 kg/m .  /68 (Patient Site: Right Arm, Patient Position: Sitting, Cuff Size: Adult Large)   Pulse (!) 52   Resp 20   Ht 5' 9\" (1.753 m)   Wt (!) 284 lb (128.8 kg)   BMI 41.94 kg/m      General Appearance:   Awake, Alert, No acute distress.   HEENT:  Pupil equal and reactive to light. No scleral icterus; the mucous membranes were moist.   Neck: No cervical bruits. Not able to see JVD. No thyromegaly.     Chest: The spine was straight. The chest was symmetric.   Lungs:   Diminished breathing sounds. No crackles. No wheezing.   Cardiovascular:   Regular rhythm and bradycardia, remote first and second heart sounds with no murmurs. No rubs or gallops.    Abdomen:  Obese. Soft. No tenderness. Non-distended. Bowels sounds are present   Extremities: Equal tibial pulses. Mild bilateral leg edema.   Skin: No rashes or ulcers. Warm, Dry.   Musculoskeletal: No tenderness. No deformity.   Neurologic: Mood and affect are appropriate. No focal deficits.         EKG:  Personally reivewed  Sinus bradycardia with 1st degree A-V block   Inferior infarct (cited on or before 16-JUL-1998)   Poor R wave progression   Abnormal ECG   When compared with ECG of 21-NOV-2016 15:33,   Vent. rate has decreased BY  32 " BPM      Cardiac Imaging Studies  Echocardiogram on September 23, 2018:    Normal left ventricular size and systolic function.    Left ventricle ejection fraction is normal. The calculated left ventricular ejection fraction is 58%.    Normal right ventricular size and systolic function.    Mild mitral regurgitation.    When compared to the previous study dated 11/22/2016, no significant change.    Limited visualization due to body habitus.     Nuclear stress test on October 12, 2018:    The pharmacologic nuclear stress test is abnormal.    There is a medium sized area of ischemia in the inferior and inferolateral segment(s) of the left ventricle.    Right ventricular enhancement is seen on the stress images which may be a marker for RV strain, which can be due to heart failure, pulmonary hypertension, or multivessel coronary disease.    The left ventricular ejection fraction is 70%.    There is no prior study available.    The patient is at an intermediate risk of future cardiac ischemic events.     Coronary CT angiogram on 12-4-2018:    The total Agatston calcium score is 2256. A calcium score in this range places the individual in the 100th percentile when compared to an age and gender matched control group and implies a very high risk of cardiac events in the next ten years.    Right dominant coronary artery system.    Technically difficult study due to poor visualization. Heavy calcification in multiple coronary vessels. There appears to be severe coronary artery stenosis in the proximal to mid left anterior descending, circumflex artery and proximal to mid right coronary artery. The distal portion of the right coronary artery was not well visualized. Moderate coronary stenosis involving the left main.    Mildly dilated aortic root at 4.2 cm.     Coronary angiogram on 12-:    At least moderate ostial left main disease that is severely calcified. There is some ectasia distal to the stenosis.    Proximal  LAD calcification without severe stenosis. Moderate-severe distal LAD disease. Severe first diagonal disease in a very small artery.    Severe ostial and proximal ramus disease. The ramus feeds a large area of the lateral wall.    Severe mid and distal (bifurcation) RCA disease. Very calcified and tortuous.    LV EDP 13 mmHg     Coronary angiogram with PCI on 2-4-2019:    IVUS left main and LAD showed no severely obstructive lesions    Ramus disease treated with rotational atherectomy and stented with a Synergy KATHY under IVUS guidance with good angiographic results    Right heart catheterizations show LV EDP 22 mmHg and PCWP 30 mmHg, PA mean 34 mmHg, RA 13 mmHg    Lab Review   Lab Results   Component Value Date     10/08/2019    K 4.3 10/08/2019     10/08/2019    CO2 22 10/08/2019    BUN 21 10/08/2019    CREATININE 1.60 (H) 10/08/2019    CALCIUM 8.6 10/08/2019     Lab Results   Component Value Date    WBC 7.5 06/04/2019    WBC 9.0 05/22/2015    HGB 15.1 06/04/2019    HCT 45.4 06/04/2019    MCV 93 06/04/2019     06/04/2019     Lab Results   Component Value Date    CHOL 126 10/08/2019    CHOL 138 06/04/2019    CHOL 114 02/05/2019     Lab Results   Component Value Date    HDL 54 10/08/2019    HDL 58 06/04/2019    HDL 41 02/05/2019     Lab Results   Component Value Date    LDLCALC 40 10/08/2019    LDLCALC 55 06/04/2019    LDLCALC 53 02/05/2019     Lab Results   Component Value Date    TRIG 162 (H) 10/08/2019    TRIG 126 06/04/2019    TRIG 99 02/05/2019     No components found for: CHOLHDL  Lab Results   Component Value Date    TROPONINI 0.01 09/23/2018     Lab Results   Component Value Date    BNP 30 09/23/2018     Lab Results   Component Value Date    TSH 3.45 02/13/2017

## 2021-06-29 ENCOUNTER — COMMUNICATION - HEALTHEAST (OUTPATIENT)
Dept: CARDIOLOGY | Facility: CLINIC | Age: 86
End: 2021-06-29

## 2021-06-29 NOTE — PROGRESS NOTES
"Progress Notes by Yonatan Sheridan MD at 9/2/2020  9:50 AM     Author: Yonatan Sheridan MD Service: -- Author Type: Physician    Filed: 9/2/2020 10:34 AM Encounter Date: 9/2/2020 Status: Signed    : Yonatan Sheridan MD (Physician)           The patient has been notified of following:     \"This telephone visit will be conducted via a call between you and your physician/provider. We have found that certain health care needs can be provided without the need for a physical exam.  This service lets us provide the care you need with a phone conversation.  If a prescription is necessary we can send it directly to your pharmacy.  If lab work is needed we can place an order for that and you can then stop by our lab to have the test done at a later time. If during the course of the call the physician/provider feels a telephone visit is not appropriate, you will not be charged for this service.\" Verbal consent has been obtained for this service by care team member:     HEART CARE PHONE ENCOUNTER      The patient has chosen to have the visit conducted as a telephone visit, to reduce risk of exposure given the current status of Coronavirus in our community. This telephone visit is being conducted via a call between the patient and physician/provider. Health care needs are being provided without a physical exam.      Assessment/Plan:   1.  Coronary artery disease s/p KATHY to Ramus, severe calcified RCA lesion with high risk of revascularization, no obstructive lesion in left main and LAD per IVUS:  He has no chest pain.  His dyspnea on exertion has been stable.  Continue medical treatment.  Continue aspirin, Plavix, Crestor, isosorbide mononitrate and metoprolol.    2.  Chronic diastolic CHF, dyspnea on exertion: His dyspnea on exertion is caused by multiple factors as we discussed before including morbid obesity, obstructive sleep apnea, deconditioning,  right diaphragm hemiparalysis, diastolic dysfunction and coronary artery disease.   " Continue hydrochlorothiazide to 25 mg daily.  Add lasix 40 mg as needed and as directed for worsening shortness of breath and leg edema.  Routine labs at Dr. Michael's office.     3.  Essential hypertension: His blood pressure is controlled.  No change in his medications today.  BP is 128/60 mmHg today.     4.  Dyslipidemia: Continue Crestor 20 mg nightly.  Last LDL was 55.     5.  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     6.  Morbid obesity and obstructive sleep apnea: Lifestyle modification and continue CPAP.     Thank you for the opportunity to be involved in the care of Chava Lincoln. If you have any questions, please feel free to contact me.  I will see the patient again in 6 months and as needed.     Much or all of the text in this note was generated through the use of Dragon Dictate voice-to-text software. Errors in spelling or words which seem out of context are unintentional.   Sound alike errors, in particular, may have escaped editing.    I have reviewed the note as documented.  This accurately captures the substance of my conversation with the patient.    Total time of call between patient and provider was 8 minutes   Start Time:  10:16 am  Stop Time:  10:26 am        History of Present Illness:   It is my pleasure to see Chava Lincoln at the [facility] clinic for routine cardiology follow up via a billable telephone visit. Chava Lincoln is a 89 y.o. male with a medical history of significant coronary artery disease, s/p KATHY to Ramus, no obstructive disease in LM and LAD, severe calcification lesions in RCA, essential hypertension, dyslipidemia, diastolic dysfunction, morbid obesity,  right diaphragm hemiparalysis, obstructive sleep apnea on CPAP.     The patient states that his dyspnea on exertion is stable, no change in last 6 months.  He had no chest pain.  He has no much physical activities, using a walker all the time.  He had no palpitations, orthopnea, PND.   His mild bilateral leg edema has been stable.   He is not able to check his weight.  His blood pressure and heart rate are controlled.  So far, he has no side effects from his current medications.     I have reviewed and updated the patient's Past Medical History, Social History, Family History and Medication List.  Past Medical History:     Patient Active Problem List   Diagnosis   ? GERD (gastroesophageal reflux disease)   ? RAMON on CPAP   ? Near syncope   ? Dyspnea on exertion   ? Essential hypertension   ? Dyslipidemia   ? Coronary artery disease involving native coronary artery of native heart without angina pectoris   ? Ascending aortic aneurysm (H)   ? Coronary artery calcification seen on CAT scan   ? Pulmonary hypertension (H)   ? History of pulmonary embolism   ? Hypoxia       Past Surgical History:     Past Surgical History:   Procedure Laterality Date   ? CV CORONARY ANGIOGRAM N/A 12/10/2018    Procedure: Coronary Angiogram;  Surgeon: Tana Duncan MD;  Location: [facility] Cath Lab;  Service: Cardiology   ? CV LEFT HEART CATHETERIZATION WO LEFT VETRICULOGRAM Left 12/10/2018    Procedure: Left Heart Catheterization Without Left Ventriculogram;  Surgeon: Tana Duncan MD;  Location: [facility] Cath Lab;  Service: Cardiology   ? CV LEFT HEART CATHETERIZATION WO LEFT VETRICULOGRAM Left 2/4/2019    Procedure: Left Heart Catheterization Without Left Ventriculogram;  Surgeon: Tana Duncan MD;  Location: [facility] Cath Lab;  Service: Cardiology   ? CV RIGHT HEART CATH N/A 2/4/2019    Procedure: Right Heart Catheterization;  Surgeon: Tana Duncan MD;  Location: [facility] Cath Lab;  Service: Cardiology   ? HERNIA REPAIR     ? JOINT REPLACEMENT     ? KNEE SURGERY Bilateral        Family History:     Family History   Problem Relation Age of Onset   ? Diabetes Mother    ? Stroke Father    ? Heart disease Brother    ? Coronary artery disease Brother         Social History:    reports that he has  never smoked. He has never used smokeless tobacco. He reports current alcohol use. He reports that he does not use drugs.    Review of Systems:   12 systems are reviewed negative except for in HPI.    Meds:     Current Outpatient Medications:   ?  aspirin 81 MG EC tablet, Take 81 mg by mouth every evening. , Disp: , Rfl:   ?  clopidogrel (PLAVIX) 75 mg tablet, Take 1 tablet (75 mg total) by mouth daily., Disp: 90 tablet, Rfl: 3  ?  isosorbide mononitrate (IMDUR) 30 MG 24 hr tablet, Take 1 tablet (30 mg total) by mouth daily., Disp: 90 tablet, Rfl: 3  ?  Lactobac no.41/Bifidobact no.7 (PROBIOTIC-10 ORAL), Take by mouth., Disp: , Rfl:   ?  metoprolol tartrate (LOPRESSOR) 25 MG tablet, Take 1 tablet (25 mg total) by mouth 2 (two) times a day., Disp: 180 tablet, Rfl: 3  ?  mirabegron (MYRBETRIQ) 50 mg Tb24 ER tablet, Take 50 mg by mouth daily., Disp: , Rfl:   ?  multivitamin with minerals (THERA-M) 9 mg iron-400 mcg Tab tablet, Take 1 tablet by mouth daily., Disp: , Rfl:   ?  oxybutynin (DITROPAN XL) 15 MG 24 hr tablet, Take 15 mg by mouth daily., Disp: , Rfl:   ?  oxybutynin (DITROPAN XL) 5 MG ER tablet, Take 5 mg by mouth daily. Take with 15 mg of oxybutynin  Indications: a condition where the urge to urinate results in urine leakage, Disp: , Rfl:   ?  rosuvastatin (CRESTOR) 20 MG tablet, Take 1 tablet (20 mg total) by mouth at bedtime., Disp: 90 tablet, Rfl: 3  ?  valsartan-hydrochlorothiazide (DIOVAN-HCT) 160-25 mg per tablet, TAKE 1 TABLET BY MOUTH DAILY, Disp: 90 tablet, Rfl: 0  ?  furosemide (LASIX) 40 MG tablet, Take 1 tablet (40 mg total) by mouth as needed (As needed and as directed for worsening SOB and leg edema)., Disp: 30 tablet, Rfl: 6    Allergies:   Lipitor [atorvastatin]; Lisinopril; Morphine; Sulfa (sulfonamide antibiotics); and Vicodin [hydrocodone-acetaminophen]      Objective:      Physical Exam        Blood pressure 128/60, pulse 68.    Tele visit, no performance of physical exam.        EKG:   Personally reivewed  Sinus bradycardia with 1st degree A-V block   Inferior infarct (cited on or before 16-JUL-1998)   Poor R wave progression   Abnormal ECG   When compared with ECG of 21-NOV-2016 15:33,   Vent. rate has decreased BY  32 BPM      Cardiac Imaging Studies  Echocardiogram on September 23, 2018:    Normal left ventricular size and systolic function.    Left ventricle ejection fraction is normal. The calculated left ventricular ejection fraction is 58%.    Normal right ventricular size and systolic function.    Mild mitral regurgitation.    When compared to the previous study dated 11/22/2016, no significant change.    Limited visualization due to body habitus.     Nuclear stress test on October 12, 2018:    The pharmacologic nuclear stress test is abnormal.    There is a medium sized area of ischemia in the inferior and inferolateral segment(s) of the left ventricle.    Right ventricular enhancement is seen on the stress images which may be a marker for RV strain, which can be due to heart failure, pulmonary hypertension, or multivessel coronary disease.    The left ventricular ejection fraction is 70%.    There is no prior study available.    The patient is at an intermediate risk of future cardiac ischemic events.     Coronary CT angiogram on 12-4-2018:    The total Agatston calcium score is 2256. A calcium score in this range places the individual in the 100th percentile when compared to an age and gender matched control group and implies a very high risk of cardiac events in the next ten years.    Right dominant coronary artery system.    Technically difficult study due to poor visualization. Heavy calcification in multiple coronary vessels. There appears to be severe coronary artery stenosis in the proximal to mid left anterior descending, circumflex artery and proximal to mid right coronary artery. The distal portion of the right coronary artery was not well visualized. Moderate coronary stenosis  involving the left main.    Mildly dilated aortic root at 4.2 cm.     Coronary angiogram on 12-:    At least moderate ostial left main disease that is severely calcified. There is some ectasia distal to the stenosis.    Proximal LAD calcification without severe stenosis. Moderate-severe distal LAD disease. Severe first diagonal disease in a very small artery.    Severe ostial and proximal ramus disease. The ramus feeds a large area of the lateral wall.    Severe mid and distal (bifurcation) RCA disease. Very calcified and tortuous.    LV EDP 13 mmHg     Coronary angiogram with PCI on 2-4-2019:    IVUS left main and LAD showed no severely obstructive lesions    Ramus disease treated with rotational atherectomy and stented with a Synergy KATHY under IVUS guidance with good angiographic results    Right heart catheterizations show LV EDP 22 mmHg and PCWP 30 mmHg, PA mean 34 mmHg, RA 13 mmHg    Lab Review   Lab Results   Component Value Date     (L) 11/21/2019    K 4.7 11/21/2019     11/21/2019    CO2 24 11/21/2019    BUN 17 11/21/2019    CREATININE 0.87 11/21/2019    CALCIUM 8.8 11/21/2019     Lab Results   Component Value Date    WBC 7.5 06/04/2019    WBC 9.0 05/22/2015    HGB 15.1 06/04/2019    HCT 45.4 06/04/2019    MCV 93 06/04/2019     06/04/2019     Lab Results   Component Value Date    CHOL 126 10/08/2019    CHOL 138 06/04/2019    CHOL 114 02/05/2019     Lab Results   Component Value Date    HDL 54 10/08/2019    HDL 58 06/04/2019    HDL 41 02/05/2019     Lab Results   Component Value Date    LDLCALC 40 10/08/2019    LDLCALC 55 06/04/2019    LDLCALC 53 02/05/2019     Lab Results   Component Value Date    TRIG 162 (H) 10/08/2019    TRIG 126 06/04/2019    TRIG 99 02/05/2019     No components found for: CHOLHDL  Lab Results   Component Value Date    TROPONINI 0.01 09/23/2018     Lab Results   Component Value Date    BNP 30 09/23/2018     Lab Results   Component Value Date    TSH 3.45  02/13/2017

## 2021-06-30 NOTE — PROGRESS NOTES
Progress Notes by Yonatan Sheridan MD at 3/24/2021 10:30 AM     Author: Yonatan Sheridan MD Service: -- Author Type: Physician    Filed: 3/25/2021  2:00 PM Encounter Date: 3/24/2021 Status: Signed    : Yonatan Sheridan MD (Physician)           Click to link to [facility] HEART [facility] NOTE       Assessment/Plan:   1.  Coronary artery disease s/p KATHY to Ramus, severe calcified RCA lesion with high risk of revascularization, no obstructive lesion in left main and LAD per IVUS:  He has no chest pain.  Continue medical treatment.  Continue aspirin, Plavix, Crestor, isosorbide mononitrate and metoprolol.    2.  Chronic congestive heart failure with preserved ejection fraction:   Patient had worsening shortness of breath and bilateral leg edema.  The Lasix to 40 mg daily started 1 week ago.  His shortness of breath and leg edema are improving.  The patient is not able to weight himself.  We discussed the management of congestive heart failure again.  Low-salt diet, continue Lasix 40 mg daily.  The patient also is on hydrochlorothiazide 25 mg daily.  Monitor his weight at a daily basis.  Routine labs CBC and BMP today.     3.  Essential hypertension: His blood pressure is controlled, continue valsartan-hydrochlorothiazide 168 oh-25 mg daily and metoprolol 25 mg twice a day     4.  Dyslipidemia: Continue Crestor 20 mg nightly.  Last LDL was controlled.     5.  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     6.  Morbid obesity and obstructive sleep apnea: Lifestyle modification and continue CPAP.     Thank you for the opportunity to be involved in the care of Chava Lincoln. If you have any questions, please feel free to contact me.  I will see the patient again in 6 months and as needed.     Much or all of the text in this note was generated through the use of Dragon Dictate voice-to-text software. Errors in spelling or words which seem out of context are unintentional.   Sound alike errors, in  particular, may have escaped editing.         History of Present Illness:   It is my pleasure to see Chava Lincoln at the [facility] clinic for routine cardiology follow up. Chava Lincoln is a 89 y.o. male with a medical history of significant coronary artery disease, s/p KATHY to Ramus, no obstructive disease in LM and LAD, severe calcification lesions in RCA, essential hypertension, dyslipidemia, diastolic dysfunction, morbid obesity,  right diaphragm hemiparalysis, obstructive sleep apnea on CPAP.     The patient and his wife state that he had worsening shortness of breath and the bilateral leg edema.  Dr. Michael started him on Lasix 40 mg daily.  His leg edema is improved, his shortness of breath is also improved.  He had no chest pain, palpitations, dizziness, orthopnea.  His blood pressure and heart rate are controlled.  The patient has significant back pain, not able to have a routine activity of all exercise.     Past Medical History:     Patient Active Problem List   Diagnosis   ? GERD (gastroesophageal reflux disease)   ? RAMON on CPAP   ? Near syncope   ? Dyspnea on exertion   ? Essential hypertension   ? Dyslipidemia   ? Coronary artery disease involving native coronary artery of native heart without angina pectoris   ? Ascending aortic aneurysm (H)   ? Coronary artery calcification seen on CAT scan   ? Pulmonary hypertension (H)   ? History of pulmonary embolism   ? Hypoxia   ? Morbid obesity (H)       Past Surgical History:     Past Surgical History:   Procedure Laterality Date   ? CV CORONARY ANGIOGRAM N/A 12/10/2018    Procedure: Coronary Angiogram;  Surgeon: Tana Duncan MD;  Location: [facility];  Service: Cardiology   ? CV LEFT HEART CATHETERIZATION WO LEFT VETRICULOGRAM Left 12/10/2018    Procedure: Left Heart Catheterization Without Left Ventriculogram;  Surgeon: Tana Duncan MD;  Location: [facility];  Service: Cardiology   ? CV LEFT HEART  CATHETERIZATION WO LEFT VETRICULOGRAM Left 2/4/2019    Procedure: Left Heart Catheterization Without Left Ventriculogram;  Surgeon: Tana Duncan MD;  Location: [facility] Cath Lab;  Service: Cardiology   ? CV RIGHT HEART CATH N/A 2/4/2019    Procedure: Right Heart Catheterization;  Surgeon: Tana Duncan MD;  Location: [facility] Cath Lab;  Service: Cardiology   ? HERNIA REPAIR     ? JOINT REPLACEMENT     ? KNEE SURGERY Bilateral        Family History:     Family History   Problem Relation Age of Onset   ? Diabetes Mother    ? Stroke Father    ? Heart disease Brother    ? Coronary artery disease Brother         Social History:    reports that he has never smoked. He has never used smokeless tobacco. He reports current alcohol use. He reports that he does not use drugs.    Review of Systems:   General: WNL  Eyes: WNL  Ears/Nose/Throat: WNL  Lungs: Shortness of Breath  Heart: Shortness of Breath with activity, Leg Swelling  Stomach: WNL  Bladder: Frequent Urination at Night  Muscle/Joints: Joint Pain, Muscle Weakness  Skin: Rash  Nervous System: Loss of Balance  Mental Health: Depression     Blood: Easy Bruising    Meds:     Current Outpatient Medications:   ?  aspirin 81 MG EC tablet, Take 81 mg by mouth every evening. , Disp: , Rfl:   ?  clopidogreL (PLAVIX) 75 mg tablet, Take 1 tablet (75 mg total) by mouth daily., Disp: 90 tablet, Rfl: 2  ?  furosemide (LASIX) 40 MG tablet, TAKE 1 TABLET BY MOUTH AS NEEDED FOR WORSENING SHORTNESS OF BREATH AND LEG EDEMA, Disp: 90 tablet, Rfl: 1  ?  isosorbide mononitrate (IMDUR) 30 MG 24 hr tablet, Take 1 tablet (30 mg total) by mouth daily., Disp: 90 tablet, Rfl: 2  ?  leuprolide, 6 month, (ELIGARD, 6 MONTH,) 45 mg syringe, Inject 45 mg under the skin. Every six months, Disp: , Rfl:   ?  metoprolol tartrate (LOPRESSOR) 25 MG tablet, Take 1 tablet (25 mg total) by mouth 2 (two) times a day., Disp: 180 tablet, Rfl: 2  ?  mirabegron (MYRBETRIQ) 50 mg Tb24 ER tablet, Take 50  "mg by mouth daily., Disp: , Rfl:   ?  multivitamin with minerals (THERA-M) 9 mg iron-400 mcg Tab tablet, Take 1 tablet by mouth daily., Disp: , Rfl:   ?  nystatin (NYSTOP) powder, Apply 1 application topically 2 (two) times a day. , Disp: , Rfl:   ?  oxybutynin (DITROPAN XL) 15 MG 24 hr tablet, Take 15 mg by mouth daily., Disp: , Rfl:   ?  oxybutynin (DITROPAN XL) 5 MG ER tablet, Take 5 mg by mouth daily. Take with 15 mg of oxybutynin  Indications: a condition where the urge to urinate results in urine leakage, Disp: , Rfl:   ?  rosuvastatin (CRESTOR) 20 MG tablet, Take 1 tablet (20 mg total) by mouth at bedtime., Disp: 90 tablet, Rfl: 2  ?  valsartan-hydrochlorothiazide (DIOVAN-HCT) 160-25 mg per tablet, Take 1 tablet by mouth daily., Disp: 90 tablet, Rfl: 2  ?  Lactobac no.41/Bifidobact no.7 (PROBIOTIC-10 ORAL), Take by mouth., Disp: , Rfl:     Allergies:   Lipitor [atorvastatin], Lisinopril, Morphine, Sulfa (sulfonamide antibiotics), and Vicodin [hydrocodone-acetaminophen]      Objective:      Physical Exam     5' 9\" (1.753 m)  Body mass index is 41.94 kg/m .  /50 (Patient Site: Right Arm, Patient Position: Sitting, Cuff Size: Adult Large)   Pulse 68   Resp 16   Ht 5' 9\" (1.753 m)   BMI 41.94 kg/m      General Appearance:   Awake, Alert, No acute distress.   HEENT:  Pupil equal and reactive to light. No scleral icterus; the mucous membranes were moist.   Neck: No cervical bruits. Not able to see JVD. No thyromegaly.     Chest: The spine was straight. The chest was symmetric.   Lungs:   Bibasilar crackles. No wheezing.   Cardiovascular:   Regular rhythm and bradycardia, remote first and second heart sounds with no murmurs. No rubs or gallops.    Abdomen:  Obese. Soft. No tenderness. Non-distended. Bowels sounds are present   Extremities: Equal tibial pulses. Mild bilateral leg edema.   Skin: No rashes or ulcers. Warm, Dry.   Musculoskeletal: No tenderness. No deformity.   Neurologic: Mood and affect are " appropriate. No focal deficits.         EKG:  Personally reivewed  Sinus bradycardia with 1st degree A-V block   Inferior infarct (cited on or before 16-JUL-1998)   Poor R wave progression   Abnormal ECG   When compared with ECG of 21-NOV-2016 15:33,   Vent. rate has decreased BY  32 BPM      Cardiac Imaging Studies  Echocardiogram on September 23, 2018:    Normal left ventricular size and systolic function.    Left ventricle ejection fraction is normal. The calculated left ventricular ejection fraction is 58%.    Normal right ventricular size and systolic function.    Mild mitral regurgitation.    When compared to the previous study dated 11/22/2016, no significant change.    Limited visualization due to body habitus.     Nuclear stress test on October 12, 2018:    The pharmacologic nuclear stress test is abnormal.    There is a medium sized area of ischemia in the inferior and inferolateral segment(s) of the left ventricle.    Right ventricular enhancement is seen on the stress images which may be a marker for RV strain, which can be due to heart failure, pulmonary hypertension, or multivessel coronary disease.    The left ventricular ejection fraction is 70%.    There is no prior study available.    The patient is at an intermediate risk of future cardiac ischemic events.     Coronary CT angiogram on 12-4-2018:    The total Agatston calcium score is 2256. A calcium score in this range places the individual in the 100th percentile when compared to an age and gender matched control group and implies a very high risk of cardiac events in the next ten years.    Right dominant coronary artery system.    Technically difficult study due to poor visualization. Heavy calcification in multiple coronary vessels. There appears to be severe coronary artery stenosis in the proximal to mid left anterior descending, circumflex artery and proximal to mid right coronary artery. The distal portion of the right coronary artery was  not well visualized. Moderate coronary stenosis involving the left main.    Mildly dilated aortic root at 4.2 cm.     Coronary angiogram on 12-:    At least moderate ostial left main disease that is severely calcified. There is some ectasia distal to the stenosis.    Proximal LAD calcification without severe stenosis. Moderate-severe distal LAD disease. Severe first diagonal disease in a very small artery.    Severe ostial and proximal ramus disease. The ramus feeds a large area of the lateral wall.    Severe mid and distal (bifurcation) RCA disease. Very calcified and tortuous.    LV EDP 13 mmHg     Coronary angiogram with PCI on 2-4-2019:    IVUS left main and LAD showed no severely obstructive lesions    Ramus disease treated with rotational atherectomy and stented with a Synergy KATHY under IVUS guidance with good angiographic results    Right heart catheterizations show LV EDP 22 mmHg and PCWP 30 mmHg, PA mean 34 mmHg, RA 13 mmHg    Lab Review   Lab Results   Component Value Date     (L) 12/23/2020    K 4.3 12/23/2020     12/23/2020    CO2 23 12/23/2020    BUN 17 12/23/2020    CREATININE 0.90 12/23/2020    CALCIUM 8.6 12/23/2020     Lab Results   Component Value Date    WBC 8.5 09/15/2020    WBC 9.0 05/22/2015    HGB 15.5 09/15/2020    HCT 46.0 09/15/2020    MCV 95 09/15/2020     09/15/2020     Lab Results   Component Value Date    CHOL 133 12/23/2020    CHOL 126 10/08/2019    CHOL 138 06/04/2019     Lab Results   Component Value Date    HDL 56 12/23/2020    HDL 54 10/08/2019    HDL 58 06/04/2019     Lab Results   Component Value Date    LDLCALC 48 12/23/2020    LDLCALC 40 10/08/2019    LDLCALC 55 06/04/2019     Lab Results   Component Value Date    TRIG 146 12/23/2020    TRIG 162 (H) 10/08/2019    TRIG 126 06/04/2019     No components found for: CHOLHDL  Lab Results   Component Value Date    TROPONINI 0.01 09/23/2018     Lab Results   Component Value Date    BNP 30 09/23/2018     Lab  Results   Component Value Date    TSH 3.45 02/13/2017

## 2021-06-30 NOTE — PROGRESS NOTES
Progress Notes by Claudine Powell CNP at 4/20/2021  2:10 PM     Author: Claudine Powell CNP Service: -- Author Type: Nurse Practitioner    Filed: 4/20/2021  2:41 PM Encounter Date: 4/20/2021 Status: Signed    : Claudine Powell CNP (Nurse Practitioner)             Assessment/Recommendations   Assessment:    1.  Heart failure with preserved ejection fraction: He has no symptoms of acute fluid retention.  He has chronic shortness of breath which is at baseline.  Lung sounds are clear. Weight has remained stable.  Edema has improved since discharge. We reviewed heart failure diagnosis, medications, treatment plan, low sodium diet, weight monitoring, and symptom monitoring.      2.  Pulmonary hypertension    3.  Obstructive sleep apnea: He wears his CPAP nightly    4.  Coronary artery disease: He denies any chest pain    Plan:  1.  BMP on April 13 showed stable renal function  2.  Low-sodium diet and daily weights  3.  He has home RN and PT/OT  4.  My nurse will help set up [facility] to assist with low-sodium diet.    Chava Lincoln will follow up with Dr. Sheridan on May 19.  Follow up with me as needed since he has difficulty coming into clinic and follows closely with primary care provider.        History of Present Illness/Subjective    Mr. Chava Lincoln is a 89 y.o. male seen at [facility] today for hospital follow-up.  He has a history of coronary artery disease, pulmonary embolism, pulmonary hypertension, obstructive sleep apnea, heart failure with preserved ejection fraction, ascending aortic aneurysm, obesity, dyslipidemia, and GERD.  He was hospitalized April 8 to April 11, 2021 with weakness, hypertension, and acute heart failure.  Troponin was negative.  Negative for PE.  He was diuresed and started on oxygen.  Echocardiogram on April 8, 2021 showed normal ejection fraction.    He discharged to transitional care facility.  He refused to wear oxygen at  TCU and decided to leave before discharge was recommended.  He is now at home with his wife.    He has chronic shortness of breath with activity but states that this is stable.  He denies orthopnea.  He uses a CPAP at night.  His lower extremity edema has improved significantly.  He wears compression stockings daily.  He denies lightheadedness and chest pain.      His home weight has been stable around 284 pounds.  He is following a low-sodium diet.      ECHO:   Results for orders placed during the hospital encounter of 04/08/21   Echo Complete [ECH10] 04/08/2021    Narrative   Extremely poor visualization    Probably normal LV systolic function    RV appears to be dilated    Valves were not visualized    There is no obvious pericardial effusion           Physical Examination Review of Systems   Vitals:    04/20/21 1411   BP: 116/62   Pulse: 72   Resp: 16     Body mass index is 44.16 kg/m .  Wt Readings from Last 3 Encounters:   04/14/21 (!) 290 lb 6.4 oz (131.7 kg)   04/12/21 (!) 285 lb 9.6 oz (129.5 kg)   04/11/21 (!) 291 lb 14.4 oz (132.4 kg)       General Appearance:     Alert, cooperative and in no acute distress.   ENT/Mouth: membranes moist, no oral lesions or bleeding gums.      EYES:  no scleral icterus, normal conjunctivae   Chest/Lungs:   lungs are clear to auscultation, no rales or wheezing, respirations unlabored   Cardiovascular:   Regular. Normal first and second heart sounds, 1+ edema bilateral lower extremities, wearing compression stockings   Abdomen:   Obese, soft, nontender, nondistended, bowel sounds present   Extremities: no cyanosis or clubbing   Skin: warm, dry.    Neurologic: mood and affect are appropriate, alert and oriented x3      General: WNL  Eyes: WNL  Ears/Nose/Throat: WNL  Lungs: WNL  Heart: Shortness of Breath with activity  Stomach: Constipation  Bladder: Frequent Urination at Night  Muscle/Joints: Joint Pain, Muscle Weakness  Skin: WNL  Nervous System: Loss of Balance  Mental  Health: WNL     Blood: WNL     Medical History  Surgical History Family History Social History   Past Medical History:   Diagnosis Date   ? Coronary artery disease due to calcified coronary lesion    ? GERD (gastroesophageal reflux disease) 7/2/2014   ? Hypertension    ? RAMON on CPAP 4/17/2017   ? Prostate cancer (H)    ? Prostate cancer (H)     Past Surgical History:   Procedure Laterality Date   ? CV CORONARY ANGIOGRAM N/A 12/10/2018    Procedure: Coronary Angiogram;  Surgeon: Tana Duncan MD;  Location: [facility];  Service: Cardiology   ? CV LEFT HEART CATHETERIZATION WO LEFT VETRICULOGRAM Left 12/10/2018    Procedure: Left Heart Catheterization Without Left Ventriculogram;  Surgeon: Tana Duncan MD;  Location: [facility];  Service: Cardiology   ? CV LEFT HEART CATHETERIZATION WO LEFT VETRICULOGRAM Left 02/04/2019    Procedure: Left Heart Catheterization Without Left Ventriculogram;  Surgeon: Tana Duncan MD;  Location: [facility];  Service: Cardiology   ? CV RIGHT HEART CATH N/A 02/04/2019    Procedure: Right Heart Catheterization;  Surgeon: Tana Duncan MD;  Location: [facility];  Service: Cardiology   ? HERNIA REPAIR     ? JOINT REPLACEMENT     ? KNEE SURGERY Bilateral    ? Prostatic cryotherapy      Family History   Problem Relation Age of Onset   ? Diabetes Mother    ? Stroke Father    ? Heart disease Brother    ? Coronary artery disease Brother     Social History     Socioeconomic History   ? Marital status:      Spouse name: Not on file   ? Number of children: Not on file   ? Years of education: Not on file   ? Highest education level: Not on file   Occupational History   ? Not on file   Social Needs   ? Financial resource strain: Not on file   ? Food insecurity     Worry: Not on file     Inability: Not on file   ? Transportation needs     Medical: Not on file     Non-medical: Not on file   Tobacco Use   ? Smoking status: Never Smoker   ?  Smokeless tobacco: Never Used   Substance and Sexual Activity   ? Alcohol use: Yes     Comment: 2-3 beer/ month   ? Drug use: No   ? Sexual activity: Not on file   Lifestyle   ? Physical activity     Days per week: Not on file     Minutes per session: Not on file   ? Stress: Not on file   Relationships   ? Social connections     Talks on phone: Not on file     Gets together: Not on file     Attends Mosque service: Not on file     Active member of club or organization: Not on file     Attends meetings of clubs or organizations: Not on file     Relationship status: Not on file   ? Intimate partner violence     Fear of current or ex partner: Not on file     Emotionally abused: Not on file     Physically abused: Not on file     Forced sexual activity: Not on file   Other Topics Concern   ? Not on file   Social History Narrative   ? Not on file          Medications  Allergies   Current Outpatient Medications   Medication Sig Dispense Refill   ? acetaminophen (TYLENOL) 325 MG tablet Take 325 mg by mouth every 6 (six) hours as needed for pain.     ? aspirin 81 MG EC tablet Take 81 mg by mouth every evening.      ? CERTAVITE SENIOR 0.4-300-250 mg-mcg-mcg tablet Take 1 tablet by mouth daily.     ? clopidogreL (PLAVIX) 75 mg tablet Take 1 tablet (75 mg total) by mouth daily. 90 tablet 2   ? COMBIVENT RESPIMAT  mcg/actuation Mist inhaler Inhale 1 puff as needed.     ? furosemide (LASIX) 40 MG tablet Take 40 mg by mouth daily.     ? isosorbide mononitrate (IMDUR) 30 MG 24 hr tablet Take 1 tablet (30 mg total) by mouth daily. 90 tablet 2   ? leuprolide, 6 month, (ELIGARD, 6 MONTH,) 45 mg syringe Inject 45 mg under the skin. Every six months     ? metoprolol tartrate (LOPRESSOR) 50 MG tablet Take 1 tablet (50 mg total) by mouth 2 (two) times a day. 180 tablet 0   ? mirabegron (MYRBETRIQ) 50 mg Tb24 ER tablet Take 50 mg by mouth daily.     ? multivitamin with minerals (THERA-M) 9 mg iron-400 mcg Tab tablet Take 1 tablet  by mouth daily.     ? nystatin (NYSTOP) powder Apply 1 application topically 2 (two) times a day.      ? oxybutynin (DITROPAN XL) 15 MG 24 hr tablet Take 15 mg by mouth daily.     ? oxybutynin (DITROPAN XL) 5 MG ER tablet Take 5 mg by mouth daily. Take with 15 mg of oxybutynin  Indications: a condition where the urge to urinate results in urine leakage     ? rosuvastatin (CRESTOR) 20 MG tablet Take 1 tablet (20 mg total) by mouth at bedtime. 90 tablet 2   ? valsartan-hydrochlorothiazide (DIOVAN-HCT) 160-25 mg per tablet Take 1 tablet by mouth daily. 90 tablet 2     No current facility-administered medications for this visit.     Allergies   Allergen Reactions   ? Lipitor [Atorvastatin] Myalgia   ? Lisinopril Cough   ? Morphine Other (See Comments)     hypotension   ? Sulfa (Sulfonamide Antibiotics) Hives   ? Vicodin [Hydrocodone-Acetaminophen] Unknown         Lab Results    Chemistry/lipid CBC Cardiac Enzymes/BNP/TSH/INR   Lab Results   Component Value Date    CHOL 133 12/23/2020    HDL 56 12/23/2020    LDLCALC 48 12/23/2020    TRIG 146 12/23/2020    CREATININE 1.05 04/13/2021    BUN 32 (H) 04/13/2021    K 4.0 04/13/2021     (L) 04/13/2021    CL 92 (L) 04/13/2021    CO2 28 04/13/2021    Lab Results   Component Value Date    WBC 10.6 04/13/2021    HGB 14.9 04/13/2021    HCT 43.3 04/13/2021    MCV 93 04/13/2021     04/13/2021    Lab Results   Component Value Date    TROPONINI 0.02 04/08/2021    BNP 58 04/08/2021    TSH 3.45 02/13/2017    INR 1.07 09/23/2018

## 2021-07-04 NOTE — PROGRESS NOTES
Progress Notes by Edmundo Rose MD (Ted) at 5/27/2021 10:20 AM     Author: Edmundo Rose MD (Ted) Service: -- Author Type: Physician    Filed: 5/27/2021 10:53 AM Encounter Date: 5/27/2021 Status: Signed    : Edmundo Rose MD (Ted) (Physician)             Thank you, Dr. Michael, for asking [facility] to evaluate Mr. Chava Lincoln.      Assessment/Recommendations   Assessment:    Hyponatremia probably related to loop diuretics and dietary sodium restriction ; clinically  he does not appear to have dilutional hyponatremia(stable weight and normal BNP)  Chronic heart failure with preserved ejection fraction, stable  Coronary artery disease with remote stenting of the ramus intermedius, no angina  Morbid obesity with sleep apnea and right-sided heart failure  Lymphedema lower extremities    Plan:  Recheck basic metabolic panel today, we will readjust dose of diuretics accordingly    Relax dietary sodium restrictions    Follow-up with Dr. Sheridan as previously scheduled       History of Present Illness    Mr. Chava Lincoln is a 89 y.o. male who is history of chronic heart failure with preserved ejection fraction and complications of morbid obesity.  He was recently hospitalized with fluid overload.  He lost over 30 pounds after aggressive diuresis.  His weight has not changed significantly since discharge home about a month ago.  He has been getting progressively weaker though.  He had blood work done by his primary physician and was found to have hyponatremia.  His wife admits that she is very strict about sodium restriction.  Patient has been taking compliant with furosemide treatment.  He denies worsening shortness of breath.  He has not had chest pain.    ECG: Personally reviewed.  4/8/2021 sinus rhythm first-degree AV block no ischemic changes    Echocardiogram: April 2021  Poor quality normal LVEF    Coronary Angiogram: 2019 single-vessel disease  with stent to ramus intermedius       Physical Examination Review of Systems   Vitals:    05/27/21 1023   BP: 128/58   Pulse: (!) 56   Resp: 16     There is no height or weight on file to calculate BMI.  Wt Readings from Last 3 Encounters:   04/29/21 (!) 284 lb (128.8 kg)   04/14/21 (!) 290 lb 6.4 oz (131.7 kg)   04/12/21 (!) 285 lb 9.6 oz (129.5 kg)     General Appearance:   Alert, cooperative, no distress, appears stated age   Head/ENT: Normocephalic, without obvious abnormality. Membranes moist      EYES:  no scleral icterus, normal conjunctivae   Neck: Supple, symmetrical, trachea midline, no adenopathy, thyroid: not enlarged, symmetric, no carotid bruit or JVD   Chest/Lungs:    Few crackles at the bases   Cardiovascular:   Regular rhythm, S1, S2 normal, no murmur, rub or gallop.   Abdomen:  Soft, non-tender, bowel sounds active all four quadrants,  no masses, no organomegaly   Extremities: no cyanosis or clubbing.  1+ edema   Skin: Skin color, texture, turgor normal, no rashes or lesions.    Psychiatric: Normal affect, calm   Neurologic: Alert and oriented x 3, moving all four extremities.     General: WNL  Eyes: WNL  Ears/Nose/Throat: WNL  Lungs: Shortness of Breath, Wheezing  Heart: Shortness of Breath with activity  Stomach: Constipation  Bladder: Frequent Urination at Night  Muscle/Joints: Muscle Weakness  Skin: WNL  Nervous System: Loss of Balance  Mental Health: WNL     Blood: Easy Bruising     Medical History  Surgical History Family History Social History   Past Medical History:   Diagnosis Date   ? Coronary artery disease due to calcified coronary lesion    ? GERD (gastroesophageal reflux disease) 7/2/2014   ? Hypertension    ? RAMON on CPAP 4/17/2017   ? Prostate cancer (H)    ? Prostate cancer (H)     Past Surgical History:   Procedure Laterality Date   ? CV CORONARY ANGIOGRAM N/A 12/10/2018    Procedure: Coronary Angiogram;  Surgeon: Tana Duncan MD;  Location: [facility] Cath Lab;  Service:  Cardiology   ? CV LEFT HEART CATHETERIZATION WO LEFT VETRICULOGRAM Left 12/10/2018    Procedure: Left Heart Catheterization Without Left Ventriculogram;  Surgeon: Tana Duncan MD;  Location: [facility] Cath Lab;  Service: Cardiology   ? CV LEFT HEART CATHETERIZATION WO LEFT VETRICULOGRAM Left 02/04/2019    Procedure: Left Heart Catheterization Without Left Ventriculogram;  Surgeon: Tana Duncan MD;  Location: [facility] Cath Lab;  Service: Cardiology   ? CV RIGHT HEART CATH N/A 02/04/2019    Procedure: Right Heart Catheterization;  Surgeon: Tana Duncan MD;  Location: [facility] Cath Lab;  Service: Cardiology   ? HERNIA REPAIR     ? JOINT REPLACEMENT     ? KNEE SURGERY Bilateral    ? Prostatic cryotherapy      no family history of premature coronary artery disease Social History     Socioeconomic History   ? Marital status:      Spouse name: Not on file   ? Number of children: Not on file   ? Years of education: Not on file   ? Highest education level: Not on file   Occupational History   ? Not on file   Social Needs   ? Financial resource strain: Not on file   ? Food insecurity     Worry: Not on file     Inability: Not on file   ? Transportation needs     Medical: Not on file     Non-medical: Not on file   Tobacco Use   ? Smoking status: Never Smoker   ? Smokeless tobacco: Never Used   Substance and Sexual Activity   ? Alcohol use: Yes     Comment: 2-3 beer/ month   ? Drug use: No   ? Sexual activity: Not on file   Lifestyle   ? Physical activity     Days per week: Not on file     Minutes per session: Not on file   ? Stress: Not on file   Relationships   ? Social connections     Talks on phone: Not on file     Gets together: Not on file     Attends Mandaeism service: Not on file     Active member of club or organization: Not on file     Attends meetings of clubs or organizations: Not on file     Relationship status: Not on file   ? Intimate partner violence     Fear of current or ex partner:  Not on file     Emotionally abused: Not on file     Physically abused: Not on file     Forced sexual activity: Not on file   Other Topics Concern   ? Not on file   Social History Narrative   ? Not on file          Medications  Allergies   Current Outpatient Medications   Medication Sig Dispense Refill   ? acetaminophen (TYLENOL) 325 MG tablet Take 325 mg by mouth every 6 (six) hours as needed for pain.     ? aspirin 81 MG EC tablet Take 81 mg by mouth every evening.      ? clopidogreL (PLAVIX) 75 mg tablet Take 1 tablet (75 mg total) by mouth daily. 90 tablet 2   ? furosemide (LASIX) 40 MG tablet Take 40 mg by mouth daily.     ? isosorbide mononitrate (IMDUR) 30 MG 24 hr tablet Take 1 tablet (30 mg total) by mouth daily. 90 tablet 2   ? leuprolide, 6 month, (ELIGARD, 6 MONTH,) 45 mg syringe Inject 45 mg under the skin. Every six months     ? metoprolol tartrate (LOPRESSOR) 50 MG tablet Take 1 tablet (50 mg total) by mouth 2 (two) times a day. 180 tablet 0   ? mirabegron (MYRBETRIQ) 50 mg Tb24 ER tablet Take 50 mg by mouth daily.     ? multivitamin with minerals (THERA-M) 9 mg iron-400 mcg Tab tablet Take 1 tablet by mouth daily.     ? nystatin (NYSTOP) powder Apply 1 application topically as needed.      ? oxybutynin (DITROPAN XL) 15 MG 24 hr tablet Take 15 mg by mouth daily.     ? oxybutynin (DITROPAN XL) 5 MG ER tablet Take 5 mg by mouth daily. Take with 15 mg of oxybutynin  Indications: a condition where the urge to urinate results in urine leakage     ? rosuvastatin (CRESTOR) 20 MG tablet Take 1 tablet (20 mg total) by mouth at bedtime. 90 tablet 2   ? valsartan-hydrochlorothiazide (DIOVAN-HCT) 160-25 mg per tablet Take 1 tablet by mouth daily. 90 tablet 2   ? CERTAVITE SENIOR 0.4-300-250 mg-mcg-mcg tablet Take 1 tablet by mouth daily.     ? COMBIVENT RESPIMAT  mcg/actuation Mist inhaler Inhale 1 puff as needed.       No current facility-administered medications for this visit.       Allergies   Allergen  Reactions   ? Lipitor [Atorvastatin] Myalgia   ? Lisinopril Cough   ? Morphine Other (See Comments)     hypotension   ? Sulfa (Sulfonamide Antibiotics) Hives   ? Vicodin [Hydrocodone-Acetaminophen] Unknown         Lab Results    Chemistry/lipid CBC Cardiac Enzymes/BNP/TSH/INR   Lab Results   Component Value Date    CHOL 133 12/23/2020    HDL 56 12/23/2020    LDLCALC 48 12/23/2020    TRIG 146 12/23/2020    CREATININE 1.00 05/17/2021    BUN 22 05/17/2021    K 4.3 05/17/2021     (L) 05/17/2021    CL 89 (L) 05/17/2021    CO2 24 05/17/2021    Lab Results   Component Value Date    WBC 8.5 05/17/2021    HGB 13.9 (L) 05/17/2021    HCT 40.6 05/17/2021    MCV 90 05/17/2021     05/17/2021    Lab Results   Component Value Date    TROPONINI 0.02 04/08/2021    BNP 84 05/17/2021    TSH 3.45 02/13/2017    INR 1.07 09/23/2018

## 2021-07-04 NOTE — PROGRESS NOTES
Progress Notes by Tara Meyer, RN at 5/27/2021  2:44 PM     Author: Tara Meyer RN Service: -- Author Type: Registered Nurse    Filed: 5/27/2021  2:56 PM Encounter Date: 5/27/2021 Status: Signed    : Tara Meyer RN (Registered Nurse)       Edmundo Rose (Dimitry)MD Mitch Mallory J, RN             Sodium is mildly improved, change furosemide to 20 mg a day        Medication list updated; refill sent per wife request so as to not have to split pills. [place] [place]

## 2021-07-04 NOTE — PROGRESS NOTES
Progress Notes by Yonatan Sheridan MD at 6/7/2021  2:30 PM     Author: Yonatan Sheridan MD Service: -- Author Type: Physician    Filed: 6/7/2021  3:10 PM Encounter Date: 6/7/2021 Status: Signed    : Yonatan Sheridan MD (Physician)           Click to link to [facility] HEART CARE NOTE       Assessment/Plan:   1.  Chronic congestive heart failure with preserved ejection fraction:   Patient has done stable dose since hospital discharge.  His wife mentioned that his bilateral leg edema could be slightly worse than he was discharged from hospital.  He has some crackles in both lung bases.  He was on Lasix 40 mg daily which was reduced to 20 mg daily.  Most likely, he needs Lasix 30 mg daily.  Routine labs BMP today to check sodium level.    2.  Coronary artery disease s/p KATHY to Ramus, severe calcified RCA lesion with high risk of revascularization, no obstructive lesion in left main and LAD per IVUS:  He has no chest pain.  Continue medical treatment.  Continue aspirin, Crestor, isosorbide mononitrate and metoprolol.  Plavix was discontinued because he has been taking dual antiplatelets medications more than 12 months post stent placement.     3.  Essential hypertension: His blood pressure is controlled, continue valsartan-hydrochlorothiazide 168 oh-25 mg daily and metoprolol 50 mg twice a day     4.  Dyslipidemia: Continue Crestor 20 mg nightly.  Last LDL was controlled.     5.  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     6.  Morbid obesity and obstructive sleep apnea: Lifestyle modification and continue CPAP.     Thank you for the opportunity to be involved in the care of Chava Lincoln. If you have any questions, please feel free to contact me.  I will see the patient again in 3 months and as needed.     Much or all of the text in this note was generated through the use of Dragon Dictate voice-to-text software. Errors in spelling or words which seem out of context are unintentional.    Sound alike errors, in particular, may have escaped editing.         History of Present Illness:   It is my pleasure to see Chava Lincoln at the [facility] clinic for routine cardiology follow up. Chava Lincoln is a 89 y.o. male with a medical history of significant coronary artery disease, s/p KATHY to Ramus in 2-2019, no obstructive disease in LM and LAD, severe calcification lesions in RCA, essential hypertension, dyslipidemia, diastolic dysfunction, morbid obesity, hyponatremia, right diaphragm hemiparalysis, obstructive sleep apnea on CPAP.     The patient was admitted to hospital due to acute on chronic diastolic congestive heart failure on April 11, 2021.  He was diuresed and rehabed in nursing home 3 days and discharged to home.  Since then, his wife states that the patient has been doing stable.  His weight has been stable.  His bilateral mild leg edema is a stable.  Currently he uses pressure support socks.  He has been taking Lasix 20 mg daily.  He had no chest pain, palpitations, dizziness, orthopnea.  His blood pressure and heart rate are controlled.      Past Medical History:     Patient Active Problem List   Diagnosis   ? GERD (gastroesophageal reflux disease)   ? RAMON on CPAP   ? Near syncope   ? Dyspnea on exertion   ? Essential hypertension   ? Dyslipidemia   ? Coronary artery disease involving native coronary artery of native heart without angina pectoris   ? Ascending aortic aneurysm (H)   ? Coronary artery calcification seen on CAT scan   ? Pulmonary hypertension (H)   ? History of pulmonary embolism   ? Hypoxia   ? Morbid obesity (H)   ? Bilateral lower extremity edema   ? Acute kidney injury (H)   ? Chronic kidney disease, stage 3   ? (HFpEF) heart failure with preserved ejection fraction (H)       Past Surgical History:     Past Surgical History:   Procedure Laterality Date   ? CV CORONARY ANGIOGRAM N/A 12/10/2018    Procedure: Coronary Angiogram;  Surgeon: Tana Duncan  MD;  Location: [facility] Cath Lab;  Service: Cardiology   ? CV LEFT HEART CATHETERIZATION WO LEFT VETRICULOGRAM Left 12/10/2018    Procedure: Left Heart Catheterization Without Left Ventriculogram;  Surgeon: Tana Duncan MD;  Location: [facility] Cath Lab;  Service: Cardiology   ? CV LEFT HEART CATHETERIZATION WO LEFT VETRICULOGRAM Left 02/04/2019    Procedure: Left Heart Catheterization Without Left Ventriculogram;  Surgeon: Tana Duncan MD;  Location: [facility] Cath Lab;  Service: Cardiology   ? CV RIGHT HEART CATH N/A 02/04/2019    Procedure: Right Heart Catheterization;  Surgeon: Tana Duncan MD;  Location: [facility] Cath Lab;  Service: Cardiology   ? HERNIA REPAIR     ? JOINT REPLACEMENT     ? KNEE SURGERY Bilateral    ? Prostatic cryotherapy         Family History:     Family History   Problem Relation Age of Onset   ? Diabetes Mother    ? Stroke Father    ? Heart disease Brother    ? Coronary artery disease Brother         Social History:    reports that he has never smoked. He has never used smokeless tobacco. He reports current alcohol use. He reports that he does not use drugs.    Review of Systems:   General: WNL  Eyes: WNL  Ears/Nose/Throat: WNL  Lungs: Shortness of Breath  Heart: Shortness of Breath with activity  Stomach: WNL  Bladder: Frequent Urination at Night  Muscle/Joints: Joint Pain  Skin: WNL  Nervous System: Loss of Balance  Mental Health: WNL     Blood: Easy Bruising    Meds:     Current Outpatient Medications:   ?  acetaminophen (TYLENOL) 325 MG tablet, Take 325 mg by mouth every 6 (six) hours as needed for pain., Disp: , Rfl:   ?  aspirin 81 MG EC tablet, Take 81 mg by mouth every evening. , Disp: , Rfl:   ?  furosemide (LASIX) 20 MG tablet, Take 1 tablet (20 mg total) by mouth daily., Disp: 135 tablet, Rfl: 3  ?  isosorbide mononitrate (IMDUR) 30 MG 24 hr tablet, Take 1 tablet (30 mg total) by mouth daily., Disp: 90 tablet, Rfl: 2  ?  leuprolide, 6 month, (ELIGARD, 6  MONTH,) 45 mg syringe, Inject 45 mg under the skin. Every six months, Disp: , Rfl:   ?  metoprolol tartrate (LOPRESSOR) 50 MG tablet, Take 1 tablet (50 mg total) by mouth 2 (two) times a day., Disp: 180 tablet, Rfl: 0  ?  mirabegron (MYRBETRIQ) 50 mg Tb24 ER tablet, Take 50 mg by mouth daily., Disp: , Rfl:   ?  multivitamin with minerals (THERA-M) 9 mg iron-400 mcg Tab tablet, Take 1 tablet by mouth daily., Disp: , Rfl:   ?  nystatin (NYSTOP) powder, Apply 1 application topically as needed. , Disp: , Rfl:   ?  oxybutynin (DITROPAN XL) 15 MG 24 hr tablet, Take 15 mg by mouth daily., Disp: , Rfl:   ?  oxybutynin (DITROPAN XL) 5 MG ER tablet, Take 5 mg by mouth daily. Take with 15 mg of oxybutynin  Indications: a condition where the urge to urinate results in urine leakage, Disp: , Rfl:   ?  rosuvastatin (CRESTOR) 20 MG tablet, Take 1 tablet (20 mg total) by mouth at bedtime., Disp: 90 tablet, Rfl: 2  ?  valsartan-hydrochlorothiazide (DIOVAN-HCT) 160-25 mg per tablet, Take 1 tablet by mouth daily., Disp: 90 tablet, Rfl: 2  ?  CERTAVITE SENIOR 0.4-300-250 mg-mcg-mcg tablet, Take 1 tablet by mouth daily., Disp: , Rfl:   ?  COMBIVENT RESPIMAT  mcg/actuation Mist inhaler, Inhale 1 puff as needed., Disp: , Rfl:     Allergies:   Lipitor [atorvastatin], Lisinopril, Morphine, Sulfa (sulfonamide antibiotics), and Vicodin [hydrocodone-acetaminophen]      Objective:      Physical Exam  (!) 288 lb 3.2 oz (130.7 kg)     Body mass index is 43.82 kg/m .  /54 (Patient Site: Left Arm, Patient Position: Sitting, Cuff Size: Adult Large)   Pulse (!) 52   Resp 20   Wt (!) 288 lb 3.2 oz (130.7 kg)   BMI 43.82 kg/m      General Appearance:   Awake, Alert, No acute distress.   HEENT:  Pupil equal and reactive to light. No scleral icterus; the mucous membranes were moist.   Neck: No cervical bruits. Not able to see JVD. No thyromegaly.     Chest: The spine was straight. The chest was symmetric.   Lungs:   Bibasilar crackles  L>R. No wheezing.   Cardiovascular:   Regular rhythm and bradycardia, remote first and second heart sounds with no murmurs. No rubs or gallops.    Abdomen:  Obese. Soft. No tenderness. Non-distended. Bowels sounds are present   Extremities: Equal tibial pulses. Mild bilateral leg edema.   Skin: No rashes or ulcers. Warm, Dry.   Musculoskeletal: No tenderness. No deformity.   Neurologic: Mood and affect are appropriate. No focal deficits.         EKG:  Personally reivewed  Sinus bradycardia with 1st degree A-V block   Inferior infarct (cited on or before 16-JUL-1998)   Poor R wave progression   Abnormal ECG   When compared with ECG of 21-NOV-2016 15:33,   Vent. rate has decreased BY  32 BPM      Cardiac Imaging Studies  Echocardiogram on 4-8-2021:    Extremely poor visualization    Probably normal LV systolic function    RV appears to be dilated    Valves were not visualized    There is no obvious pericardial effusion      Coronary angiogram on 12-:    At least moderate ostial left main disease that is severely calcified. There is some ectasia distal to the stenosis.    Proximal LAD calcification without severe stenosis. Moderate-severe distal LAD disease. Severe first diagonal disease in a very small artery.    Severe ostial and proximal ramus disease. The ramus feeds a large area of the lateral wall.    Severe mid and distal (bifurcation) RCA disease. Very calcified and tortuous.    LV EDP 13 mmHg     Coronary angiogram with PCI on 2-4-2019:    IVUS left main and LAD showed no severely obstructive lesions    Ramus disease treated with rotational atherectomy and stented with a Synergy KATHY under IVUS guidance with good angiographic results    Right heart catheterizations show LV EDP 22 mmHg and PCWP 30 mmHg, PA mean 34 mmHg, RA 13 mmHg    Lab Review   Lab Results   Component Value Date     (L) 05/27/2021    K 3.9 05/27/2021    CL 90 (L) 05/27/2021    CO2 27 05/27/2021    BUN 22 05/27/2021    CREATININE  0.93 05/27/2021    CALCIUM 8.7 05/27/2021     Lab Results   Component Value Date    WBC 8.5 05/17/2021    WBC 9.0 05/22/2015    HGB 13.9 (L) 05/17/2021    HCT 40.6 05/17/2021    MCV 90 05/17/2021     05/17/2021     Lab Results   Component Value Date    CHOL 133 12/23/2020    CHOL 126 10/08/2019    CHOL 138 06/04/2019     Lab Results   Component Value Date    HDL 56 12/23/2020    HDL 54 10/08/2019    HDL 58 06/04/2019     Lab Results   Component Value Date    LDLCALC 48 12/23/2020    LDLCALC 40 10/08/2019    LDLCALC 55 06/04/2019     Lab Results   Component Value Date    TRIG 146 12/23/2020    TRIG 162 (H) 10/08/2019    TRIG 126 06/04/2019     No components found for: CHOLHDL  Lab Results   Component Value Date    TROPONINI 0.02 04/08/2021     Lab Results   Component Value Date    BNP 84 05/17/2021     Lab Results   Component Value Date    TSH 3.45 02/13/2017

## 2021-07-06 VITALS
WEIGHT: 288.2 LBS | RESPIRATION RATE: 20 BRPM | BODY MASS INDEX: 43.82 KG/M2 | SYSTOLIC BLOOD PRESSURE: 118 MMHG | DIASTOLIC BLOOD PRESSURE: 54 MMHG | HEART RATE: 52 BPM

## 2021-07-06 VITALS — SYSTOLIC BLOOD PRESSURE: 128 MMHG | DIASTOLIC BLOOD PRESSURE: 58 MMHG | HEART RATE: 56 BPM | RESPIRATION RATE: 16 BRPM

## 2021-07-07 NOTE — TELEPHONE ENCOUNTER
Pt has dental visit this morning.  Santa asking if pt will need abx prior to.      Informed Santa pt is more than 6 weeks out from PCI, and he will not need abx prior to dental visit.  Santa verbalized understanding and had no questions.  -rocio

## 2021-07-07 NOTE — TELEPHONE ENCOUNTER
===View-only below this line===  ----- Message -----  From: Yonatan Sheridan MD  Sent: 6/25/2021   4:23 PM CDT  To: Graciela Sapp RN    BMP is good.  Continue current treatment if he feels good.  Thanks  Jered    === Recommendations discussed with wife Santa.  Santa verbalized understanding and had no questions.  -omid

## 2021-07-09 ENCOUNTER — COMMUNICATION - HEALTHEAST (OUTPATIENT)
Dept: ADMINISTRATIVE | Facility: CLINIC | Age: 86
End: 2021-07-09

## 2021-07-10 ENCOUNTER — COMMUNICATION - HEALTHEAST (OUTPATIENT)
Dept: CARDIOLOGY | Facility: CLINIC | Age: 86
End: 2021-07-10

## 2021-07-22 NOTE — LETTER
Letter by Yonatan Sheridan MD at      Author: Yonatan Sheridan MD Service: -- Author Type: --    Filed:  Encounter Date: 7/9/2021 Status: (Other)         Chava Lincoln  [facility] [address]  [facility] [id number]      July 9, 2021      Dear Chava,    This letter is to remind you that you will be due for your follow up appointment with Dr. Yonatan Sheridan in September, 2021. To help ensure you are in the best health possible, a regular follow-up with your cardiologist is essential.     Please call our Patient Scheduling Line at [phone] to schedule your appointment at your earliest convenience.  If you have recently scheduled an appointment, please disregard this letter.    We look forward to seeing you again. As always, we are available at the number  above for any questions or concerns you may have.      Sincerely,     The Physicians and Staff of [facility]

## 2021-08-30 DIAGNOSIS — I10 ESSENTIAL HYPERTENSION: ICD-10-CM

## 2021-08-30 RX ORDER — METOPROLOL TARTRATE 50 MG
50 TABLET ORAL 2 TIMES DAILY
Qty: 180 TABLET | Refills: 0 | Status: SHIPPED | OUTPATIENT
Start: 2021-08-30 | End: 2021-11-01

## 2021-09-14 ENCOUNTER — TELEPHONE (OUTPATIENT)
Dept: CARDIOLOGY | Facility: CLINIC | Age: 86
End: 2021-09-14

## 2021-09-14 NOTE — TELEPHONE ENCOUNTER
Spouse Santa LVM reporting pt has rash across back, hips, under arms, and is calling to report because Dr Sheridan asked at last visit if pt was having any rashes.      Discussed rash with Santa - she is concerned that it may be yeast because pt has frequently moist skin.  Informed Santa that I'm not sure rash would be anything related to heart meds that haven't changed, especially since rash came on suddenly.  Santa said pt does have a lot of derm issues.  She will discuss with PCP.  -omid

## 2021-09-20 ENCOUNTER — LAB REQUISITION (OUTPATIENT)
Dept: LAB | Facility: CLINIC | Age: 86
End: 2021-09-20
Payer: MEDICARE

## 2021-09-20 ENCOUNTER — OFFICE VISIT (OUTPATIENT)
Dept: CARDIOLOGY | Facility: CLINIC | Age: 86
End: 2021-09-20
Payer: MEDICARE

## 2021-09-20 VITALS
OXYGEN SATURATION: 93 % | SYSTOLIC BLOOD PRESSURE: 122 MMHG | HEART RATE: 56 BPM | DIASTOLIC BLOOD PRESSURE: 64 MMHG | WEIGHT: 291 LBS | BODY MASS INDEX: 44.25 KG/M2

## 2021-09-20 DIAGNOSIS — E78.5 DYSLIPIDEMIA: ICD-10-CM

## 2021-09-20 DIAGNOSIS — I50.32 CHRONIC DIASTOLIC CONGESTIVE HEART FAILURE (H): Primary | ICD-10-CM

## 2021-09-20 DIAGNOSIS — I10 ESSENTIAL HYPERTENSION: ICD-10-CM

## 2021-09-20 DIAGNOSIS — N18.31 CHRONIC KIDNEY DISEASE, STAGE 3A (H): ICD-10-CM

## 2021-09-20 DIAGNOSIS — R60.0 BILATERAL LOWER EXTREMITY EDEMA: ICD-10-CM

## 2021-09-20 DIAGNOSIS — R35.0 FREQUENT URINATION: ICD-10-CM

## 2021-09-20 DIAGNOSIS — I25.810 CORONARY ARTERY DISEASE INVOLVING CORONARY BYPASS GRAFT OF NATIVE HEART WITHOUT ANGINA PECTORIS: ICD-10-CM

## 2021-09-20 DIAGNOSIS — I71.21 ASCENDING AORTIC ANEURYSM (H): ICD-10-CM

## 2021-09-20 LAB
ALBUMIN UR-MCNC: 10 MG/DL
ANION GAP SERPL CALCULATED.3IONS-SCNC: 11 MMOL/L (ref 5–18)
APPEARANCE UR: CLEAR
BACTERIA #/AREA URNS HPF: ABNORMAL /HPF
BILIRUB UR QL STRIP: NEGATIVE
BUN SERPL-MCNC: 35 MG/DL (ref 8–28)
CALCIUM SERPL-MCNC: 8.9 MG/DL (ref 8.5–10.5)
CHLORIDE BLD-SCNC: 96 MMOL/L (ref 98–107)
CHOLEST SERPL-MCNC: 102 MG/DL
CO2 SERPL-SCNC: 26 MMOL/L (ref 22–31)
COLOR UR AUTO: ABNORMAL
CREAT SERPL-MCNC: 1.42 MG/DL (ref 0.7–1.3)
ERYTHROCYTE [DISTWIDTH] IN BLOOD BY AUTOMATED COUNT: 12.7 % (ref 10–15)
FASTING STATUS PATIENT QL REPORTED: NO
GFR SERPL CREATININE-BSD FRML MDRD: 43 ML/MIN/1.73M2
GLUCOSE BLD-MCNC: 130 MG/DL (ref 70–125)
GLUCOSE UR STRIP-MCNC: NEGATIVE MG/DL
HCT VFR BLD AUTO: 39.3 % (ref 40–53)
HDLC SERPL-MCNC: 49 MG/DL
HGB BLD-MCNC: 13.2 G/DL (ref 13.3–17.7)
HGB UR QL STRIP: NEGATIVE
HYALINE CASTS: 1 /LPF
KETONES UR STRIP-MCNC: NEGATIVE MG/DL
LDLC SERPL CALC-MCNC: 34 MG/DL
LEUKOCYTE ESTERASE UR QL STRIP: ABNORMAL
MAGNESIUM SERPL-MCNC: 1.9 MG/DL (ref 1.8–2.6)
MCH RBC QN AUTO: 31.3 PG (ref 26.5–33)
MCHC RBC AUTO-ENTMCNC: 33.6 G/DL (ref 31.5–36.5)
MCV RBC AUTO: 93 FL (ref 78–100)
MUCOUS THREADS #/AREA URNS LPF: PRESENT /LPF
NITRATE UR QL: POSITIVE
PH UR STRIP: 7 [PH] (ref 5–7)
PLATELET # BLD AUTO: 259 10E3/UL (ref 150–450)
POTASSIUM BLD-SCNC: 4.4 MMOL/L (ref 3.5–5)
RBC # BLD AUTO: 4.22 10E6/UL (ref 4.4–5.9)
RBC URINE: 1 /HPF
SODIUM SERPL-SCNC: 133 MMOL/L (ref 136–145)
SP GR UR STRIP: 1.01 (ref 1–1.03)
TRIGL SERPL-MCNC: 97 MG/DL
UROBILINOGEN UR STRIP-MCNC: <2 MG/DL
WBC # BLD AUTO: 10.5 10E3/UL (ref 4–11)
WBC CLUMPS #/AREA URNS HPF: PRESENT /HPF
WBC URINE: 46 /HPF

## 2021-09-20 PROCEDURE — 83735 ASSAY OF MAGNESIUM: CPT | Performed by: INTERNAL MEDICINE

## 2021-09-20 PROCEDURE — 36415 COLL VENOUS BLD VENIPUNCTURE: CPT | Performed by: INTERNAL MEDICINE

## 2021-09-20 PROCEDURE — 99214 OFFICE O/P EST MOD 30 MIN: CPT | Performed by: INTERNAL MEDICINE

## 2021-09-20 PROCEDURE — 87086 URINE CULTURE/COLONY COUNT: CPT | Mod: ORL | Performed by: FAMILY MEDICINE

## 2021-09-20 PROCEDURE — 80061 LIPID PANEL: CPT | Performed by: INTERNAL MEDICINE

## 2021-09-20 PROCEDURE — 85027 COMPLETE CBC AUTOMATED: CPT | Performed by: INTERNAL MEDICINE

## 2021-09-20 PROCEDURE — 80048 BASIC METABOLIC PNL TOTAL CA: CPT | Performed by: INTERNAL MEDICINE

## 2021-09-20 PROCEDURE — 81001 URINALYSIS AUTO W/SCOPE: CPT

## 2021-09-20 RX ORDER — CLOPIDOGREL BISULFATE 75 MG/1
TABLET ORAL
COMMUNITY
End: 2021-09-20

## 2021-09-20 RX ORDER — METOPROLOL TARTRATE 25 MG/1
TABLET, FILM COATED ORAL
COMMUNITY
End: 2021-09-20

## 2021-09-20 RX ORDER — TORSEMIDE 20 MG/1
TABLET ORAL
Qty: 40 TABLET | Refills: 6 | Status: SHIPPED | OUTPATIENT
Start: 2021-09-20 | End: 2021-09-23

## 2021-09-20 NOTE — PROGRESS NOTES
Assessment/Plan:   1.  Chronic congestive heart failure with preserved ejection fraction: The patient's physical examination showed obvious bilateral leg edema.  Her lungs are clear.  He still could have obvious fluid retention 15 pounds easily.  The patient developed red rash on the back, not quite sure if it is related to Lasix.  He is going to see Dr. Torres's office this afternoon.  Discontinued Lasix 40 mg daily, start torsemide 40 mg twice a day for 5 days and then 40 mg daily.  Routine labs including CBC, BMP, magnesium, lipid profile and urinary analysis because of frequent urination.  His wife continue to monitor his weight, leg edema.     2.  Coronary artery disease s/p KATHY to Ramus, severe calcified RCA lesion with high risk of revascularization, no obstructive lesion in left main and LAD per IVUS:  He has no chest pain.  Continue medical treatment.  Continue aspirin, Crestor, isosorbide mononitrate and metoprolol.      3.  Essential hypertension: His blood pressure is controlled, continue valsartan-hydrochlorothiazide 168 oh-25 mg daily and metoprolol 50 mg twice a day     4.  Dyslipidemia: Continue Crestor 20 mg nightly.  Last LDL was controlled.     5.  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     6.  Morbid obesity and obstructive sleep apnea: Lifestyle modification and continue CPAP.    7.  Frequent urination, skin rash: Checked UA today.  The patient is going to see Dr. Torres's office this afternoon.    Thank you for the opportunity to be involved in the care of Chava Lincoln. If you have any questions, please feel free to contact me.  I will see the patient again in 2 months and as needed.    Much or all of the text in this note was generated through the use of Dragon Dictate voice-to-text software. Errors in spelling or words which seem out of context are unintentional. Sound alike errors, in particular, may have escaped editing.       History of Present Illness:   It  is my pleasure to see Chava Lincoln at the [facility] clinic for routine cardiology follow up.  Chava Lincoln is a 89 year old male with a medical history of coronary artery disease, s/p KATHY to Ramus in 2-2019, no obstructive disease in LM and LAD, severe calcification lesions in RCA, essential hypertension, dyslipidemia, chronic diastolic congestive heart failure, morbid obesity, hyponatremia, right diaphragm hemiparalysis, obstructive sleep apnea on CPAP.    The patient states that her shortness of breath is similar to the past.  He had no chest pain.  He had no palpitations.  He did not complains of her dizziness, lightheadedness.  He has bilateral leg edema which has been stable.  His weight has been stable.  He complains of difficult urination and frequent urination.  He developed red rash with itching on his back.  He has no fever chills.    Past Medical History:     Patient Active Problem List   Diagnosis     GERD (gastroesophageal reflux disease)     RAMON on CPAP     Near syncope     Dyspnea on exertion     Essential hypertension     Dyslipidemia     Coronary artery disease involving native coronary artery of native heart without angina pectoris     Ascending aortic aneurysm (H)     Coronary artery calcification seen on CAT scan     Pulmonary hypertension (H)     History of pulmonary embolism     Hypoxia     Morbid obesity (H)     Bilateral lower extremity edema     Acute kidney injury (H)     Chronic kidney disease, stage 3     (HFpEF) heart failure with preserved ejection fraction (H)       Past Surgical History:     Past Surgical History:   Procedure Laterality Date     CV CORONARY ANGIOGRAM N/A 12/10/2018    Procedure: Coronary Angiogram;  Surgeon: Tana Duncan MD;  Location: [facility] Lab;  Service: Cardiology     CV LEFT HEART CATHETERIZATION WITHOUT LEFT VENTRICULOGRAM Left 12/10/2018    Procedure: Left Heart Catheterization Without Left Ventriculogram;  Surgeon:  Tana Duncan MD;  Location: [facility] Cath Lab;  Service: Cardiology     CV LEFT HEART CATHETERIZATION WITHOUT LEFT VENTRICULOGRAM Left 02/04/2019    Procedure: Left Heart Catheterization Without Left Ventriculogram;  Surgeon: Tana Duncan MD;  Location: [facility] Cath Lab;  Service: Cardiology     CV RIGHT HEART CATHETERIZATION N/A 02/04/2019    Procedure: Right Heart Catheterization;  Surgeon: Tana Duncan MD;  Location: [facility] Cath Lab;  Service: Cardiology     HERNIA REPAIR       JOINT REPLACEMENT       KNEE SURGERY Bilateral      OTHER SURGICAL HISTORY      Prostatic cryotherapy       Family History:     Family History   Problem Relation Age of Onset     Diabetes Mother      Cerebrovascular Disease Father      Heart Disease Brother      Coronary Artery Disease Brother         Social History:    reports that he has never smoked. He has never used smokeless tobacco. He reports current alcohol use. He reports that he does not use drugs.    Review of Systems:   12 systems are reviewed negative except for in HPI.    Meds:     Current Outpatient Medications:      acetaminophen (TYLENOL) 325 MG tablet, [ACETAMINOPHEN (TYLENOL) 325 MG TABLET] Take 325 mg by mouth every 6 (six) hours as needed for pain., Disp: , Rfl:      aspirin 81 MG EC tablet, [ASPIRIN 81 MG EC TABLET] Take 81 mg by mouth every evening. , Disp: , Rfl:      CERTAVITE SENIOR 0.4-300-250 mg-mcg-mcg tablet, [CERTAVITE SENIOR 0.4-300-250 MG-MCG-MCG TABLET] Take 1 tablet by mouth daily., Disp: , Rfl:      COMBIVENT RESPIMAT  mcg/actuation Mist inhaler, [COMBIVENT RESPIMAT  MCG/ACTUATION MIST INHALER] Inhale 1 puff as needed., Disp: , Rfl:      isosorbide mononitrate (IMDUR) 30 MG 24 hr tablet, [ISOSORBIDE MONONITRATE (IMDUR) 30 MG 24 HR TABLET] Take 1 tablet (30 mg total) by mouth daily., Disp: 90 tablet, Rfl: 2     MEDICATION CANNOT BE REORDERED - PLEASE MANUALLY REORDER AND DISCONTINUE THE OLD ORDER, [LEUPROLIDE, 6 MONTH,  (ELIGARD, 6 MONTH,) 45 MG SYRINGE] Inject 45 mg under the skin. Every six months, Disp: , Rfl:      metoprolol tartrate (LOPRESSOR) 50 MG tablet, Take 1 tablet (50 mg) by mouth 2 times daily, Disp: 180 tablet, Rfl: 0     mirabegron (MYRBETRIQ) 50 mg Tb24 ER tablet, [MIRABEGRON (MYRBETRIQ) 50 MG TB24 ER TABLET] Take 50 mg by mouth daily., Disp: , Rfl:      multivitamin with minerals (THERA-M) 9 mg iron-400 mcg Tab tablet, [MULTIVITAMIN WITH MINERALS (THERA-M) 9 MG IRON-400 MCG TAB TABLET] Take 1 tablet by mouth daily., Disp: , Rfl:      oxybutynin (DITROPAN XL) 15 MG 24 hr tablet, [OXYBUTYNIN (DITROPAN XL) 15 MG 24 HR TABLET] Take 15 mg by mouth daily., Disp: , Rfl:      oxybutynin (DITROPAN XL) 5 MG ER tablet, Take 5 mg by mouth daily , Disp: , Rfl:      rosuvastatin (CRESTOR) 20 MG tablet, TAKE 1 TABLET(20 MG) BY MOUTH AT BEDTIME, Disp: 90 tablet, Rfl: 0     torsemide (DEMADEX) 20 MG tablet, Take 40 mg bid for 5 days and then reduce it to 40 mg AM, Disp: 40 tablet, Rfl: 6     valsartan-hydrochlorothiazide (DIOVAN HCT) 160-25 MG tablet, TAKE 1 TABLET BY MOUTH DAILY, Disp: 90 tablet, Rfl: 0    Allergies:   Atorvastatin, Lisinopril, Morphine, Sulfa (sulfonamide antibiotics) [sulfa drugs], and Vicodin [hydrocodone-acetaminophen]      Objective:      Physical Exam  132 kg (291 lb)     Body mass index is 44.25 kg/m .  /64 (BP Location: Left arm, Patient Position: Sitting, Cuff Size: Adult Large)   Pulse 56   Wt 132 kg (291 lb)   SpO2 93%   BMI 44.25 kg/m      General Appearance:   Awake, Alert, No acute distress.   HEENT:  Pupil equal and reactive to light. No scleral icterus; the mucous membranes were moist.   Neck: No cervical bruits. No JVD. No thyromegaly.     Chest: The spine was straight. The chest was symmetric.   Lungs:   Respirations unlabored; Lungs are clear to auscultation. No crackles. No wheezing.   Cardiovascular:   Regular rhythm and rate, normal first and second heart sounds with no murmurs. No  rubs or gallops.    Abdomen:  Obese. Soft. No tenderness. Non-distended. Bowels sounds are present   Extremities: Bilateral leg edema.   Skin: Red skin rashes most in back. Warm, Dry.   Musculoskeletal: No tenderness. No deformity.   Neurologic: Mood and affect are appropriate. No focal deficits.         EKG:  Personally reivewed  Sinus bradycardia with 1st degree A-V block   Inferior infarct (cited on or before 16-JUL-1998)   Poor R wave progression   Abnormal ECG   When compared with ECG of 21-NOV-2016 15:33,   Vent. rate has decreased BY  32 BPM      Cardiac Imaging Studies  Echocardiogram on 4-8-2021:    Extremely poor visualization    Probably normal LV systolic function    RV appears to be dilated    Valves were not visualized    There is no obvious pericardial effusion      Coronary angiogram on 12-:    At least moderate ostial left main disease that is severely calcified. There is some ectasia distal to the stenosis.    Proximal LAD calcification without severe stenosis. Moderate-severe distal LAD disease. Severe first diagonal disease in a very small artery.    Severe ostial and proximal ramus disease. The ramus feeds a large area of the lateral wall.    Severe mid and distal (bifurcation) RCA disease. Very calcified and tortuous.    LV EDP 13 mmHg     Coronary angiogram with PCI on 2-4-2019:    IVUS left main and LAD showed no severely obstructive lesions    Ramus disease treated with rotational atherectomy and stented with a Synergy KATHY under IVUS guidance with good angiographic results    Right heart catheterizations show LV EDP 22 mmHg and PCWP 30 mmHg, PA mean 34 mmHg, RA 13 mmHg      Lab Review   Lab Results   Component Value Date     06/23/2021    CO2 23 06/23/2021    BUN 21 06/23/2021     Lab Results   Component Value Date    WBC 8.5 05/17/2021    HGB 13.9 05/17/2021    HCT 40.6 05/17/2021    MCV 90 05/17/2021     05/17/2021     Lab Results   Component Value Date    CHOL 133  12/23/2020    CHOL 126 10/08/2019    CHOL 138 06/04/2019     Lab Results   Component Value Date    HDL 56 12/23/2020    HDL 54 10/08/2019    HDL 58 06/04/2019     No components found for: LDLCALC  Lab Results   Component Value Date    TRIG 146 12/23/2020    TRIG 162 (H) 10/08/2019    TRIG 126 06/04/2019     No components found for: CHOLHDL  Lab Results   Component Value Date    TROPONINI 0.02 04/08/2021     Lab Results   Component Value Date    BNP 84 05/17/2021     No results found for: TSH

## 2021-09-20 NOTE — LETTER
9/20/2021    Erasmo Michael MD  [facility] [address]  [place] [address]    RE: Chava Lincoln       Dear Colleague,    I had the pleasure of seeing Chava Lincoln in the [facility].              Assessment/Plan:   1.  Chronic congestive heart failure with preserved ejection fraction: The patient's physical examination showed obvious bilateral leg edema.  Her lungs are clear.  He still could have obvious fluid retention 15 pounds easily.  The patient developed red rash on the back, not quite sure if it is related to Lasix.  He is going to see Dr. Torres's office this afternoon.  Discontinued Lasix 40 mg daily, start torsemide 40 mg twice a day for 5 days and then 40 mg daily.  Routine labs including CBC, BMP, magnesium, lipid profile and urinary analysis because of frequent urination.  His wife continue to monitor his weight, leg edema.     2.  Coronary artery disease s/p KATHY to Ramus, severe calcified RCA lesion with high risk of revascularization, no obstructive lesion in left main and LAD per IVUS:  He has no chest pain.  Continue medical treatment.  Continue aspirin, Crestor, isosorbide mononitrate and metoprolol.      3.  Essential hypertension: His blood pressure is controlled, continue valsartan-hydrochlorothiazide 168 oh-25 mg daily and metoprolol 50 mg twice a day     4.  Dyslipidemia: Continue Crestor 20 mg nightly.  Last LDL was controlled.     5.  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     6.  Morbid obesity and obstructive sleep apnea: Lifestyle modification and continue CPAP.    7.  Frequent urination, skin rash: Checked UA today.  The patient is going to see Dr. Torres's office this afternoon.    Thank you for the opportunity to be involved in the care of Chava Lincoln. If you have any questions, please feel free to contact me.  I will see the patient again in 2 months and as  needed.    Much or all of the text in this note was generated through the use of Dragon Dictate voice-to-text software. Errors in spelling or words which seem out of context are unintentional. Sound alike errors, in particular, may have escaped editing.       History of Present Illness:   It is my pleasure to see Chava Lincoln at the [facility] clinic for routine cardiology follow up.  Chava Lincoln is a 89 year old male with a medical history of coronary artery disease, s/p KATHY to Ramus in 2-2019, no obstructive disease in LM and LAD, severe calcification lesions in RCA, essential hypertension, dyslipidemia, chronic diastolic congestive heart failure, morbid obesity, hyponatremia, right diaphragm hemiparalysis, obstructive sleep apnea on CPAP.    The patient states that her shortness of breath is similar to the past.  He had no chest pain.  He had no palpitations.  He did not complains of her dizziness, lightheadedness.  He has bilateral leg edema which has been stable.  His weight has been stable.  He complains of difficult urination and frequent urination.  He developed red rash with itching on his back.  He has no fever chills.    Past Medical History:     Patient Active Problem List   Diagnosis     GERD (gastroesophageal reflux disease)     RAMON on CPAP     Near syncope     Dyspnea on exertion     Essential hypertension     Dyslipidemia     Coronary artery disease involving native coronary artery of native heart without angina pectoris     Ascending aortic aneurysm (H)     Coronary artery calcification seen on CAT scan     Pulmonary hypertension (H)     History of pulmonary embolism     Hypoxia     Morbid obesity (H)     Bilateral lower extremity edema     Acute kidney injury (H)     Chronic kidney disease, stage 3     (HFpEF) heart failure with preserved ejection fraction (H)       Past Surgical History:     Past Surgical History:   Procedure Laterality Date     CV CORONARY ANGIOGRAM  N/A 12/10/2018    Procedure: Coronary Angiogram;  Surgeon: Tana Duncan MD;  Location: [facility] Cath Lab;  Service: Cardiology     CV LEFT HEART CATHETERIZATION WITHOUT LEFT VENTRICULOGRAM Left 12/10/2018    Procedure: Left Heart Catheterization Without Left Ventriculogram;  Surgeon: Tana Duncan MD;  Location: [facility] Cath Lab;  Service: Cardiology     CV LEFT HEART CATHETERIZATION WITHOUT LEFT VENTRICULOGRAM Left 02/04/2019    Procedure: Left Heart Catheterization Without Left Ventriculogram;  Surgeon: Tana Duncan MD;  Location: [facility] Cath Lab;  Service: Cardiology     CV RIGHT HEART CATHETERIZATION N/A 02/04/2019    Procedure: Right Heart Catheterization;  Surgeon: Tana Duncan MD;  Location: [facility] Lab;  Service: Cardiology     HERNIA REPAIR       JOINT REPLACEMENT       KNEE SURGERY Bilateral      OTHER SURGICAL HISTORY      Prostatic cryotherapy       Family History:     Family History   Problem Relation Age of Onset     Diabetes Mother      Cerebrovascular Disease Father      Heart Disease Brother      Coronary Artery Disease Brother         Social History:    reports that he has never smoked. He has never used smokeless tobacco. He reports current alcohol use. He reports that he does not use drugs.    Review of Systems:   12 systems are reviewed negative except for in HPI.    Meds:     Current Outpatient Medications:      acetaminophen (TYLENOL) 325 MG tablet, [ACETAMINOPHEN (TYLENOL) 325 MG TABLET] Take 325 mg by mouth every 6 (six) hours as needed for pain., Disp: , Rfl:      aspirin 81 MG EC tablet, [ASPIRIN 81 MG EC TABLET] Take 81 mg by mouth every evening. , Disp: , Rfl:      CERTAVITE SENIOR 0.4-300-250 mg-mcg-mcg tablet, [CERTAVITE SENIOR 0.4-300-250 MG-MCG-MCG TABLET] Take 1 tablet by mouth daily., Disp: , Rfl:      COMBIVENT RESPIMAT  mcg/actuation Mist inhaler, [COMBIVENT RESPIMAT  MCG/ACTUATION MIST INHALER] Inhale 1 puff as needed., Disp: ,  Rfl:      isosorbide mononitrate (IMDUR) 30 MG 24 hr tablet, [ISOSORBIDE MONONITRATE (IMDUR) 30 MG 24 HR TABLET] Take 1 tablet (30 mg total) by mouth daily., Disp: 90 tablet, Rfl: 2     MEDICATION CANNOT BE REORDERED - PLEASE MANUALLY REORDER AND DISCONTINUE THE OLD ORDER, [LEUPROLIDE, 6 MONTH, (ELIGARD, 6 MONTH,) 45 MG SYRINGE] Inject 45 mg under the skin. Every six months, Disp: , Rfl:      metoprolol tartrate (LOPRESSOR) 50 MG tablet, Take 1 tablet (50 mg) by mouth 2 times daily, Disp: 180 tablet, Rfl: 0     mirabegron (MYRBETRIQ) 50 mg Tb24 ER tablet, [MIRABEGRON (MYRBETRIQ) 50 MG TB24 ER TABLET] Take 50 mg by mouth daily., Disp: , Rfl:      multivitamin with minerals (THERA-M) 9 mg iron-400 mcg Tab tablet, [MULTIVITAMIN WITH MINERALS (THERA-M) 9 MG IRON-400 MCG TAB TABLET] Take 1 tablet by mouth daily., Disp: , Rfl:      oxybutynin (DITROPAN XL) 15 MG 24 hr tablet, [OXYBUTYNIN (DITROPAN XL) 15 MG 24 HR TABLET] Take 15 mg by mouth daily., Disp: , Rfl:      oxybutynin (DITROPAN XL) 5 MG ER tablet, Take 5 mg by mouth daily , Disp: , Rfl:      rosuvastatin (CRESTOR) 20 MG tablet, TAKE 1 TABLET(20 MG) BY MOUTH AT BEDTIME, Disp: 90 tablet, Rfl: 0     torsemide (DEMADEX) 20 MG tablet, Take 40 mg bid for 5 days and then reduce it to 40 mg AM, Disp: 40 tablet, Rfl: 6     valsartan-hydrochlorothiazide (DIOVAN HCT) 160-25 MG tablet, TAKE 1 TABLET BY MOUTH DAILY, Disp: 90 tablet, Rfl: 0    Allergies:   Atorvastatin, Lisinopril, Morphine, Sulfa (sulfonamide antibiotics) [sulfa drugs], and Vicodin [hydrocodone-acetaminophen]      Objective:      Physical Exam  132 kg (291 lb)     Body mass index is 44.25 kg/m .  /64 (BP Location: Left arm, Patient Position: Sitting, Cuff Size: Adult Large)   Pulse 56   Wt 132 kg (291 lb)   SpO2 93%   BMI 44.25 kg/m      General Appearance:   Awake, Alert, No acute distress.   HEENT:  Pupil equal and reactive to light. No scleral icterus; the mucous membranes were moist.   Neck: No  cervical bruits. No JVD. No thyromegaly.     Chest: The spine was straight. The chest was symmetric.   Lungs:   Respirations unlabored; Lungs are clear to auscultation. No crackles. No wheezing.   Cardiovascular:   Regular rhythm and rate, normal first and second heart sounds with no murmurs. No rubs or gallops.    Abdomen:  Obese. Soft. No tenderness. Non-distended. Bowels sounds are present   Extremities: Bilateral leg edema.   Skin: Red skin rashes most in back. Warm, Dry.   Musculoskeletal: No tenderness. No deformity.   Neurologic: Mood and affect are appropriate. No focal deficits.         EKG:  Personally reivewed  Sinus bradycardia with 1st degree A-V block   Inferior infarct (cited on or before 16-JUL-1998)   Poor R wave progression   Abnormal ECG   When compared with ECG of 21-NOV-2016 15:33,   Vent. rate has decreased BY  32 BPM      Cardiac Imaging Studies  Echocardiogram on 4-8-2021:    Extremely poor visualization    Probably normal LV systolic function    RV appears to be dilated    Valves were not visualized    There is no obvious pericardial effusion      Coronary angiogram on 12-:    At least moderate ostial left main disease that is severely calcified. There is some ectasia distal to the stenosis.    Proximal LAD calcification without severe stenosis. Moderate-severe distal LAD disease. Severe first diagonal disease in a very small artery.    Severe ostial and proximal ramus disease. The ramus feeds a large area of the lateral wall.    Severe mid and distal (bifurcation) RCA disease. Very calcified and tortuous.    LV EDP 13 mmHg     Coronary angiogram with PCI on 2-4-2019:    IVUS left main and LAD showed no severely obstructive lesions    Ramus disease treated with rotational atherectomy and stented with a Synergy KATHY under IVUS guidance with good angiographic results    Right heart catheterizations show LV EDP 22 mmHg and PCWP 30 mmHg, PA mean 34 mmHg, RA 13 mmHg      Lab Review   Lab  Results   Component Value Date     06/23/2021    CO2 23 06/23/2021    BUN 21 06/23/2021     Lab Results   Component Value Date    WBC 8.5 05/17/2021    HGB 13.9 05/17/2021    HCT 40.6 05/17/2021    MCV 90 05/17/2021     05/17/2021     Lab Results   Component Value Date    CHOL 133 12/23/2020    CHOL 126 10/08/2019    CHOL 138 06/04/2019     Lab Results   Component Value Date    HDL 56 12/23/2020    HDL 54 10/08/2019    HDL 58 06/04/2019     No components found for: LDLCALC  Lab Results   Component Value Date    TRIG 146 12/23/2020    TRIG 162 (H) 10/08/2019    TRIG 126 06/04/2019     No components found for: CHOLHDL  Lab Results   Component Value Date    TROPONINI 0.02 04/08/2021     Lab Results   Component Value Date    BNP 84 05/17/2021     No results found for: TSH              Thank you for allowing me to participate in the care of your patient.      Sincerely,     Yonatan Sheridan MD     [facility]  cc:   No referring provider defined for this encounter.

## 2021-09-22 LAB — BACTERIA UR CULT: ABNORMAL

## 2021-09-23 ENCOUNTER — TELEPHONE (OUTPATIENT)
Dept: CARDIOLOGY | Facility: CLINIC | Age: 86
End: 2021-09-23

## 2021-09-23 ENCOUNTER — LAB REQUISITION (OUTPATIENT)
Dept: LAB | Facility: CLINIC | Age: 86
End: 2021-09-23
Payer: MEDICARE

## 2021-09-23 DIAGNOSIS — N18.31 CHRONIC KIDNEY DISEASE, STAGE 3A (H): ICD-10-CM

## 2021-09-23 DIAGNOSIS — I50.32 CHRONIC DIASTOLIC CONGESTIVE HEART FAILURE (H): ICD-10-CM

## 2021-09-23 PROCEDURE — 87086 URINE CULTURE/COLONY COUNT: CPT | Mod: ORL | Performed by: FAMILY MEDICINE

## 2021-09-23 RX ORDER — TORSEMIDE 20 MG/1
40 TABLET ORAL DAILY
Qty: 180 TABLET | Refills: 1 | Status: SHIPPED | OUTPATIENT
Start: 2021-09-23 | End: 2021-10-29

## 2021-09-23 NOTE — TELEPHONE ENCOUNTER
Wife Santa left voice message that PCP wants pt to hold evening dose of torsemide.    Per result note - informed Santa that pt should only take furosemide 40 mg once daily.  Santa verbalized understanding and had no questions.  -Yonatan Perez MD   9/20/2021  5:16 PM CDT Back to Top        Please call the patient nad let him know that I have reviewed his lab reports.  His creatinine is mildly elevated.  Please jsut take torsemide 40 mg daily.  Thanks  Jered

## 2021-09-24 LAB — BACTERIA UR CULT: NO GROWTH

## 2021-10-11 ENCOUNTER — HEALTH MAINTENANCE LETTER (OUTPATIENT)
Age: 86
End: 2021-10-11

## 2021-10-29 ENCOUNTER — TELEPHONE (OUTPATIENT)
Dept: CARDIOLOGY | Facility: CLINIC | Age: 86
End: 2021-10-29

## 2021-10-29 ENCOUNTER — HOSPITAL ENCOUNTER (EMERGENCY)
Facility: CLINIC | Age: 86
Discharge: HOME | End: 2021-10-29
Attending: EMERGENCY MEDICINE | Admitting: EMERGENCY MEDICINE
Payer: MEDICARE

## 2021-10-29 VITALS
RESPIRATION RATE: 18 BRPM | HEART RATE: 64 BPM | BODY MASS INDEX: 39.99 KG/M2 | SYSTOLIC BLOOD PRESSURE: 153 MMHG | TEMPERATURE: 97.6 F | DIASTOLIC BLOOD PRESSURE: 70 MMHG | WEIGHT: 270 LBS | HEIGHT: 69 IN | OXYGEN SATURATION: 92 %

## 2021-10-29 DIAGNOSIS — E86.0 DEHYDRATION: ICD-10-CM

## 2021-10-29 DIAGNOSIS — I50.32 CHRONIC DIASTOLIC CONGESTIVE HEART FAILURE (H): ICD-10-CM

## 2021-10-29 DIAGNOSIS — N17.9 ACUTE KIDNEY INJURY (H): ICD-10-CM

## 2021-10-29 LAB
ALBUMIN SERPL-MCNC: 3.4 G/DL (ref 3.5–5)
ALP SERPL-CCNC: 56 U/L (ref 45–120)
ALT SERPL W P-5'-P-CCNC: 46 U/L (ref 0–45)
ANION GAP SERPL CALCULATED.3IONS-SCNC: 14 MMOL/L (ref 5–18)
AST SERPL W P-5'-P-CCNC: 47 U/L (ref 0–40)
BILIRUB SERPL-MCNC: 0.5 MG/DL (ref 0–1)
BNP SERPL-MCNC: 67 PG/ML (ref 0–93)
BUN SERPL-MCNC: 99 MG/DL (ref 8–28)
CALCIUM SERPL-MCNC: 9.4 MG/DL (ref 8.5–10.5)
CHLORIDE BLD-SCNC: 98 MMOL/L (ref 98–107)
CO2 SERPL-SCNC: 27 MMOL/L (ref 22–31)
CREAT SERPL-MCNC: 3.56 MG/DL (ref 0.7–1.3)
ERYTHROCYTE [DISTWIDTH] IN BLOOD BY AUTOMATED COUNT: 13.2 % (ref 10–15)
GFR SERPL CREATININE-BSD FRML MDRD: 14 ML/MIN/1.73M2
GLUCOSE BLD-MCNC: 144 MG/DL (ref 70–125)
HCT VFR BLD AUTO: 41 % (ref 40–53)
HGB BLD-MCNC: 13.4 G/DL (ref 13.3–17.7)
MCH RBC QN AUTO: 30.6 PG (ref 26.5–33)
MCHC RBC AUTO-ENTMCNC: 32.7 G/DL (ref 31.5–36.5)
MCV RBC AUTO: 94 FL (ref 78–100)
PLATELET # BLD AUTO: 182 10E3/UL (ref 150–450)
POTASSIUM BLD-SCNC: 4 MMOL/L (ref 3.5–5)
PROT SERPL-MCNC: 7 G/DL (ref 6–8)
RBC # BLD AUTO: 4.38 10E6/UL (ref 4.4–5.9)
SODIUM SERPL-SCNC: 139 MMOL/L (ref 136–145)
TROPONIN I SERPL-MCNC: 0.06 NG/ML (ref 0–0.29)
WBC # BLD AUTO: 9.4 10E3/UL (ref 4–11)

## 2021-10-29 PROCEDURE — 82040 ASSAY OF SERUM ALBUMIN: CPT | Performed by: EMERGENCY MEDICINE

## 2021-10-29 PROCEDURE — 93005 ELECTROCARDIOGRAM TRACING: CPT | Performed by: EMERGENCY MEDICINE

## 2021-10-29 PROCEDURE — 83880 ASSAY OF NATRIURETIC PEPTIDE: CPT | Performed by: EMERGENCY MEDICINE

## 2021-10-29 PROCEDURE — 85027 COMPLETE CBC AUTOMATED: CPT | Performed by: EMERGENCY MEDICINE

## 2021-10-29 PROCEDURE — 36415 COLL VENOUS BLD VENIPUNCTURE: CPT | Performed by: EMERGENCY MEDICINE

## 2021-10-29 PROCEDURE — 99284 EMERGENCY DEPT VISIT MOD MDM: CPT

## 2021-10-29 PROCEDURE — 84484 ASSAY OF TROPONIN QUANT: CPT | Performed by: EMERGENCY MEDICINE

## 2021-10-29 RX ORDER — TORSEMIDE 20 MG/1
20 TABLET ORAL DAILY
Qty: 90 TABLET | Refills: 1 | Status: SHIPPED | OUTPATIENT
Start: 2021-10-29 | End: 2022-06-02

## 2021-10-29 ASSESSMENT — MIFFLIN-ST. JEOR: SCORE: 1865.09

## 2021-10-29 NOTE — TELEPHONE ENCOUNTER
----- Message from Julissa Mendieta sent at 10/29/2021  2:22 PM CDT -----  Regarding: [facility] pt  General phone call:    Caller: Santa  Primary cardiologist: HANH  Detailed reason for call: Santa is calling and states the pt was switched from furosemide to torsemide as they thought he was allergic to the furosemide due to developing a rash. Santa states they went to see a dermatologist and found out that the pt had a skin condition and was then prescribed doxycycline. However, the pt then started to vomit and was told to discontinue the doxycycline. Santa states the pt continues to vomit and she is wondering if this is due to dehydration or if it could be something else. Santa also states the pt has lost 23 lbs since his last appt with HANH  New or active symptoms? N/A  Best phone number: [phone]  Best time to contact: anytime  Ok to leave a detailedmessage? yes  Device? no    Additional Info:

## 2021-10-29 NOTE — TELEPHONE ENCOUNTER
Pt was changed from furosemide to torsemide 9/20/21 d/t rash.  Rash is still present - derm has diagnosed him with skin issue and pt was placed on doxycycline.  Pt started vomiting so doxycycline is on hold.  Vomiting stopped so pt ate tater tot hotdish and coleslaw for lunch and vomited again.    Pt has been sleeping 11-12 hrs / night and has been napping during the day.    Current weight is 270#.  Shortness of breath and leg swelling are much better.  Pt is taking torsemide 40 mg daily.    Dr Fatimah Pratt is worried.  Pt will see you 11/10/21.  She is worried he maybe lost too much weight (23#) since he saw you last.  Any new recommendations?  -omid

## 2021-10-29 NOTE — TELEPHONE ENCOUNTER
Recommendations discussed with Santa.  Santa verbalized understanding and had no questions.  Torsemide dose decreased.  -rah    ===View-only below this line===  ----- Message -----  From: Yonatan Sheridan MD  Sent: 10/29/2021   4:00 PM CDT  To: Graciela Sapp RN    Please reduce torsemide to 20 mg daily.  Thanks  Jered

## 2021-10-29 NOTE — ED NOTES
Bed: [facility]05  Expected date:   Expected time:   Means of arrival:   Comments:  89 y.o on HTN meds, drops to 80's systolic when standing

## 2021-10-29 NOTE — ED PROVIDER NOTES
EMERGENCY DEPARTMENT ENCOUNTER      NAME: Chava Lincoln  AGE: 89 year old male  YOB: 1929  MRN: [id number]  EVALUATION DATE & TIME: 10/29/2021  6:34 PM    PCP: Erasmo Michael    ED PROVIDER: Esteban Khoury M.D.      Chief Complaint   Patient presents with     Hypotension     Fatigue         FINAL IMPRESSION:  Acute kidney injury  Dehydration    ED COURSE & MEDICAL DECISION MAKING:    Pertinent Labs & Imaging studies reviewed. (See chart for details)  89 year old male presents to the Emergency Department for evaluation of low blood pressure.  Patient with a long history of pulmonary edema/congestive failure.  Recently had his Demadex dosage doubled.  Patient was to have this on a short-term basis but the wife continued to give the increased dose for much longer period of time.  Today his blood pressure dropped to below 90 and he had an episode of vomiting.  After arrival here patient reports feeling much better and blood pressure has rebounded normally without intervention.  Pressures have been over 110 systolic.  On exam he is a morbidly obese male in mild distress.  Vital signs are appropriate.  Lungs are diminished but clear.  Cardiac exam unremarkable.  Trace lower extremity edema.  Will assess electrolytes and renal function given the prolonged use of the excessive Demadex.  EKG was obtained and this was unremarkable.  Given her use of excessive. Patient appears non toxic with stable vitals signs.     6:48 PM I met with the patient for the initial interview and physical examination. Discussed plan for treatment and workup in the ED.    8:27 PM.  Laboratory evaluation significant for acute kidney injury.  Urine is 99 creatinine 3.56.  Typical creatinines are in the 1.5 range.  Electrolytes are normal.  Liver function tests minimally abnormal.  Long conversation held with patient and his wife.  We will proceed with outpatient management but he is to go without his Demadex for the next 2 days and  have repeat renal function test on Monday.  Should he have any increased dyspnea or weakness over the weekend is return immediately.  Both are agreeable with plan.  At the conclusion of the encounter I discussed the results of all of the tests and the disposition. The questions were answered and return precautions provided. The patient or family acknowledged understanding and was agreeable with the care plan.       PPE: Provider wore N95 mask, eye protection, surgical cap.     MEDICATIONS GIVEN IN THE EMERGENCY:  Medications - No data to display    NEW PRESCRIPTIONS STARTED AT TODAY'S ER VISIT  New Prescriptions    No medications on file          =================================================================    HPI    Patient information was obtained from: Patient's wife and patient    Use of Intrepreter: N/A         Chava Lincoln is a 89 year old male with a pertient medical history of hypertension, hyperlipidemia, CAD, ascending aortic aneurysm, CKD, RAMON on CPAP, and prostate cancer who presents to the ED for evaluation of hypotension and generalized weakness.    Per patient's wife, on 9/20/2021 the patient saw his cardiologist and reported a diffuse itching rash; patient was instructed to stop his furosemide and was instead started on 40 mg torsemide twice per day. He is now on torsemide 40 mg once daily and his weight has dropped from 293 to 270 pounds since 9/20. The patient was also seen by dermatology and was diagnosed with bullous pemphigoid for which he was started on doxycycline and niacinamide. He additionally received steroid injections. He soon stopped the doxycycline because it caused him to vomit. His rash has since improved. Wife reports that the patient has had progressively increased weakness. Patient also had an episode of vomiting at lunch today. Wife realized that she had not been taking the patient's blood pressure regularly since he started on the torsemide and his blood pressure was  noted to be low today. Patient also endorses ongoing shortness of breath. No other symptoms or complaints at this time.         REVIEW OF SYSTEMS   Constitutional:  Positive for generalized weakness. Denies fever, chills  Respiratory:  Positive for shortness of breath. Denies productive cough  Cardiovascular:  Denies chest pain, palpitations  GI:  Positive for vomiting. Denies abdominal pain, nausea, or change in bowel or bladder habits   Musculoskeletal:  Denies any new muscle/joint swelling  Skin:  Positive for rash.   Neurologic:  Denies focal weakness  All systems negative except as marked.     PAST MEDICAL HISTORY:  Past Medical History:   Diagnosis Date     Coronary artery disease due to calcified coronary lesion      GERD (gastroesophageal reflux disease) 7/2/2014     Hypertension      RAMON on CPAP 4/17/2017     Prostate cancer (H)      Prostate cancer (H)        PAST SURGICAL HISTORY:  Past Surgical History:   Procedure Laterality Date     CV CORONARY ANGIOGRAM N/A 12/10/2018    Procedure: Coronary Angiogram;  Surgeon: Tana Duncan MD;  Location: [facility] Cath Lab;  Service: Cardiology     CV LEFT HEART CATHETERIZATION WITHOUT LEFT VENTRICULOGRAM Left 12/10/2018    Procedure: Left Heart Catheterization Without Left Ventriculogram;  Surgeon: Tana Duncan MD;  Location: [facility] Cath Lab;  Service: Cardiology     CV LEFT HEART CATHETERIZATION WITHOUT LEFT VENTRICULOGRAM Left 02/04/2019    Procedure: Left Heart Catheterization Without Left Ventriculogram;  Surgeon: Tana Duncan MD;  Location: [facility] Cath Lab;  Service: Cardiology     CV RIGHT HEART CATHETERIZATION N/A 02/04/2019    Procedure: Right Heart Catheterization;  Surgeon: Tana Duncan MD;  Location: [facility] Cath Lab;  Service: Cardiology     HERNIA REPAIR       JOINT REPLACEMENT       KNEE SURGERY Bilateral      OTHER SURGICAL HISTORY      Prostatic cryotherapy         CURRENT MEDICATIONS:    No current  facility-administered medications for this encounter.    Current Outpatient Medications:      acetaminophen (TYLENOL) 325 MG tablet, [ACETAMINOPHEN (TYLENOL) 325 MG TABLET] Take 325 mg by mouth every 6 (six) hours as needed for pain., Disp: , Rfl:      aspirin 81 MG EC tablet, [ASPIRIN 81 MG EC TABLET] Take 81 mg by mouth every evening. , Disp: , Rfl:      CERTAVITE SENIOR 0.4-300-250 mg-mcg-mcg tablet, [CERTAVITE SENIOR 0.4-300-250 MG-MCG-MCG TABLET] Take 1 tablet by mouth daily., Disp: , Rfl:      COMBIVENT RESPIMAT  mcg/actuation Mist inhaler, [COMBIVENT RESPIMAT  MCG/ACTUATION MIST INHALER] Inhale 1 puff as needed., Disp: , Rfl:      isosorbide mononitrate (IMDUR) 30 MG 24 hr tablet, [ISOSORBIDE MONONITRATE (IMDUR) 30 MG 24 HR TABLET] Take 1 tablet (30 mg total) by mouth daily., Disp: 90 tablet, Rfl: 2     MEDICATION CANNOT BE REORDERED - PLEASE MANUALLY REORDER AND DISCONTINUE THE OLD ORDER, [LEUPROLIDE, 6 MONTH, (ELIGARD, 6 MONTH,) 45 MG SYRINGE] Inject 45 mg under the skin. Every six months, Disp: , Rfl:      metoprolol tartrate (LOPRESSOR) 50 MG tablet, Take 1 tablet (50 mg) by mouth 2 times daily, Disp: 180 tablet, Rfl: 0     mirabegron (MYRBETRIQ) 50 mg Tb24 ER tablet, [MIRABEGRON (MYRBETRIQ) 50 MG TB24 ER TABLET] Take 50 mg by mouth daily., Disp: , Rfl:      multivitamin with minerals (THERA-M) 9 mg iron-400 mcg Tab tablet, [MULTIVITAMIN WITH MINERALS (THERA-M) 9 MG IRON-400 MCG TAB TABLET] Take 1 tablet by mouth daily., Disp: , Rfl:      oxybutynin (DITROPAN XL) 15 MG 24 hr tablet, [OXYBUTYNIN (DITROPAN XL) 15 MG 24 HR TABLET] Take 15 mg by mouth daily., Disp: , Rfl:      oxybutynin (DITROPAN XL) 5 MG ER tablet, Take 5 mg by mouth daily , Disp: , Rfl:      rosuvastatin (CRESTOR) 20 MG tablet, TAKE 1 TABLET(20 MG) BY MOUTH AT BEDTIME, Disp: 90 tablet, Rfl: 0     torsemide (DEMADEX) 20 MG tablet, Take 1 tablet (20 mg) by mouth daily, Disp: 90 tablet, Rfl: 1     valsartan-hydrochlorothiazide  (DIOVAN HCT) 160-25 MG tablet, TAKE 1 TABLET BY MOUTH DAILY, Disp: 90 tablet, Rfl: 0    ALLERGIES:  Allergies   Allergen Reactions     Atorvastatin Muscle Pain (Myalgia)     Lisinopril Cough     Morphine Other (See Comments)     hypotension     Sulfa (Sulfonamide Antibiotics) [Sulfa Drugs] Hives     Vicodin [Hydrocodone-Acetaminophen] Unknown       FAMILY HISTORY:  Family History   Problem Relation Age of Onset     Diabetes Mother      Cerebrovascular Disease Father      Heart Disease Brother      Coronary Artery Disease Brother        SOCIAL HISTORY:   Social History     Socioeconomic History     Marital status:      Spouse name: Santa     Number of children: 0     Years of education: None     Highest education level: 12th grade   Occupational History     Occupation: Retired    Tobacco Use     Smoking status: Never Smoker     Smokeless tobacco: Never Used   Substance and Sexual Activity     Alcohol use: Yes     Comment: Alcoholic Drinks/day: 2-3 beer/ month     Drug use: No     Sexual activity: Not Currently     Partners: Female   Other Topics Concern     None   Social History Narrative     None     Social Determinants of Health     Financial Resource Strain: Low Risk      Difficulty of Paying Living Expenses: Not hard at all   Food Insecurity: No Food Insecurity     Worried About Running Out of Food in the Last Year: Never true     Ran Out of Food in the Last Year: Never true   Transportation Needs: No Transportation Needs     Lack of Transportation (Medical): No     Lack of Transportation (Non-Medical): No   Physical Activity: Inactive     Days of Exercise per Week: 0 days     Minutes of Exercise per Session: 0 min   Stress: No Stress Concern Present     Feeling of Stress : Only a little   Social Connections: Socially Isolated     Frequency of Communication with Friends and Family: Never     Frequency of Social Gatherings with Friends and Family: Never     Attends [employer] Services: Never     Active Member  "of Clubs or Organizations: No     Attends Club or Organization Meetings: Never     Marital Status:    Intimate Partner Violence: Not At Risk     Fear of Current or Ex-Partner: No     Emotionally Abused: No     Physically Abused: No     Sexually Abused: No       VITALS:  Patient Vitals for the past 24 hrs:   BP Temp Temp src Pulse Resp SpO2 Height Weight   10/29/21 1841 132/60 97.6  F (36.4  C) Oral 60 14 93 % 1.753 m (5' 9\") 122.5 kg (270 lb)        PHYSICAL EXAM    Constitutional:  Awake, alert, morbidly obese male in mild distress  HENT:  Normocephalic, Atraumatic. Bilateral external ears normal. Tacky mucous membranes. Nose normal. Neck- Normal range of motion with no guarding, No midline cervical tenderness, Supple, No stridor.   Eyes:  PERRL, EOMI with no signs of entrapment, Conjunctiva normal, No discharge.   Respiratory:  Diminished breath sounds, No respiratory distress, No wheezing.    Cardiovascular:  Normal heart rate, Normal rhythm, No appreciable murmur, rubs or gallops.   GI:  Obese, Soft, No tenderness, No distension, No palpable masses  Musculoskeletal:   Trace lower extremity edema. Good range of motion in all major joints. No tenderness to palpation or major deformities noted.  Integument:  Warm, Dry, No erythema, No rash.   Neurologic:  Alert & oriented, Normal motor function, Normal sensory function, No focal deficits noted.   Psychiatric:  Affect normal, Judgment normal, Mood normal.     LAB:  All pertinent labs reviewed and interpreted.  Results for orders placed or performed during the hospital encounter of 10/29/21   Comprehensive metabolic panel   Result Value Ref Range    Sodium 139 136 - 145 mmol/L    Potassium 4.0 3.5 - 5.0 mmol/L    Chloride 98 98 - 107 mmol/L    Carbon Dioxide (CO2) 27 22 - 31 mmol/L    Anion Gap 14 5 - 18 mmol/L    Urea Nitrogen 99 (H) 8 - 28 mg/dL    Creatinine 3.56 (H) 0.70 - 1.30 mg/dL    Calcium 9.4 8.5 - 10.5 mg/dL    Glucose 144 (H) 70 - 125 mg/dL    " Alkaline Phosphatase 56 45 - 120 U/L    AST 47 (H) 0 - 40 U/L    ALT 46 (H) 0 - 45 U/L    Protein Total 7.0 6.0 - 8.0 g/dL    Albumin 3.4 (L) 3.5 - 5.0 g/dL    Bilirubin Total 0.5 0.0 - 1.0 mg/dL    GFR Estimate 14 (L) >60 mL/min/1.73m2   Result Value Ref Range    Troponin I 0.06 0.00 - 0.29 ng/mL   B-Type Natriuretic Peptide ([facility] Only)   Result Value Ref Range    BNP 67 0 - 93 pg/mL   CBC (+ platelets, no diff)   Result Value Ref Range    WBC Count 9.4 4.0 - 11.0 10e3/uL    RBC Count 4.38 (L) 4.40 - 5.90 10e6/uL    Hemoglobin 13.4 13.3 - 17.7 g/dL    Hematocrit 41.0 40.0 - 53.0 %    MCV 94 78 - 100 fL    MCH 30.6 26.5 - 33.0 pg    MCHC 32.7 31.5 - 36.5 g/dL    RDW 13.2 10.0 - 15.0 %    Platelet Count 182 150 - 450 10e3/uL         EKG:    Sinus bradycardia first-degree AV block.  Low voltage QRS.  No acute ST segment elevations.  Essentially unchanged from 4/8/2021  I have independently reviewed and interpreted the EKG(s) documented above.      I, Shavon Carr, am serving as a scribe to document services personally performed by Esteban Khoury MD, based on my observation and the provider's statements to me. I, Esteban Khoury MD attest that Shavon Carr is acting in a scribe capacity, has observed my performance of the services and has documented them in accordance with my direction.    Esteban Khoury M.D.  Emergency Medicine  [facility] EMERGENCY ROOM     Esteban Khoury MD  10/29/21 2029

## 2021-10-30 NOTE — DISCHARGE INSTRUCTIONS
Your blood work today shows your kidneys have been affected by the increased diuretics.  Do not take the Demadex tomorrow nor on Sunday.  Follow-up with your doctor on Monday to have repeat kidney function testing performed.  Over the weekend if there is any increased signs of illness or lightheadedness he should return immediately

## 2021-11-01 ENCOUNTER — LAB REQUISITION (OUTPATIENT)
Dept: LAB | Facility: CLINIC | Age: 86
End: 2021-11-01
Payer: MEDICARE

## 2021-11-01 ENCOUNTER — TELEPHONE (OUTPATIENT)
Dept: CARDIOLOGY | Facility: CLINIC | Age: 86
End: 2021-11-01

## 2021-11-01 DIAGNOSIS — I10 ESSENTIAL HYPERTENSION: ICD-10-CM

## 2021-11-01 DIAGNOSIS — N17.9 ACUTE KIDNEY FAILURE, UNSPECIFIED (H): ICD-10-CM

## 2021-11-01 LAB
ANION GAP SERPL CALCULATED.3IONS-SCNC: 14 MMOL/L (ref 5–18)
BUN SERPL-MCNC: 78 MG/DL (ref 8–28)
CALCIUM SERPL-MCNC: 9.1 MG/DL (ref 8.5–10.5)
CHLORIDE BLD-SCNC: 98 MMOL/L (ref 98–107)
CO2 SERPL-SCNC: 25 MMOL/L (ref 22–31)
CREAT SERPL-MCNC: 2.29 MG/DL (ref 0.7–1.3)
GFR SERPL CREATININE-BSD FRML MDRD: 24 ML/MIN/1.73M2
GLUCOSE BLD-MCNC: 167 MG/DL (ref 70–125)
POTASSIUM BLD-SCNC: 3.9 MMOL/L (ref 3.5–5)
SODIUM SERPL-SCNC: 137 MMOL/L (ref 136–145)

## 2021-11-01 PROCEDURE — 80048 BASIC METABOLIC PNL TOTAL CA: CPT | Mod: ORL | Performed by: STUDENT IN AN ORGANIZED HEALTH CARE EDUCATION/TRAINING PROGRAM

## 2021-11-01 RX ORDER — METOPROLOL TARTRATE 50 MG
25 TABLET ORAL 2 TIMES DAILY
Qty: 90 TABLET | Refills: 0
Start: 2021-11-01 | End: 2021-11-10

## 2021-11-01 NOTE — TELEPHONE ENCOUNTER
Spoke with wife and pt has been seen in ED and PCP office.  Stopping Torsemide and adjusting Losartan -hydrochlorothiazide to stop the hydrochlorothiazide. He will see the PCP office again on Thursday. Wife is concerned about med changes. Pt bp is 88/52 and HR in 50 in ED and office. Also holding metoprolol for one day. Will update Dr. Sheridan.

## 2021-11-01 NOTE — TELEPHONE ENCOUNTER
Left VM for pt and wife with return number. Will update med list.     Advise to reduce metoprolol from 50 mg bid to 25 mg bid.   Hold hydrochlorothiazide.   Thanks   Jered    Message text

## 2021-11-01 NOTE — TELEPHONE ENCOUNTER
----- Message from Neris Wilkins sent at 11/1/2021  3:02 PM CDT -----  Regarding: [facility] pt  General phone call:    Caller: Santa - spouse   Primary cardiologist: [facility]  Detailed reason for call: Torseide was decreased on Saturday but [facility]went [facility] the [facility] at [facility] at night because of low BP and he was totally dehydrated.  They told him not to take any and a PMD saw him today and changed everything today    Best phone number: [phone]   Best time to contact: any  Ok to leave a detailedmessage? yes  Device? no    Additional Info:

## 2021-11-05 ENCOUNTER — LAB REQUISITION (OUTPATIENT)
Dept: LAB | Facility: CLINIC | Age: 86
End: 2021-11-05
Payer: MEDICARE

## 2021-11-05 DIAGNOSIS — N17.9 ACUTE KIDNEY FAILURE, UNSPECIFIED (H): ICD-10-CM

## 2021-11-05 LAB
ANION GAP SERPL CALCULATED.3IONS-SCNC: 10 MMOL/L (ref 5–18)
ATRIAL RATE - MUSE: 33 BPM
BUN SERPL-MCNC: 48 MG/DL (ref 8–28)
CALCIUM SERPL-MCNC: 8.8 MG/DL (ref 8.5–10.5)
CHLORIDE BLD-SCNC: 104 MMOL/L (ref 98–107)
CO2 SERPL-SCNC: 23 MMOL/L (ref 22–31)
CREAT SERPL-MCNC: 1.77 MG/DL (ref 0.7–1.3)
DIASTOLIC BLOOD PRESSURE - MUSE: NORMAL MMHG
GFR SERPL CREATININE-BSD FRML MDRD: 33 ML/MIN/1.73M2
GLUCOSE BLD-MCNC: 128 MG/DL (ref 70–125)
INTERPRETATION ECG - MUSE: NORMAL
P AXIS - MUSE: NORMAL DEGREES
POTASSIUM BLD-SCNC: 4.6 MMOL/L (ref 3.5–5)
PR INTERVAL - MUSE: 308 MS
QRS DURATION - MUSE: 88 MS
QT - MUSE: 430 MS
QTC - MUSE: 430 MS
R AXIS - MUSE: 30 DEGREES
SODIUM SERPL-SCNC: 137 MMOL/L (ref 136–145)
SYSTOLIC BLOOD PRESSURE - MUSE: NORMAL MMHG
T AXIS - MUSE: 75 DEGREES
VENTRICULAR RATE- MUSE: 60 BPM

## 2021-11-05 PROCEDURE — 80048 BASIC METABOLIC PNL TOTAL CA: CPT | Mod: ORL | Performed by: STUDENT IN AN ORGANIZED HEALTH CARE EDUCATION/TRAINING PROGRAM

## 2021-11-10 ENCOUNTER — OFFICE VISIT (OUTPATIENT)
Dept: CARDIOLOGY | Facility: CLINIC | Age: 86
End: 2021-11-10
Attending: INTERNAL MEDICINE
Payer: MEDICARE

## 2021-11-10 VITALS — DIASTOLIC BLOOD PRESSURE: 68 MMHG | SYSTOLIC BLOOD PRESSURE: 120 MMHG | HEART RATE: 68 BPM | RESPIRATION RATE: 24 BRPM

## 2021-11-10 DIAGNOSIS — I71.21 ASCENDING AORTIC ANEURYSM (H): ICD-10-CM

## 2021-11-10 DIAGNOSIS — R06.09 DYSPNEA ON EXERTION: ICD-10-CM

## 2021-11-10 DIAGNOSIS — N18.32 STAGE 3B CHRONIC KIDNEY DISEASE (H): ICD-10-CM

## 2021-11-10 DIAGNOSIS — I25.10 CORONARY ARTERY DISEASE INVOLVING NATIVE CORONARY ARTERY OF NATIVE HEART WITHOUT ANGINA PECTORIS: Primary | ICD-10-CM

## 2021-11-10 DIAGNOSIS — E78.5 DYSLIPIDEMIA: ICD-10-CM

## 2021-11-10 DIAGNOSIS — I10 ESSENTIAL HYPERTENSION: ICD-10-CM

## 2021-11-10 DIAGNOSIS — I50.32 CHRONIC DIASTOLIC CONGESTIVE HEART FAILURE (H): ICD-10-CM

## 2021-11-10 LAB
ANION GAP SERPL CALCULATED.3IONS-SCNC: 7 MMOL/L (ref 5–18)
BUN SERPL-MCNC: 21 MG/DL (ref 8–28)
CALCIUM SERPL-MCNC: 8.6 MG/DL (ref 8.5–10.5)
CHLORIDE BLD-SCNC: 108 MMOL/L (ref 98–107)
CO2 SERPL-SCNC: 22 MMOL/L (ref 22–31)
CREAT SERPL-MCNC: 1.17 MG/DL (ref 0.7–1.3)
ERYTHROCYTE [DISTWIDTH] IN BLOOD BY AUTOMATED COUNT: 13.7 % (ref 10–15)
GFR SERPL CREATININE-BSD FRML MDRD: 54 ML/MIN/1.73M2
GLUCOSE BLD-MCNC: 170 MG/DL (ref 70–125)
HCT VFR BLD AUTO: 37.8 % (ref 40–53)
HGB BLD-MCNC: 12.4 G/DL (ref 13.3–17.7)
MCH RBC QN AUTO: 31.3 PG (ref 26.5–33)
MCHC RBC AUTO-ENTMCNC: 32.8 G/DL (ref 31.5–36.5)
MCV RBC AUTO: 96 FL (ref 78–100)
PLATELET # BLD AUTO: 165 10E3/UL (ref 150–450)
POTASSIUM BLD-SCNC: 4.6 MMOL/L (ref 3.5–5)
RBC # BLD AUTO: 3.96 10E6/UL (ref 4.4–5.9)
SODIUM SERPL-SCNC: 137 MMOL/L (ref 136–145)
WBC # BLD AUTO: 8.7 10E3/UL (ref 4–11)

## 2021-11-10 PROCEDURE — 85027 COMPLETE CBC AUTOMATED: CPT | Performed by: INTERNAL MEDICINE

## 2021-11-10 PROCEDURE — 36415 COLL VENOUS BLD VENIPUNCTURE: CPT | Performed by: INTERNAL MEDICINE

## 2021-11-10 PROCEDURE — 99215 OFFICE O/P EST HI 40 MIN: CPT | Performed by: INTERNAL MEDICINE

## 2021-11-10 PROCEDURE — 80048 BASIC METABOLIC PNL TOTAL CA: CPT | Performed by: INTERNAL MEDICINE

## 2021-11-10 RX ORDER — DOXYCYCLINE 100 MG/1
100 TABLET ORAL 2 TIMES DAILY
COMMUNITY
Start: 2021-10-12 | End: 2022-06-02

## 2021-11-10 RX ORDER — NYSTATIN 100000 [USP'U]/G
POWDER TOPICAL PRN
COMMUNITY
Start: 2021-09-26 | End: 2023-01-12

## 2021-11-10 RX ORDER — VALSARTAN 40 MG/1
40 TABLET ORAL DAILY
Status: ON HOLD | COMMUNITY
Start: 2021-11-03 | End: 2024-07-31

## 2021-11-10 RX ORDER — METOPROLOL TARTRATE 25 MG/1
1 TABLET, FILM COATED ORAL 2 TIMES DAILY
COMMUNITY
End: 2021-12-27

## 2021-11-10 RX ORDER — TRIAMCINOLONE ACETONIDE 1 MG/G
1 OINTMENT TOPICAL PRN
COMMUNITY
End: 2024-07-30

## 2021-11-10 NOTE — LETTER
11/10/2021    Erasmo Michael MD  [facility] [address]  [place] [address]    RE: Chava YOUNG Dorigo       Dear Colleague,    I had the pleasure of seeing Chava Lincoln in the [facility].            Assessment/Plan:   1.  Worsening chronic congestive heart failure with preserved ejection fraction: After his emergency visit on October 29, the patient gained 10 pounds, complains of worsening shortness of breath.  His torsemide was discontinued due to acute on chronic renal injury.  His creatinine was much improved several days ago from 3.7-1.7.    We discussed low-salt diet less than 2 g a day, fluid restriction 1.5 L a day.  Restart the torsemide 20 mg daily.  If his weight is not going down, consider to increase torsemide to 40 mg daily.  Schedule the patient to see heart failure clinic in 10 to 14 days, follow-up with me in 2 weeks.  Also asked the patient's wife to use my chart to communicate with me anytime.     2.  Coronary artery disease s/p KATHY to Ramus, severe calcified RCA lesion with high risk of revascularization, no obstructive lesion in left main and LAD per IVUS:  He has no chest pain.  Continue medical treatment.  Continue aspirin, Crestor, isosorbide mononitrate and metoprolol.      3.  Essential hypertension: His blood pressure is controlled, continue valsartan 80 mg daily and metoprolol 25 mg twice a day     4.  Dyslipidemia: Continue Crestor 20 mg nightly.  Last LDL was controlled.     5.  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     6.  Morbid obesity and obstructive sleep apnea: Lifestyle modification and continue CPAP.      Thank you for the opportunity to be involved in the care of Chava Lincoln. If you have any questions, please feel free to contact me.  I will see the patient again in 2 months and as needed.    Much or all of the text in this note was generated through the use of Dragon  Dictate voice-to-text software. Errors in spelling or words which seem out of context are unintentional. Sound alike errors, in particular, may have escaped editing.       History of Present Illness:   It is my pleasure to see Chava Lincoln at the [facility] clinic for routine cardiology follow up.  Chava Lincoln is a 89 year old male with a medical history of coronary artery disease, s/p KATHY to Ramus in 2-2019, no obstructive disease in LM and LAD, severe calcification lesions in RCA, essential hypertension, dyslipidemia, chronic diastolic congestive heart failure, morbid obesity, hyponatremia, right diaphragm hemiparalysis, obstructive sleep apnea on CPAP.    The patient presented to emergency room 10 days ago due to low blood pressure.  He lost 20 pounds with diuretic torsemide 40 mg daily.  He was fine to have significantly elevated creatinine from baseline.  The patient's torsemide was discontinued.  Valsartan-hydrochlorothiazide was discontinued, started valsartan 80 mg daily, continued metoprolol 25 mg twice a day.  Over last 10 days, he gained the 10 pounds of weight again.  He has worsening shortness of breath.  His blood pressure was improved, 120/68 mmHg this morning.  His heart rate is in normal range.  The patient has no chest pain.  He has of worsening shortness of breath as mentioned above.  His leg edema is a slightly getting worse, but still better than before.  He had no palpitations, dizziness.  He is not able to do exercise of for long time.  His activities is from a chair to bathroom.    Past Medical History:     Patient Active Problem List   Diagnosis     GERD (gastroesophageal reflux disease)     RAMON on CPAP     Near syncope     Dyspnea on exertion     Essential hypertension     Dyslipidemia     Coronary artery disease involving native coronary artery of native heart without angina pectoris     Ascending aortic aneurysm (H)     Coronary artery calcification seen on  CAT scan     Pulmonary hypertension (H)     History of pulmonary embolism     Hypoxia     Morbid obesity (H)     Bilateral lower extremity edema     Acute kidney injury (H)     Chronic kidney disease, stage 3 (H)     (HFpEF) heart failure with preserved ejection fraction (H)     Dehydration       Past Surgical History:     Past Surgical History:   Procedure Laterality Date     CV CORONARY ANGIOGRAM N/A 12/10/2018    Procedure: Coronary Angiogram;  Surgeon: Tana Duncan MD;  Location: [facility] Lab;  Service: Cardiology     CV LEFT HEART CATHETERIZATION WITHOUT LEFT VENTRICULOGRAM Left 12/10/2018    Procedure: Left Heart Catheterization Without Left Ventriculogram;  Surgeon: Tana Duncan MD;  Location: [facility] Lab;  Service: Cardiology     CV LEFT HEART CATHETERIZATION WITHOUT LEFT VENTRICULOGRAM Left 02/04/2019    Procedure: Left Heart Catheterization Without Left Ventriculogram;  Surgeon: Tana Duncan MD;  Location: [facility] Lab;  Service: Cardiology     CV RIGHT HEART CATHETERIZATION N/A 02/04/2019    Procedure: Right Heart Catheterization;  Surgeon: Tana Duncan MD;  Location: [facility] Lab;  Service: Cardiology     HERNIA REPAIR       JOINT REPLACEMENT       KNEE SURGERY Bilateral      OTHER SURGICAL HISTORY      Prostatic cryotherapy       Family History:     Family History   Problem Relation Age of Onset     Diabetes Mother      Cerebrovascular Disease Father      Heart Disease Brother      Coronary Artery Disease Brother         Social History:    reports that he has never smoked. He has never used smokeless tobacco. He reports current alcohol use. He reports that he does not use drugs.    Review of Systems:   12 systems are reviewed negative except for in HPI.    Meds:     Current Outpatient Medications:      acetaminophen (TYLENOL) 325 MG tablet, [ACETAMINOPHEN (TYLENOL) 325 MG TABLET] Take 325 mg by mouth every 6 (six) hours as needed for pain., Disp: , Rfl:       aspirin 81 MG EC tablet, [ASPIRIN 81 MG EC TABLET] Take 81 mg by mouth every evening. , Disp: , Rfl:      COMBIVENT RESPIMAT  mcg/actuation Mist inhaler, [COMBIVENT RESPIMAT  MCG/ACTUATION MIST INHALER] Inhale 1 puff as needed., Disp: , Rfl:      doxycycline monohydrate (ADOXA) 100 MG tablet, Take 100 mg by mouth 2 times daily, Disp: , Rfl:      isosorbide mononitrate (IMDUR) 30 MG 24 hr tablet, [ISOSORBIDE MONONITRATE (IMDUR) 30 MG 24 HR TABLET] Take 1 tablet (30 mg total) by mouth daily., Disp: 90 tablet, Rfl: 2     MEDICATION CANNOT BE REORDERED - PLEASE MANUALLY REORDER AND DISCONTINUE THE OLD ORDER, [LEUPROLIDE, 6 MONTH, (ELIGARD, 6 MONTH,) 45 MG SYRINGE] Inject 45 mg under the skin. Every six months, Disp: , Rfl:      metoprolol tartrate (LOPRESSOR) 25 MG tablet, Take 1 tablet by mouth 2 times daily, Disp: , Rfl:      mirabegron (MYRBETRIQ) 50 mg Tb24 ER tablet, [MIRABEGRON (MYRBETRIQ) 50 MG TB24 ER TABLET] Take 50 mg by mouth daily., Disp: , Rfl:      multivitamin with minerals (THERA-M) 9 mg iron-400 mcg Tab tablet, [MULTIVITAMIN WITH MINERALS (THERA-M) 9 MG IRON-400 MCG TAB TABLET] Take 1 tablet by mouth daily., Disp: , Rfl:      NYSTOP 580504 UNIT/GM powder, as needed, Disp: , Rfl:      oxybutynin (DITROPAN XL) 15 MG 24 hr tablet, [OXYBUTYNIN (DITROPAN XL) 15 MG 24 HR TABLET] Take 15 mg by mouth daily., Disp: , Rfl:      oxybutynin (DITROPAN XL) 5 MG ER tablet, Take 5 mg by mouth daily , Disp: , Rfl:      rosuvastatin (CRESTOR) 20 MG tablet, TAKE 1 TABLET(20 MG) BY MOUTH AT BEDTIME, Disp: 90 tablet, Rfl: 0     torsemide (DEMADEX) 20 MG tablet, Take 1 tablet (20 mg) by mouth daily, Disp: 90 tablet, Rfl: 1     triamcinolone (KENALOG) 0.1 % external ointment, triamcinolone acetonide 0.1 % topical ointment  APPLY TOPICALLY TO RASH ON BACK TWICE DAILY FOR 2 WEEKS, Disp: , Rfl:      valsartan (DIOVAN) 80 MG tablet, Take 80 mg by mouth daily, Disp: , Rfl:      CERTAVITE SENIOR 0.4-300-250  mg-mcg-mcg tablet, [CERTAVITE SENIOR 0.4-300-250 MG-MCG-MCG TABLET] Take 1 tablet by mouth daily., Disp: , Rfl:     Allergies:   Atorvastatin, Lisinopril, Morphine, Sulfa (sulfonamide antibiotics) [sulfa drugs], and Vicodin [hydrocodone-acetaminophen]      Objective:      Physical Exam        There is no height or weight on file to calculate BMI.  /68 (BP Location: Right arm, Patient Position: Sitting, Cuff Size: Adult Large)   Pulse 68   Resp 24     General Appearance:   Awake, Alert, No acute distress.   HEENT:  Pupil equal and reactive to light. No scleral icterus; the mucous membranes were moist.   Neck: No cervical bruits. Not able to see JVD. No thyromegaly.     Chest: The spine was straight. The chest was symmetric.   Lungs:   Diminished breathing sounds No crackles. No wheezing.   Cardiovascular:   Regular rhythm and rate, remote first and second heart sounds with no murmurs. No rubs or gallops.    Abdomen:  Obese. Soft. No tenderness. Non-distended. Bowels sounds are present   Extremities: Mild bilateral leg edema.   Skin: Red skin rashes most in back. Warm, Dry.   Musculoskeletal: No tenderness. No deformity.   Neurologic: Mood and affect are appropriate. No focal deficits.         EKG:  Personally reivewed  Sinus bradycardia with 1st degree A-V block   Inferior infarct (cited on or before 16-JUL-1998)   Poor R wave progression   Abnormal ECG   When compared with ECG of 21-NOV-2016 15:33,   Vent. rate has decreased BY  32 BPM      Cardiac Imaging Studies  Echocardiogram on 4-8-2021:    Extremely poor visualization    Probably normal LV systolic function    RV appears to be dilated    Valves were not visualized    There is no obvious pericardial effusion      Coronary angiogram with PCI on 2-4-2019:    IVUS left main and LAD showed no severely obstructive lesions    Ramus disease treated with rotational atherectomy and stented with a Synergy KATHY under IVUS guidance with good angiographic  results    Right heart catheterizations show LV EDP 22 mmHg and PCWP 30 mmHg, PA mean 34 mmHg, RA 13 mmHg      Lab Review   Lab Results   Component Value Date     06/23/2021    CO2 23 06/23/2021    BUN 21 06/23/2021     Lab Results   Component Value Date    WBC 8.5 05/17/2021    HGB 13.9 05/17/2021    HCT 40.6 05/17/2021    MCV 90 05/17/2021     05/17/2021     Lab Results   Component Value Date    CHOL 102 09/20/2021    CHOL 133 12/23/2020    CHOL 126 10/08/2019     Lab Results   Component Value Date    HDL 49 09/20/2021    HDL 56 12/23/2020    HDL 54 10/08/2019     No components found for: LDLCALC  Lab Results   Component Value Date    TRIG 97 09/20/2021    TRIG 146 12/23/2020    TRIG 162 (H) 10/08/2019     Lab Results   Component Value Date    TROPONINI 0.02 04/08/2021     Lab Results   Component Value Date    BNP 84 05/17/2021

## 2021-11-10 NOTE — PROGRESS NOTES
Assessment/Plan:   1.  Worsening chronic congestive heart failure with preserved ejection fraction: After his emergency visit on October 29, the patient gained 10 pounds, complains of worsening shortness of breath.  His torsemide was discontinued due to acute on chronic renal injury.  His creatinine was much improved several days ago from 3.7-1.7.    We discussed low-salt diet less than 2 g a day, fluid restriction 1.5 L a day.  Restart the torsemide 20 mg daily.  If his weight is not going down, consider to increase torsemide to 40 mg daily.  Schedule the patient to see heart failure clinic in 10 to 14 days, follow-up with me in 2 weeks.  Also asked the patient's wife to use my chart to communicate with me anytime.     2.  Coronary artery disease s/p KATHY to Ramus, severe calcified RCA lesion with high risk of revascularization, no obstructive lesion in left main and LAD per IVUS:  He has no chest pain.  Continue medical treatment.  Continue aspirin, Crestor, isosorbide mononitrate and metoprolol.      3.  Essential hypertension: His blood pressure is controlled, continue valsartan 80 mg daily and metoprolol 25 mg twice a day     4.  Dyslipidemia: Continue Crestor 20 mg nightly.  Last LDL was controlled.     5.  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     6.  Morbid obesity and obstructive sleep apnea: Lifestyle modification and continue CPAP.      Thank you for the opportunity to be involved in the care of Chava Lincoln. If you have any questions, please feel free to contact me.  I will see the patient again in 2 months and as needed.    Much or all of the text in this note was generated through the use of Dragon Dictate voice-to-text software. Errors in spelling or words which seem out of context are unintentional. Sound alike errors, in particular, may have escaped editing.       History of Present Illness:   It is my pleasure to see Chava Lincoln at the [facility]  clinic for routine cardiology follow up.  Chava Lincoln is a 89 year old male with a medical history of coronary artery disease, s/p KATHY to Ramus in 2-2019, no obstructive disease in LM and LAD, severe calcification lesions in RCA, essential hypertension, dyslipidemia, chronic diastolic congestive heart failure, morbid obesity, hyponatremia, right diaphragm hemiparalysis, obstructive sleep apnea on CPAP.    The patient presented to emergency room 10 days ago due to low blood pressure.  He lost 20 pounds with diuretic torsemide 40 mg daily.  He was fine to have significantly elevated creatinine from baseline.  The patient's torsemide was discontinued.  Valsartan-hydrochlorothiazide was discontinued, started valsartan 80 mg daily, continued metoprolol 25 mg twice a day.  Over last 10 days, he gained the 10 pounds of weight again.  He has worsening shortness of breath.  His blood pressure was improved, 120/68 mmHg this morning.  His heart rate is in normal range.  The patient has no chest pain.  He has of worsening shortness of breath as mentioned above.  His leg edema is a slightly getting worse, but still better than before.  He had no palpitations, dizziness.  He is not able to do exercise of for long time.  His activities is from a chair to bathroom.    Past Medical History:     Patient Active Problem List   Diagnosis     GERD (gastroesophageal reflux disease)     RAMON on CPAP     Near syncope     Dyspnea on exertion     Essential hypertension     Dyslipidemia     Coronary artery disease involving native coronary artery of native heart without angina pectoris     Ascending aortic aneurysm (H)     Coronary artery calcification seen on CAT scan     Pulmonary hypertension (H)     History of pulmonary embolism     Hypoxia     Morbid obesity (H)     Bilateral lower extremity edema     Acute kidney injury (H)     Chronic kidney disease, stage 3 (H)     (HFpEF) heart failure with preserved ejection fraction (H)      Dehydration       Past Surgical History:     Past Surgical History:   Procedure Laterality Date     CV CORONARY ANGIOGRAM N/A 12/10/2018    Procedure: Coronary Angiogram;  Surgeon: Tana Duncan MD;  Location: [facility] Lab;  Service: Cardiology     CV LEFT HEART CATHETERIZATION WITHOUT LEFT VENTRICULOGRAM Left 12/10/2018    Procedure: Left Heart Catheterization Without Left Ventriculogram;  Surgeon: Tana Duncan MD;  Location: [facility] Lab;  Service: Cardiology     CV LEFT HEART CATHETERIZATION WITHOUT LEFT VENTRICULOGRAM Left 02/04/2019    Procedure: Left Heart Catheterization Without Left Ventriculogram;  Surgeon: Tana Duncan MD;  Location: [facility] Lab;  Service: Cardiology     CV RIGHT HEART CATHETERIZATION N/A 02/04/2019    Procedure: Right Heart Catheterization;  Surgeon: Tana Duncan MD;  Location: [facility] Lab;  Service: Cardiology     HERNIA REPAIR       JOINT REPLACEMENT       KNEE SURGERY Bilateral      OTHER SURGICAL HISTORY      Prostatic cryotherapy       Family History:     Family History   Problem Relation Age of Onset     Diabetes Mother      Cerebrovascular Disease Father      Heart Disease Brother      Coronary Artery Disease Brother         Social History:    reports that he has never smoked. He has never used smokeless tobacco. He reports current alcohol use. He reports that he does not use drugs.    Review of Systems:   12 systems are reviewed negative except for in HPI.    Meds:     Current Outpatient Medications:      acetaminophen (TYLENOL) 325 MG tablet, [ACETAMINOPHEN (TYLENOL) 325 MG TABLET] Take 325 mg by mouth every 6 (six) hours as needed for pain., Disp: , Rfl:      aspirin 81 MG EC tablet, [ASPIRIN 81 MG EC TABLET] Take 81 mg by mouth every evening. , Disp: , Rfl:      COMBIVENT RESPIMAT  mcg/actuation Mist inhaler, [COMBIVENT RESPIMAT  MCG/ACTUATION MIST INHALER] Inhale 1 puff as needed., Disp: , Rfl:      doxycycline  monohydrate (ADOXA) 100 MG tablet, Take 100 mg by mouth 2 times daily, Disp: , Rfl:      isosorbide mononitrate (IMDUR) 30 MG 24 hr tablet, [ISOSORBIDE MONONITRATE (IMDUR) 30 MG 24 HR TABLET] Take 1 tablet (30 mg total) by mouth daily., Disp: 90 tablet, Rfl: 2     MEDICATION CANNOT BE REORDERED - PLEASE MANUALLY REORDER AND DISCONTINUE THE OLD ORDER, [LEUPROLIDE, 6 MONTH, (ELIGARD, 6 MONTH,) 45 MG SYRINGE] Inject 45 mg under the skin. Every six months, Disp: , Rfl:      metoprolol tartrate (LOPRESSOR) 25 MG tablet, Take 1 tablet by mouth 2 times daily, Disp: , Rfl:      mirabegron (MYRBETRIQ) 50 mg Tb24 ER tablet, [MIRABEGRON (MYRBETRIQ) 50 MG TB24 ER TABLET] Take 50 mg by mouth daily., Disp: , Rfl:      multivitamin with minerals (THERA-M) 9 mg iron-400 mcg Tab tablet, [MULTIVITAMIN WITH MINERALS (THERA-M) 9 MG IRON-400 MCG TAB TABLET] Take 1 tablet by mouth daily., Disp: , Rfl:      NYSTOP 815854 UNIT/GM powder, as needed, Disp: , Rfl:      oxybutynin (DITROPAN XL) 15 MG 24 hr tablet, [OXYBUTYNIN (DITROPAN XL) 15 MG 24 HR TABLET] Take 15 mg by mouth daily., Disp: , Rfl:      oxybutynin (DITROPAN XL) 5 MG ER tablet, Take 5 mg by mouth daily , Disp: , Rfl:      rosuvastatin (CRESTOR) 20 MG tablet, TAKE 1 TABLET(20 MG) BY MOUTH AT BEDTIME, Disp: 90 tablet, Rfl: 0     torsemide (DEMADEX) 20 MG tablet, Take 1 tablet (20 mg) by mouth daily, Disp: 90 tablet, Rfl: 1     triamcinolone (KENALOG) 0.1 % external ointment, triamcinolone acetonide 0.1 % topical ointment  APPLY TOPICALLY TO RASH ON BACK TWICE DAILY FOR 2 WEEKS, Disp: , Rfl:      valsartan (DIOVAN) 80 MG tablet, Take 80 mg by mouth daily, Disp: , Rfl:      CERTAVITE SENIOR 0.4-300-250 mg-mcg-mcg tablet, [CERTAVITE SENIOR 0.4-300-250 MG-MCG-MCG TABLET] Take 1 tablet by mouth daily., Disp: , Rfl:     Allergies:   Atorvastatin, Lisinopril, Morphine, Sulfa (sulfonamide antibiotics) [sulfa drugs], and Vicodin [hydrocodone-acetaminophen]      Objective:      Physical  Exam        There is no height or weight on file to calculate BMI.  /68 (BP Location: Right arm, Patient Position: Sitting, Cuff Size: Adult Large)   Pulse 68   Resp 24     General Appearance:   Awake, Alert, No acute distress.   HEENT:  Pupil equal and reactive to light. No scleral icterus; the mucous membranes were moist.   Neck: No cervical bruits. Not able to see JVD. No thyromegaly.     Chest: The spine was straight. The chest was symmetric.   Lungs:   Diminished breathing sounds No crackles. No wheezing.   Cardiovascular:   Regular rhythm and rate, remote first and second heart sounds with no murmurs. No rubs or gallops.    Abdomen:  Obese. Soft. No tenderness. Non-distended. Bowels sounds are present   Extremities: Mild bilateral leg edema.   Skin: Red skin rashes most in back. Warm, Dry.   Musculoskeletal: No tenderness. No deformity.   Neurologic: Mood and affect are appropriate. No focal deficits.         EKG:  Personally reivewed  Sinus bradycardia with 1st degree A-V block   Inferior infarct (cited on or before 16-JUL-1998)   Poor R wave progression   Abnormal ECG   When compared with ECG of 21-NOV-2016 15:33,   Vent. rate has decreased BY  32 BPM      Cardiac Imaging Studies  Echocardiogram on 4-8-2021:    Extremely poor visualization    Probably normal LV systolic function    RV appears to be dilated    Valves were not visualized    There is no obvious pericardial effusion      Coronary angiogram with PCI on 2-4-2019:    IVUS left main and LAD showed no severely obstructive lesions    Ramus disease treated with rotational atherectomy and stented with a Synergy KATHY under IVUS guidance with good angiographic results    Right heart catheterizations show LV EDP 22 mmHg and PCWP 30 mmHg, PA mean 34 mmHg, RA 13 mmHg      Lab Review   Lab Results   Component Value Date     06/23/2021    CO2 23 06/23/2021    BUN 21 06/23/2021     Lab Results   Component Value Date    WBC 8.5 05/17/2021    HGB  13.9 05/17/2021    HCT 40.6 05/17/2021    MCV 90 05/17/2021     05/17/2021     Lab Results   Component Value Date    CHOL 102 09/20/2021    CHOL 133 12/23/2020    CHOL 126 10/08/2019     Lab Results   Component Value Date    HDL 49 09/20/2021    HDL 56 12/23/2020    HDL 54 10/08/2019     No components found for: LDLCALC  Lab Results   Component Value Date    TRIG 97 09/20/2021    TRIG 146 12/23/2020    TRIG 162 (H) 10/08/2019     Lab Results   Component Value Date    TROPONINI 0.02 04/08/2021     Lab Results   Component Value Date    BNP 84 05/17/2021

## 2021-11-10 NOTE — PATIENT INSTRUCTIONS
Chava Lincoln,    It is my pleasure to see you today at the [facility].    My recommendations for this visit are:    1.  Restart torsemide 20 mg daily now.  If weight is not going down, increase to 40 mg daily  2.  Schedule heart failure in 10 days  3.  CBC and BMP today  4.  Continue other current cardiac medications  5.  Will see you again in 2 months      Yonatan Sheridan MD, PhD

## 2021-11-19 ENCOUNTER — OFFICE VISIT (OUTPATIENT)
Dept: CARDIOLOGY | Facility: CLINIC | Age: 86
End: 2021-11-19
Payer: MEDICARE

## 2021-11-19 ENCOUNTER — TELEPHONE (OUTPATIENT)
Dept: CARDIOLOGY | Facility: CLINIC | Age: 86
End: 2021-11-19

## 2021-11-19 VITALS
BODY MASS INDEX: 40.46 KG/M2 | WEIGHT: 274 LBS | RESPIRATION RATE: 12 BRPM | OXYGEN SATURATION: 93 % | HEART RATE: 66 BPM | DIASTOLIC BLOOD PRESSURE: 60 MMHG | SYSTOLIC BLOOD PRESSURE: 108 MMHG

## 2021-11-19 DIAGNOSIS — I10 ESSENTIAL HYPERTENSION: ICD-10-CM

## 2021-11-19 DIAGNOSIS — I50.32 CHRONIC HEART FAILURE WITH PRESERVED EJECTION FRACTION (H): ICD-10-CM

## 2021-11-19 DIAGNOSIS — I50.32 CHRONIC HEART FAILURE WITH PRESERVED EJECTION FRACTION (H): Primary | ICD-10-CM

## 2021-11-19 DIAGNOSIS — I50.32 CHRONIC DIASTOLIC CONGESTIVE HEART FAILURE (H): Primary | ICD-10-CM

## 2021-11-19 DIAGNOSIS — R06.09 DYSPNEA ON EXERTION: ICD-10-CM

## 2021-11-19 PROBLEM — E86.0 DEHYDRATION: Status: RESOLVED | Noted: 2021-10-29 | Resolved: 2021-11-19

## 2021-11-19 LAB
ANION GAP SERPL CALCULATED.3IONS-SCNC: 9 MMOL/L (ref 5–18)
BUN SERPL-MCNC: 30 MG/DL (ref 8–28)
CALCIUM SERPL-MCNC: 8.9 MG/DL (ref 8.5–10.5)
CHLORIDE BLD-SCNC: 101 MMOL/L (ref 98–107)
CO2 SERPL-SCNC: 26 MMOL/L (ref 22–31)
CREAT SERPL-MCNC: 1.47 MG/DL (ref 0.7–1.3)
GFR SERPL CREATININE-BSD FRML MDRD: 41 ML/MIN/1.73M2
GLUCOSE BLD-MCNC: 144 MG/DL (ref 70–125)
POTASSIUM BLD-SCNC: 4.2 MMOL/L (ref 3.5–5)
SODIUM SERPL-SCNC: 136 MMOL/L (ref 136–145)

## 2021-11-19 PROCEDURE — 36415 COLL VENOUS BLD VENIPUNCTURE: CPT | Performed by: NURSE PRACTITIONER

## 2021-11-19 PROCEDURE — 80048 BASIC METABOLIC PNL TOTAL CA: CPT | Performed by: NURSE PRACTITIONER

## 2021-11-19 PROCEDURE — 99214 OFFICE O/P EST MOD 30 MIN: CPT | Performed by: NURSE PRACTITIONER

## 2021-11-19 NOTE — PROGRESS NOTES
HEART CARE PROGRESS NOTE      [facility]  [phone]      Assessment/Recommendations   Assessment:    1.  Heart failure with preserved ejection fraction: His weight has decreased and edema and shortness of breath are back at baseline since restarting torsemide last week.  He and his wife feel that he is doing well.     2.  Coronary artery disease: He denies any chest pain    3.  Hypertension: Blood pressure stable today at 108/60.  Home blood pressures have been stable.    Plan:  1.  Continue current medications  2.  Low-sodium diet and fluid restriction  3.  Daily weights  4.  BMP pending    Chava Lincoln will follow up with Dr. Sheridan on January 17.       History of Present Illness/Subjective    Mr. Chava Lincoln is a 89 year old male seen at [facility] today for follow-up.  He has a history of heart failure with preserved ejection fraction, pulmonary hypertension, mildly dilated ascending aorta, obstructive sleep apnea, coronary artery disease, obesity, dyslipidemia, and GERD.    He was seen in the emergency room on October 29, 2021 with hypotension and acute kidney injury.  His torsemide and hydrochlorothiazide were discontinued.  He saw Dr. Sheridan on November 10 with a 10 pound weight gain, increased shortness of breath and increased edema.  He was restarted on torsemide 20 mg daily.  His weight has decreased about 4 to 6 pounds.  Home weight is now around 274 pounds which is close to baseline.  His shortness of breath has improved significantly.  He has chronic dyspnea on exertion but is back at baseline.  Lower extremity edema is mild and at baseline.  He denies lightheadedness and chest pain.      His home blood pressures are stable between 100s-130s systolic.  He had one blood pressure of 97 systolic but was asymptomatic.  He is following a low-sodium diet and limiting fluids.        Physical Examination Review of Systems   Vitals: /60 (BP Location:  Left arm, Patient Position: Sitting, Cuff Size: Adult Large)   Pulse 66   Resp 12   Wt 124.3 kg (274 lb)   SpO2 93%   BMI 40.46 kg/m    BMI= Body mass index is 40.46 kg/m .  Wt Readings from Last 3 Encounters:   11/19/21 124.3 kg (274 lb)   10/29/21 122.5 kg (270 lb)   09/20/21 132 kg (291 lb)       General Appearance:     Alert, cooperative and in no acute distress.   ENT/Mouth: membranes moist, no oral lesions or bleeding gums.      EYES:  no scleral icterus, normal conjunctivae   Chest/Lungs:   lungs are clear to auscultation, no rales or wheezing, respirations unlabored   Cardiovascular:   Regular. Normal first and second heart sounds, 1+ edema bilateral lower extremities    Abdomen:   Obese, soft, nontender, nondistended, bowel sounds present   Extremities: no cyanosis or clubbing   Skin: warm, dry.    Neurologic: mood and affect are appropriate, alert and oriented x3         Please refer above for cardiac ROS details.      Medical History  Surgical History Family History Social History   Past Medical History:   Diagnosis Date     Coronary artery disease due to calcified coronary lesion      GERD (gastroesophageal reflux disease) 7/2/2014     Hypertension      RAMON on CPAP 4/17/2017     Prostate cancer (H)      Prostate cancer (H)      Past Surgical History:   Procedure Laterality Date     CV CORONARY ANGIOGRAM N/A 12/10/2018    Procedure: Coronary Angiogram;  Surgeon: Tana Duncan MD;  Location: [facility];  Service: Cardiology     CV LEFT HEART CATHETERIZATION WITHOUT LEFT VENTRICULOGRAM Left 12/10/2018    Procedure: Left Heart Catheterization Without Left Ventriculogram;  Surgeon: Tana Duncan MD;  Location: [facility];  Service: Cardiology     CV LEFT HEART CATHETERIZATION WITHOUT LEFT VENTRICULOGRAM Left 02/04/2019    Procedure: Left Heart Catheterization Without Left Ventriculogram;  Surgeon: Tana Duncan MD;  Location: [facility];  Service: Cardiology     CV  RIGHT HEART CATHETERIZATION N/A 02/04/2019    Procedure: Right Heart Catheterization;  Surgeon: Tana Duncan MD;  Location: [facility] Lab;  Service: Cardiology     HERNIA REPAIR       JOINT REPLACEMENT       KNEE SURGERY Bilateral      OTHER SURGICAL HISTORY      Prostatic cryotherapy     Family History   Problem Relation Age of Onset     Diabetes Mother      Cerebrovascular Disease Father      Heart Disease Brother      Coronary Artery Disease Brother     Social History     Socioeconomic History     Marital status:      Spouse name: Santa     Number of children: 0     Years of education: Not on file     Highest education level: 12th grade   Occupational History     Occupation: Retired    Tobacco Use     Smoking status: Never Smoker     Smokeless tobacco: Never Used   Substance and Sexual Activity     Alcohol use: Yes     Comment: Alcoholic Drinks/day: 2-3 beer/ month     Drug use: No     Sexual activity: Not Currently     Partners: Female   Other Topics Concern     Not on file   Social History Narrative     Not on file     Social Determinants of Health     Financial Resource Strain: Low Risk      Difficulty of Paying Living Expenses: Not hard at all   Food Insecurity: No Food Insecurity     Worried About Running Out of Food in the Last Year: Never true     Ran Out of Food in the Last Year: Never true   Transportation Needs: No Transportation Needs     Lack of Transportation (Medical): No     Lack of Transportation (Non-Medical): No   Physical Activity: Inactive     Days of Exercise per Week: 0 days     Minutes of Exercise per Session: 0 min   Stress: No Stress Concern Present     Feeling of Stress : Only a little   Social Connections: Socially Isolated     Frequency of Communication with Friends and Family: Never     Frequency of Social Gatherings with Friends and Family: Never     Attends Yazidism Services: Never     Active Member of Clubs or Organizations: No     Attends Club or Organization  Meetings: Never     Marital Status:    Intimate Partner Violence: Not At Risk     Fear of Current or Ex-Partner: No     Emotionally Abused: No     Physically Abused: No     Sexually Abused: No   Housing Stability: Not on file          Medications  Allergies   Current Outpatient Medications   Medication Sig Dispense Refill     acetaminophen (TYLENOL) 325 MG tablet [ACETAMINOPHEN (TYLENOL) 325 MG TABLET] Take 325 mg by mouth every 6 (six) hours as needed for pain.       aspirin 81 MG EC tablet [ASPIRIN 81 MG EC TABLET] Take 81 mg by mouth every evening.        CERTAVITE SENIOR 0.4-300-250 mg-mcg-mcg tablet [CERTAVITE SENIOR 0.4-300-250 MG-MCG-MCG TABLET] Take 1 tablet by mouth daily.       COMBIVENT RESPIMAT  mcg/actuation Mist inhaler [COMBIVENT RESPIMAT  MCG/ACTUATION MIST INHALER] Inhale 1 puff as needed.       doxycycline monohydrate (ADOXA) 100 MG tablet Take 100 mg by mouth 2 times daily       isosorbide mononitrate (IMDUR) 30 MG 24 hr tablet [ISOSORBIDE MONONITRATE (IMDUR) 30 MG 24 HR TABLET] Take 1 tablet (30 mg total) by mouth daily. 90 tablet 2     MEDICATION CANNOT BE REORDERED - PLEASE MANUALLY REORDER AND DISCONTINUE THE OLD ORDER [LEUPROLIDE, 6 MONTH, (ELIGARD, 6 MONTH,) 45 MG SYRINGE] Inject 45 mg under the skin. Every six months       metoprolol tartrate (LOPRESSOR) 25 MG tablet Take 1 tablet by mouth 2 times daily       mirabegron (MYRBETRIQ) 50 mg Tb24 ER tablet [MIRABEGRON (MYRBETRIQ) 50 MG TB24 ER TABLET] Take 50 mg by mouth daily.       multivitamin with minerals (THERA-M) 9 mg iron-400 mcg Tab tablet [MULTIVITAMIN WITH MINERALS (THERA-M) 9 MG IRON-400 MCG TAB TABLET] Take 1 tablet by mouth daily.       NYSTOP 916474 UNIT/GM powder as needed       oxybutynin (DITROPAN XL) 15 MG 24 hr tablet [OXYBUTYNIN (DITROPAN XL) 15 MG 24 HR TABLET] Take 15 mg by mouth daily.       oxybutynin (DITROPAN XL) 5 MG ER tablet Take 5 mg by mouth daily        rosuvastatin (CRESTOR) 20 MG tablet TAKE  1 TABLET(20 MG) BY MOUTH AT BEDTIME 90 tablet 0     torsemide (DEMADEX) 20 MG tablet Take 1 tablet (20 mg) by mouth daily 90 tablet 1     triamcinolone (KENALOG) 0.1 % external ointment triamcinolone acetonide 0.1 % topical ointment   APPLY TOPICALLY TO RASH ON BACK TWICE DAILY FOR 2 WEEKS       valsartan (DIOVAN) 80 MG tablet Take 80 mg by mouth daily      Allergies   Allergen Reactions     Atorvastatin Muscle Pain (Myalgia)     Lisinopril Cough     Morphine Other (See Comments)     hypotension     Sulfa (Sulfonamide Antibiotics) [Sulfa Drugs] Hives     Vicodin [Hydrocodone-Acetaminophen] Unknown         Lab Results    Chemistry/lipid CBC Cardiac Enzymes/BNP/TSH/INR   Recent Labs   Lab Test 09/20/21  1207   CHOL 102   HDL 49   LDL 34   TRIG 97     Recent Labs   Lab Test 09/20/21  1207 12/23/20  1014 10/08/19  1543   LDL 34 48 40     Recent Labs   Lab Test 11/10/21  0843      POTASSIUM 4.6   CHLORIDE 108*   CO2 22   *   BUN 21   CR 1.17   GFRESTIMATED 54*   SUZE 8.6     Recent Labs   Lab Test 11/10/21  0843 11/05/21  1206 11/01/21  1453   CR 1.17 1.77* 2.29*     No results for input(s): A1C in the last 30886 hours. Recent Labs   Lab Test 11/10/21  0843   WBC 8.7   HGB 12.4*   HCT 37.8*   MCV 96        Recent Labs   Lab Test 11/10/21  0843 10/29/21  1904 09/20/21  1207   HGB 12.4* 13.4 13.2*    Recent Labs   Lab Test 10/29/21  1904 04/08/21  2325 04/08/21  1719   TROPONINI 0.06 0.02 0.01     Recent Labs   Lab Test 10/29/21  1904 05/17/21  1045 04/08/21  1055   BNP 67 84 58     No results for input(s): TSH in the last 90492 hours.  Recent Labs   Lab Test 09/23/18  1045   INR 1.07

## 2021-11-19 NOTE — TELEPHONE ENCOUNTER
----- Message from Claudine Powell NP sent at 11/19/2021  1:45 PM CST -----  Renal function slightly abnormal but much better than when he was in ED in October.  Continue current medications.  Dr. Sheridan recommended a 1.5L fluid restriction.  After talking with him today in clinic and seeing his labs, I think he is probably drinking less than this.  Please review need to stay hydrated but not exceed 1.5 L.  Please have him get BMP checked again in about 2 weeks and review results with Dr. Sheridan.

## 2021-11-19 NOTE — LETTER
11/19/2021    Erasmo Michael MD  [facility] [address] [place]  [place] [address]    RE: Chava YOUNG Salazar       Dear Colleague,    I had the pleasure of seeing Chava Lincoln in the [facility].    HEART CARE PROGRESS NOTE      [facility]  [phone]      Assessment/Recommendations   Assessment:    1.  Heart failure with preserved ejection fraction: His weight has decreased and edema and shortness of breath are back at baseline since restarting torsemide last week.  He and his wife feel that he is doing well.     2.  Coronary artery disease: He denies any chest pain    3.  Hypertension: Blood pressure stable today at 108/60.  Home blood pressures have been stable.    Plan:  1.  Continue current medications  2.  Low-sodium diet and fluid restriction  3.  Daily weights  4.  BMP pending    Chava Lincoln will follow up with Dr. Sheridan on January 17.       History of Present Illness/Subjective    Mr. Chava Lincoln is a 89 year old male seen at [facility] today for follow-up.  He has a history of heart failure with preserved ejection fraction, pulmonary hypertension, mildly dilated ascending aorta, obstructive sleep apnea, coronary artery disease, obesity, dyslipidemia, and GERD.    He was seen in the emergency room on October 29, 2021 with hypotension and acute kidney injury.  His torsemide and hydrochlorothiazide were discontinued.  He saw Dr. Sheridan on November 10 with a 10 pound weight gain, increased shortness of breath and increased edema.  He was restarted on torsemide 20 mg daily.  His weight has decreased about 4 to 6 pounds.  Home weight is now around 274 pounds which is close to baseline.  His shortness of breath has improved significantly.  He has chronic dyspnea on exertion but is back at baseline.  Lower extremity edema is mild and at baseline.  He denies lightheadedness and  chest pain.      His home blood pressures are stable between 100s-130s systolic.  He had one blood pressure of 97 systolic but was asymptomatic.  He is following a low-sodium diet and limiting fluids.        Physical Examination Review of Systems   Vitals: /60 (BP Location: Left arm, Patient Position: Sitting, Cuff Size: Adult Large)   Pulse 66   Resp 12   Wt 124.3 kg (274 lb)   SpO2 93%   BMI 40.46 kg/m    BMI= Body mass index is 40.46 kg/m .  Wt Readings from Last 3 Encounters:   11/19/21 124.3 kg (274 lb)   10/29/21 122.5 kg (270 lb)   09/20/21 132 kg (291 lb)       General Appearance:     Alert, cooperative and in no acute distress.   ENT/Mouth: membranes moist, no oral lesions or bleeding gums.      EYES:  no scleral icterus, normal conjunctivae   Chest/Lungs:   lungs are clear to auscultation, no rales or wheezing, respirations unlabored   Cardiovascular:   Regular. Normal first and second heart sounds, 1+ edema bilateral lower extremities    Abdomen:   Obese, soft, nontender, nondistended, bowel sounds present   Extremities: no cyanosis or clubbing   Skin: warm, dry.    Neurologic: mood and affect are appropriate, alert and oriented x3         Please refer above for cardiac ROS details.      Medical History  Surgical History Family History Social History   Past Medical History:   Diagnosis Date     Coronary artery disease due to calcified coronary lesion      GERD (gastroesophageal reflux disease) 7/2/2014     Hypertension      RAMON on CPAP 4/17/2017     Prostate cancer (H)      Prostate cancer (H)      Past Surgical History:   Procedure Laterality Date     CV CORONARY ANGIOGRAM N/A 12/10/2018    Procedure: Coronary Angiogram;  Surgeon: Tana Duncan MD;  Location: [facility];  Service: Cardiology     CV LEFT HEART CATHETERIZATION WITHOUT LEFT VENTRICULOGRAM Left 12/10/2018    Procedure: Left Heart Catheterization Without Left Ventriculogram;  Surgeon: Tana Duncan MD;  Location:   [facility];  Service: Cardiology     CV LEFT HEART CATHETERIZATION WITHOUT LEFT VENTRICULOGRAM Left 02/04/2019    Procedure: Left Heart Catheterization Without Left Ventriculogram;  Surgeon: Tana Duncan MD;  Location: [facility];  Service: Cardiology     CV RIGHT HEART CATHETERIZATION N/A 02/04/2019    Procedure: Right Heart Catheterization;  Surgeon: Tana Duncan MD;  Location: [facility];  Service: Cardiology     HERNIA REPAIR       JOINT REPLACEMENT       KNEE SURGERY Bilateral      OTHER SURGICAL HISTORY      Prostatic cryotherapy     Family History   Problem Relation Age of Onset     Diabetes Mother      Cerebrovascular Disease Father      Heart Disease Brother      Coronary Artery Disease Brother     Social History     Socioeconomic History     Marital status:      Spouse name: Santa     Number of children: 0     Years of education: Not on file     Highest education level: 12th grade   Occupational History     Occupation: Retired    Tobacco Use     Smoking status: Never Smoker     Smokeless tobacco: Never Used   Substance and Sexual Activity     Alcohol use: Yes     Comment: Alcoholic Drinks/day: 2-3 beer/ month     Drug use: No     Sexual activity: Not Currently     Partners: Female   Other Topics Concern     Not on file   Social History Narrative     Not on file     Social Determinants of Health     Financial Resource Strain: Low Risk      Difficulty of Paying Living Expenses: Not hard at all   Food Insecurity: No Food Insecurity     Worried About Running Out of Food in the Last Year: Never true     Ran Out of Food in the Last Year: Never true   Transportation Needs: No Transportation Needs     Lack of Transportation (Medical): No     Lack of Transportation (Non-Medical): No   Physical Activity: Inactive     Days of Exercise per Week: 0 days     Minutes of Exercise per Session: 0 min   Stress: No Stress Concern Present     Feeling of Stress : Only a little   Social  Connections: Socially Isolated     Frequency of Communication with Friends and Family: Never     Frequency of Social Gatherings with Friends and Family: Never     Attends Yazidism Services: Never     Active Member of Clubs or Organizations: No     Attends Club or Organization Meetings: Never     Marital Status:    Intimate Partner Violence: Not At Risk     Fear of Current or Ex-Partner: No     Emotionally Abused: No     Physically Abused: No     Sexually Abused: No   Housing Stability: Not on file          Medications  Allergies   Current Outpatient Medications   Medication Sig Dispense Refill     acetaminophen (TYLENOL) 325 MG tablet [ACETAMINOPHEN (TYLENOL) 325 MG TABLET] Take 325 mg by mouth every 6 (six) hours as needed for pain.       aspirin 81 MG EC tablet [ASPIRIN 81 MG EC TABLET] Take 81 mg by mouth every evening.        CERTAVITE SENIOR 0.4-300-250 mg-mcg-mcg tablet [CERTAVITE SENIOR 0.4-300-250 MG-MCG-MCG TABLET] Take 1 tablet by mouth daily.       COMBIVENT RESPIMAT  mcg/actuation Mist inhaler [COMBIVENT RESPIMAT  MCG/ACTUATION MIST INHALER] Inhale 1 puff as needed.       doxycycline monohydrate (ADOXA) 100 MG tablet Take 100 mg by mouth 2 times daily       isosorbide mononitrate (IMDUR) 30 MG 24 hr tablet [ISOSORBIDE MONONITRATE (IMDUR) 30 MG 24 HR TABLET] Take 1 tablet (30 mg total) by mouth daily. 90 tablet 2     MEDICATION CANNOT BE REORDERED - PLEASE MANUALLY REORDER AND DISCONTINUE THE OLD ORDER [LEUPROLIDE, 6 MONTH, (ELIGARD, 6 MONTH,) 45 MG SYRINGE] Inject 45 mg under the skin. Every six months       metoprolol tartrate (LOPRESSOR) 25 MG tablet Take 1 tablet by mouth 2 times daily       mirabegron (MYRBETRIQ) 50 mg Tb24 ER tablet [MIRABEGRON (MYRBETRIQ) 50 MG TB24 ER TABLET] Take 50 mg by mouth daily.       multivitamin with minerals (THERA-M) 9 mg iron-400 mcg Tab tablet [MULTIVITAMIN WITH MINERALS (THERA-M) 9 MG IRON-400 MCG TAB TABLET] Take 1 tablet by mouth daily.        NYSTOP [id number] UNIT/GM powder as needed       oxybutynin (DITROPAN XL) 15 MG 24 hr tablet [OXYBUTYNIN (DITROPAN XL) 15 MG 24 HR TABLET] Take 15 mg by mouth daily.       oxybutynin (DITROPAN XL) 5 MG ER tablet Take 5 mg by mouth daily        rosuvastatin (CRESTOR) 20 MG tablet TAKE 1 TABLET(20 MG) BY MOUTH AT BEDTIME 90 tablet 0     torsemide (DEMADEX) 20 MG tablet Take 1 tablet (20 mg) by mouth daily 90 tablet 1     triamcinolone (KENALOG) 0.1 % external ointment triamcinolone acetonide 0.1 % topical ointment   APPLY TOPICALLY TO RASH ON BACK TWICE DAILY FOR 2 WEEKS       valsartan (DIOVAN) 80 MG tablet Take 80 mg by mouth daily      Allergies   Allergen Reactions     Atorvastatin Muscle Pain (Myalgia)     Lisinopril Cough     Morphine Other (See Comments)     hypotension     Sulfa (Sulfonamide Antibiotics) [Sulfa Drugs] Hives     Vicodin [Hydrocodone-Acetaminophen] Unknown         Lab Results    Chemistry/lipid CBC Cardiac Enzymes/BNP/TSH/INR   Recent Labs   Lab Test 09/20/21  1207   CHOL 102   HDL 49   LDL 34   TRIG 97     Recent Labs   Lab Test 09/20/21  1207 12/23/20  1014 10/08/19  1543   LDL 34 48 40     Recent Labs   Lab Test 11/10/21  0843      POTASSIUM 4.6   CHLORIDE 108*   CO2 22   *   BUN 21   CR 1.17   GFRESTIMATED 54*   SUZE 8.6     Recent Labs   Lab Test 11/10/21  0843 11/05/21  1206 11/01/21  1453   CR 1.17 1.77* 2.29*     No results for input(s): A1C in the last 56057 hours. Recent Labs   Lab Test 11/10/21  0843   WBC 8.7   HGB 12.4*   HCT 37.8*   MCV 96        Recent Labs   Lab Test 11/10/21  0843 10/29/21  1904 09/20/21  1207   HGB 12.4* 13.4 13.2*    Recent Labs   Lab Test 10/29/21  1904 04/08/21  2325 04/08/21  1719   TROPONINI 0.06 0.02 0.01     Recent Labs   Lab Test 10/29/21  1904 05/17/21  1045 04/08/21  1055   BNP 67 84 58     No results for input(s): TSH in the last 43402 hours.  Recent Labs   Lab Test 09/23/18  1045   INR 1.07                      Thank you for allowing  me to participate in the care of your patient.      Sincerely,     Claudine Powell NP     [facility]  cc:   No referring provider defined for this encounter.

## 2021-11-19 NOTE — PATIENT INSTRUCTIONS
Chava Lincoln,    It was a pleasure to see you today at the [facility].     My recommendations after this visit include:  - Your labs will be on MyChart.  You will be called if they are abnormal.    - Continue to monitor for signs of retaining fluid (increasing weights, shortness of breath, swelling) and call with any concerns.   - If you have any questions or concerns, please call [phone] to talk with my nurse.    Claudine Arriola, CNP

## 2021-11-23 NOTE — TELEPHONE ENCOUNTER
Called and spoke to patient and his wife Santa; reviewed lab results with them both. Encouraged patient to make sure he is getting full 1.5L of fluid per day. Santa states that he definitely isn't getting enough water. Recommended that patient set out pre-measured water for the day so he can better track how much he is/should drink; Mauricio and Santa agreeable to plan.     Pt to have BMP drawn in 2 weeks at [facility] walk in lab; BMP ordered under Dr. Sheridan so he is flagged to review result.     Will call pt on 12-6-21 to remind them to have lab drawn on 12-7-21.     Dorothea Henderson RN

## 2021-11-29 ENCOUNTER — LAB REQUISITION (OUTPATIENT)
Dept: LAB | Facility: CLINIC | Age: 86
End: 2021-11-29
Payer: MEDICARE

## 2021-11-29 DIAGNOSIS — R32 UNSPECIFIED URINARY INCONTINENCE: ICD-10-CM

## 2021-11-29 PROCEDURE — 87086 URINE CULTURE/COLONY COUNT: CPT | Mod: ORL | Performed by: STUDENT IN AN ORGANIZED HEALTH CARE EDUCATION/TRAINING PROGRAM

## 2021-12-02 LAB — BACTERIA UR CULT: ABNORMAL

## 2021-12-07 ENCOUNTER — LAB (OUTPATIENT)
Dept: LAB | Facility: CLINIC | Age: 86
End: 2021-12-07
Payer: MEDICARE

## 2021-12-07 DIAGNOSIS — I50.32 CHRONIC HEART FAILURE WITH PRESERVED EJECTION FRACTION (H): ICD-10-CM

## 2021-12-07 LAB
ANION GAP SERPL CALCULATED.3IONS-SCNC: 9 MMOL/L (ref 5–18)
BUN SERPL-MCNC: 33 MG/DL (ref 8–28)
CALCIUM SERPL-MCNC: 9.6 MG/DL (ref 8.5–10.5)
CHLORIDE BLD-SCNC: 101 MMOL/L (ref 98–107)
CO2 SERPL-SCNC: 27 MMOL/L (ref 22–31)
CREAT SERPL-MCNC: 1.38 MG/DL (ref 0.7–1.3)
GFR SERPL CREATININE-BSD FRML MDRD: 44 ML/MIN/1.73M2
GLUCOSE BLD-MCNC: 123 MG/DL (ref 70–125)
POTASSIUM BLD-SCNC: 4.1 MMOL/L (ref 3.5–5)
SODIUM SERPL-SCNC: 137 MMOL/L (ref 136–145)

## 2021-12-07 PROCEDURE — 36415 COLL VENOUS BLD VENIPUNCTURE: CPT

## 2021-12-07 PROCEDURE — 80048 BASIC METABOLIC PNL TOTAL CA: CPT

## 2021-12-07 NOTE — TELEPHONE ENCOUNTER
Call to Santa this AM as reminder to make sure they have BMP labs done today at  outpatient [facility] lab. She will be sure that he gets there.  Mauricio has not been meeting the fluid intake daily of 1.5 liters. He has been coming close most days, but has difficulty in getting this amount in.  They premeasure this amount in the fridge each day to monitor it closely. He will continue to work at this, and start out earlier with more frequent fluid intake intervals.    Estefani Rose RN BSN, CHFN

## 2021-12-27 DIAGNOSIS — I25.10 CORONARY ARTERY DISEASE INVOLVING NATIVE CORONARY ARTERY OF NATIVE HEART WITHOUT ANGINA PECTORIS: Primary | ICD-10-CM

## 2021-12-27 RX ORDER — METOPROLOL TARTRATE 25 MG/1
25 TABLET, FILM COATED ORAL 2 TIMES DAILY
Qty: 180 TABLET | Refills: 1 | Status: SHIPPED | OUTPATIENT
Start: 2021-12-27 | End: 2022-06-07

## 2022-01-11 ENCOUNTER — TELEPHONE (OUTPATIENT)
Dept: CARDIOLOGY | Facility: CLINIC | Age: 87
End: 2022-01-11
Payer: MEDICARE

## 2022-01-11 NOTE — TELEPHONE ENCOUNTER
Santa had question if pt needed labs done prior to 1/17/22 office visit with Dr Sheridan - she had a note to check in January.  Informed wife Santa that we have no open orders, and we did need labs last month - they were done on 12/7/21.  -omid

## 2022-01-17 ENCOUNTER — LAB REQUISITION (OUTPATIENT)
Dept: LAB | Facility: CLINIC | Age: 87
End: 2022-01-17
Payer: MEDICARE

## 2022-01-17 ENCOUNTER — OFFICE VISIT (OUTPATIENT)
Dept: CARDIOLOGY | Facility: CLINIC | Age: 87
End: 2022-01-17
Attending: INTERNAL MEDICINE
Payer: MEDICARE

## 2022-01-17 VITALS — HEART RATE: 60 BPM | SYSTOLIC BLOOD PRESSURE: 134 MMHG | RESPIRATION RATE: 16 BRPM | DIASTOLIC BLOOD PRESSURE: 64 MMHG

## 2022-01-17 DIAGNOSIS — I50.32 CHRONIC DIASTOLIC CONGESTIVE HEART FAILURE (H): Primary | ICD-10-CM

## 2022-01-17 DIAGNOSIS — E66.01 MORBID OBESITY (H): ICD-10-CM

## 2022-01-17 DIAGNOSIS — R30.0 DYSURIA: ICD-10-CM

## 2022-01-17 DIAGNOSIS — I25.10 CORONARY ARTERY CALCIFICATION SEEN ON CAT SCAN: ICD-10-CM

## 2022-01-17 DIAGNOSIS — I10 ESSENTIAL HYPERTENSION: ICD-10-CM

## 2022-01-17 DIAGNOSIS — I27.20 PULMONARY HYPERTENSION (H): ICD-10-CM

## 2022-01-17 DIAGNOSIS — R06.09 DYSPNEA ON EXERTION: ICD-10-CM

## 2022-01-17 DIAGNOSIS — I71.21 ASCENDING AORTIC ANEURYSM (H): ICD-10-CM

## 2022-01-17 DIAGNOSIS — E78.5 DYSLIPIDEMIA: ICD-10-CM

## 2022-01-17 DIAGNOSIS — G47.33 OSA ON CPAP: ICD-10-CM

## 2022-01-17 LAB
ALBUMIN SERPL-MCNC: 3.4 G/DL (ref 3.5–5)
ALP SERPL-CCNC: 59 U/L (ref 45–120)
ALT SERPL W P-5'-P-CCNC: 24 U/L (ref 0–45)
ANION GAP SERPL CALCULATED.3IONS-SCNC: 13 MMOL/L (ref 5–18)
AST SERPL W P-5'-P-CCNC: 28 U/L (ref 0–40)
BILIRUB SERPL-MCNC: 0.6 MG/DL (ref 0–1)
BUN SERPL-MCNC: 22 MG/DL (ref 8–28)
CALCIUM SERPL-MCNC: 9.1 MG/DL (ref 8.5–10.5)
CHLORIDE BLD-SCNC: 100 MMOL/L (ref 98–107)
CO2 SERPL-SCNC: 25 MMOL/L (ref 22–31)
CREAT SERPL-MCNC: 1.17 MG/DL (ref 0.7–1.3)
ERYTHROCYTE [DISTWIDTH] IN BLOOD BY AUTOMATED COUNT: 14.7 % (ref 10–15)
GFR SERPL CREATININE-BSD FRML MDRD: 58 ML/MIN/1.73M2
GLUCOSE BLD-MCNC: 96 MG/DL (ref 70–125)
HCT VFR BLD AUTO: 39.5 % (ref 40–53)
HGB BLD-MCNC: 12.9 G/DL (ref 13.3–17.7)
MCH RBC QN AUTO: 32.4 PG (ref 26.5–33)
MCHC RBC AUTO-ENTMCNC: 32.7 G/DL (ref 31.5–36.5)
MCV RBC AUTO: 99 FL (ref 78–100)
PLATELET # BLD AUTO: 239 10E3/UL (ref 150–450)
POTASSIUM BLD-SCNC: 3.9 MMOL/L (ref 3.5–5)
PROT SERPL-MCNC: 6.4 G/DL (ref 6–8)
RBC # BLD AUTO: 3.98 10E6/UL (ref 4.4–5.9)
SODIUM SERPL-SCNC: 138 MMOL/L (ref 136–145)
WBC # BLD AUTO: 8.8 10E3/UL (ref 4–11)

## 2022-01-17 PROCEDURE — 36415 COLL VENOUS BLD VENIPUNCTURE: CPT | Performed by: INTERNAL MEDICINE

## 2022-01-17 PROCEDURE — 87086 URINE CULTURE/COLONY COUNT: CPT | Mod: ORL | Performed by: STUDENT IN AN ORGANIZED HEALTH CARE EDUCATION/TRAINING PROGRAM

## 2022-01-17 PROCEDURE — 85027 COMPLETE CBC AUTOMATED: CPT | Performed by: INTERNAL MEDICINE

## 2022-01-17 PROCEDURE — 80053 COMPREHEN METABOLIC PANEL: CPT | Performed by: INTERNAL MEDICINE

## 2022-01-17 PROCEDURE — 99214 OFFICE O/P EST MOD 30 MIN: CPT | Performed by: INTERNAL MEDICINE

## 2022-01-17 NOTE — LETTER
1/17/2022    Erasmo Michael MD  [facility] [address]  [place] [address]    RE: Chava Lincoln       Dear Colleague,     I had the pleasure of seeing Chava Lincoln in the [facility].            Assessment/Plan:   1.  Chronic congestive heart failure with preserved ejection fraction: The patient has mild dyspnea on exertion.  Bilateral leg edema.  He gained 4 pounds recently.  Increase torsemide to 40 mg daily until his weight back to his baseline 272 pounds and then back to 20 mg daily.  Also discussed with his wife for, additional 20 mg of torsemide as needed in the future.  CBC, CMP today.     2.  Coronary artery disease s/p KATHY to Ramus, severe calcified RCA lesion with high risk of revascularization, no obstructive lesion in left main and LAD per IVUS:  He has no chest pain.  Continue medical treatment.  Continue aspirin, Crestor, isosorbide mononitrate and metoprolol.      3.  Essential hypertension: His blood pressure is controlled, continue valsartan 80 mg daily and metoprolol 25 mg twice a day     4.  Dyslipidemia: Continue Crestor 20 mg nightly.  Last LDL was controlled.     5.  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     6.  Morbid obesity and obstructive sleep apnea: Lifestyle modification and continue CPAP.      Thank you for the opportunity to be involved in the care of Chava Lincoln. If you have any questions, please feel free to contact me.  I will see the patient again in 6 months and as needed.    Much or all of the text in this note was generated through the use of Dragon Dictate voice-to-text software. Errors in spelling or words which seem out of context are unintentional. Sound alike errors, in particular, may have escaped editing.       History of Present Illness:   It is my pleasure to see Chava Lincoln at the [facility] for routine cardiology follow up.  Chava Lincoln is a 89 year old  male with a medical history of coronary artery disease, s/p KATHY to Ramus in 2-2019, no obstructive disease in LM and LAD, severe calcification lesions in RCA, essential hypertension, dyslipidemia, chronic diastolic congestive heart failure, morbid obesity, hyponatremia, right diaphragm hemiparalysis, obstructive sleep apnea on CPAP.    The patient and his wife state that he gained 4 pounds recently.  He has been eating low-salt diet.  He has been taking his medication regularly.  Currently he is on torsemide to 20 mg daily.  Hydrochlorothiazide was discontinued due to low blood pressure.  He had no chest pain, palpitations, dizziness, orthopnea.  His leg edema has a stable.  His dyspnea on exertion is similar.  His blood pressure and pulses are in normal range.    Past Medical History:     Patient Active Problem List   Diagnosis     GERD (gastroesophageal reflux disease)     RAMON on CPAP     Near syncope     Dyspnea on exertion     Essential hypertension     Dyslipidemia     Coronary artery disease involving native coronary artery of native heart without angina pectoris     Ascending aortic aneurysm (H)     Coronary artery calcification seen on CAT scan     Pulmonary hypertension (H)     History of pulmonary embolism     Hypoxia     Morbid obesity (H)     Bilateral lower extremity edema     Chronic kidney disease, stage 3 (H)     (HFpEF) heart failure with preserved ejection fraction (H)       Past Surgical History:     Past Surgical History:   Procedure Laterality Date     CV CORONARY ANGIOGRAM N/A 12/10/2018    Procedure: Coronary Angiogram;  Surgeon: Tana Duncan MD;  Location: [facility] Cath Lab;  Service: Cardiology     CV LEFT HEART CATHETERIZATION WITHOUT LEFT VENTRICULOGRAM Left 12/10/2018    Procedure: Left Heart Catheterization Without Left Ventriculogram;  Surgeon: Tana Duncan MD;  Location: [facility] Cath Lab;  Service: Cardiology     CV LEFT HEART CATHETERIZATION WITHOUT LEFT VENTRICULOGRAM  Left 02/04/2019    Procedure: Left Heart Catheterization Without Left Ventriculogram;  Surgeon: Tana Duncan MD;  Location: [facility] Cath Lab;  Service: Cardiology     CV RIGHT HEART CATHETERIZATION N/A 02/04/2019    Procedure: Right Heart Catheterization;  Surgeon: Tana Duncan MD;  Location: [facility] Cath Lab;  Service: Cardiology     HERNIA REPAIR       JOINT REPLACEMENT       KNEE SURGERY Bilateral      OTHER SURGICAL HISTORY      Prostatic cryotherapy       Family History:     Family History   Problem Relation Age of Onset     Diabetes Mother      Cerebrovascular Disease Father      Heart Disease Brother      Coronary Artery Disease Brother         Social History:    reports that he has never smoked. He has never used smokeless tobacco. He reports current alcohol use. He reports that he does not use drugs.    Review of Systems:   12 systems are reviewed negative except for in HPI.    Meds:     Current Outpatient Medications:      acetaminophen (TYLENOL) 325 MG tablet, [ACETAMINOPHEN (TYLENOL) 325 MG TABLET] Take 325 mg by mouth every 6 (six) hours as needed for pain., Disp: , Rfl:      aspirin 81 MG EC tablet, [ASPIRIN 81 MG EC TABLET] Take 81 mg by mouth every evening. , Disp: , Rfl:      CERTAVITE SENIOR 0.4-300-250 mg-mcg-mcg tablet, [CERTAVITE SENIOR 0.4-300-250 MG-MCG-MCG TABLET] Take 1 tablet by mouth daily., Disp: , Rfl:      isosorbide mononitrate (IMDUR) 30 MG 24 hr tablet, [ISOSORBIDE MONONITRATE (IMDUR) 30 MG 24 HR TABLET] Take 1 tablet (30 mg total) by mouth daily., Disp: 90 tablet, Rfl: 2     MEDICATION CANNOT BE REORDERED - PLEASE MANUALLY REORDER AND DISCONTINUE THE OLD ORDER, [LEUPROLIDE, 6 MONTH, (ELIGARD, 6 MONTH,) 45 MG SYRINGE] Inject 45 mg under the skin. Every six months, Disp: , Rfl:      metoprolol tartrate (LOPRESSOR) 25 MG tablet, Take 1 tablet (25 mg) by mouth 2 times daily, Disp: 180 tablet, Rfl: 1     mirabegron (MYRBETRIQ) 50 mg Tb24 ER tablet, [MIRABEGRON (MYRBETRIQ)  50 MG TB24 ER TABLET] Take 50 mg by mouth daily., Disp: , Rfl:      NYSTOP [id number] UNIT/GM powder, as needed, Disp: , Rfl:      oxybutynin (DITROPAN XL) 15 MG 24 hr tablet, [OXYBUTYNIN (DITROPAN XL) 15 MG 24 HR TABLET] Take 15 mg by mouth daily., Disp: , Rfl:      oxybutynin (DITROPAN XL) 5 MG ER tablet, Take 5 mg by mouth daily , Disp: , Rfl:      rosuvastatin (CRESTOR) 20 MG tablet, TAKE 1 TABLET(20 MG) BY MOUTH AT BEDTIME, Disp: 90 tablet, Rfl: 1     torsemide (DEMADEX) 20 MG tablet, Take 1 tablet (20 mg) by mouth daily, Disp: 90 tablet, Rfl: 1     triamcinolone (KENALOG) 0.1 % external ointment, triamcinolone acetonide 0.1 % topical ointment  APPLY TOPICALLY TO RASH ON BACK TWICE DAILY FOR 2 WEEKS, Disp: , Rfl:      valsartan (DIOVAN) 80 MG tablet, Take 80 mg by mouth daily, Disp: , Rfl:      COMBIVENT RESPIMAT  mcg/actuation Mist inhaler, [COMBIVENT RESPIMAT  MCG/ACTUATION MIST INHALER] Inhale 1 puff as needed. (Patient not taking: Reported on 1/17/2022), Disp: , Rfl:      doxycycline monohydrate (ADOXA) 100 MG tablet, Take 100 mg by mouth 2 times daily, Disp: , Rfl:      multivitamin with minerals (THERA-M) 9 mg iron-400 mcg Tab tablet, [MULTIVITAMIN WITH MINERALS (THERA-M) 9 MG IRON-400 MCG TAB TABLET] Take 1 tablet by mouth daily., Disp: , Rfl:     Allergies:   Atorvastatin, Lisinopril, Morphine, Sulfa (sulfonamide antibiotics) [sulfa drugs], and Vicodin [hydrocodone-acetaminophen]      Objective:      Physical Exam        There is no height or weight on file to calculate BMI.  /64 (BP Location: Right arm, Patient Position: Sitting, Cuff Size: Adult Large)   Pulse 60   Resp 16     General Appearance:   Awake, Alert, No acute distress.   HEENT:  Pupil equal and reactive to light. No scleral icterus; the mucous membranes were moist.   Neck: No cervical bruits. Not able to see JVD. No thyromegaly.     Chest: The spine was straight. The chest was symmetric.   Lungs:   Diminished breathing  sounds. Few crackles in left base. No wheezing.   Cardiovascular:   Regular rhythm and rate, remote first and second heart sounds with no murmurs. No rubs or gallops.    Abdomen:  Obese. Soft. No tenderness. Non-distended. Bowels sounds are present   Extremities: Mild bilateral leg edema.   Skin: Red skin rashes most in back. Warm, Dry.   Musculoskeletal: No tenderness. No deformity.   Neurologic: Mood and affect are appropriate. No focal deficits.         EKG:  Personally reivewed  Sinus bradycardia with 1st degree A-V block   Inferior infarct (cited on or before 16-JUL-1998)   Poor R wave progression   Abnormal ECG   When compared with ECG of 21-NOV-2016 15:33,   Vent. rate has decreased BY  32 BPM      Cardiac Imaging Studies  Echocardiogram on 4-8-2021:    Extremely poor visualization    Probably normal LV systolic function    RV appears to be dilated    Valves were not visualized    There is no obvious pericardial effusion      Coronary angiogram with PCI on 2-4-2019:    IVUS left main and LAD showed no severely obstructive lesions    Ramus disease treated with rotational atherectomy and stented with a Synergy KATHY under IVUS guidance with good angiographic results    Right heart catheterizations show LV EDP 22 mmHg and PCWP 30 mmHg, PA mean 34 mmHg, RA 13 mmHg      Lab Review   Lab Results   Component Value Date     06/23/2021    CO2 23 06/23/2021    BUN 21 06/23/2021     Lab Results   Component Value Date    WBC 8.5 05/17/2021    HGB 13.9 05/17/2021    HCT 40.6 05/17/2021    MCV 90 05/17/2021     05/17/2021     Lab Results   Component Value Date    CHOL 102 09/20/2021    CHOL 133 12/23/2020    CHOL 126 10/08/2019     Lab Results   Component Value Date    HDL 49 09/20/2021    HDL 56 12/23/2020    HDL 54 10/08/2019     Lab Results   Component Value Date    TRIG 97 09/20/2021    TRIG 146 12/23/2020    TRIG 162 (H) 10/08/2019     Lab Results   Component Value Date    TROPONINI 0.02 04/08/2021     Lab  Results   Component Value Date    BNP 84 05/17/2021                   Thank you for allowing me to participate in the care of your patient.      Sincerely,     Yonatan Sheridan MD     [facility]  cc:   Yonatan Sheridan MD  [address] [facility]  [address] [address]

## 2022-01-17 NOTE — PROGRESS NOTES
Assessment/Plan:   1.  Chronic congestive heart failure with preserved ejection fraction: The patient has mild dyspnea on exertion.  Bilateral leg edema.  He gained 4 pounds recently.  Increase torsemide to 40 mg daily until his weight back to his baseline 272 pounds and then back to 20 mg daily.  Also discussed with his wife for, additional 20 mg of torsemide as needed in the future.  CBC, CMP today.     2.  Coronary artery disease s/p KATHY to Ramus, severe calcified RCA lesion with high risk of revascularization, no obstructive lesion in left main and LAD per IVUS:  He has no chest pain.  Continue medical treatment.  Continue aspirin, Crestor, isosorbide mononitrate and metoprolol.      3.  Essential hypertension: His blood pressure is controlled, continue valsartan 80 mg daily and metoprolol 25 mg twice a day     4.  Dyslipidemia: Continue Crestor 20 mg nightly.  Last LDL was controlled.     5.  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     6.  Morbid obesity and obstructive sleep apnea: Lifestyle modification and continue CPAP.      Thank you for the opportunity to be involved in the care of Chava Lincoln. If you have any questions, please feel free to contact me.  I will see the patient again in 6 months and as needed.    Much or all of the text in this note was generated through the use of Dragon Dictate voice-to-text software. Errors in spelling or words which seem out of context are unintentional. Sound alike errors, in particular, may have escaped editing.       History of Present Illness:   It is my pleasure to see Chava Lincoln at the [facility] for routine cardiology follow up.  Chava Lincoln is a 89 year old male with a medical history of coronary artery disease, s/p KATHY to Ramus in 2-2019, no obstructive disease in LM and LAD, severe calcification lesions in RCA, essential hypertension, dyslipidemia, chronic diastolic congestive heart failure,  morbid obesity, hyponatremia, right diaphragm hemiparalysis, obstructive sleep apnea on CPAP.    The patient and his wife state that he gained 4 pounds recently.  He has been eating low-salt diet.  He has been taking his medication regularly.  Currently he is on torsemide to 20 mg daily.  Hydrochlorothiazide was discontinued due to low blood pressure.  He had no chest pain, palpitations, dizziness, orthopnea.  His leg edema has a stable.  His dyspnea on exertion is similar.  His blood pressure and pulses are in normal range.    Past Medical History:     Patient Active Problem List   Diagnosis     GERD (gastroesophageal reflux disease)     RAMON on CPAP     Near syncope     Dyspnea on exertion     Essential hypertension     Dyslipidemia     Coronary artery disease involving native coronary artery of native heart without angina pectoris     Ascending aortic aneurysm (H)     Coronary artery calcification seen on CAT scan     Pulmonary hypertension (H)     History of pulmonary embolism     Hypoxia     Morbid obesity (H)     Bilateral lower extremity edema     Chronic kidney disease, stage 3 (H)     (HFpEF) heart failure with preserved ejection fraction (H)       Past Surgical History:     Past Surgical History:   Procedure Laterality Date     CV CORONARY ANGIOGRAM N/A 12/10/2018    Procedure: Coronary Angiogram;  Surgeon: Tana Duncan MD;  Location: [facility] Cath Lab;  Service: Cardiology     CV LEFT HEART CATHETERIZATION WITHOUT LEFT VENTRICULOGRAM Left 12/10/2018    Procedure: Left Heart Catheterization Without Left Ventriculogram;  Surgeon: Tana Duncan MD;  Location: [facility] Cath Lab;  Service: Cardiology     CV LEFT HEART CATHETERIZATION WITHOUT LEFT VENTRICULOGRAM Left 02/04/2019    Procedure: Left Heart Catheterization Without Left Ventriculogram;  Surgeon: Tana Duncan MD;  Location: [facility] Cath Lab;  Service: Cardiology     CV RIGHT HEART CATHETERIZATION N/A 02/04/2019    Procedure: Right  Heart Catheterization;  Surgeon: Tana Duncan MD;  Location: [facility] Cath Lab;  Service: Cardiology     HERNIA REPAIR       JOINT REPLACEMENT       KNEE SURGERY Bilateral      OTHER SURGICAL HISTORY      Prostatic cryotherapy       Family History:     Family History   Problem Relation Age of Onset     Diabetes Mother      Cerebrovascular Disease Father      Heart Disease Brother      Coronary Artery Disease Brother         Social History:    reports that he has never smoked. He has never used smokeless tobacco. He reports current alcohol use. He reports that he does not use drugs.    Review of Systems:   12 systems are reviewed negative except for in HPI.    Meds:     Current Outpatient Medications:      acetaminophen (TYLENOL) 325 MG tablet, [ACETAMINOPHEN (TYLENOL) 325 MG TABLET] Take 325 mg by mouth every 6 (six) hours as needed for pain., Disp: , Rfl:      aspirin 81 MG EC tablet, [ASPIRIN 81 MG EC TABLET] Take 81 mg by mouth every evening. , Disp: , Rfl:      CERTAVITE SENIOR 0.4-300-250 mg-mcg-mcg tablet, [CERTAVITE SENIOR 0.4-300-250 MG-MCG-MCG TABLET] Take 1 tablet by mouth daily., Disp: , Rfl:      isosorbide mononitrate (IMDUR) 30 MG 24 hr tablet, [ISOSORBIDE MONONITRATE (IMDUR) 30 MG 24 HR TABLET] Take 1 tablet (30 mg total) by mouth daily., Disp: 90 tablet, Rfl: 2     MEDICATION CANNOT BE REORDERED - PLEASE MANUALLY REORDER AND DISCONTINUE THE OLD ORDER, [LEUPROLIDE, 6 MONTH, (ELIGARD, 6 MONTH,) 45 MG SYRINGE] Inject 45 mg under the skin. Every six months, Disp: , Rfl:      metoprolol tartrate (LOPRESSOR) 25 MG tablet, Take 1 tablet (25 mg) by mouth 2 times daily, Disp: 180 tablet, Rfl: 1     mirabegron (MYRBETRIQ) 50 mg Tb24 ER tablet, [MIRABEGRON (MYRBETRIQ) 50 MG TB24 ER TABLET] Take 50 mg by mouth daily., Disp: , Rfl:      NYSTOP [id number] UNIT/GM powder, as needed, Disp: , Rfl:      oxybutynin (DITROPAN XL) 15 MG 24 hr tablet, [OXYBUTYNIN (DITROPAN XL) 15 MG 24 HR TABLET] Take 15 mg by mouth  daily., Disp: , Rfl:      oxybutynin (DITROPAN XL) 5 MG ER tablet, Take 5 mg by mouth daily , Disp: , Rfl:      rosuvastatin (CRESTOR) 20 MG tablet, TAKE 1 TABLET(20 MG) BY MOUTH AT BEDTIME, Disp: 90 tablet, Rfl: 1     torsemide (DEMADEX) 20 MG tablet, Take 1 tablet (20 mg) by mouth daily, Disp: 90 tablet, Rfl: 1     triamcinolone (KENALOG) 0.1 % external ointment, triamcinolone acetonide 0.1 % topical ointment  APPLY TOPICALLY TO RASH ON BACK TWICE DAILY FOR 2 WEEKS, Disp: , Rfl:      valsartan (DIOVAN) 80 MG tablet, Take 80 mg by mouth daily, Disp: , Rfl:      COMBIVENT RESPIMAT  mcg/actuation Mist inhaler, [COMBIVENT RESPIMAT  MCG/ACTUATION MIST INHALER] Inhale 1 puff as needed. (Patient not taking: Reported on 1/17/2022), Disp: , Rfl:      doxycycline monohydrate (ADOXA) 100 MG tablet, Take 100 mg by mouth 2 times daily, Disp: , Rfl:      multivitamin with minerals (THERA-M) 9 mg iron-400 mcg Tab tablet, [MULTIVITAMIN WITH MINERALS (THERA-M) 9 MG IRON-400 MCG TAB TABLET] Take 1 tablet by mouth daily., Disp: , Rfl:     Allergies:   Atorvastatin, Lisinopril, Morphine, Sulfa (sulfonamide antibiotics) [sulfa drugs], and Vicodin [hydrocodone-acetaminophen]      Objective:      Physical Exam        There is no height or weight on file to calculate BMI.  /64 (BP Location: Right arm, Patient Position: Sitting, Cuff Size: Adult Large)   Pulse 60   Resp 16     General Appearance:   Awake, Alert, No acute distress.   HEENT:  Pupil equal and reactive to light. No scleral icterus; the mucous membranes were moist.   Neck: No cervical bruits. Not able to see JVD. No thyromegaly.     Chest: The spine was straight. The chest was symmetric.   Lungs:   Diminished breathing sounds. Few crackles in left base. No wheezing.   Cardiovascular:   Regular rhythm and rate, remote first and second heart sounds with no murmurs. No rubs or gallops.    Abdomen:  Obese. Soft. No tenderness. Non-distended. Bowels sounds are  present   Extremities: Mild bilateral leg edema.   Skin: Red skin rashes most in back. Warm, Dry.   Musculoskeletal: No tenderness. No deformity.   Neurologic: Mood and affect are appropriate. No focal deficits.         EKG:  Personally reivewed  Sinus bradycardia with 1st degree A-V block   Inferior infarct (cited on or before 16-JUL-1998)   Poor R wave progression   Abnormal ECG   When compared with ECG of 21-NOV-2016 15:33,   Vent. rate has decreased BY  32 BPM      Cardiac Imaging Studies  Echocardiogram on 4-8-2021:    Extremely poor visualization    Probably normal LV systolic function    RV appears to be dilated    Valves were not visualized    There is no obvious pericardial effusion      Coronary angiogram with PCI on 2-4-2019:    IVUS left main and LAD showed no severely obstructive lesions    Ramus disease treated with rotational atherectomy and stented with a Synergy KATHY under IVUS guidance with good angiographic results    Right heart catheterizations show LV EDP 22 mmHg and PCWP 30 mmHg, PA mean 34 mmHg, RA 13 mmHg      Lab Review   Lab Results   Component Value Date     06/23/2021    CO2 23 06/23/2021    BUN 21 06/23/2021     Lab Results   Component Value Date    WBC 8.5 05/17/2021    HGB 13.9 05/17/2021    HCT 40.6 05/17/2021    MCV 90 05/17/2021     05/17/2021     Lab Results   Component Value Date    CHOL 102 09/20/2021    CHOL 133 12/23/2020    CHOL 126 10/08/2019     Lab Results   Component Value Date    HDL 49 09/20/2021    HDL 56 12/23/2020    HDL 54 10/08/2019     Lab Results   Component Value Date    TRIG 97 09/20/2021    TRIG 146 12/23/2020    TRIG 162 (H) 10/08/2019     Lab Results   Component Value Date    TROPONINI 0.02 04/08/2021     Lab Results   Component Value Date    BNP 84 05/17/2021

## 2022-01-19 LAB — BACTERIA UR CULT: NO GROWTH

## 2022-01-21 ENCOUNTER — LAB REQUISITION (OUTPATIENT)
Dept: LAB | Facility: CLINIC | Age: 87
End: 2022-01-21
Payer: MEDICARE

## 2022-01-21 DIAGNOSIS — T14.8XXA OTHER INJURY OF UNSPECIFIED BODY REGION, INITIAL ENCOUNTER: ICD-10-CM

## 2022-01-21 PROCEDURE — 87077 CULTURE AEROBIC IDENTIFY: CPT | Mod: ORL | Performed by: PHYSICIAN ASSISTANT

## 2022-01-21 PROCEDURE — 87205 SMEAR GRAM STAIN: CPT | Mod: ORL | Performed by: PHYSICIAN ASSISTANT

## 2022-01-25 LAB
BACTERIA WND CULT: ABNORMAL
BACTERIA WND CULT: ABNORMAL
GRAM STAIN RESULT: ABNORMAL
GRAM STAIN RESULT: ABNORMAL

## 2022-02-08 ENCOUNTER — HOSPITAL ENCOUNTER (EMERGENCY)
Facility: CLINIC | Age: 87
Discharge: HOME | End: 2022-02-08
Attending: EMERGENCY MEDICINE | Admitting: EMERGENCY MEDICINE
Payer: MEDICARE

## 2022-02-08 ENCOUNTER — APPOINTMENT (OUTPATIENT)
Dept: NUCLEAR MEDICINE | Facility: CLINIC | Age: 87
End: 2022-02-08
Attending: EMERGENCY MEDICINE
Payer: MEDICARE

## 2022-02-08 ENCOUNTER — APPOINTMENT (OUTPATIENT)
Dept: RADIOLOGY | Facility: CLINIC | Age: 87
End: 2022-02-08
Attending: EMERGENCY MEDICINE
Payer: MEDICARE

## 2022-02-08 VITALS
OXYGEN SATURATION: 93 % | HEART RATE: 72 BPM | BODY MASS INDEX: 42.16 KG/M2 | DIASTOLIC BLOOD PRESSURE: 81 MMHG | RESPIRATION RATE: 21 BRPM | SYSTOLIC BLOOD PRESSURE: 184 MMHG | WEIGHT: 285.5 LBS

## 2022-02-08 DIAGNOSIS — R09.02 HYPOXIA: ICD-10-CM

## 2022-02-08 DIAGNOSIS — R10.13 ABDOMINAL PAIN, EPIGASTRIC: ICD-10-CM

## 2022-02-08 LAB
ALBUMIN SERPL-MCNC: 3.3 G/DL (ref 3.5–5)
ALP SERPL-CCNC: 60 U/L (ref 45–120)
ALT SERPL W P-5'-P-CCNC: 28 U/L (ref 0–45)
ANION GAP SERPL CALCULATED.3IONS-SCNC: 10 MMOL/L (ref 5–18)
AST SERPL W P-5'-P-CCNC: 32 U/L (ref 0–40)
BASE EXCESS BLDV CALC-SCNC: 8.6 MMOL/L
BASOPHILS # BLD AUTO: 0 10E3/UL (ref 0–0.2)
BASOPHILS NFR BLD AUTO: 1 %
BILIRUB SERPL-MCNC: 0.4 MG/DL (ref 0–1)
BNP SERPL-MCNC: 95 PG/ML (ref 0–93)
BUN SERPL-MCNC: 35 MG/DL (ref 8–28)
CALCIUM SERPL-MCNC: 8.4 MG/DL (ref 8.5–10.5)
CHLORIDE BLD-SCNC: 101 MMOL/L (ref 98–107)
CO2 SERPL-SCNC: 28 MMOL/L (ref 22–31)
CREAT SERPL-MCNC: 1.7 MG/DL (ref 0.7–1.3)
D DIMER PPP FEU-MCNC: 3.08 UG/ML FEU (ref 0–0.5)
EOSINOPHIL # BLD AUTO: 0.2 10E3/UL (ref 0–0.7)
EOSINOPHIL NFR BLD AUTO: 2 %
ERYTHROCYTE [DISTWIDTH] IN BLOOD BY AUTOMATED COUNT: 13.7 % (ref 10–15)
FLUAV RNA SPEC QL NAA+PROBE: NEGATIVE
FLUBV RNA RESP QL NAA+PROBE: NEGATIVE
GFR SERPL CREATININE-BSD FRML MDRD: 37 ML/MIN/1.73M2
GLUCOSE BLD-MCNC: 171 MG/DL (ref 70–125)
HCO3 BLDV-SCNC: 29 MMOL/L (ref 24–30)
HCT VFR BLD AUTO: 39.6 % (ref 40–53)
HGB BLD-MCNC: 12.6 G/DL (ref 13.3–17.7)
HOLD SPECIMEN: NORMAL
IMM GRANULOCYTES # BLD: 0 10E3/UL
IMM GRANULOCYTES NFR BLD: 1 %
LYMPHOCYTES # BLD AUTO: 2.3 10E3/UL (ref 0.8–5.3)
LYMPHOCYTES NFR BLD AUTO: 27 %
MAGNESIUM SERPL-MCNC: 1.9 MG/DL (ref 1.8–2.6)
MCH RBC QN AUTO: 32 PG (ref 26.5–33)
MCHC RBC AUTO-ENTMCNC: 31.8 G/DL (ref 31.5–36.5)
MCV RBC AUTO: 101 FL (ref 78–100)
MONOCYTES # BLD AUTO: 0.8 10E3/UL (ref 0–1.3)
MONOCYTES NFR BLD AUTO: 9 %
NEUTROPHILS # BLD AUTO: 5.2 10E3/UL (ref 1.6–8.3)
NEUTROPHILS NFR BLD AUTO: 60 %
NRBC # BLD AUTO: 0 10E3/UL
NRBC BLD AUTO-RTO: 0 /100
OXYHGB MFR BLDV: 39.4 % (ref 70–75)
PCO2 BLDV: 60 MM HG (ref 35–50)
PH BLDV: 7.37 [PH] (ref 7.35–7.45)
PLATELET # BLD AUTO: 243 10E3/UL (ref 150–450)
PO2 BLDV: 25 MM HG (ref 25–47)
POTASSIUM BLD-SCNC: 4 MMOL/L (ref 3.5–5)
PROT SERPL-MCNC: 6.3 G/DL (ref 6–8)
RBC # BLD AUTO: 3.94 10E6/UL (ref 4.4–5.9)
SAO2 % BLDV: 40.2 % (ref 70–75)
SARS-COV-2 RNA RESP QL NAA+PROBE: NEGATIVE
SODIUM SERPL-SCNC: 139 MMOL/L (ref 136–145)
TROPONIN I SERPL-MCNC: 0.01 NG/ML (ref 0–0.29)
WBC # BLD AUTO: 8.6 10E3/UL (ref 4–11)

## 2022-02-08 PROCEDURE — 343N000001 HC RX 343: Performed by: EMERGENCY MEDICINE

## 2022-02-08 PROCEDURE — 83735 ASSAY OF MAGNESIUM: CPT | Performed by: EMERGENCY MEDICINE

## 2022-02-08 PROCEDURE — 87636 SARSCOV2 & INF A&B AMP PRB: CPT | Performed by: EMERGENCY MEDICINE

## 2022-02-08 PROCEDURE — 82805 BLOOD GASES W/O2 SATURATION: CPT | Performed by: EMERGENCY MEDICINE

## 2022-02-08 PROCEDURE — 36415 COLL VENOUS BLD VENIPUNCTURE: CPT | Performed by: EMERGENCY MEDICINE

## 2022-02-08 PROCEDURE — 84484 ASSAY OF TROPONIN QUANT: CPT | Performed by: EMERGENCY MEDICINE

## 2022-02-08 PROCEDURE — 85379 FIBRIN DEGRADATION QUANT: CPT | Performed by: EMERGENCY MEDICINE

## 2022-02-08 PROCEDURE — 85014 HEMATOCRIT: CPT | Performed by: EMERGENCY MEDICINE

## 2022-02-08 PROCEDURE — 93005 ELECTROCARDIOGRAM TRACING: CPT | Performed by: EMERGENCY MEDICINE

## 2022-02-08 PROCEDURE — 99285 EMERGENCY DEPT VISIT HI MDM: CPT | Mod: 25

## 2022-02-08 PROCEDURE — 83880 ASSAY OF NATRIURETIC PEPTIDE: CPT | Performed by: EMERGENCY MEDICINE

## 2022-02-08 PROCEDURE — 78580 LUNG PERFUSION IMAGING: CPT

## 2022-02-08 PROCEDURE — C9803 HOPD COVID-19 SPEC COLLECT: HCPCS

## 2022-02-08 PROCEDURE — 80053 COMPREHEN METABOLIC PANEL: CPT | Performed by: EMERGENCY MEDICINE

## 2022-02-08 PROCEDURE — A9540 TC99M MAA: HCPCS | Performed by: EMERGENCY MEDICINE

## 2022-02-08 PROCEDURE — 71045 X-RAY EXAM CHEST 1 VIEW: CPT

## 2022-02-08 RX ADMIN — KIT FOR THE PREPARATION OF TECHNETIUM TC 99M ALBUMIN AGGREGATED 7.94 MILLICURIE: 2.5 INJECTION, POWDER, FOR SOLUTION INTRAVENOUS at 19:34

## 2022-02-08 NOTE — ED PROVIDER NOTES
"EMERGENCY DEPARTMENT ENCOUNTER      NAME: Chava Lincoln  AGE: 89 year old male  YOB: 1929  MRN: [id number]  EVALUATION DATE & TIME: 2/8/2022  3:00 PM    PCP: Erasmo Michael    ED PROVIDER: Esteban Khoury M.D.      Chief Complaint   Patient presents with     Fatigue     Shortness of Breath       FINAL IMPRESSION:  Exertional hypoxia  Abdominal discomfort    ED COURSE & MEDICAL DECISION MAKING:    Pertinent Labs & Imaging studies reviewed. (See chart for details)  89 year old male presents to the Emergency Department for evaluation of weakness, abdominal distress, brief hypoxia.  Patient reports having onset of \"stomach problems\" after eating.  Seem to feel little fatigued and short of breath also.  Called EMS by the time they arrived symptoms had basically subsided.  However with paramedics moving them about oxygenation was noted to drop to 88% on room air.  Patient given supplemental oxygen with good effect.  Patient reports his oxygen level typically around 90% with improvement with deep breathing.  Patient is an obese male in mild distress.  Lungs diminished but clear.  Card exam unremarkable.  Abdomen obese soft nontender.  Patient with marked lower extremity edema.  However reports this is improved compared to typical edema.  Patient has noted a slight open sore in the right leg which has been ongoing for few weeks.  Shallow ulceration noted.  Patient is obese male with a history of congestive heart failure with reports of general weakness and brief hypoxia.  Primary concern is of pulmonary edema.  Possibility of cardiac event further worsening his cardiac function.  Labs are sent for evaluation.  Patient also being swabbed for Covid.  Patient does report being vaccinated.. Patient appears non toxic with stable vitals signs.     3:13 PM I met with the patient for the initial interview and physical examination. Discussed plan for treatment and workup in the ED.    3:50 PM.  D-dimer returns " moderately elevated.  This could relate to his hypoxia however patient reports his oxygen levels typically around 90% and with deeper breathing increases to 92%.  Awaiting renal function.  4:55 PM I rechecked and updated the patient.  Patient informed of plan for V/Q scanning.  7:50 PM.  VQ scan initiated  8:40 PM.  VQ scan is negative.  Patient with occasional desaturations with exertion.  Not remarkable given his history of heart failure/coronary artery disease.  Patient with borderline hypoxia routinely.  No serious indications of additional compromise presently.  We will proceed with plans for continued outpatient management.  9:05 PM.  Oxygenation remains at 90 to 91% on room air.  We will proceed with plans for discharge  At the conclusion of the encounter I discussed the results of all of the tests and the disposition. The questions were answered and return precautions provided. The patient or family acknowledged understanding and was agreeable with the care plan.       PPE: Provider wore gloves, N95 mask, eye protection, surgical cap, and paper mask.     MEDICATIONS GIVEN IN THE EMERGENCY:  Medications - No data to display    NEW PRESCRIPTIONS STARTED AT TODAY'S ER VISIT  New Prescriptions    No medications on file     =================================================================    HPI    Patient information was obtained from: patient     Use of Intrepreter: N/A         Chava YOUNG Salazar is a 89 year old male with a pertient medical history of respiratory failure with hypoxia, RAMON on CPAP, COPD, prostate cancer, aortic aneurysm, HFpEF CAD, and hypertension who presents to the ED via EMS for evaluation of nausea and shortness of breath.     Patient reports feeling nausea and shortness of breath after eating lunch this afternoon. Patient called EMS but states that he felt better by the time that they arrived. Patient states that he is feeling fine here. Patient has moderate bilateral lower leg edema that  he states appears better than it typically does. He also notes a small sore to his right lower leg that has been present for the past couple of weeks.     Per EMS, patient SpO2 sats dropped to 88% on room air while moving around so he was put on 2L O2.     Per chart review,   1/17/22 patient visited his Cardiologist, Dr. Sheridan at [place].  Chronic congestive heart failure with preserved ejection fraction: patient has mild dyspnea on exertion, bilateral leg edema. Recently gained 4 lbs. Increase torsemide to 40 mg daily until his weight back to his baseline 272 pounds and then back to 20 mg daily.  Also discussed additional 20 mg of torsemide as needed in the future.      REVIEW OF SYSTEMS   Constitutional:  Denies fever, chills  Respiratory:  Denies productive cough  Positive for shortness of breath  Cardiovascular:  Denies chest pain, palpitations  Positive for leg swelling (bilateral)  GI:  Denies abdominal pain, vomiting, or change in bowel or bladder habits   Positive for nausea  Musculoskeletal:  Denies any new muscle/joint swelling  Skin:  Denies rash   Positive for sore (right lower leg)  Neurologic:  Denies focal weakness  All systems negative except as marked.       PAST MEDICAL HISTORY:  Past Medical History:   Diagnosis Date     Coronary artery disease due to calcified coronary lesion      GERD (gastroesophageal reflux disease) 7/2/2014     Hypertension      RAMON on CPAP 4/17/2017     Prostate cancer (H)      Prostate cancer (H)        PAST SURGICAL HISTORY:  Past Surgical History:   Procedure Laterality Date     CV CORONARY ANGIOGRAM N/A 12/10/2018    Procedure: Coronary Angiogram;  Surgeon: Tana Duncan MD;  Location: [facility] Cath Lab;  Service: Cardiology     CV LEFT HEART CATHETERIZATION WITHOUT LEFT VENTRICULOGRAM Left 12/10/2018    Procedure: Left Heart Catheterization Without Left Ventriculogram;  Surgeon: Tana Duncan MD;  Location: [facility] Cath Lab;  Service: Cardiology     CV LEFT  HEART CATHETERIZATION WITHOUT LEFT VENTRICULOGRAM Left 02/04/2019    Procedure: Left Heart Catheterization Without Left Ventriculogram;  Surgeon: Tana Duncan MD;  Location: [facility] Cath Lab;  Service: Cardiology     CV RIGHT HEART CATHETERIZATION N/A 02/04/2019    Procedure: Right Heart Catheterization;  Surgeon: Tana Duncan MD;  Location: [facility] Cath Lab;  Service: Cardiology     HERNIA REPAIR       JOINT REPLACEMENT       KNEE SURGERY Bilateral      OTHER SURGICAL HISTORY      Prostatic cryotherapy       CURRENT MEDICATIONS:    No current facility-administered medications for this encounter.    Current Outpatient Medications:      acetaminophen (TYLENOL) 325 MG tablet, [ACETAMINOPHEN (TYLENOL) 325 MG TABLET] Take 325 mg by mouth every 6 (six) hours as needed for pain., Disp: , Rfl:      aspirin 81 MG EC tablet, [ASPIRIN 81 MG EC TABLET] Take 81 mg by mouth every evening. , Disp: , Rfl:      CERTAVITE SENIOR 0.4-300-250 mg-mcg-mcg tablet, [CERTAVITE SENIOR 0.4-300-250 MG-MCG-MCG TABLET] Take 1 tablet by mouth daily., Disp: , Rfl:      COMBIVENT RESPIMAT  mcg/actuation Mist inhaler, [COMBIVENT RESPIMAT  MCG/ACTUATION MIST INHALER] Inhale 1 puff as needed. (Patient not taking: Reported on 1/17/2022), Disp: , Rfl:      doxycycline monohydrate (ADOXA) 100 MG tablet, Take 100 mg by mouth 2 times daily, Disp: , Rfl:      isosorbide mononitrate (IMDUR) 30 MG 24 hr tablet, [ISOSORBIDE MONONITRATE (IMDUR) 30 MG 24 HR TABLET] Take 1 tablet (30 mg total) by mouth daily., Disp: 90 tablet, Rfl: 2     MEDICATION CANNOT BE REORDERED - PLEASE MANUALLY REORDER AND DISCONTINUE THE OLD ORDER, [LEUPROLIDE, 6 MONTH, (ELIGARD, 6 MONTH,) 45 MG SYRINGE] Inject 45 mg under the skin. Every six months, Disp: , Rfl:      metoprolol tartrate (LOPRESSOR) 25 MG tablet, Take 1 tablet (25 mg) by mouth 2 times daily, Disp: 180 tablet, Rfl: 1     mirabegron (MYRBETRIQ) 50 mg Tb24 ER tablet, [MIRABEGRON (MYRBETRIQ) 50  MG TB24 ER TABLET] Take 50 mg by mouth daily., Disp: , Rfl:      multivitamin with minerals (THERA-M) 9 mg iron-400 mcg Tab tablet, [MULTIVITAMIN WITH MINERALS (THERA-M) 9 MG IRON-400 MCG TAB TABLET] Take 1 tablet by mouth daily., Disp: , Rfl:      NYSTOP 182872 UNIT/GM powder, as needed, Disp: , Rfl:      oxybutynin (DITROPAN XL) 15 MG 24 hr tablet, [OXYBUTYNIN (DITROPAN XL) 15 MG 24 HR TABLET] Take 15 mg by mouth daily., Disp: , Rfl:      oxybutynin (DITROPAN XL) 5 MG ER tablet, Take 5 mg by mouth daily , Disp: , Rfl:      rosuvastatin (CRESTOR) 20 MG tablet, TAKE 1 TABLET(20 MG) BY MOUTH AT BEDTIME, Disp: 90 tablet, Rfl: 1     torsemide (DEMADEX) 20 MG tablet, Take 1 tablet (20 mg) by mouth daily, Disp: 90 tablet, Rfl: 1     triamcinolone (KENALOG) 0.1 % external ointment, triamcinolone acetonide 0.1 % topical ointment  APPLY TOPICALLY TO RASH ON BACK TWICE DAILY FOR 2 WEEKS, Disp: , Rfl:      valsartan (DIOVAN) 80 MG tablet, Take 80 mg by mouth daily, Disp: , Rfl:     ALLERGIES:  Allergies   Allergen Reactions     Atorvastatin Muscle Pain (Myalgia)     Lisinopril Cough     Morphine Other (See Comments)     hypotension     Sulfa (Sulfonamide Antibiotics) [Sulfa Drugs] Hives     Vicodin [Hydrocodone-Acetaminophen] Unknown       FAMILY HISTORY:  Family History   Problem Relation Age of Onset     Diabetes Mother      Cerebrovascular Disease Father      Heart Disease Brother      Coronary Artery Disease Brother        SOCIAL HISTORY:   Social History     Socioeconomic History     Marital status:      Spouse name: Santa     Number of children: 0     Years of education: None     Highest education level: 12th grade   Occupational History     Occupation: Retired    Tobacco Use     Smoking status: Never Smoker     Smokeless tobacco: Never Used   Substance and Sexual Activity     Alcohol use: Yes     Comment: Alcoholic Drinks/day: 2-3 beer/ month     Drug use: No     Sexual activity: Not Currently     Partners: Female    Other Topics Concern     None   Social History Narrative     None     Social Determinants of Health     Financial Resource Strain: Low Risk      Difficulty of Paying Living Expenses: Not hard at all   Food Insecurity: No Food Insecurity     Worried About Running Out of Food in the Last Year: Never true     Ran Out of Food in the Last Year: Never true   Transportation Needs: No Transportation Needs     Lack of Transportation (Medical): No     Lack of Transportation (Non-Medical): No   Physical Activity: Inactive     Days of Exercise per Week: 0 days     Minutes of Exercise per Session: 0 min   Stress: No Stress Concern Present     Feeling of Stress : Only a little   Social Connections: Socially Isolated     Frequency of Communication with Friends and Family: Never     Frequency of Social Gatherings with Friends and Family: Never     Attends [employer] Services: Never     Active Member of Clubs or Organizations: No     Attends Club or Organization Meetings: Never     Marital Status:    Intimate Partner Violence: Not At Risk     Fear of Current or Ex-Partner: No     Emotionally Abused: No     Physically Abused: No     Sexually Abused: No   Housing Stability: Not on file       VITALS:  Patient Vitals for the past 24 hrs:   BP Pulse Resp SpO2 Weight   02/08/22 1502 139/64 65 24 (!) 89 % 129.5 kg (285 lb 8 oz)        PHYSICAL EXAM    Constitutional:  Awake, alert, obese male in mild distress.   HENT:  Normocephalic, Atraumatic. Bilateral external ears normal. Oropharynx moist. Nose normal. Neck- Normal range of motion with no guarding, No midline cervical tenderness, Supple, No stridor.   Eyes:  PERRL, EOMI with no signs of entrapment, Conjunctiva normal, No discharge.   Respiratory:  Diminished breath sounds, No respiratory distress, No wheezing.    Cardiovascular:  Normal heart rate, Normal rhythm, heart tones distant but normal. No appreciable rubs or gallops.   GI:  Soft, No tenderness, No distension, No  palpable masses. Obese.   Musculoskeletal:  Intact distal pulses, No edema. Good range of motion in all major joints. No tenderness to palpation or major deformities noted.  Integument:  Warm, Dry, No erythema, No rash. Moderate bilateral lower extremity edema with venous stasis changes. 4cm x 6cm shallow ulcer to pretibial area of right lower extremity.   Neurologic:  Alert & oriented, Normal motor function, Normal sensory function, No focal deficits noted.   Psychiatric:  Affect normal, Judgment normal, Mood normal.       LAB:  All pertinent labs reviewed and interpreted.  Results for orders placed or performed during the hospital encounter of 02/08/22   XR Chest Port 1 View     Status: None    Narrative    EXAM: XR CHEST PORT 1 VIEW  LOCATION: [facility]  DATE/TIME: 2/8/2022 5:06 PM    INDICATION: Chest pain.  COMPARISON: 09/20/2018 chest CT.      Impression    IMPRESSION: Hypoexpanded lungs. Redemonstrated moderately elevated right hemidiaphragm. Mild basilar opacities are most likely atelectasis. Upper normal heart size. Aortic atherosclerosis. Degenerative changes of the bony structures.       NM Lung Scan Perfusion Particulate     Status: None    Narrative    EXAM: NM LUNG SCAN PERFUSION PARTICULATE  LOCATION: [facility]  DATE/TIME: 2/8/2022 7:31 PM    INDICATION: PE suspected, high probability.  COMPARISON: CXR 2/8/2022 5:06 PM and CTA chest 4/8/2021.  TECHNIQUE: 7.94 mCi technetium-99m MAA, IV. Standard lung perfusion imaging.    FINDINGS: Photopenia inferior right hemithorax related to chronic elevation of the right hemidiaphragm. Otherwise normal perfusion to both lungs. No segmental perfusion defects.      Impression    IMPRESSION:   1.  Low probability for pulmonary emboli.   Extra Blue Top Tube     Status: None   Result Value Ref Range    Hold Specimen JIC    Extra Red Top Tube     Status: None   Result Value Ref Range    Hold Specimen JIC    Extra  Green Top (Lithium Heparin) Tube     Status: None   Result Value Ref Range    Hold Specimen JIC    Extra Purple Top Tube     Status: None   Result Value Ref Range    Hold Specimen JIC    Troponin I     Status: Normal   Result Value Ref Range    Troponin I 0.01 0.00 - 0.29 ng/mL   Magnesium     Status: Normal   Result Value Ref Range    Magnesium 1.9 1.8 - 2.6 mg/dL   Comprehensive metabolic panel     Status: Abnormal   Result Value Ref Range    Sodium 139 136 - 145 mmol/L    Potassium 4.0 3.5 - 5.0 mmol/L    Chloride 101 98 - 107 mmol/L    Carbon Dioxide (CO2) 28 22 - 31 mmol/L    Anion Gap 10 5 - 18 mmol/L    Urea Nitrogen 35 (H) 8 - 28 mg/dL    Creatinine 1.70 (H) 0.70 - 1.30 mg/dL    Calcium 8.4 (L) 8.5 - 10.5 mg/dL    Glucose 171 (H) 70 - 125 mg/dL    Alkaline Phosphatase 60 45 - 120 U/L    AST 32 0 - 40 U/L    ALT 28 0 - 45 U/L    Protein Total 6.3 6.0 - 8.0 g/dL    Albumin 3.3 (L) 3.5 - 5.0 g/dL    Bilirubin Total 0.4 0.0 - 1.0 mg/dL    GFR Estimate 37 (L) >60 mL/min/1.73m2   B-Type Natriuretic Peptide ([facility] Only)     Status: Abnormal   Result Value Ref Range    BNP 95 (H) 0 - 93 pg/mL   Blood gas venous     Status: Abnormal   Result Value Ref Range    pH Venous 7.37 7.35 - 7.45    pCO2 Venous 60 (H) 35 - 50 mm Hg    pO2 Venous 25 25 - 47 mm Hg    Bicarbonate Venous 29 24 - 30 mmol/L    Base Excess/Deficit (+/-) 8.6   mmol/L    Oxyhemoglobin Venous 39.4 (L) 70.0 - 75.0 %    O2 Sat, Venous 40.2 (L) 70.0 - 75.0 %   D dimer quantitative     Status: Abnormal   Result Value Ref Range    D-Dimer Quantitative 3.08 (H) 0.00 - 0.50 ug/mL FEU    Narrative    This D-dimer assay is intended for use in conjunction with a clinical pretest probability assessment model to exclude pulmonary embolism (PE) and deep venous thrombosis (DVT) in outpatients suspected of PE or DVT. The cut-off value is 0.50 ug/mL FEU.   CBC with platelets and differential     Status: Abnormal   Result Value Ref Range    WBC Count 8.6 4.0 - 11.0  10e3/uL    RBC Count 3.94 (L) 4.40 - 5.90 10e6/uL    Hemoglobin 12.6 (L) 13.3 - 17.7 g/dL    Hematocrit 39.6 (L) 40.0 - 53.0 %     (H) 78 - 100 fL    MCH 32.0 26.5 - 33.0 pg    MCHC 31.8 31.5 - 36.5 g/dL    RDW 13.7 10.0 - 15.0 %    Platelet Count 243 150 - 450 10e3/uL    % Neutrophils 60 %    % Lymphocytes 27 %    % Monocytes 9 %    % Eosinophils 2 %    % Basophils 1 %    % Immature Granulocytes 1 %    NRBCs per 100 WBC 0 <1 /100    Absolute Neutrophils 5.2 1.6 - 8.3 10e3/uL    Absolute Lymphocytes 2.3 0.8 - 5.3 10e3/uL    Absolute Monocytes 0.8 0.0 - 1.3 10e3/uL    Absolute Eosinophils 0.2 0.0 - 0.7 10e3/uL    Absolute Basophils 0.0 0.0 - 0.2 10e3/uL    Absolute Immature Granulocytes 0.0 <=0.4 10e3/uL    Absolute NRBCs 0.0 10e3/uL   Symptomatic; Unknown Influenza A/B & SARS-CoV2 (COVID-19) Virus PCR Multiplex Nasopharyngeal     Status: Normal    Specimen: Nasopharyngeal; Swab   Result Value Ref Range    Influenza A PCR Negative Negative    Influenza B PCR Negative Negative    SARS CoV2 PCR Negative Negative    Narrative    Testing was performed using the zeina SARS-CoV-2 & Influenza A/B Assay on the zeina Asmita System. This test should be ordered for the detection of SARS-CoV-2 and influenza viruses in individuals who meet clinical and/or epidemiological criteria. Test performance is unknown in asymptomatic patients. This test is for in vitro diagnostic use under the FDA EUA for laboratories certified under CLIA to perform moderate and/or high complexity testing. This test has not been FDA cleared or approved. A negative result does not rule out the presence of PCR inhibitors in the specimen or target RNA in concentration below the limit of detection for the assay. If only one viral target is positive but coinfection with multiple targets is suspected, the sample should be re-tested with another FDA cleared, approved or authorized test, if coinfection would change clinical management. [facility] are certified under the Clinical Laboratory Improvement Amendments of 1988 (CLIA-88) as  qualified to perform moderate and/or high complexity laboratory testing.   [place] Draw     Status: None    Narrative    The following orders were created for panel order [place] Draw.  Procedure                               Abnormality         Status                     ---------                               -----------         ------                     Extra Blue Top Tube[[id number]]                              Final result               Extra Red Top Tube[[id number]]                               Final result               Extra Green Top (Lithium...[[id number]]                      Final result               Extra Purple Top Tube[[id number]]                            Final result                 Please view results for these tests on the individual orders.   CBC with Platelets & Differential     Status: Abnormal    Narrative    The following orders were created for panel order CBC with Platelets & Differential.  Procedure                               Abnormality         Status                     ---------                               -----------         ------                     CBC with platelets and d...[[id number]]  Abnormal            Final result                 Please view results for these tests on the individual orders.         EKG:    Normal sinus rhythm with first-degree AV block.  Low voltage QRS.  Poor R progression in anterior leads.  Subtle ST segment elevations in the inferior lateral leads.  Essentially unchanged from October 29, 2021.  I have independently reviewed and interpreted the EKG(s) documented above.          I, Yajaira Holliday, am serving as a scribe to document services personally performed by Esteban Khoury MD, based on my observation and the provider's statements to me. I, Esteban Khoury MD attest that Yajaira Holliday is acting in a scribe capacity, has observed my performance of the  services and has documented them in accordance with my direction.    Esteban Khoury M.D.  Emergency Medicine  [facility] EMERGENCY ROOM      Esteban Khoury MD  02/08/22 2046       Esteban Khoury MD  02/08/22 2106

## 2022-02-08 NOTE — ED NOTES
Pt sats 89% on arrival. EMS states pt sats dropped to low 80s when pt stood up.  Pt placed on 2L NC and sats >92%. Will continue to monitor.

## 2022-02-08 NOTE — ED TRIAGE NOTES
Pt presents to the ED with c/o worsening fatigue and SOB. Pt noticed he became more sob and wheezing earlier today. Pt wife called EMS. Denies fevers or cough  Denies and CP

## 2022-02-09 LAB
ATRIAL RATE - MUSE: 62 BPM
DIASTOLIC BLOOD PRESSURE - MUSE: 64 MMHG
INTERPRETATION ECG - MUSE: NORMAL
P AXIS - MUSE: 73 DEGREES
PR INTERVAL - MUSE: 286 MS
QRS DURATION - MUSE: 96 MS
QT - MUSE: 448 MS
QTC - MUSE: 454 MS
R AXIS - MUSE: 25 DEGREES
SYSTOLIC BLOOD PRESSURE - MUSE: 139 MMHG
T AXIS - MUSE: 62 DEGREES
VENTRICULAR RATE- MUSE: 62 BPM

## 2022-02-16 DIAGNOSIS — G47.33 OBSTRUCTIVE SLEEP APNEA (ADULT) (PEDIATRIC): Primary | ICD-10-CM

## 2022-03-21 ENCOUNTER — TELEPHONE (OUTPATIENT)
Dept: SLEEP MEDICINE | Facility: CLINIC | Age: 87
End: 2022-03-21
Payer: MEDICARE

## 2022-03-21 DIAGNOSIS — G47.33 OBSTRUCTIVE SLEEP APNEA (ADULT) (PEDIATRIC): Primary | ICD-10-CM

## 2022-03-21 NOTE — TELEPHONE ENCOUNTER
Reason for Call:  Other call back    Detailed comments: Pt has a new appt on June 1st. Pt needs cpap supplies before up coming appt..   Please contact pt to confirm where to send prescription.      Phone Number Patient can be reached at: Home number on file [phone] (home)    Best Time: anytime     Can we leave a detailed message on this number? YES    Call taken on 3/21/2022 at 1:16 PM by Yuliana Waller

## 2022-04-28 ENCOUNTER — LAB REQUISITION (OUTPATIENT)
Dept: LAB | Facility: CLINIC | Age: 87
End: 2022-04-28
Payer: MEDICARE

## 2022-04-28 DIAGNOSIS — E78.5 HYPERLIPIDEMIA, UNSPECIFIED: ICD-10-CM

## 2022-04-28 DIAGNOSIS — L12.0 BULLOUS PEMPHIGOID (H): ICD-10-CM

## 2022-04-28 LAB
ALBUMIN SERPL-MCNC: 3.5 G/DL (ref 3.5–5)
ALP SERPL-CCNC: 92 U/L (ref 45–120)
ALT SERPL W P-5'-P-CCNC: 14 U/L (ref 0–45)
ANION GAP SERPL CALCULATED.3IONS-SCNC: 13 MMOL/L (ref 5–18)
AST SERPL W P-5'-P-CCNC: 22 U/L (ref 0–40)
BILIRUB SERPL-MCNC: 0.5 MG/DL (ref 0–1)
BUN SERPL-MCNC: 34 MG/DL (ref 8–28)
CALCIUM SERPL-MCNC: 8.6 MG/DL (ref 8.5–10.5)
CHLORIDE BLD-SCNC: 100 MMOL/L (ref 98–107)
CHOLEST SERPL-MCNC: 144 MG/DL
CO2 SERPL-SCNC: 25 MMOL/L (ref 22–31)
CREAT SERPL-MCNC: 1.43 MG/DL (ref 0.7–1.3)
ERYTHROCYTE [DISTWIDTH] IN BLOOD BY AUTOMATED COUNT: 14.1 % (ref 10–15)
GFR SERPL CREATININE-BSD FRML MDRD: 46 ML/MIN/1.73M2
GLUCOSE BLD-MCNC: 131 MG/DL (ref 70–125)
HCT VFR BLD AUTO: 39.4 % (ref 40–53)
HDLC SERPL-MCNC: 54 MG/DL
HGB BLD-MCNC: 12.8 G/DL (ref 13.3–17.7)
LDLC SERPL CALC-MCNC: 62 MG/DL
MCH RBC QN AUTO: 31.7 PG (ref 26.5–33)
MCHC RBC AUTO-ENTMCNC: 32.5 G/DL (ref 31.5–36.5)
MCV RBC AUTO: 98 FL (ref 78–100)
PLATELET # BLD AUTO: 254 10E3/UL (ref 150–450)
POTASSIUM BLD-SCNC: 4.1 MMOL/L (ref 3.5–5)
PROT SERPL-MCNC: 6.2 G/DL (ref 6–8)
RBC # BLD AUTO: 4.04 10E6/UL (ref 4.4–5.9)
SODIUM SERPL-SCNC: 138 MMOL/L (ref 136–145)
TRIGL SERPL-MCNC: 140 MG/DL
WBC # BLD AUTO: 9.6 10E3/UL (ref 4–11)

## 2022-04-28 PROCEDURE — 80053 COMPREHEN METABOLIC PANEL: CPT | Mod: ORL | Performed by: FAMILY MEDICINE

## 2022-04-28 PROCEDURE — 80061 LIPID PANEL: CPT | Mod: ORL | Performed by: FAMILY MEDICINE

## 2022-04-28 PROCEDURE — 85027 COMPLETE CBC AUTOMATED: CPT | Mod: ORL | Performed by: FAMILY MEDICINE

## 2022-05-13 ENCOUNTER — IMMUNIZATION (OUTPATIENT)
Dept: NURSING | Facility: CLINIC | Age: 87
End: 2022-05-13
Payer: MEDICARE

## 2022-05-13 PROCEDURE — 91305 COVID-19,PF,PFIZER (12+ YRS): CPT

## 2022-05-13 PROCEDURE — 0054A COVID-19,PF,PFIZER (12+ YRS): CPT

## 2022-05-17 ENCOUNTER — DOCUMENTATION ONLY (OUTPATIENT)
Dept: SLEEP MEDICINE | Facility: CLINIC | Age: 87
End: 2022-05-17
Payer: MEDICARE

## 2022-05-23 ENCOUNTER — DOCUMENTATION ONLY (OUTPATIENT)
Dept: HOME HEALTH SERVICES | Facility: CLINIC | Age: 87
End: 2022-05-23

## 2022-05-23 NOTE — PROGRESS NOTES
Patient's spouse, Santa, came in to [facility] for scheduled download appointment. Compliance data download completed and faxed to provider. SN released from [place] and added to [facility].  
Yes

## 2022-05-30 ASSESSMENT — SLEEP AND FATIGUE QUESTIONNAIRES
HOW LIKELY ARE YOU TO NOD OFF OR FALL ASLEEP WHILE SITTING QUIETLY AFTER LUNCH WITHOUT ALCOHOL: SLIGHT CHANCE OF DOZING
HOW LIKELY ARE YOU TO NOD OFF OR FALL ASLEEP WHILE SITTING AND TALKING TO SOMEONE: WOULD NEVER DOZE
HOW LIKELY ARE YOU TO NOD OFF OR FALL ASLEEP WHEN YOU ARE A PASSENGER IN A CAR FOR AN HOUR WITHOUT A BREAK: WOULD NEVER DOZE
HOW LIKELY ARE YOU TO NOD OFF OR FALL ASLEEP WHILE SITTING INACTIVE IN A PUBLIC PLACE: WOULD NEVER DOZE
HOW LIKELY ARE YOU TO NOD OFF OR FALL ASLEEP WHILE LYING DOWN TO REST IN THE AFTERNOON WHEN CIRCUMSTANCES PERMIT: SLIGHT CHANCE OF DOZING
HOW LIKELY ARE YOU TO NOD OFF OR FALL ASLEEP IN A CAR, WHILE STOPPED FOR A FEW MINUTES IN TRAFFIC: WOULD NEVER DOZE
HOW LIKELY ARE YOU TO NOD OFF OR FALL ASLEEP WHILE SITTING AND READING: SLIGHT CHANCE OF DOZING
HOW LIKELY ARE YOU TO NOD OFF OR FALL ASLEEP WHILE WATCHING TV: WOULD NEVER DOZE

## 2022-05-31 DIAGNOSIS — R06.09 DYSPNEA ON EXERTION: ICD-10-CM

## 2022-05-31 DIAGNOSIS — I27.20 PULMONARY HYPERTENSION (H): ICD-10-CM

## 2022-05-31 DIAGNOSIS — I50.32 CHRONIC DIASTOLIC CONGESTIVE HEART FAILURE (H): Primary | ICD-10-CM

## 2022-06-01 ENCOUNTER — LAB (OUTPATIENT)
Dept: CARDIOLOGY | Facility: CLINIC | Age: 87
End: 2022-06-01
Payer: MEDICARE

## 2022-06-01 ENCOUNTER — VIRTUAL VISIT (OUTPATIENT)
Dept: SLEEP MEDICINE | Facility: CLINIC | Age: 87
End: 2022-06-01
Payer: MEDICARE

## 2022-06-01 VITALS — WEIGHT: 273.6 LBS | HEIGHT: 69 IN | BODY MASS INDEX: 40.52 KG/M2

## 2022-06-01 DIAGNOSIS — I27.20 PULMONARY HYPERTENSION (H): ICD-10-CM

## 2022-06-01 DIAGNOSIS — G47.33 OBSTRUCTIVE SLEEP APNEA: Primary | ICD-10-CM

## 2022-06-01 DIAGNOSIS — R06.09 DYSPNEA ON EXERTION: ICD-10-CM

## 2022-06-01 DIAGNOSIS — I50.32 CHRONIC DIASTOLIC CONGESTIVE HEART FAILURE (H): ICD-10-CM

## 2022-06-01 LAB
ALBUMIN SERPL-MCNC: 3.4 G/DL (ref 3.5–5)
ANION GAP SERPL CALCULATED.3IONS-SCNC: 12 MMOL/L (ref 5–18)
BUN SERPL-MCNC: 34 MG/DL (ref 8–28)
CALCIUM SERPL-MCNC: 8.7 MG/DL (ref 8.5–10.5)
CHLORIDE BLD-SCNC: 100 MMOL/L (ref 98–107)
CO2 SERPL-SCNC: 26 MMOL/L (ref 22–31)
CREAT SERPL-MCNC: 1.64 MG/DL (ref 0.7–1.3)
GFR SERPL CREATININE-BSD FRML MDRD: 39 ML/MIN/1.73M2
GLUCOSE BLD-MCNC: 146 MG/DL (ref 70–125)
PHOSPHATE SERPL-MCNC: 3.6 MG/DL (ref 2.5–4.5)
POTASSIUM BLD-SCNC: 4.3 MMOL/L (ref 3.5–5)
SODIUM SERPL-SCNC: 138 MMOL/L (ref 136–145)

## 2022-06-01 PROCEDURE — 36415 COLL VENOUS BLD VENIPUNCTURE: CPT

## 2022-06-01 PROCEDURE — 80069 RENAL FUNCTION PANEL: CPT

## 2022-06-01 PROCEDURE — 99441 PR PHYSICIAN TELEPHONE EVALUATION 5-10 MIN: CPT | Performed by: INTERNAL MEDICINE

## 2022-06-01 NOTE — PROGRESS NOTES
"Chava Lincoln is a 89 year old male being evaluated via a billable telephone visit.     \"This telephone visit will be conducted via a call between you and your physician/provider. We have found that certain health care needs can be provided without the need for an in-person visit or physical exam.  This service lets us provide the care you need with a telephone conversation.  If a prescription is necessary we can send it directly to your pharmacy.  If lab work is needed we can place an order for that and you can then stop by our lab to have the test done at a later time.\"    Telephone visits are billed at different rates depending on your insurance coverage.  Please reach out to your insurance provider with any questions.    Patient has given verbal consent for  a Telephone visit? Yes    What telephone number would you like your provider to contact at at:  [phone]     How would you like to obtain your AVS? Trino Castorena, Virtual Facilitator    Telephone Visit Details:     Telephone Visit Start Time: 8:30 AM    Telephone Visit End Time:8:37 AM     Thank you for the opportunity to participate in the care of Chaav Lincoln.     He is a 89 year old y/o male patient who comes to the sleep medicine clinic for follow up.  The patient was diagnosed with RAMON on 06/04/07 (AHI=32). He has been doing well on CPAP since that time. He continues to benefit from CPAP therapy. He would like to get a prescription to continue to get supplies.     Assessment and Plan:  In summary Chava Lincoln is a 89 year old year old male who is here for follow up.    1. Obstructive sleep apnea  I congratulated the patient on his excellent usage. I will keep him on the same pressure settings for now.  - COMPREHENSIVE DME     Compliance Download data for 30 Days:  Pressure setting:CPAP 14 cwp  Residual AHI:9.6 events per hour  Leak:Minimal  Compliance:100%  Mask Tolerance:Good  Skin irritation:None  DME:[facility] [place]    Sleep-Wake Cycle:    The patient likes to initiate sleep at around 10 PM with a sleep latency of 1 hour. The patient has 3 nocturnal awakenings. Final wake up time is around 7 AM.    GINA:  GINA Total Score: 11  Total score - [place]: 3 (5/30/2022  8:16 PM)        Patient Active Problem List   Diagnosis     GERD (gastroesophageal reflux disease)     RAMON on CPAP     Near syncope     Dyspnea on exertion     Essential hypertension     Dyslipidemia     Coronary artery disease involving native coronary artery of native heart without angina pectoris     Ascending aortic aneurysm (H)     Coronary artery calcification seen on CAT scan     Pulmonary hypertension (H)     History of pulmonary embolism     Hypoxia     Morbid obesity (H)     Bilateral lower extremity edema     Chronic kidney disease, stage 3 (H)     (HFpEF) heart failure with preserved ejection fraction (H)     Abdominal pain, epigastric       Past Medical History:   Diagnosis Date     Coronary artery disease due to calcified coronary lesion      GERD (gastroesophageal reflux disease) 7/2/2014     Hypertension      RAMON on CPAP 4/17/2017     Prostate cancer (H)      Prostate cancer (H)        Past Surgical History:   Procedure Laterality Date     CV CORONARY ANGIOGRAM N/A 12/10/2018    Procedure: Coronary Angiogram;  Surgeon: Tana Duncan MD;  Location: [facility];  Service: Cardiology     CV LEFT HEART CATHETERIZATION WITHOUT LEFT VENTRICULOGRAM Left 12/10/2018    Procedure: Left Heart Catheterization Without Left Ventriculogram;  Surgeon: Tana Duncan MD;  Location: [facility];  Service: Cardiology     CV LEFT HEART CATHETERIZATION WITHOUT LEFT VENTRICULOGRAM Left 02/04/2019    Procedure: Left Heart Catheterization Without Left Ventriculogram;  Surgeon: Tana Duncan MD;  Location: [facility] Cath Lab;  Service: Cardiology     CV RIGHT HEART CATHETERIZATION N/A 02/04/2019    Procedure: Right Heart Catheterization;   Surgeon: Tana Duncan MD;  Location: [facility] Cath Lab;  Service: Cardiology     HERNIA REPAIR       JOINT REPLACEMENT       KNEE SURGERY Bilateral      OTHER SURGICAL HISTORY      Prostatic cryotherapy       Social History     Socioeconomic History     Marital status:      Spouse name: Santa     Number of children: 0     Years of education: Not on file     Highest education level: 12th grade   Occupational History     Occupation: Retired    Tobacco Use     Smoking status: Never Smoker     Smokeless tobacco: Never Used   Substance and Sexual Activity     Alcohol use: Yes     Comment: Alcoholic Drinks/day: 2-3 beer/ month     Drug use: No     Sexual activity: Not Currently     Partners: Female   Other Topics Concern     Not on file   Social History Narrative     Not on file     Social Determinants of Health     Financial Resource Strain: Low Risk      Difficulty of Paying Living Expenses: Not hard at all   Food Insecurity: No Food Insecurity     Worried About Running Out of Food in the Last Year: Never true     Ran Out of Food in the Last Year: Never true   Transportation Needs: No Transportation Needs     Lack of Transportation (Medical): No     Lack of Transportation (Non-Medical): No   Physical Activity: Not on file   Stress: Not on file   Social Connections: Not on file   Intimate Partner Violence: Not At Risk     Fear of Current or Ex-Partner: No     Emotionally Abused: No     Physically Abused: No     Sexually Abused: No   Housing Stability: Not on file       Current Outpatient Medications   Medication Sig Dispense Refill     acetaminophen (TYLENOL) 325 MG tablet [ACETAMINOPHEN (TYLENOL) 325 MG TABLET] Take 325 mg by mouth every 6 (six) hours as needed for pain.       aspirin 81 MG EC tablet [ASPIRIN 81 MG EC TABLET] Take 81 mg by mouth every evening.        isosorbide mononitrate (IMDUR) 30 MG 24 hr tablet [ISOSORBIDE MONONITRATE (IMDUR) 30 MG 24 HR TABLET] Take 1 tablet (30 mg total) by mouth  daily. 90 tablet 2     MEDICATION CANNOT BE REORDERED - PLEASE MANUALLY REORDER AND DISCONTINUE THE OLD ORDER [LEUPROLIDE, 6 MONTH, (ELIGARD, 6 MONTH,) 45 MG SYRINGE] Inject 45 mg under the skin. Every six months       metoprolol tartrate (LOPRESSOR) 25 MG tablet Take 1 tablet (25 mg) by mouth 2 times daily 180 tablet 1     mirabegron (MYRBETRIQ) 50 mg Tb24 ER tablet [MIRABEGRON (MYRBETRIQ) 50 MG TB24 ER TABLET] Take 50 mg by mouth daily.       multivitamin with minerals (THERA-M) 9 mg iron-400 mcg Tab tablet [MULTIVITAMIN WITH MINERALS (THERA-M) 9 MG IRON-400 MCG TAB TABLET] Take 1 tablet by mouth daily.       NYSTOP [id number] UNIT/GM powder as needed       oxybutynin (DITROPAN XL) 15 MG 24 hr tablet [OXYBUTYNIN (DITROPAN XL) 15 MG 24 HR TABLET] Take 15 mg by mouth daily.       oxybutynin (DITROPAN XL) 5 MG ER tablet Take 5 mg by mouth daily        rosuvastatin (CRESTOR) 20 MG tablet TAKE 1 TABLET(20 MG) BY MOUTH AT BEDTIME 90 tablet 0     torsemide (DEMADEX) 20 MG tablet Take 1 tablet (20 mg) by mouth daily 90 tablet 1     triamcinolone (KENALOG) 0.1 % external ointment triamcinolone acetonide 0.1 % topical ointment   APPLY TOPICALLY TO RASH ON BACK TWICE DAILY FOR 2 WEEKS       valsartan (DIOVAN) 80 MG tablet Take 80 mg by mouth daily       CERTAVITE SENIOR 0.4-300-250 mg-mcg-mcg tablet [CERTAVITE SENIOR 0.4-300-250 MG-MCG-MCG TABLET] Take 1 tablet by mouth daily.       COMBIVENT RESPIMAT  mcg/actuation Mist inhaler [COMBIVENT RESPIMAT  MCG/ACTUATION MIST INHALER] Inhale 1 puff as needed. (Patient not taking: Reported on 1/17/2022)       doxycycline monohydrate (ADOXA) 100 MG tablet Take 100 mg by mouth 2 times daily         Allergies   Allergen Reactions     Atorvastatin Muscle Pain (Myalgia)     Lisinopril Cough     Morphine Other (See Comments)     hypotension     Sulfa (Sulfonamide Antibiotics) [Sulfa Drugs] Hives     Vicodin [Hydrocodone-Acetaminophen] Unknown       Physical Exam:  GEN: NAD,  Psych:  normal mood, normal affect    I reviewed the efficacy and compliance report from his device. Data summarized on the HPI and the PAP compliance flow sheet.     Patient verbalized understanding of these issues, agrees with the plan and all questions were answered today. Patient was given an opportuntity to voice any other symptoms or concerns not listed above. Patient did not have any other symptoms or concerns.      Pipo Segovia DO  Board Certified in Internal Medicine and Sleep Medicine    (Note created with Dragon voice recognition and unintended spelling errors and word substitutions may occur)     Audio and visual devices were used for this virtual clinic visit with permission from patient.

## 2022-06-02 ENCOUNTER — OFFICE VISIT (OUTPATIENT)
Dept: CARDIOLOGY | Facility: CLINIC | Age: 87
End: 2022-06-02
Payer: MEDICARE

## 2022-06-02 VITALS
HEART RATE: 64 BPM | DIASTOLIC BLOOD PRESSURE: 52 MMHG | WEIGHT: 271.4 LBS | RESPIRATION RATE: 24 BRPM | HEIGHT: 69 IN | BODY MASS INDEX: 40.2 KG/M2 | SYSTOLIC BLOOD PRESSURE: 112 MMHG

## 2022-06-02 DIAGNOSIS — R06.09 DYSPNEA ON EXERTION: ICD-10-CM

## 2022-06-02 DIAGNOSIS — I50.32 CHRONIC DIASTOLIC CONGESTIVE HEART FAILURE (H): ICD-10-CM

## 2022-06-02 DIAGNOSIS — I50.32 CHRONIC HEART FAILURE WITH PRESERVED EJECTION FRACTION (H): Primary | ICD-10-CM

## 2022-06-02 PROCEDURE — 99214 OFFICE O/P EST MOD 30 MIN: CPT | Performed by: NURSE PRACTITIONER

## 2022-06-02 RX ORDER — TORSEMIDE 20 MG/1
40 TABLET ORAL DAILY
Qty: 180 TABLET | Refills: 3 | Status: SHIPPED | OUTPATIENT
Start: 2022-06-02 | End: 2022-06-21

## 2022-06-02 RX ORDER — LACTOBACILLUS RHAMNOSUS GG 10B CELL
1 CAPSULE ORAL DAILY
COMMUNITY

## 2022-06-02 RX ORDER — MYCOPHENOLATE MOFETIL 500 MG/1
1000 TABLET ORAL 2 TIMES DAILY
COMMUNITY
Start: 2022-05-22

## 2022-06-02 NOTE — LETTER
6/2/2022    Erasmo Michael MD  [facility] [address] [place]  [place] [address]    RE: Chava Lincoln       Dear Colleague,     I had the pleasure of seeing Chava Lincoln in the [facility].  HEART CARE PROGRESS NOTE      [facility]  [phone]      Assessment/Recommendations   Assessment:    1.  Heart failure with preserved ejection fraction: He has been using more torsemide over the past 2 months.  His weight fluctuates between 271 to 275 pounds.  If his weight is above 272 pounds, he takes an extra 20 mg of torsemide.  Lung sounds are clear.  He notes a slight increase in his dyspnea on exertion.    2.  Coronary artery disease: He denies any chest pain    3.  Hypertension: Blood pressure stable at 112/52.    Plan:  1.  Increase torsemide to 40 mg daily  2.  Low-sodium diet and fluid restriction  3.  Daily weights  4.  BMP on June 1 reviewed.  Creatinine slightly elevated at 1.64.    Chava Lincoln will follow up in 2 weeks and will recheck BMP.  He has seen Dr. Sheridan on June 30.       History of Present Illness/Subjective    Mr. Chava Lincoln is a 89 year old male seen at [facility] today for follow-up.  He has a history of heart failure with preserved ejection fraction, pulmonary hypertension, mildly dilated ascending aorta, obstructive sleep apnea, coronary artery disease, obesity, dyslipidemia, and GERD.    He takes torsemide 20 mg daily but takes 40 mg daily if his weight is above 272 pounds.  In April and home, he was taking extra torsemide about half a month.  He notes a slight increase in [place] dyspnea on exertion.  He has chronic lower extremity edema which is stable.  He had a wound on his lower leg which has now healed.  He denies lightheadedness, orthopnea and chest pain.      He is following a low-sodium diet and limiting fluids.        Physical Examination Review of Systems   Vitals: /52 (BP  "Location: Right arm, Patient Position: Sitting, Cuff Size: Adult Large)   Pulse 64   Resp 24   Ht 1.753 m (5' 9\")   Wt 123.1 kg (271 lb 6.4 oz)   BMI 40.08 kg/m    BMI= Body mass index is 40.08 kg/m .  Wt Readings from Last 3 Encounters:   06/02/22 123.1 kg (271 lb 6.4 oz)   06/01/22 124.1 kg (273 lb 9.6 oz)   02/08/22 129.5 kg (285 lb 8 oz)       General Appearance:     Alert, cooperative and in no acute distress.   ENT/Mouth: membranes moist, no oral lesions or bleeding gums.      EYES:  no scleral icterus, normal conjunctivae   Chest/Lungs:   lungs are clear to auscultation, no rales or wheezing, respirations unlabored   Cardiovascular:   Regular. Normal first and second heart sounds, 1+ edema bilateral lower extremities (left slightly greater than)   Abdomen:   Obese, soft, nontender, nondistended, bowel sounds present   Extremities: no cyanosis or clubbing   Skin: warm, dry.    Neurologic: mood and affect are appropriate, alert and oriented x3         Please refer above for cardiac ROS details.      Medical History  Surgical History Family History Social History   Past Medical History:   Diagnosis Date     Coronary artery disease due to calcified coronary lesion      GERD (gastroesophageal reflux disease) 7/2/2014     Hypertension      RAMON on CPAP 4/17/2017     Prostate cancer (H)      Prostate cancer (H)      Past Surgical History:   Procedure Laterality Date     CV CORONARY ANGIOGRAM N/A 12/10/2018    Procedure: Coronary Angiogram;  Surgeon: Tana Duncan MD;  Location: [facility];  Service: Cardiology     CV LEFT HEART CATHETERIZATION WITHOUT LEFT VENTRICULOGRAM Left 12/10/2018    Procedure: Left Heart Catheterization Without Left Ventriculogram;  Surgeon: Tana Duncan MD;  Location: [facility] Lab;  Service: Cardiology     CV LEFT HEART CATHETERIZATION WITHOUT LEFT VENTRICULOGRAM Left 02/04/2019    Procedure: Left Heart Catheterization Without Left Ventriculogram;  Surgeon: " Tana Duncan MD;  Location: [facility] Cath Lab;  Service: Cardiology     CV RIGHT HEART CATHETERIZATION N/A 02/04/2019    Procedure: Right Heart Catheterization;  Surgeon: Tana Duncan MD;  Location: [facility] Cath Lab;  Service: Cardiology     HERNIA REPAIR       JOINT REPLACEMENT       KNEE SURGERY Bilateral      OTHER SURGICAL HISTORY      Prostatic cryotherapy     Family History   Problem Relation Age of Onset     Diabetes Mother      Cerebrovascular Disease Father      Heart Disease Brother      Coronary Artery Disease Brother     Social History     Socioeconomic History     Marital status:      Spouse name: Santa     Number of children: 0     Years of education: Not on file     Highest education level: 12th grade   Occupational History     Occupation: Retired    Tobacco Use     Smoking status: Never Smoker     Smokeless tobacco: Never Used   Substance and Sexual Activity     Alcohol use: Yes     Comment: Alcoholic Drinks/day: 2-3 beer/ month     Drug use: No     Sexual activity: Not Currently     Partners: Female   Other Topics Concern     Not on file   Social History Narrative     Not on file     Social Determinants of Health     Financial Resource Strain: Low Risk      Difficulty of Paying Living Expenses: Not hard at all   Food Insecurity: No Food Insecurity     Worried About Running Out of Food in the Last Year: Never true     Ran Out of Food in the Last Year: Never true   Transportation Needs: No Transportation Needs     Lack of Transportation (Medical): No     Lack of Transportation (Non-Medical): No   Physical Activity: Not on file   Stress: Not on file   Social Connections: Not on file   Intimate Partner Violence: Not At Risk     Fear of Current or Ex-Partner: No     Emotionally Abused: No     Physically Abused: No     Sexually Abused: No   Housing Stability: Not on file          Medications  Allergies   Current Outpatient Medications   Medication Sig Dispense Refill     acetaminophen  (TYLENOL) 325 MG tablet [ACETAMINOPHEN (TYLENOL) 325 MG TABLET] Take 325 mg by mouth every 6 (six) hours as needed for pain.       aspirin 81 MG EC tablet [ASPIRIN 81 MG EC TABLET] Take 81 mg by mouth every evening.        isosorbide mononitrate (IMDUR) 30 MG 24 hr tablet [ISOSORBIDE MONONITRATE (IMDUR) 30 MG 24 HR TABLET] Take 1 tablet (30 mg total) by mouth daily. 90 tablet 2     lactobacillus rhamnosus, GG, (CULTURELL) capsule Take 1 capsule by mouth daily       MEDICATION CANNOT BE REORDERED - PLEASE MANUALLY REORDER AND DISCONTINUE THE OLD ORDER [LEUPROLIDE, 6 MONTH, (ELIGARD, 6 MONTH,) 45 MG SYRINGE] Inject 45 mg under the skin. Every six months       metoprolol tartrate (LOPRESSOR) 25 MG tablet Take 1 tablet (25 mg) by mouth 2 times daily 180 tablet 1     mirabegron (MYRBETRIQ) 50 mg Tb24 ER tablet [MIRABEGRON (MYRBETRIQ) 50 MG TB24 ER TABLET] Take 50 mg by mouth daily.       multivitamin with minerals (THERA-M) 9 mg iron-400 mcg Tab tablet [MULTIVITAMIN WITH MINERALS (THERA-M) 9 MG IRON-400 MCG TAB TABLET] Take 1 tablet by mouth daily.       mycophenolate (GENERIC EQUIVALENT) 500 MG tablet Take 1,500 mg by mouth 2 times daily       NYSTOP [id number] UNIT/GM powder as needed       oxybutynin (DITROPAN XL) 15 MG 24 hr tablet [OXYBUTYNIN (DITROPAN XL) 15 MG 24 HR TABLET] Take 15 mg by mouth daily.       oxybutynin (DITROPAN XL) 5 MG ER tablet Take 5 mg by mouth daily        rosuvastatin (CRESTOR) 20 MG tablet TAKE 1 TABLET(20 MG) BY MOUTH AT BEDTIME 90 tablet 0     torsemide (DEMADEX) 20 MG tablet Take 2 tablets (40 mg) by mouth daily 180 tablet 3     triamcinolone (KENALOG) 0.1 % external ointment Apply 1 applicator topically as needed       valsartan (DIOVAN) 80 MG tablet Take 80 mg by mouth daily      Allergies   Allergen Reactions     Atorvastatin Muscle Pain (Myalgia)     Lisinopril Cough     Morphine Other (See Comments)     hypotension     Sulfa (Sulfonamide Antibiotics) [Sulfa Drugs] Hives     Vicodin  [Hydrocodone-Acetaminophen] Unknown         Lab Results    Chemistry/lipid CBC Cardiac Enzymes/BNP/TSH/INR   Recent Labs   Lab Test 09/20/21  1207   CHOL 102   HDL 49   LDL 34   TRIG 97     Recent Labs   Lab Test 09/20/21  1207 12/23/20  1014 10/08/19  1543   LDL 34 48 40     Recent Labs   Lab Test 11/10/21  0843      POTASSIUM 4.6   CHLORIDE 108*   CO2 22   *   BUN 21   CR 1.17   GFRESTIMATED 54*   SUZE 8.6     Recent Labs   Lab Test 11/10/21  0843 11/05/21  1206 11/01/21  1453   CR 1.17 1.77* 2.29*     No results for input(s): A1C in the last 33937 hours. Recent Labs   Lab Test 11/10/21  0843   WBC 8.7   HGB 12.4*   HCT 37.8*   MCV 96        Recent Labs   Lab Test 11/10/21  0843 10/29/21  1904 09/20/21  1207   HGB 12.4* 13.4 13.2*    Recent Labs   Lab Test 10/29/21  1904 04/08/21  2325 04/08/21  1719   TROPONINI 0.06 0.02 0.01     Recent Labs   Lab Test 10/29/21  1904 05/17/21  1045 04/08/21  1055   BNP 67 84 58     No results for input(s): TSH in the last 33669 hours.  Recent Labs   Lab Test 09/23/18  1045   INR 1.07              Thank you for allowing me to participate in the care of your patient.      Sincerely,     Claudine Powell NP     [facility]  cc:   Referred Self,

## 2022-06-02 NOTE — PATIENT INSTRUCTIONS
Chava Lincoln,    It was a pleasure to see you today at the [facility].     My recommendations after this visit include:  - Increase torsemide to 40 mg daily.    - Continue to monitor for signs of retaining fluid (increasing weights, shortness of breath, swelling) and call with any concerns.   - Follow up with Claudine in 2 weeks.   - If you have any questions or concerns, please call [phone] to talk with my nurse.    Claudine Arriola, CNP

## 2022-06-02 NOTE — PROGRESS NOTES
"HEART CARE PROGRESS NOTE      [facility]  [phone]      Assessment/Recommendations   Assessment:    1.  Heart failure with preserved ejection fraction: He has been using more torsemide over the past 2 months.  His weight fluctuates between 271 to 275 pounds.  If his weight is above 272 pounds, he takes an extra 20 mg of torsemide.  Lung sounds are clear.  He notes a slight increase in his dyspnea on exertion.    2.  Coronary artery disease: He denies any chest pain    3.  Hypertension: Blood pressure stable at 112/52.    Plan:  1.  Increase torsemide to 40 mg daily  2.  Low-sodium diet and fluid restriction  3.  Daily weights  4.  BMP on June 1 reviewed.  Creatinine slightly elevated at 1.64.    Chava Lincoln will follow up in 2 weeks and will recheck BMP.  He has seen Dr. Sheridan on June 30.       History of Present Illness/Subjective    Mr. Chava Lincoln is a 89 year old male seen at [facility] today for follow-up.  He has a history of heart failure with preserved ejection fraction, pulmonary hypertension, mildly dilated ascending aorta, obstructive sleep apnea, coronary artery disease, obesity, dyslipidemia, and GERD.    He takes torsemide 20 mg daily but takes 40 mg daily if his weight is above 272 pounds.  In April and home, he was taking extra torsemide about half a month.  He notes a slight increase in [place] dyspnea on exertion.  He has chronic lower extremity edema which is stable.  He had a wound on his lower leg which has now healed.  He denies lightheadedness, orthopnea and chest pain.      He is following a low-sodium diet and limiting fluids.        Physical Examination Review of Systems   Vitals: /52 (BP Location: Right arm, Patient Position: Sitting, Cuff Size: Adult Large)   Pulse 64   Resp 24   Ht 1.753 m (5' 9\")   Wt 123.1 kg (271 lb 6.4 oz)   BMI 40.08 kg/m    BMI= Body mass index is 40.08 kg/m .  Wt Readings from Last 3 Encounters: "   06/02/22 123.1 kg (271 lb 6.4 oz)   06/01/22 124.1 kg (273 lb 9.6 oz)   02/08/22 129.5 kg (285 lb 8 oz)       General Appearance:     Alert, cooperative and in no acute distress.   ENT/Mouth: membranes moist, no oral lesions or bleeding gums.      EYES:  no scleral icterus, normal conjunctivae   Chest/Lungs:   lungs are clear to auscultation, no rales or wheezing, respirations unlabored   Cardiovascular:   Regular. Normal first and second heart sounds, 1+ edema bilateral lower extremities (left slightly greater than)   Abdomen:   Obese, soft, nontender, nondistended, bowel sounds present   Extremities: no cyanosis or clubbing   Skin: warm, dry.    Neurologic: mood and affect are appropriate, alert and oriented x3         Please refer above for cardiac ROS details.      Medical History  Surgical History Family History Social History   Past Medical History:   Diagnosis Date     Coronary artery disease due to calcified coronary lesion      GERD (gastroesophageal reflux disease) 7/2/2014     Hypertension      RAMON on CPAP 4/17/2017     Prostate cancer (H)      Prostate cancer (H)      Past Surgical History:   Procedure Laterality Date     CV CORONARY ANGIOGRAM N/A 12/10/2018    Procedure: Coronary Angiogram;  Surgeon: Tana Duncan MD;  Location: [facility];  Service: Cardiology     CV LEFT HEART CATHETERIZATION WITHOUT LEFT VENTRICULOGRAM Left 12/10/2018    Procedure: Left Heart Catheterization Without Left Ventriculogram;  Surgeon: Tana Duncan MD;  Location: [facility];  Service: Cardiology     CV LEFT HEART CATHETERIZATION WITHOUT LEFT VENTRICULOGRAM Left 02/04/2019    Procedure: Left Heart Catheterization Without Left Ventriculogram;  Surgeon: Tana Duncan MD;  Location: [facility];  Service: Cardiology     CV RIGHT HEART CATHETERIZATION N/A 02/04/2019    Procedure: Right Heart Catheterization;  Surgeon: Tana Duncan MD;  Location: [facility];  Service:  Cardiology     HERNIA REPAIR       JOINT REPLACEMENT       KNEE SURGERY Bilateral      OTHER SURGICAL HISTORY      Prostatic cryotherapy     Family History   Problem Relation Age of Onset     Diabetes Mother      Cerebrovascular Disease Father      Heart Disease Brother      Coronary Artery Disease Brother     Social History     Socioeconomic History     Marital status:      Spouse name: Santa     Number of children: 0     Years of education: Not on file     Highest education level: 12th grade   Occupational History     Occupation: Retired    Tobacco Use     Smoking status: Never Smoker     Smokeless tobacco: Never Used   Substance and Sexual Activity     Alcohol use: Yes     Comment: Alcoholic Drinks/day: 2-3 beer/ month     Drug use: No     Sexual activity: Not Currently     Partners: Female   Other Topics Concern     Not on file   Social History Narrative     Not on file     Social Determinants of Health     Financial Resource Strain: Low Risk      Difficulty of Paying Living Expenses: Not hard at all   Food Insecurity: No Food Insecurity     Worried About Running Out of Food in the Last Year: Never true     Ran Out of Food in the Last Year: Never true   Transportation Needs: No Transportation Needs     Lack of Transportation (Medical): No     Lack of Transportation (Non-Medical): No   Physical Activity: Not on file   Stress: Not on file   Social Connections: Not on file   Intimate Partner Violence: Not At Risk     Fear of Current or Ex-Partner: No     Emotionally Abused: No     Physically Abused: No     Sexually Abused: No   Housing Stability: Not on file          Medications  Allergies   Current Outpatient Medications   Medication Sig Dispense Refill     acetaminophen (TYLENOL) 325 MG tablet [ACETAMINOPHEN (TYLENOL) 325 MG TABLET] Take 325 mg by mouth every 6 (six) hours as needed for pain.       aspirin 81 MG EC tablet [ASPIRIN 81 MG EC TABLET] Take 81 mg by mouth every evening.        isosorbide  mononitrate (IMDUR) 30 MG 24 hr tablet [ISOSORBIDE MONONITRATE (IMDUR) 30 MG 24 HR TABLET] Take 1 tablet (30 mg total) by mouth daily. 90 tablet 2     lactobacillus rhamnosus, GG, (CULTURELL) capsule Take 1 capsule by mouth daily       MEDICATION CANNOT BE REORDERED - PLEASE MANUALLY REORDER AND DISCONTINUE THE OLD ORDER [LEUPROLIDE, 6 MONTH, (ELIGARD, 6 MONTH,) 45 MG SYRINGE] Inject 45 mg under the skin. Every six months       metoprolol tartrate (LOPRESSOR) 25 MG tablet Take 1 tablet (25 mg) by mouth 2 times daily 180 tablet 1     mirabegron (MYRBETRIQ) 50 mg Tb24 ER tablet [MIRABEGRON (MYRBETRIQ) 50 MG TB24 ER TABLET] Take 50 mg by mouth daily.       multivitamin with minerals (THERA-M) 9 mg iron-400 mcg Tab tablet [MULTIVITAMIN WITH MINERALS (THERA-M) 9 MG IRON-400 MCG TAB TABLET] Take 1 tablet by mouth daily.       mycophenolate (GENERIC EQUIVALENT) 500 MG tablet Take 1,500 mg by mouth 2 times daily       NYSTOP 099156 UNIT/GM powder as needed       oxybutynin (DITROPAN XL) 15 MG 24 hr tablet [OXYBUTYNIN (DITROPAN XL) 15 MG 24 HR TABLET] Take 15 mg by mouth daily.       oxybutynin (DITROPAN XL) 5 MG ER tablet Take 5 mg by mouth daily        rosuvastatin (CRESTOR) 20 MG tablet TAKE 1 TABLET(20 MG) BY MOUTH AT BEDTIME 90 tablet 0     torsemide (DEMADEX) 20 MG tablet Take 2 tablets (40 mg) by mouth daily 180 tablet 3     triamcinolone (KENALOG) 0.1 % external ointment Apply 1 applicator topically as needed       valsartan (DIOVAN) 80 MG tablet Take 80 mg by mouth daily      Allergies   Allergen Reactions     Atorvastatin Muscle Pain (Myalgia)     Lisinopril Cough     Morphine Other (See Comments)     hypotension     Sulfa (Sulfonamide Antibiotics) [Sulfa Drugs] Hives     Vicodin [Hydrocodone-Acetaminophen] Unknown         Lab Results    Chemistry/lipid CBC Cardiac Enzymes/BNP/TSH/INR   Recent Labs   Lab Test 09/20/21  1207   CHOL 102   HDL 49   LDL 34   TRIG 97     Recent Labs   Lab Test 09/20/21  1207  12/23/20  1014 10/08/19  1543   LDL 34 48 40     Recent Labs   Lab Test 11/10/21  0843      POTASSIUM 4.6   CHLORIDE 108*   CO2 22   *   BUN 21   CR 1.17   GFRESTIMATED 54*   SUZE 8.6     Recent Labs   Lab Test 11/10/21  0843 11/05/21  1206 11/01/21  1453   CR 1.17 1.77* 2.29*     No results for input(s): A1C in the last 69096 hours. Recent Labs   Lab Test 11/10/21  0843   WBC 8.7   HGB 12.4*   HCT 37.8*   MCV 96        Recent Labs   Lab Test 11/10/21  0843 10/29/21  1904 09/20/21  1207   HGB 12.4* 13.4 13.2*    Recent Labs   Lab Test 10/29/21  1904 04/08/21  2325 04/08/21  1719   TROPONINI 0.06 0.02 0.01     Recent Labs   Lab Test 10/29/21  1904 05/17/21  1045 04/08/21  1055   BNP 67 84 58     No results for input(s): TSH in the last 12911 hours.  Recent Labs   Lab Test 09/23/18  1045   INR 1.07

## 2022-06-07 ENCOUNTER — TELEPHONE (OUTPATIENT)
Dept: CARDIOLOGY | Facility: CLINIC | Age: 87
End: 2022-06-07
Payer: MEDICARE

## 2022-06-07 DIAGNOSIS — I25.10 CORONARY ARTERY DISEASE INVOLVING NATIVE CORONARY ARTERY OF NATIVE HEART WITHOUT ANGINA PECTORIS: ICD-10-CM

## 2022-06-07 RX ORDER — METOPROLOL TARTRATE 25 MG/1
TABLET, FILM COATED ORAL
Qty: 180 TABLET | Refills: 1 | Status: SHIPPED | OUTPATIENT
Start: 2022-06-07 | End: 2022-12-08

## 2022-06-07 NOTE — TELEPHONE ENCOUNTER
I spoke with wife Santa about Mauricio.  His torsemide was increased to 40 mg daily last week.  His weight has gradually increased from 272 pounds to 275 pounds today.  275 pounds is his upper limit of normal weight.  She states that his lower extremity edema and dyspnea on exertion is present but stable.  He is following a low-sodium diet.    Santa will be having an ablation on June 9 and is concerned about Mauricio' health.  They will be having help from a family friend who is an RN.      Increase torsemide by taking an extra 20 mg in the afternoon today and tomorrow.    They will contact our clinic if symptoms worsen or weights continue to increase.

## 2022-06-14 ENCOUNTER — DOCUMENTATION ONLY (OUTPATIENT)
Dept: OTHER | Facility: CLINIC | Age: 87
End: 2022-06-14
Payer: MEDICARE

## 2022-06-18 ENCOUNTER — LAB REQUISITION (OUTPATIENT)
Dept: LAB | Facility: CLINIC | Age: 87
End: 2022-06-18
Payer: MEDICARE

## 2022-06-18 DIAGNOSIS — R39.15 URGENCY OF URINATION: ICD-10-CM

## 2022-06-18 PROCEDURE — 87086 URINE CULTURE/COLONY COUNT: CPT | Mod: ORL | Performed by: STUDENT IN AN ORGANIZED HEALTH CARE EDUCATION/TRAINING PROGRAM

## 2022-06-18 PROCEDURE — 87086 URINE CULTURE/COLONY COUNT: CPT | Performed by: STUDENT IN AN ORGANIZED HEALTH CARE EDUCATION/TRAINING PROGRAM

## 2022-06-20 LAB — BACTERIA UR CULT: NORMAL

## 2022-06-21 ENCOUNTER — OFFICE VISIT (OUTPATIENT)
Dept: CARDIOLOGY | Facility: CLINIC | Age: 87
End: 2022-06-21
Payer: MEDICARE

## 2022-06-21 VITALS
WEIGHT: 275.4 LBS | RESPIRATION RATE: 20 BRPM | HEART RATE: 73 BPM | DIASTOLIC BLOOD PRESSURE: 58 MMHG | HEIGHT: 68 IN | SYSTOLIC BLOOD PRESSURE: 110 MMHG | BODY MASS INDEX: 41.74 KG/M2

## 2022-06-21 DIAGNOSIS — R06.09 DYSPNEA ON EXERTION: ICD-10-CM

## 2022-06-21 DIAGNOSIS — I50.32 CHRONIC DIASTOLIC CONGESTIVE HEART FAILURE (H): ICD-10-CM

## 2022-06-21 DIAGNOSIS — G47.33 OSA ON CPAP: ICD-10-CM

## 2022-06-21 DIAGNOSIS — I50.32 CHRONIC HEART FAILURE WITH PRESERVED EJECTION FRACTION (H): Primary | ICD-10-CM

## 2022-06-21 LAB
ANION GAP SERPL CALCULATED.3IONS-SCNC: 13 MMOL/L (ref 5–18)
BUN SERPL-MCNC: 36 MG/DL (ref 8–28)
CALCIUM SERPL-MCNC: 8.9 MG/DL (ref 8.5–10.5)
CHLORIDE BLD-SCNC: 101 MMOL/L (ref 98–107)
CO2 SERPL-SCNC: 26 MMOL/L (ref 22–31)
CREAT SERPL-MCNC: 1.33 MG/DL (ref 0.7–1.3)
GFR SERPL CREATININE-BSD FRML MDRD: 50 ML/MIN/1.73M2
GLUCOSE BLD-MCNC: 209 MG/DL (ref 70–125)
POTASSIUM BLD-SCNC: 3.9 MMOL/L (ref 3.5–5)
SODIUM SERPL-SCNC: 140 MMOL/L (ref 136–145)

## 2022-06-21 PROCEDURE — 80048 BASIC METABOLIC PNL TOTAL CA: CPT | Performed by: NURSE PRACTITIONER

## 2022-06-21 PROCEDURE — 36415 COLL VENOUS BLD VENIPUNCTURE: CPT | Performed by: NURSE PRACTITIONER

## 2022-06-21 PROCEDURE — 99214 OFFICE O/P EST MOD 30 MIN: CPT | Performed by: NURSE PRACTITIONER

## 2022-06-21 RX ORDER — TORSEMIDE 20 MG/1
40 TABLET ORAL DAILY
Qty: 180 TABLET | Refills: 3
Start: 2022-06-21 | End: 2022-08-25

## 2022-06-21 RX ORDER — KETOCONAZOLE 20 MG/G
CREAM TOPICAL DAILY PRN
COMMUNITY
Start: 2022-06-04

## 2022-06-21 RX ORDER — FLUTICASONE PROPIONATE 0.05 %
CREAM (GRAM) TOPICAL DAILY
COMMUNITY
Start: 2022-05-24 | End: 2024-07-30

## 2022-06-21 RX ORDER — BETAMETHASONE DIPROPIONATE 0.5 MG/G
CREAM TOPICAL 2 TIMES DAILY PRN
COMMUNITY
Start: 2022-04-24

## 2022-06-21 RX ORDER — GENTAMICIN SULFATE 1 MG/G
OINTMENT TOPICAL
COMMUNITY
Start: 2022-04-07 | End: 2024-07-30

## 2022-06-21 NOTE — LETTER
"6/21/2022    Erasmo Michael MD  [facility] [address] [place]  [place] [address]    RE: Chava Lincoln       Dear Colleague,     I had the pleasure of seeing Chava Lincoln in the [facility].  HEART CARE PROGRESS NOTE      [facility]  [phone]      Assessment/Recommendations   Assessment:    1.  Heart failure with preserved ejection fraction: He has chronic dyspnea on exertion and edema which are at baseline.  He takes torsemide 40 mg daily but increases to 60 mg daily if his weight is above 275 pounds.    2.  Coronary artery disease: He denies any chest pain    3.  Hypertension: Blood pressure stable at 110/58.    Plan:  1.  BMP pending  2.  Low-sodium diet and fluid restriction  3.  Daily weights      Chava Lincoln will follow up with Dr. Sheridan on June 30 and in the heart failure clinic in 2 months.       History of Present Illness/Subjective    Mr. Chava Lincoln is a 89 year old male seen at [facility] today for follow-up.  He has a history of heart failure with preserved ejection fraction, pulmonary hypertension, mildly dilated ascending aorta, obstructive sleep apnea, coronary artery disease, obesity, dyslipidemia, and GERD.    He was seen in clinic 3 weeks ago and torsemide increased to 40 mg daily.  He takes 60 mg if his weight is above 275 pounds.  He has chronic dyspnea on exertion which is at baseline.  He has chronic lower extremity edema which is stable.  He wears compression stockings daily.  He has chronic fatigue.  He denies lightheadedness, orthopnea, PND and chest pain.      He is following a low-sodium diet and limiting fluids.        Physical Examination Review of Systems   Vitals: /58 (BP Location: Left arm, Patient Position: Sitting, Cuff Size: Adult Large)   Pulse 73   Resp 20   Ht 1.727 m (5' 8\")   Wt 124.9 kg (275 lb 6.4 oz)   BMI 41.87 kg/m    BMI= Body mass index is 41.87 " kg/m .  Wt Readings from Last 3 Encounters:   06/21/22 124.9 kg (275 lb 6.4 oz)   06/02/22 123.1 kg (271 lb 6.4 oz)   06/01/22 124.1 kg (273 lb 9.6 oz)       General Appearance:     Alert, cooperative and in no acute distress.   ENT/Mouth: membranes moist, no oral lesions or bleeding gums.      EYES:  no scleral icterus, normal conjunctivae   Chest/Lungs:   lungs are clear to auscultation, no rales or wheezing, respirations unlabored   Cardiovascular:   Regular. Normal first and second heart sounds, 1+ edema bilateral lower extremities (left slightly greater than)   Abdomen:   Obese, soft, nontender, nondistended, bowel sounds present   Extremities: no cyanosis or clubbing   Skin: warm, dry.    Neurologic: mood and affect are appropriate, alert and oriented x3         Please refer above for cardiac ROS details.      Medical History  Surgical History Family History Social History   Past Medical History:   Diagnosis Date     Coronary artery disease due to calcified coronary lesion      GERD (gastroesophageal reflux disease) 7/2/2014     Hypertension      RAMON on CPAP 4/17/2017     Prostate cancer (H)      Prostate cancer (H)      Past Surgical History:   Procedure Laterality Date     CV CORONARY ANGIOGRAM N/A 12/10/2018    Procedure: Coronary Angiogram;  Surgeon: Tana Duncan MD;  Location: [facility];  Service: Cardiology     CV LEFT HEART CATHETERIZATION WITHOUT LEFT VENTRICULOGRAM Left 12/10/2018    Procedure: Left Heart Catheterization Without Left Ventriculogram;  Surgeon: Tana Duncan MD;  Location: [facility];  Service: Cardiology     CV LEFT HEART CATHETERIZATION WITHOUT LEFT VENTRICULOGRAM Left 02/04/2019    Procedure: Left Heart Catheterization Without Left Ventriculogram;  Surgeon: Tana Duncan MD;  Location: [facility];  Service: Cardiology     CV RIGHT HEART CATHETERIZATION N/A 02/04/2019    Procedure: Right Heart Catheterization;  Surgeon: Tana Duncan MD;   Location: [facility];  Service: Cardiology     HERNIA REPAIR       JOINT REPLACEMENT       KNEE SURGERY Bilateral      OTHER SURGICAL HISTORY      Prostatic cryotherapy     Family History   Problem Relation Age of Onset     Diabetes Mother      Cerebrovascular Disease Father      Heart Disease Brother      Coronary Artery Disease Brother     Social History     Socioeconomic History     Marital status:      Spouse name: Santa     Number of children: 0     Years of education: Not on file     Highest education level: 12th grade   Occupational History     Occupation: Retired    Tobacco Use     Smoking status: Never Smoker     Smokeless tobacco: Never Used   Substance and Sexual Activity     Alcohol use: Yes     Comment: Alcoholic Drinks/day: 2-3 beer/ month     Drug use: No     Sexual activity: Not Currently     Partners: Female   Other Topics Concern     Not on file   Social History Narrative     Not on file     Social Determinants of Health     Financial Resource Strain: Low Risk      Difficulty of Paying Living Expenses: Not hard at all   Food Insecurity: No Food Insecurity     Worried About Running Out of Food in the Last Year: Never true     Ran Out of Food in the Last Year: Never true   Transportation Needs: No Transportation Needs     Lack of Transportation (Medical): No     Lack of Transportation (Non-Medical): No   Physical Activity: Not on file   Stress: Not on file   Social Connections: Not on file   Intimate Partner Violence: Not At Risk     Fear of Current or Ex-Partner: No     Emotionally Abused: No     Physically Abused: No     Sexually Abused: No   Housing Stability: Not on file          Medications  Allergies   Current Outpatient Medications   Medication Sig Dispense Refill     aspirin 81 MG EC tablet [ASPIRIN 81 MG EC TABLET] Take 81 mg by mouth every evening.        betamethasone dipropionate (DIPROSONE) 0.05 % external cream APPLY TOPICALLY TWICE DAILY TO RASH ON BODY       fluticasone  (CUTIVATE) 0.05 % external cream        gentamicin (GARAMYCIN) 0.1 % external ointment APPLY TO GROIN AND LEG TWICE DAILY AS NEEDED       isosorbide mononitrate (IMDUR) 30 MG 24 hr tablet [ISOSORBIDE MONONITRATE (IMDUR) 30 MG 24 HR TABLET] Take 1 tablet (30 mg total) by mouth daily. 90 tablet 2     ketoconazole (NIZORAL) 2 % external cream APPLY TOPICALLY TO THE AFFECTED AREA DAILY       lactobacillus rhamnosus (GG) (CULTURELL) capsule Take 1 capsule by mouth daily       MEDICATION CANNOT BE REORDERED - PLEASE MANUALLY REORDER AND DISCONTINUE THE OLD ORDER [LEUPROLIDE, 6 MONTH, (ELIGARD, 6 MONTH,) 45 MG SYRINGE] Inject 45 mg under the skin. Every six months       Melatonin 2.5 MG CHEW        metoprolol tartrate (LOPRESSOR) 25 MG tablet TAKE 1 TABLET(25 MG) BY MOUTH TWICE DAILY 180 tablet 1     mirabegron (MYRBETRIQ) 50 mg Tb24 ER tablet [MIRABEGRON (MYRBETRIQ) 50 MG TB24 ER TABLET] Take 50 mg by mouth daily.       multivitamin with minerals (THERA-M) 9 mg iron-400 mcg Tab tablet [MULTIVITAMIN WITH MINERALS (THERA-M) 9 MG IRON-400 MCG TAB TABLET] Take 1 tablet by mouth daily.       mycophenolate (GENERIC EQUIVALENT) 500 MG tablet Take 1,500 mg by mouth 2 times daily       NYSTOP [id number] UNIT/GM powder as needed       oxybutynin (DITROPAN XL) 15 MG 24 hr tablet [OXYBUTYNIN (DITROPAN XL) 15 MG 24 HR TABLET] Take 15 mg by mouth daily.       oxybutynin (DITROPAN XL) 5 MG ER tablet Take 5 mg by mouth daily        rosuvastatin (CRESTOR) 20 MG tablet TAKE 1 TABLET(20 MG) BY MOUTH AT BEDTIME 90 tablet 0     torsemide (DEMADEX) 20 MG tablet Take 2 tablets (40 mg) by mouth daily 180 tablet 3     triamcinolone (KENALOG) 0.1 % external ointment Apply 1 applicator topically as needed       valsartan (DIOVAN) 80 MG tablet Take 80 mg by mouth daily       acetaminophen (TYLENOL) 325 MG tablet [ACETAMINOPHEN (TYLENOL) 325 MG TABLET] Take 325 mg by mouth every 6 (six) hours as needed for pain. (Patient not taking: Reported on  6/21/2022)      Allergies   Allergen Reactions     Atorvastatin Muscle Pain (Myalgia)     Gabapentin Unknown     Lisinopril Cough     Morphine Other (See Comments)     hypotension     Sulfa (Sulfonamide Antibiotics) [Sulfa Drugs] Hives     Vicodin [Hydrocodone-Acetaminophen] Unknown         Lab Results    Chemistry/lipid CBC Cardiac Enzymes/BNP/TSH/INR   Recent Labs   Lab Test 09/20/21  1207   CHOL 102   HDL 49   LDL 34   TRIG 97     Recent Labs   Lab Test 09/20/21  1207 12/23/20  1014 10/08/19  1543   LDL 34 48 40     Recent Labs   Lab Test 11/10/21  0843      POTASSIUM 4.6   CHLORIDE 108*   CO2 22   *   BUN 21   CR 1.17   GFRESTIMATED 54*   SUZE 8.6     Recent Labs   Lab Test 11/10/21  0843 11/05/21  1206 11/01/21  1453   CR 1.17 1.77* 2.29*     No results for input(s): A1C in the last 79538 hours. Recent Labs   Lab Test 11/10/21  0843   WBC 8.7   HGB 12.4*   HCT 37.8*   MCV 96        Recent Labs   Lab Test 11/10/21  0843 10/29/21  1904 09/20/21  1207   HGB 12.4* 13.4 13.2*    Recent Labs   Lab Test 10/29/21  1904 04/08/21  2325 04/08/21  1719   TROPONINI 0.06 0.02 0.01     Recent Labs   Lab Test 10/29/21  1904 05/17/21  1045 04/08/21  1055   BNP 67 84 58     No results for input(s): TSH in the last 70817 hours.  Recent Labs   Lab Test 09/23/18  1045   INR 1.07                      Late entry. Pt arrived at clinic, via car, although virtual visit arranged. After discussion with provider, blood work is needed this week. Reschedule appointment as in-person. Wife request for assistance for patient,  from vehicle to clinic via w/c. Wife unable to push w/c for patient transportation as recovering from ablation procedure. W/c brought to car, 2 elevation seat cushions placed into w/c by wife. Pt transferred into w/c independently, and staff only available as SBA. Pt was brought into clinic via w/c, by nursing for appointment safely and without incident. Pt had taken his weight at home before arrival.  [facility] staff made aware that no in clinic weight needed, and provider made aware. Pt was returned to car after OV, via w/c, safely and without incident. Pt transferred self into front seat of vehicle. Writer there for SBA, but not needed. Pt wife present for both exiting and entrance of vehicle. GUS,Rn     Claudine Powell NP     [facility]  cc:   No referring provider defined for this encounter.

## 2022-06-21 NOTE — PROGRESS NOTES
Late entry. Pt arrived at clinic, via car, although virtual visit arranged. After discussion with provider, blood work is needed this week. Reschedule appointment as in-person. Wife request for assistance for patient,  from vehicle to clinic via w/c. Wife unable to push w/c for patient transportation as recovering from ablation procedure. W/c brought to car, 2 elevation seat cushions placed into w/c by wife. Pt transferred into w/c independently, and staff only available as SBA. Pt was brought into clinic via w/c, by nursing for appointment safely and without incident. Pt had taken his weight at home before arrival. [facility] staff made aware that no in clinic weight needed, and provider made aware. Pt was returned to car after OV, via w/c, safely and without incident. Pt transferred self into front seat of vehicle. Writer there for SBA, but not needed. Pt wife present for both exiting and entrance of vehicle. GUS,Rn

## 2022-06-21 NOTE — PROGRESS NOTES
"HEART CARE PROGRESS NOTE      [facility]  [phone]      Assessment/Recommendations   Assessment:    1.  Heart failure with preserved ejection fraction: He has chronic dyspnea on exertion and edema which are at baseline.  He takes torsemide 40 mg daily but increases to 60 mg daily if his weight is above 275 pounds.    2.  Coronary artery disease: He denies any chest pain    3.  Hypertension: Blood pressure stable at 110/58.    Plan:  1.  BMP pending  2.  Low-sodium diet and fluid restriction  3.  Daily weights      Chava Lincoln will follow up with Dr. Sheridan on June 30 and in the heart failure clinic in 2 months.       History of Present Illness/Subjective    Mr. Chava Lincoln is a 89 year old male seen at [facility] today for follow-up.  He has a history of heart failure with preserved ejection fraction, pulmonary hypertension, mildly dilated ascending aorta, obstructive sleep apnea, coronary artery disease, obesity, dyslipidemia, and GERD.    He was seen in clinic 3 weeks ago and torsemide increased to 40 mg daily.  He takes 60 mg if his weight is above 275 pounds.  He has chronic dyspnea on exertion which is at baseline.  He has chronic lower extremity edema which is stable.  He wears compression stockings daily.  He has chronic fatigue.  He denies lightheadedness, orthopnea, PND and chest pain.      He is following a low-sodium diet and limiting fluids.        Physical Examination Review of Systems   Vitals: /58 (BP Location: Left arm, Patient Position: Sitting, Cuff Size: Adult Large)   Pulse 73   Resp 20   Ht 1.727 m (5' 8\")   Wt 124.9 kg (275 lb 6.4 oz)   BMI 41.87 kg/m    BMI= Body mass index is 41.87 kg/m .  Wt Readings from Last 3 Encounters:   06/21/22 124.9 kg (275 lb 6.4 oz)   06/02/22 123.1 kg (271 lb 6.4 oz)   06/01/22 124.1 kg (273 lb 9.6 oz)       General Appearance:     Alert, cooperative and in no acute distress.   ENT/Mouth: " membranes moist, no oral lesions or bleeding gums.      EYES:  no scleral icterus, normal conjunctivae   Chest/Lungs:   lungs are clear to auscultation, no rales or wheezing, respirations unlabored   Cardiovascular:   Regular. Normal first and second heart sounds, 1+ edema bilateral lower extremities (left slightly greater than)   Abdomen:   Obese, soft, nontender, nondistended, bowel sounds present   Extremities: no cyanosis or clubbing   Skin: warm, dry.    Neurologic: mood and affect are appropriate, alert and oriented x3         Please refer above for cardiac ROS details.      Medical History  Surgical History Family History Social History   Past Medical History:   Diagnosis Date     Coronary artery disease due to calcified coronary lesion      GERD (gastroesophageal reflux disease) 7/2/2014     Hypertension      RAMON on CPAP 4/17/2017     Prostate cancer (H)      Prostate cancer (H)      Past Surgical History:   Procedure Laterality Date     CV CORONARY ANGIOGRAM N/A 12/10/2018    Procedure: Coronary Angiogram;  Surgeon: Tana Duncan MD;  Location: [facility];  Service: Cardiology     CV LEFT HEART CATHETERIZATION WITHOUT LEFT VENTRICULOGRAM Left 12/10/2018    Procedure: Left Heart Catheterization Without Left Ventriculogram;  Surgeon: Tana Duncan MD;  Location: [facility];  Service: Cardiology     CV LEFT HEART CATHETERIZATION WITHOUT LEFT VENTRICULOGRAM Left 02/04/2019    Procedure: Left Heart Catheterization Without Left Ventriculogram;  Surgeon: Tana Duncan MD;  Location: [facility];  Service: Cardiology     CV RIGHT HEART CATHETERIZATION N/A 02/04/2019    Procedure: Right Heart Catheterization;  Surgeon: Tana Duncan MD;  Location: [facility];  Service: Cardiology     HERNIA REPAIR       JOINT REPLACEMENT       KNEE SURGERY Bilateral      OTHER SURGICAL HISTORY      Prostatic cryotherapy     Family History   Problem Relation Age of Onset     Diabetes  Mother      Cerebrovascular Disease Father      Heart Disease Brother      Coronary Artery Disease Brother     Social History     Socioeconomic History     Marital status:      Spouse name: Santa     Number of children: 0     Years of education: Not on file     Highest education level: 12th grade   Occupational History     Occupation: Retired    Tobacco Use     Smoking status: Never Smoker     Smokeless tobacco: Never Used   Substance and Sexual Activity     Alcohol use: Yes     Comment: Alcoholic Drinks/day: 2-3 beer/ month     Drug use: No     Sexual activity: Not Currently     Partners: Female   Other Topics Concern     Not on file   Social History Narrative     Not on file     Social Determinants of Health     Financial Resource Strain: Low Risk      Difficulty of Paying Living Expenses: Not hard at all   Food Insecurity: No Food Insecurity     Worried About Running Out of Food in the Last Year: Never true     Ran Out of Food in the Last Year: Never true   Transportation Needs: No Transportation Needs     Lack of Transportation (Medical): No     Lack of Transportation (Non-Medical): No   Physical Activity: Not on file   Stress: Not on file   Social Connections: Not on file   Intimate Partner Violence: Not At Risk     Fear of Current or Ex-Partner: No     Emotionally Abused: No     Physically Abused: No     Sexually Abused: No   Housing Stability: Not on file          Medications  Allergies   Current Outpatient Medications   Medication Sig Dispense Refill     aspirin 81 MG EC tablet [ASPIRIN 81 MG EC TABLET] Take 81 mg by mouth every evening.        betamethasone dipropionate (DIPROSONE) 0.05 % external cream APPLY TOPICALLY TWICE DAILY TO RASH ON BODY       fluticasone (CUTIVATE) 0.05 % external cream        gentamicin (GARAMYCIN) 0.1 % external ointment APPLY TO GROIN AND LEG TWICE DAILY AS NEEDED       isosorbide mononitrate (IMDUR) 30 MG 24 hr tablet [ISOSORBIDE MONONITRATE (IMDUR) 30 MG 24 HR TABLET]  Take 1 tablet (30 mg total) by mouth daily. 90 tablet 2     ketoconazole (NIZORAL) 2 % external cream APPLY TOPICALLY TO THE AFFECTED AREA DAILY       lactobacillus rhamnosus (GG) (CULTURELL) capsule Take 1 capsule by mouth daily       MEDICATION CANNOT BE REORDERED - PLEASE MANUALLY REORDER AND DISCONTINUE THE OLD ORDER [LEUPROLIDE, 6 MONTH, (ELIGARD, 6 MONTH,) 45 MG SYRINGE] Inject 45 mg under the skin. Every six months       Melatonin 2.5 MG CHEW        metoprolol tartrate (LOPRESSOR) 25 MG tablet TAKE 1 TABLET(25 MG) BY MOUTH TWICE DAILY 180 tablet 1     mirabegron (MYRBETRIQ) 50 mg Tb24 ER tablet [MIRABEGRON (MYRBETRIQ) 50 MG TB24 ER TABLET] Take 50 mg by mouth daily.       multivitamin with minerals (THERA-M) 9 mg iron-400 mcg Tab tablet [MULTIVITAMIN WITH MINERALS (THERA-M) 9 MG IRON-400 MCG TAB TABLET] Take 1 tablet by mouth daily.       mycophenolate (GENERIC EQUIVALENT) 500 MG tablet Take 1,500 mg by mouth 2 times daily       NYSTOP [id number] UNIT/GM powder as needed       oxybutynin (DITROPAN XL) 15 MG 24 hr tablet [OXYBUTYNIN (DITROPAN XL) 15 MG 24 HR TABLET] Take 15 mg by mouth daily.       oxybutynin (DITROPAN XL) 5 MG ER tablet Take 5 mg by mouth daily        rosuvastatin (CRESTOR) 20 MG tablet TAKE 1 TABLET(20 MG) BY MOUTH AT BEDTIME 90 tablet 0     torsemide (DEMADEX) 20 MG tablet Take 2 tablets (40 mg) by mouth daily 180 tablet 3     triamcinolone (KENALOG) 0.1 % external ointment Apply 1 applicator topically as needed       valsartan (DIOVAN) 80 MG tablet Take 80 mg by mouth daily       acetaminophen (TYLENOL) 325 MG tablet [ACETAMINOPHEN (TYLENOL) 325 MG TABLET] Take 325 mg by mouth every 6 (six) hours as needed for pain. (Patient not taking: Reported on 6/21/2022)      Allergies   Allergen Reactions     Atorvastatin Muscle Pain (Myalgia)     Gabapentin Unknown     Lisinopril Cough     Morphine Other (See Comments)     hypotension     Sulfa (Sulfonamide Antibiotics) [Sulfa Drugs] Hives     Vicodin  [Hydrocodone-Acetaminophen] Unknown         Lab Results    Chemistry/lipid CBC Cardiac Enzymes/BNP/TSH/INR   Recent Labs   Lab Test 09/20/21  1207   CHOL 102   HDL 49   LDL 34   TRIG 97     Recent Labs   Lab Test 09/20/21  1207 12/23/20  1014 10/08/19  1543   LDL 34 48 40     Recent Labs   Lab Test 11/10/21  0843      POTASSIUM 4.6   CHLORIDE 108*   CO2 22   *   BUN 21   CR 1.17   GFRESTIMATED 54*   SUZE 8.6     Recent Labs   Lab Test 11/10/21  0843 11/05/21  1206 11/01/21  1453   CR 1.17 1.77* 2.29*     No results for input(s): A1C in the last 92145 hours. Recent Labs   Lab Test 11/10/21  0843   WBC 8.7   HGB 12.4*   HCT 37.8*   MCV 96        Recent Labs   Lab Test 11/10/21  0843 10/29/21  1904 09/20/21  1207   HGB 12.4* 13.4 13.2*    Recent Labs   Lab Test 10/29/21  1904 04/08/21  2325 04/08/21  1719   TROPONINI 0.06 0.02 0.01     Recent Labs   Lab Test 10/29/21  1904 05/17/21  1045 04/08/21  1055   BNP 67 84 58     No results for input(s): TSH in the last 60571 hours.  Recent Labs   Lab Test 09/23/18  1045   INR 1.07

## 2022-06-21 NOTE — PATIENT INSTRUCTIONS
Chava Lincoln,    It was a pleasure to see you today at the [facility].     My recommendations after this visit include:  - Continue current medications. Take torsemide 40 mg daily.  If weight is greater than 275 pounds, take 60 mg.    - Limit salt in your diet.   - Your labs will be on MyChart.   - Follow up with Claudine in 2 months.   - Continue to monitor for signs of retaining fluid (increasing weights, shortness of breath, swelling) and call with any concerns.   - If you have any questions or concerns, please call [phone] to talk with my nurse.    Claudine Arriola, CNP

## 2022-06-30 ENCOUNTER — OFFICE VISIT (OUTPATIENT)
Dept: CARDIOLOGY | Facility: CLINIC | Age: 87
End: 2022-06-30
Payer: MEDICARE

## 2022-06-30 VITALS — HEART RATE: 71 BPM | SYSTOLIC BLOOD PRESSURE: 114 MMHG | DIASTOLIC BLOOD PRESSURE: 66 MMHG

## 2022-06-30 DIAGNOSIS — N18.32 STAGE 3B CHRONIC KIDNEY DISEASE (H): ICD-10-CM

## 2022-06-30 DIAGNOSIS — I50.32 CHRONIC DIASTOLIC CONGESTIVE HEART FAILURE (H): Primary | ICD-10-CM

## 2022-06-30 DIAGNOSIS — E78.5 DYSLIPIDEMIA: ICD-10-CM

## 2022-06-30 DIAGNOSIS — R06.09 DYSPNEA ON EXERTION: ICD-10-CM

## 2022-06-30 DIAGNOSIS — I10 ESSENTIAL HYPERTENSION: ICD-10-CM

## 2022-06-30 DIAGNOSIS — E66.01 MORBID OBESITY (H): ICD-10-CM

## 2022-06-30 DIAGNOSIS — G47.33 OSA ON CPAP: ICD-10-CM

## 2022-06-30 DIAGNOSIS — I50.32 CHRONIC DIASTOLIC CONGESTIVE HEART FAILURE (H): ICD-10-CM

## 2022-06-30 DIAGNOSIS — I25.10 CORONARY ARTERY CALCIFICATION SEEN ON CAT SCAN: ICD-10-CM

## 2022-06-30 PROCEDURE — 99214 OFFICE O/P EST MOD 30 MIN: CPT | Performed by: INTERNAL MEDICINE

## 2022-06-30 RX ORDER — POTASSIUM CHLORIDE 1.5 G/1.58G
20 POWDER, FOR SOLUTION ORAL DAILY
Qty: 90 EACH | Refills: 0 | Status: SHIPPED | OUTPATIENT
Start: 2022-06-30 | End: 2024-07-30

## 2022-06-30 RX ORDER — POTASSIUM CHLORIDE 1.5 G/1.58G
20 POWDER, FOR SOLUTION ORAL DAILY
Qty: 30 EACH | Refills: 4 | Status: SHIPPED | OUTPATIENT
Start: 2022-06-30 | End: 2022-06-30

## 2022-06-30 NOTE — LETTER
6/30/2022    Erasmo Michael MD  [facility] [address]  [place] [address]    RE: Chava YOUNG Dorigo       Dear Colleague,     I had the [facility] of [facility] Lincoln [facility].            Assessment/Plan:   1.  Chronic congestive heart failure with preserved ejection fraction: The patient has worsening shortness of breath on exertion and bilateral leg edema.  He gained 5 pounds recently.  Increase torsemide to 40 mg twice a day for 7 to 10 days to get rid of for 5 to 10 pounds of extra fluid.    Repeat basic metabolic panel in 7 to 10 days.  Her BMP on June 21, 2022 was reported to stable renal function, creatinine 1.38.     2.  Coronary artery disease s/p KATHY to Ramus, severe calcified RCA lesion with high risk of revascularization, no obstructive lesion in left main and LAD per IVUS:  He has no chest pain.  Continue medical treatment.  Continue aspirin, Crestor, isosorbide mononitrate and metoprolol.      3.  Essential hypertension: His blood pressure is controlled, continue valsartan 80 mg daily and metoprolol 25 mg twice a day     4.  Dyslipidemia: Continue Crestor 20 mg nightly.  Last LDL was controlled.     5.  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     6.  Morbid obesity and obstructive sleep apnea: Lifestyle modification and continue CPAP.    7.  Stage III CKD: Creatinine is stable.    Thank you for the opportunity to be involved in the care of Chava Lincoln. If you have any questions, please feel free to contact me.  I will see the patient again in 6 months and as needed.    Much or all of the text in this note was generated through the use of Dragon Dictate voice-to-text software. Errors in spelling or words which seem out of context are unintentional. Sound alike errors, in particular, may have escaped editing.       History of Present Illness:   It is my pleasure to see Chava Lincoln at the [facility]  clinic for routine cardiology follow up.  Chava Lincoln is a 89 year old male with a medical history of coronary artery disease, s/p KATHY to Ramus in 2-2019, no obstructive disease in LM and LAD, severe calcification lesions in RCA, benign essential hypertension, dyslipidemia, chronic diastolic congestive heart failure, morbid obesity, right diaphragm hemiparalysis, obstructive sleep apnea on CPAP.    The patient states that his shortness of breath has been gradually getting worse recently.  He has of worsening bilateral leg edema.  He gained 5 pounds since the last visit.  He denies chest pain, palpitations, dizziness, orthopnea.  He has no fever chills.  He does complains of urine incontinence.  His blood pressure and heart rate are controlled.  Currently he is on torsemide 40 mg daily.    Past Medical History:     Patient Active Problem List   Diagnosis     GERD (gastroesophageal reflux disease)     RAMON on CPAP     Near syncope     Dyspnea on exertion     Essential hypertension     Dyslipidemia     Coronary artery disease involving native coronary artery of native heart without angina pectoris     Ascending aortic aneurysm (H)     Coronary artery calcification seen on CAT scan     Pulmonary hypertension (H)     History of pulmonary embolism     Hypoxia     Morbid obesity (H)     Bilateral lower extremity edema     Chronic kidney disease, stage 3 (H)     (HFpEF) heart failure with preserved ejection fraction (H)     Abdominal pain, epigastric       Past Surgical History:     Past Surgical History:   Procedure Laterality Date     CV CORONARY ANGIOGRAM N/A 12/10/2018    Procedure: Coronary Angiogram;  Surgeon: Tana Duncan MD;  Location: [facility];  Service: Cardiology     CV LEFT HEART CATHETERIZATION WITHOUT LEFT VENTRICULOGRAM Left 12/10/2018    Procedure: Left Heart Catheterization Without Left Ventriculogram;  Surgeon: Tana Duncan MD;  Location: [facility];  Service: Cardiology      CV LEFT HEART CATHETERIZATION WITHOUT LEFT VENTRICULOGRAM Left 02/04/2019    Procedure: Left Heart Catheterization Without Left Ventriculogram;  Surgeon: Tana Duncan MD;  Location: [facility];  Service: Cardiology     CV RIGHT HEART CATHETERIZATION N/A 02/04/2019    Procedure: Right Heart Catheterization;  Surgeon: Tana Duncan MD;  Location: [facility];  Service: Cardiology     HERNIA REPAIR       JOINT REPLACEMENT       KNEE SURGERY Bilateral      OTHER SURGICAL HISTORY      Prostatic cryotherapy       Family History:     Family History   Problem Relation Age of Onset     Diabetes Mother      Cerebrovascular Disease Father      Heart Disease Brother      Coronary Artery Disease Brother         Social History:    reports that he has never smoked. He has never used smokeless tobacco. He reports current alcohol use. He reports that he does not use drugs.    Review of Systems:   12 systems are reviewed negative except for in HPI.    Meds:     Current Outpatient Medications:      acetaminophen (TYLENOL) 325 MG tablet, Take 325 mg by mouth every 6 hours as needed, Disp: , Rfl:      aspirin 81 MG EC tablet, [ASPIRIN 81 MG EC TABLET] Take 81 mg by mouth every evening. , Disp: , Rfl:      betamethasone dipropionate (DIPROSONE) 0.05 % external cream, APPLY TOPICALLY TWICE DAILY TO RASH ON BODY, Disp: , Rfl:      fluticasone (CUTIVATE) 0.05 % external cream, daily, Disp: , Rfl:      gentamicin (GARAMYCIN) 0.1 % external ointment, APPLY TO GROIN AND LEG TWICE DAILY AS NEEDED, Disp: , Rfl:      isosorbide mononitrate (IMDUR) 30 MG 24 hr tablet, [ISOSORBIDE MONONITRATE (IMDUR) 30 MG 24 HR TABLET] Take 1 tablet (30 mg total) by mouth daily., Disp: 90 tablet, Rfl: 2     ketoconazole (NIZORAL) 2 % external cream, APPLY TOPICALLY TO THE AFFECTED AREA DAILY, Disp: , Rfl:      lactobacillus rhamnosus (GG) (CULTURELL) capsule, Take 1 capsule by mouth daily, Disp: , Rfl:      MEDICATION CANNOT BE  REORDERED - PLEASE MANUALLY REORDER AND DISCONTINUE THE OLD ORDER, [LEUPROLIDE, 6 MONTH, (ELIGARD, 6 MONTH,) 45 MG SYRINGE] Inject 45 mg under the skin. Every six months, Disp: , Rfl:      Melatonin 2.5 MG CHEW, Take by mouth At Bedtime, Disp: , Rfl:      metoprolol tartrate (LOPRESSOR) 25 MG tablet, TAKE 1 TABLET(25 MG) BY MOUTH TWICE DAILY, Disp: 180 tablet, Rfl: 1     mirabegron (MYRBETRIQ) 50 mg Tb24 ER tablet, [MIRABEGRON (MYRBETRIQ) 50 MG TB24 ER TABLET] Take 50 mg by mouth daily., Disp: , Rfl:      multivitamin with minerals (THERA-M) 9 mg iron-400 mcg Tab tablet, [MULTIVITAMIN WITH MINERALS (THERA-M) 9 MG IRON-400 MCG TAB TABLET] Take 1 tablet by mouth daily., Disp: , Rfl:      mycophenolate (GENERIC EQUIVALENT) 500 MG tablet, Take 1,500 mg by mouth 2 times daily, Disp: , Rfl:      NYSTOP 880035 UNIT/GM powder, as needed, Disp: , Rfl:      oxybutynin (DITROPAN XL) 15 MG 24 hr tablet, [OXYBUTYNIN (DITROPAN XL) 15 MG 24 HR TABLET] Take 15 mg by mouth daily., Disp: , Rfl:      oxybutynin (DITROPAN XL) 5 MG ER tablet, Take 5 mg by mouth daily , Disp: , Rfl:      potassium chloride (KLOR-CON) 20 MEQ packet, Take 20 mEq by mouth daily, Disp: 30 each, Rfl: 4     rosuvastatin (CRESTOR) 20 MG tablet, TAKE 1 TABLET(20 MG) BY MOUTH AT BEDTIME, Disp: 90 tablet, Rfl: 0     torsemide (DEMADEX) 20 MG tablet, Take 2 tablets (40 mg) by mouth daily, Disp: 180 tablet, Rfl: 3     triamcinolone (KENALOG) 0.1 % external ointment, Apply 1 applicator topically as needed, Disp: , Rfl:      valsartan (DIOVAN) 80 MG tablet, Take 80 mg by mouth daily, Disp: , Rfl:     Allergies:   Atorvastatin, Gabapentin, Lisinopril, Morphine, Sulfa (sulfonamide antibiotics) [sulfa drugs], and Vicodin [hydrocodone-acetaminophen]      Objective:      Physical Exam        There is no height or weight on file to calculate BMI.  /66 (BP Location: Left arm, Patient Position: Sitting, Cuff Size: Adult Large)   Pulse 71     General Appearance:    Awake, Alert, No acute distress.   HEENT:  Pupil equal and reactive to light. No scleral icterus; the mucous membranes were moist.   Neck: No cervical bruits. Not able to see JVD. No thyromegaly.     Chest: The spine was straight. The chest was symmetric.   Lungs:   Diminished breathing sounds. Crackles in both bases. No wheezing.   Cardiovascular:   Regular rhythm and rate, remote first and second heart sounds with no murmurs. No rubs or gallops.    Abdomen:  Obese. Soft. No tenderness. Non-distended. Bowels sounds are present   Extremities: Mild bilateral leg edema.   Skin: Red skin rashes most in back. Warm, Dry.   Musculoskeletal: No tenderness. No deformity.   Neurologic: Mood and affect are appropriate. No focal deficits.         EKG:  Personally reivewed  Sinus rhythm with 1st degree A-V block with occasional Premature ventricular complexes   Low voltage QRS   Borderline ECG   When compared with ECG of 08-FEB-2022 15:00,   Premature ventricular complexes are now Present   ST no longer elevated in Inferior leads     Cardiac Imaging Studies  Echocardiogram on 4-8-2021:    Extremely poor visualization    Probably normal LV systolic function    RV appears to be dilated    Valves were not visualized    There is no obvious pericardial effusion      Coronary angiogram with PCI on 2-4-2019:    IVUS left main and LAD showed no severely obstructive lesions    Ramus disease treated with rotational atherectomy and stented with a Synergy KATHY under IVUS guidance with good angiographic results    Right heart catheterizations show LV EDP 22 mmHg and PCWP 30 mmHg, PA mean 34 mmHg, RA 13 mmHg      Lab Review   Lab Results   Component Value Date     06/23/2021    CO2 23 06/23/2021    BUN 21 06/23/2021     Lab Results   Component Value Date    WBC 8.5 05/17/2021    HGB 13.9 05/17/2021    HCT 40.6 05/17/2021    MCV 90 05/17/2021     05/17/2021     Lab Results   Component Value Date    CHOL 144 04/28/2022    CHOL 102  09/20/2021    CHOL 133 12/23/2020     Lab Results   Component Value Date    HDL 54 04/28/2022    HDL 49 09/20/2021    HDL 56 12/23/2020     Lab Results   Component Value Date    TRIG 140 04/28/2022    TRIG 97 09/20/2021    TRIG 146 12/23/2020     Lab Results   Component Value Date    TROPONINI 0.02 04/08/2021     Lab Results   Component Value Date    BNP 84 05/17/2021               Thank you for allowing me to participate in the care of your patient.      Sincerely,     Yonatan Sheridan MD     [facility]  cc:   Yonatan Sheridan MD  [address] [facility] [address]  [place] [address]

## 2022-06-30 NOTE — PROGRESS NOTES
Assessment/Plan:   1.  Chronic congestive heart failure with preserved ejection fraction: The patient has worsening shortness of breath on exertion and bilateral leg edema.  He gained 5 pounds recently.  Increase torsemide to 40 mg twice a day for 7 to 10 days to get rid of for 5 to 10 pounds of extra fluid.    Repeat basic metabolic panel in 7 to 10 days.  Her BMP on June 21, 2022 was reported to stable renal function, creatinine 1.38.     2.  Coronary artery disease s/p KATHY to Ramus, severe calcified RCA lesion with high risk of revascularization, no obstructive lesion in left main and LAD per IVUS:  He has no chest pain.  Continue medical treatment.  Continue aspirin, Crestor, isosorbide mononitrate and metoprolol.      3.  Essential hypertension: His blood pressure is controlled, continue valsartan 80 mg daily and metoprolol 25 mg twice a day     4.  Dyslipidemia: Continue Crestor 20 mg nightly.  Last LDL was controlled.     5.  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     6.  Morbid obesity and obstructive sleep apnea: Lifestyle modification and continue CPAP.    7.  Stage III CKD: Creatinine is stable.    Thank you for the opportunity to be involved in the care of Chava Lincoln. If you have any questions, please feel free to contact me.  I will see the patient again in 6 months and as needed.    Much or all of the text in this note was generated through the use of Dragon Dictate voice-to-text software. Errors in spelling or words which seem out of context are unintentional. Sound alike errors, in particular, may have escaped editing.       History of Present Illness:   It is my pleasure to see Chava Lincoln at the [facility] clinic for routine cardiology follow up.  Chava Lincoln is a 89 year old male with a medical history of coronary artery disease, s/p KATHY to Ramus in 2-2019, no obstructive disease in LM and LAD, severe calcification lesions in RCA, benign  essential hypertension, dyslipidemia, chronic diastolic congestive heart failure, morbid obesity, right diaphragm hemiparalysis, obstructive sleep apnea on CPAP.    The patient states that his shortness of breath has been gradually getting worse recently.  He has of worsening bilateral leg edema.  He gained 5 pounds since the last visit.  He denies chest pain, palpitations, dizziness, orthopnea.  He has no fever chills.  He does complains of urine incontinence.  His blood pressure and heart rate are controlled.  Currently he is on torsemide 40 mg daily.    Past Medical History:     Patient Active Problem List   Diagnosis     GERD (gastroesophageal reflux disease)     RAMON on CPAP     Near syncope     Dyspnea on exertion     Essential hypertension     Dyslipidemia     Coronary artery disease involving native coronary artery of native heart without angina pectoris     Ascending aortic aneurysm (H)     Coronary artery calcification seen on CAT scan     Pulmonary hypertension (H)     History of pulmonary embolism     Hypoxia     Morbid obesity (H)     Bilateral lower extremity edema     Chronic kidney disease, stage 3 (H)     (HFpEF) heart failure with preserved ejection fraction (H)     Abdominal pain, epigastric       Past Surgical History:     Past Surgical History:   Procedure Laterality Date     CV CORONARY ANGIOGRAM N/A 12/10/2018    Procedure: Coronary Angiogram;  Surgeon: Tana Duncan MD;  Location: [facility] Cath Lab;  Service: Cardiology     CV LEFT HEART CATHETERIZATION WITHOUT LEFT VENTRICULOGRAM Left 12/10/2018    Procedure: Left Heart Catheterization Without Left Ventriculogram;  Surgeon: Tana Duncan MD;  Location: [facility] Cath Lab;  Service: Cardiology     CV LEFT HEART CATHETERIZATION WITHOUT LEFT VENTRICULOGRAM Left 02/04/2019    Procedure: Left Heart Catheterization Without Left Ventriculogram;  Surgeon: Tana Duncan MD;  Location: [facility] Cath Lab;  Service: Cardiology     CV  RIGHT HEART CATHETERIZATION N/A 02/04/2019    Procedure: Right Heart Catheterization;  Surgeon: Tana Duncan MD;  Location: [facility];  Service: Cardiology     HERNIA REPAIR       JOINT REPLACEMENT       KNEE SURGERY Bilateral      OTHER SURGICAL HISTORY      Prostatic cryotherapy       Family History:     Family History   Problem Relation Age of Onset     Diabetes Mother      Cerebrovascular Disease Father      Heart Disease Brother      Coronary Artery Disease Brother         Social History:    reports that he has never smoked. He has never used smokeless tobacco. He reports current alcohol use. He reports that he does not use drugs.    Review of Systems:   12 systems are reviewed negative except for in HPI.    Meds:     Current Outpatient Medications:      acetaminophen (TYLENOL) 325 MG tablet, Take 325 mg by mouth every 6 hours as needed, Disp: , Rfl:      aspirin 81 MG EC tablet, [ASPIRIN 81 MG EC TABLET] Take 81 mg by mouth every evening. , Disp: , Rfl:      betamethasone dipropionate (DIPROSONE) 0.05 % external cream, APPLY TOPICALLY TWICE DAILY TO RASH ON BODY, Disp: , Rfl:      fluticasone (CUTIVATE) 0.05 % external cream, daily, Disp: , Rfl:      gentamicin (GARAMYCIN) 0.1 % external ointment, APPLY TO GROIN AND LEG TWICE DAILY AS NEEDED, Disp: , Rfl:      isosorbide mononitrate (IMDUR) 30 MG 24 hr tablet, [ISOSORBIDE MONONITRATE (IMDUR) 30 MG 24 HR TABLET] Take 1 tablet (30 mg total) by mouth daily., Disp: 90 tablet, Rfl: 2     ketoconazole (NIZORAL) 2 % external cream, APPLY TOPICALLY TO THE AFFECTED AREA DAILY, Disp: , Rfl:      lactobacillus rhamnosus (GG) (CULTURELL) capsule, Take 1 capsule by mouth daily, Disp: , Rfl:      MEDICATION CANNOT BE REORDERED - PLEASE MANUALLY REORDER AND DISCONTINUE THE OLD ORDER, [LEUPROLIDE, 6 MONTH, (ELIGARD, 6 MONTH,) 45 MG SYRINGE] Inject 45 mg under the skin. Every six months, Disp: , Rfl:      Melatonin 2.5 MG CHEW, Take by mouth At Bedtime, Disp: ,  Rfl:      metoprolol tartrate (LOPRESSOR) 25 MG tablet, TAKE 1 TABLET(25 MG) BY MOUTH TWICE DAILY, Disp: 180 tablet, Rfl: 1     mirabegron (MYRBETRIQ) 50 mg Tb24 ER tablet, [MIRABEGRON (MYRBETRIQ) 50 MG TB24 ER TABLET] Take 50 mg by mouth daily., Disp: , Rfl:      multivitamin with minerals (THERA-M) 9 mg iron-400 mcg Tab tablet, [MULTIVITAMIN WITH MINERALS (THERA-M) 9 MG IRON-400 MCG TAB TABLET] Take 1 tablet by mouth daily., Disp: , Rfl:      mycophenolate (GENERIC EQUIVALENT) 500 MG tablet, Take 1,500 mg by mouth 2 times daily, Disp: , Rfl:      NYSTOP 354020 UNIT/GM powder, as needed, Disp: , Rfl:      oxybutynin (DITROPAN XL) 15 MG 24 hr tablet, [OXYBUTYNIN (DITROPAN XL) 15 MG 24 HR TABLET] Take 15 mg by mouth daily., Disp: , Rfl:      oxybutynin (DITROPAN XL) 5 MG ER tablet, Take 5 mg by mouth daily , Disp: , Rfl:      potassium chloride (KLOR-CON) 20 MEQ packet, Take 20 mEq by mouth daily, Disp: 30 each, Rfl: 4     rosuvastatin (CRESTOR) 20 MG tablet, TAKE 1 TABLET(20 MG) BY MOUTH AT BEDTIME, Disp: 90 tablet, Rfl: 0     torsemide (DEMADEX) 20 MG tablet, Take 2 tablets (40 mg) by mouth daily, Disp: 180 tablet, Rfl: 3     triamcinolone (KENALOG) 0.1 % external ointment, Apply 1 applicator topically as needed, Disp: , Rfl:      valsartan (DIOVAN) 80 MG tablet, Take 80 mg by mouth daily, Disp: , Rfl:     Allergies:   Atorvastatin, Gabapentin, Lisinopril, Morphine, Sulfa (sulfonamide antibiotics) [sulfa drugs], and Vicodin [hydrocodone-acetaminophen]      Objective:      Physical Exam        There is no height or weight on file to calculate BMI.  /66 (BP Location: Left arm, Patient Position: Sitting, Cuff Size: Adult Large)   Pulse 71     General Appearance:   Awake, Alert, No acute distress.   HEENT:  Pupil equal and reactive to light. No scleral icterus; the mucous membranes were moist.   Neck: No cervical bruits. Not able to see JVD. No thyromegaly.     Chest: The spine was straight. The chest was  symmetric.   Lungs:   Diminished breathing sounds. Crackles in both bases. No wheezing.   Cardiovascular:   Regular rhythm and rate, remote first and second heart sounds with no murmurs. No rubs or gallops.    Abdomen:  Obese. Soft. No tenderness. Non-distended. Bowels sounds are present   Extremities: Mild bilateral leg edema.   Skin: Red skin rashes most in back. Warm, Dry.   Musculoskeletal: No tenderness. No deformity.   Neurologic: Mood and affect are appropriate. No focal deficits.         EKG:  Personally reivewed  Sinus rhythm with 1st degree A-V block with occasional Premature ventricular complexes   Low voltage QRS   Borderline ECG   When compared with ECG of 08-FEB-2022 15:00,   Premature ventricular complexes are now Present   ST no longer elevated in Inferior leads     Cardiac Imaging Studies  Echocardiogram on 4-8-2021:    Extremely poor visualization    Probably normal LV systolic function    RV appears to be dilated    Valves were not visualized    There is no obvious pericardial effusion      Coronary angiogram with PCI on 2-4-2019:    IVUS left main and LAD showed no severely obstructive lesions    Ramus disease treated with rotational atherectomy and stented with a Synergy KATHY under IVUS guidance with good angiographic results    Right heart catheterizations show LV EDP 22 mmHg and PCWP 30 mmHg, PA mean 34 mmHg, RA 13 mmHg      Lab Review   Lab Results   Component Value Date     06/23/2021    CO2 23 06/23/2021    BUN 21 06/23/2021     Lab Results   Component Value Date    WBC 8.5 05/17/2021    HGB 13.9 05/17/2021    HCT 40.6 05/17/2021    MCV 90 05/17/2021     05/17/2021     Lab Results   Component Value Date    CHOL 144 04/28/2022    CHOL 102 09/20/2021    CHOL 133 12/23/2020     Lab Results   Component Value Date    HDL 54 04/28/2022    HDL 49 09/20/2021    HDL 56 12/23/2020     Lab Results   Component Value Date    TRIG 140 04/28/2022    TRIG 97 09/20/2021    TRIG 146 12/23/2020      Lab Results   Component Value Date    TROPONINI 0.02 04/08/2021     Lab Results   Component Value Date    BNP 84 05/17/2021

## 2022-06-30 NOTE — PATIENT INSTRUCTIONS
Chava Lincoln,    It is my pleasure to see you today at the [facility].    My recommendations for this visit are:    Increase torsemide from 40 mg daily to twice for 10 days to get rid of 5-10 pounds of extra fluid and then change it back to 40 mg daily.  Start potassium chloride 20 mEq daily  Basic metabolic  panel in 7-10 days for recheck potassium level and kidney function.  Continue other medications.  Please see urologist for urine incontinence.  Will see you again in 6 months    Yonatan Sheridan MD, PhD

## 2022-07-08 ENCOUNTER — LAB (OUTPATIENT)
Dept: CARDIOLOGY | Facility: CLINIC | Age: 87
End: 2022-07-08
Payer: MEDICARE

## 2022-07-08 DIAGNOSIS — I50.32 CHRONIC DIASTOLIC CONGESTIVE HEART FAILURE (H): ICD-10-CM

## 2022-07-08 LAB
ANION GAP SERPL CALCULATED.3IONS-SCNC: 14 MMOL/L (ref 5–18)
BUN SERPL-MCNC: 68 MG/DL (ref 8–28)
CALCIUM SERPL-MCNC: 9.3 MG/DL (ref 8.5–10.5)
CHLORIDE BLD-SCNC: 97 MMOL/L (ref 98–107)
CO2 SERPL-SCNC: 26 MMOL/L (ref 22–31)
CREAT SERPL-MCNC: 2.11 MG/DL (ref 0.7–1.3)
GFR SERPL CREATININE-BSD FRML MDRD: 29 ML/MIN/1.73M2
GLUCOSE BLD-MCNC: 117 MG/DL (ref 70–125)
POTASSIUM BLD-SCNC: 4.8 MMOL/L (ref 3.5–5)
SODIUM SERPL-SCNC: 137 MMOL/L (ref 136–145)

## 2022-07-08 PROCEDURE — 80048 BASIC METABOLIC PNL TOTAL CA: CPT

## 2022-07-08 PROCEDURE — 36415 COLL VENOUS BLD VENIPUNCTURE: CPT

## 2022-07-11 DIAGNOSIS — I50.32 CHRONIC DIASTOLIC CONGESTIVE HEART FAILURE (H): Primary | ICD-10-CM

## 2022-07-21 ENCOUNTER — TELEPHONE (OUTPATIENT)
Dept: CARDIOLOGY | Facility: CLINIC | Age: 87
End: 2022-07-21

## 2022-07-21 NOTE — TELEPHONE ENCOUNTER
"My chart message received:    \"Just received your message this morning (Thursday) about withholding Torsemide on Tuesday and resuming on Wednesday and blood test on Friday.  We did as you said and used 40 mg morning and night for 10 days, stopping on July 9.  Weight responded.  Have since used 40 mg morning only.  BP was low at primary appointment on Monday, July 18 and he reduced Valsartan by half.  I will call this morning and see if I can get instructions for what you want this today and tomorrow\"    "

## 2022-07-21 NOTE — TELEPHONE ENCOUNTER
Spoke with spouse regarding Mychart message. Pt currently taking 40 mg of torsemide. Valsartan was decreased by half with pcp due to hypotension. Confirmed bmp is scheduled for Friday.     bp readings last 24 hours. 125/62  And 127/59    No further questions or concerns noted-LISBETH

## 2022-07-21 NOTE — TELEPHONE ENCOUNTER
[facility] Call Center    Phone Message    May a detailed message be left on voicemail: yes     Reason for Call: Other: Patient spouse asked that Dr Sheridan review her [employer] message      Action Taken: Other: routed to cardiology    Travel Screening: Not Applicable

## 2022-07-22 ENCOUNTER — LAB (OUTPATIENT)
Dept: CARDIOLOGY | Facility: CLINIC | Age: 87
End: 2022-07-22
Payer: MEDICARE

## 2022-07-22 DIAGNOSIS — I50.32 CHRONIC DIASTOLIC CONGESTIVE HEART FAILURE (H): ICD-10-CM

## 2022-07-22 LAB
ANION GAP SERPL CALCULATED.3IONS-SCNC: 15 MMOL/L (ref 5–18)
BUN SERPL-MCNC: 38 MG/DL (ref 8–28)
CALCIUM SERPL-MCNC: 9.1 MG/DL (ref 8.5–10.5)
CHLORIDE BLD-SCNC: 101 MMOL/L (ref 98–107)
CO2 SERPL-SCNC: 22 MMOL/L (ref 22–31)
CREAT SERPL-MCNC: 1.34 MG/DL (ref 0.7–1.3)
GFR SERPL CREATININE-BSD FRML MDRD: 49 ML/MIN/1.73M2
GLUCOSE BLD-MCNC: 148 MG/DL (ref 70–125)
POTASSIUM BLD-SCNC: 3.9 MMOL/L (ref 3.5–5)
SODIUM SERPL-SCNC: 138 MMOL/L (ref 136–145)

## 2022-07-22 PROCEDURE — 36415 COLL VENOUS BLD VENIPUNCTURE: CPT

## 2022-07-22 PROCEDURE — 80048 BASIC METABOLIC PNL TOTAL CA: CPT

## 2022-07-28 ENCOUNTER — PATIENT OUTREACH (OUTPATIENT)
Dept: CARE COORDINATION | Facility: CLINIC | Age: 87
End: 2022-07-28

## 2022-07-28 DIAGNOSIS — Z65.9 PSYCHOSOCIAL PROBLEM: Primary | ICD-10-CM

## 2022-07-28 NOTE — PROGRESS NOTES
Clinic Care Coordination Contact  Care Team Conversations    CC SW consulted with CC in clinic for in home resources for the pt. CC to connect with the CC SW if needing any further assistance. CC SW will do no further outreaches at this time.     DENISE Will  Social Work Care Coordinator - [facility]  Care Coordination  [email or web address]  [email or web address]  Cell Phone: [phone]  Gender pronouns: she/her  Employed by [employer]

## 2022-08-03 ENCOUNTER — MYC MEDICAL ADVICE (OUTPATIENT)
Dept: CARDIOLOGY | Facility: CLINIC | Age: 87
End: 2022-08-03

## 2022-08-08 NOTE — TELEPHONE ENCOUNTER
Spoke with spouse regarding recommendations from Dr. Sheridan.Discussed alt means of therapy for constipation. Spouse states pt had a bowel movement today. But will call back If weight gain increased. No further questions or concerns noted. -LISBETH

## 2022-08-08 NOTE — TELEPHONE ENCOUNTER
Wife calling to notify about more weight gain since increase in torsemide dosage. Mauricio has gained 3.4 lbs since 8/3 and is still constipated. They would like to know if there is something he could take for his constipation. Please call wife Santa back to discuss at [phone].

## 2022-08-08 NOTE — TELEPHONE ENCOUNTER
----- Message from Yonatan Sheridan MD sent at 8/8/2022  4:50 PM CDT -----  Please advise him to call primary care physician for the management of constipation.  Thanks  Jered

## 2022-08-24 ENCOUNTER — MYC MEDICAL ADVICE (OUTPATIENT)
Dept: CARDIOLOGY | Facility: CLINIC | Age: 87
End: 2022-08-24

## 2022-08-24 DIAGNOSIS — I50.32 CHRONIC DIASTOLIC CONGESTIVE HEART FAILURE (H): ICD-10-CM

## 2022-08-25 RX ORDER — TORSEMIDE 20 MG/1
40 TABLET ORAL DAILY
Qty: 180 TABLET | Refills: 3
Start: 2022-08-25 | End: 2022-08-25

## 2022-08-25 NOTE — TELEPHONE ENCOUNTER
From: Yonatan Sheridan MD  Sent: 8/25/2022  11:03 AM CDT  To: Veda Harvey  Subject: FW: Torsemide 80 MG per day report               If he has no worsening symptoms, please advise to see Urologist because urine retention can cause problem.  If he has worsening symptoms, additional 40 mg torsemide in the afternoon as needed.  Jose Luis Lawton        ----- Message -----  From: Veda Harvey  Sent: 8/24/2022   8:56 AM CDT  To: Yonatan Sheridan MD  Subject: Torsemide 80 MG per day report                   ----- Message from Veda Harvey sent at 8/24/2022  8:56 AM CDT -----       ----- Message sent from Veda Harvey to Mauricio Lincoln at 8/24/2022  8:55 AM -----   Aba Martínez,    I apologize for the schedule change. Dr. Sheridan is currently out of the office until next week. Upon his return I will have him review your message and respond accordingly. For now, please monitor symptoms and continue with daily weights as well as ensuring he is taking the Torsemide in the morning to avoid urinating large amounts at night. I would also encourage following up with urology regarding the incontinence issue mentioned.    MARK Mccollum      ----- Message -----       From:Chava Lincoln       Sent:8/24/2022  8:17 AM CDT         To:Yonatan Sheridan MD    Subject:Torsemide 80 MG per day report    You had prescribed the 80 MG Torsemide which we started July 1 and Megha weight went from 275 down to a low of 269.2.  However in August his weight has varied from 274 to 277.  Current is 275.2.  This is with the 80 MG Torsemide per day.  We were scheduled to see Claudine Powell on Tuesday 8/23, but she was not in and had to reschedule.  The reschedule date is 9/20/22.  Should we be doing anything different in the meantime.  Megha weight seems to be staying in the 274 and up range.  I do think he has a urology problem where he leaks a significant amount while sitting all day and can't urinate much during the day and only urinates large amts during the night.

## 2022-08-25 NOTE — TELEPHONE ENCOUNTER
Spoke to spouse regarding recommendations from Dr. Sheridan. Will monitor symptoms for an additional week. Spouse in agreement. Discussed ensuring low sodium diet, compression stockings and water intake. No further questions or concerns noted. -LISBETH

## 2022-09-06 ENCOUNTER — TELEPHONE (OUTPATIENT)
Dept: CARDIOLOGY | Facility: CLINIC | Age: 87
End: 2022-09-06

## 2022-09-06 DIAGNOSIS — I50.32 CHRONIC DIASTOLIC CONGESTIVE HEART FAILURE (H): ICD-10-CM

## 2022-09-06 DIAGNOSIS — R06.09 DYSPNEA ON EXERTION: ICD-10-CM

## 2022-09-06 RX ORDER — ISOSORBIDE MONONITRATE 30 MG/1
30 TABLET, EXTENDED RELEASE ORAL DAILY
Qty: 90 TABLET | Refills: 2 | Status: SHIPPED | OUTPATIENT
Start: 2022-09-06 | End: 2022-09-06

## 2022-09-06 RX ORDER — ISOSORBIDE MONONITRATE 30 MG/1
30 TABLET, EXTENDED RELEASE ORAL DAILY
Qty: 90 TABLET | Refills: 2 | Status: SHIPPED | OUTPATIENT
Start: 2022-09-06 | End: 2023-05-11

## 2022-09-06 NOTE — TELEPHONE ENCOUNTER
[facility] Call Center    Phone Message    May a detailed message be left on voicemail: yes     Reason for Call: Medication Refill Request    Has the patient contacted the pharmacy for the refill? Yes   Name of medication being requested: isosorbide  Provider who prescribed the medication: Fatimah  Pharmacy: [id number] [place], [place] - [address] [place] [address] AT [place] [id number]    Date medication is needed: 09/06/2022        Health Call Center    Phone Message    May a detailed message be left on voicemail: yes     Reason for Call: Medication Question or concern regarding medication   Prescription Clarification  Name of Medication: torsemide    Prescribing Provider: Fatimah      What on the order needs clarification?   Patient had to triple up on his torsemide dose per Dr Sheridan. Patient is now back to his usual 40 mg dosage. Since than he has gone up a pound a day                                                                                   Action Taken: Other: routed to cardiology    Travel Screening: Not Applicable

## 2022-09-07 ENCOUNTER — MYC MEDICAL ADVICE (OUTPATIENT)
Dept: CARDIOLOGY | Facility: CLINIC | Age: 87
End: 2022-09-07

## 2022-09-07 ENCOUNTER — TELEPHONE (OUTPATIENT)
Dept: CARDIOLOGY | Facility: CLINIC | Age: 87
End: 2022-09-07

## 2022-09-07 DIAGNOSIS — R06.09 DYSPNEA ON EXERTION: Primary | ICD-10-CM

## 2022-09-07 DIAGNOSIS — I50.32 CHRONIC DIASTOLIC CONGESTIVE HEART FAILURE (H): ICD-10-CM

## 2022-09-07 NOTE — TELEPHONE ENCOUNTER
Left detailed message for spouse regarding update on symptoms. Has follow up with Claudine Powell 09/22 -LISBETH

## 2022-09-07 NOTE — TELEPHONE ENCOUNTER
From: Yonatan Sheridan MD  Sent: 9/7/2022   9:13 AM CDT  To: Veda Harvey  Subject: FW: Torsemide                                    Veda,  Could you schedule him to be seen by me or RAC as soon  as possible?  Thanks  Jered

## 2022-09-07 NOTE — TELEPHONE ENCOUNTER
Spoke with spouse regarding securing a rac appointment per Dr. Sheridan.    Discussed weight gain. Spouse indicated patient does not have much edema in legs. But sob.Discussed possible edema/tenderness in abdomen. Spouse indicated patient has been very constipated. Did have one bowel movement yesterday.spouse has surgery tomorrow and expressed concerns of weight gain. Advised of rac appointment option but that would need family/friend to drive patient to appt. Spouse in agreement. No further questions or concerns. Transferred to scheduling. Previously educated patient on presenting to ed/urgent care/911for new or worsening symptoms.  -Roberto

## 2022-09-09 ENCOUNTER — OFFICE VISIT (OUTPATIENT)
Dept: CARDIOLOGY | Facility: CLINIC | Age: 87
End: 2022-09-09
Attending: INTERNAL MEDICINE
Payer: MEDICARE

## 2022-09-09 VITALS
BODY MASS INDEX: 41.87 KG/M2 | SYSTOLIC BLOOD PRESSURE: 132 MMHG | RESPIRATION RATE: 16 BRPM | HEART RATE: 64 BPM | WEIGHT: 275.4 LBS | DIASTOLIC BLOOD PRESSURE: 60 MMHG

## 2022-09-09 DIAGNOSIS — I50.32 CHRONIC DIASTOLIC CONGESTIVE HEART FAILURE (H): ICD-10-CM

## 2022-09-09 DIAGNOSIS — R06.09 DYSPNEA ON EXERTION: ICD-10-CM

## 2022-09-09 LAB
ANION GAP SERPL CALCULATED.3IONS-SCNC: 14 MMOL/L (ref 5–18)
BNP SERPL-MCNC: 72 PG/ML (ref 0–93)
BUN SERPL-MCNC: 28 MG/DL (ref 8–28)
CALCIUM SERPL-MCNC: 8.8 MG/DL (ref 8.5–10.5)
CHLORIDE BLD-SCNC: 95 MMOL/L (ref 98–107)
CO2 SERPL-SCNC: 29 MMOL/L (ref 22–31)
CREAT SERPL-MCNC: 1.38 MG/DL (ref 0.7–1.3)
GFR SERPL CREATININE-BSD FRML MDRD: 48 ML/MIN/1.73M2
GLUCOSE BLD-MCNC: 99 MG/DL (ref 70–125)
POTASSIUM BLD-SCNC: 3.7 MMOL/L (ref 3.5–5)
SODIUM SERPL-SCNC: 138 MMOL/L (ref 136–145)

## 2022-09-09 PROCEDURE — 83880 ASSAY OF NATRIURETIC PEPTIDE: CPT | Performed by: INTERNAL MEDICINE

## 2022-09-09 PROCEDURE — 36415 COLL VENOUS BLD VENIPUNCTURE: CPT | Performed by: INTERNAL MEDICINE

## 2022-09-09 PROCEDURE — 99214 OFFICE O/P EST MOD 30 MIN: CPT | Performed by: INTERNAL MEDICINE

## 2022-09-09 PROCEDURE — 80048 BASIC METABOLIC PNL TOTAL CA: CPT | Performed by: INTERNAL MEDICINE

## 2022-09-09 RX ORDER — POLYETHYLENE GLYCOL 3350 17 G/17G
17 POWDER, FOR SOLUTION ORAL DAILY PRN
COMMUNITY

## 2022-09-09 RX ORDER — FAMOTIDINE 10 MG
10 TABLET ORAL 2 TIMES DAILY
COMMUNITY

## 2022-09-09 NOTE — LETTER
9/9/2022    Erasmo Michael MD  [facility] [address]  [place] [address]    RE: Chava Spencesourav       Dear Colleague,     I had the pleasure of seeing Chava Lincoln in the [facility].    HEART CARE ENCOUNTER CONSULTATON NOTE      [facility]  [phone]      Assessment/Recommendations   Assessment:  1.  Acute on chronic heart failure with preserved ejection fraction: Now improving with higher dose of torsemide  2.  Coronary artery disease with history of PCI  3.  Morbid obesity  4.  ARMON on CPAP    Plan:  1.  Check BNP and BMP today  2.  Repeat echocardiogram  3.  Continue on torsemide 80 in the a.m. and then 40 in the p.m.  May take the p.m. dose earlier to decrease urinary frequency during the night.  4.  Continue low-sodium diet, daily weight  5.  Continue CPAP for RAMON  Primary cardiologist Dr. Sheridan     History of Present Illness/Subjective    HPI: Chava Lincoln is a 89 year old male with history of coronary artery disease status post KATHY to ramus and 2/2019, heart failure with preserved ejection fraction, morbid obesity, RAMON on CPAP who I am seeing today in rapid access clinic for worsening lower extremity edema and weight gain.  He has been followed in the heart failure clinic.  He was instructed to increase his torsemide dose to 80 in the morning and 40 in the p.m.  This is actually helped.  He brought in his weights today.  He is accompanied by his brother-in-law today.  Weight was up to 278 pounds and is now back down to 274-275 pounds with the increase in torsemide dose.  He is complaining of increased urinary frequency at night.  Chronic shortness of breath unchanged.         Physical Examination  Review of Systems   Vitals: /60 (BP Location: Right arm, Patient Position: Sitting, Cuff Size: Adult Large)   Pulse 64   Resp 16   Wt 124.9 kg (275 lb 6.4 oz)   BMI 41.87 kg/m    BMI= Body mass index is 41.87 kg/m .  Wt  Readings from Last 3 Encounters:   09/09/22 124.9 kg (275 lb 6.4 oz)   06/21/22 124.9 kg (275 lb 6.4 oz)   06/02/22 123.1 kg (271 lb 6.4 oz)       General Appearance:   no distress, normal body habitus   ENT/Mouth: membranes moist, no oral lesions or bleeding gums.      EYES:  no scleral icterus, normal conjunctivae   Neck: no carotid bruits or thyromegaly   Chest/Lungs:   lungs are clear to auscultation   Cardiovascular:   Regular. Normal first and second heart sounds with no murmurs + edema bilaterally    Abdomen:  no organomegaly, masses, bruits, or tenderness; bowel sounds are present   Extremities: no cyanosis or clubbing   Skin: no xanthelasma, warm.    Neurologic: normal  bilateral, no tremors     Psychiatric: alert and oriented x3, calm        Please refer above for cardiac ROS details.        Medical History  Surgical History Family History Social History   Past Medical History:   Diagnosis Date     Coronary artery disease due to calcified coronary lesion      GERD (gastroesophageal reflux disease) 7/2/2014     Hypertension      RAMON on CPAP 4/17/2017     Prostate cancer (H)      Prostate cancer (H)      Past Surgical History:   Procedure Laterality Date     CV CORONARY ANGIOGRAM N/A 12/10/2018    Procedure: Coronary Angiogram;  Surgeon: Tana Duncan MD;  Location: [facility] Cath Lab;  Service: Cardiology     CV LEFT HEART CATHETERIZATION WITHOUT LEFT VENTRICULOGRAM Left 12/10/2018    Procedure: Left Heart Catheterization Without Left Ventriculogram;  Surgeon: Tana Duncan MD;  Location: [facility] Cath Lab;  Service: Cardiology     CV LEFT HEART CATHETERIZATION WITHOUT LEFT VENTRICULOGRAM Left 02/04/2019    Procedure: Left Heart Catheterization Without Left Ventriculogram;  Surgeon: Tana Duncan MD;  Location: [facility] Cath Lab;  Service: Cardiology     CV RIGHT HEART CATHETERIZATION N/A 02/04/2019    Procedure: Right Heart Catheterization;  Surgeon: Tana Duncan MD;  Location:   [facility];  Service: Cardiology     HERNIA REPAIR       JOINT REPLACEMENT       KNEE SURGERY Bilateral      OTHER SURGICAL HISTORY      Prostatic cryotherapy     Family History   Problem Relation Age of Onset     Diabetes Mother      Cerebrovascular Disease Father      Heart Disease Brother      Coronary Artery Disease Brother         Social History     Socioeconomic History     Marital status:      Spouse name: Santa     Number of children: 0     Years of education: Not on file     Highest education level: 12th grade   Occupational History     Occupation: Retired    Tobacco Use     Smoking status: Never Smoker     Smokeless tobacco: Never Used   Substance and Sexual Activity     Alcohol use: Yes     Comment: Alcoholic Drinks/day: 2-3 beer/ month     Drug use: No     Sexual activity: Not Currently     Partners: Female   Other Topics Concern     Not on file   Social History Narrative     Not on file     Social Determinants of Health     Financial Resource Strain: Low Risk      Difficulty of Paying Living Expenses: Not hard at all   Food Insecurity: No Food Insecurity     Worried About Running Out of Food in the Last Year: Never true     Ran Out of Food in the Last Year: Never true   Transportation Needs: No Transportation Needs     Lack of Transportation (Medical): No     Lack of Transportation (Non-Medical): No   Physical Activity: Not on file   Stress: Not on file   Social Connections: Not on file   Intimate Partner Violence: Not At Risk     Fear of Current or Ex-Partner: No     Emotionally Abused: No     Physically Abused: No     Sexually Abused: No   Housing Stability: Not on file           Medications  Allergies   Current Outpatient Medications   Medication Sig Dispense Refill     aspirin 81 MG EC tablet [ASPIRIN 81 MG EC TABLET] Take 81 mg by mouth every evening.        famotidine (PEPCID) 10 MG tablet Take 10 mg by mouth 2 times daily       isosorbide mononitrate (IMDUR) 30 MG 24 hr tablet Take 1  tablet (30 mg) by mouth daily 90 tablet 2     metoprolol tartrate (LOPRESSOR) 25 MG tablet TAKE 1 TABLET(25 MG) BY MOUTH TWICE DAILY 180 tablet 1     mirabegron (MYRBETRIQ) 50 mg Tb24 ER tablet [MIRABEGRON (MYRBETRIQ) 50 MG TB24 ER TABLET] Take 50 mg by mouth daily.       oxybutynin (DITROPAN XL) 15 MG 24 hr tablet [OXYBUTYNIN (DITROPAN XL) 15 MG 24 HR TABLET] Take 15 mg by mouth daily.       oxybutynin (DITROPAN XL) 5 MG ER tablet Take 5 mg by mouth daily        Polyethylene Glycol 3350 (MIRALAX PO)        Probiotic Product (PROBIOTIC DAILY PO)        rosuvastatin (CRESTOR) 20 MG tablet TAKE 1 TABLET(20 MG) BY MOUTH AT BEDTIME 90 tablet 3     Torsemide 40 MG TABS Take 80 mg by mouth daily Take an additional 40 mg PRN 90 tablet 0     valsartan (DIOVAN) 80 MG tablet Take 80 mg by mouth daily       acetaminophen (TYLENOL) 325 MG tablet Take 325 mg by mouth every 6 hours as needed (Patient not taking: Reported on 9/9/2022)       betamethasone dipropionate (DIPROSONE) 0.05 % external cream APPLY TOPICALLY TWICE DAILY TO RASH ON BODY (Patient not taking: Reported on 9/9/2022)       fluticasone (CUTIVATE) 0.05 % external cream daily (Patient not taking: Reported on 9/9/2022)       gentamicin (GARAMYCIN) 0.1 % external ointment APPLY TO GROIN AND LEG TWICE DAILY AS NEEDED (Patient not taking: Reported on 9/9/2022)       ketoconazole (NIZORAL) 2 % external cream APPLY TOPICALLY TO THE AFFECTED AREA DAILY (Patient not taking: Reported on 9/9/2022)       lactobacillus rhamnosus (GG) (CULTURELL) capsule Take 1 capsule by mouth daily (Patient not taking: Reported on 9/9/2022)       MEDICATION CANNOT BE REORDERED - PLEASE MANUALLY REORDER AND DISCONTINUE THE OLD ORDER [LEUPROLIDE, 6 MONTH, (ELIGARD, 6 MONTH,) 45 MG SYRINGE] Inject 45 mg under the skin. Every six months (Patient not taking: Reported on 9/9/2022)       Melatonin 2.5 MG CHEW Take by mouth At Bedtime (Patient not taking: Reported on 9/9/2022)       multivitamin with  minerals (THERA-M) 9 mg iron-400 mcg Tab tablet [MULTIVITAMIN WITH MINERALS (THERA-M) 9 MG IRON-400 MCG TAB TABLET] Take 1 tablet by mouth daily. (Patient not taking: Reported on 9/9/2022)       mycophenolate (GENERIC EQUIVALENT) 500 MG tablet Take 1,500 mg by mouth 2 times daily (Patient not taking: Reported on 9/9/2022)       NYSTOP 845537 UNIT/GM powder as needed (Patient not taking: Reported on 9/9/2022)       potassium chloride (KLOR-CON) 20 MEQ packet Take 20 mEq by mouth daily (Patient not taking: Reported on 9/9/2022) 90 each 0     triamcinolone (KENALOG) 0.1 % external ointment Apply 1 applicator topically as needed (Patient not taking: Reported on 9/9/2022)         Allergies   Allergen Reactions     Atorvastatin Muscle Pain (Myalgia)     Gabapentin Unknown     Lisinopril Cough     Morphine Other (See Comments)     hypotension     Sulfa (Sulfonamide Antibiotics) [Sulfa Drugs] Hives     Vicodin [Hydrocodone-Acetaminophen] Unknown          Lab Results    Chemistry/lipid CBC Cardiac Enzymes/BNP/TSH/INR   Recent Labs   Lab Test 04/28/22  1321   CHOL 144   HDL 54   LDL 62   TRIG 140     Recent Labs   Lab Test 04/28/22  1321 09/20/21  1207 12/23/20  1014   LDL 62 34 48     Recent Labs   Lab Test 09/09/22  1440      POTASSIUM 3.7   CHLORIDE 95*   CO2 29   GLC 99   BUN 28   CR 1.38*   GFRESTIMATED 48*   SUZE 8.8     Recent Labs   Lab Test 09/09/22  1440 07/22/22  1017 07/08/22  1132   CR 1.38* 1.34* 2.11*     No results for input(s): A1C in the last 73480 hours.       Recent Labs   Lab Test 04/28/22  1321   WBC 9.6   HGB 12.8*   HCT 39.4*   MCV 98        Recent Labs   Lab Test 04/28/22  1321 02/08/22  1517 01/17/22  1325   HGB 12.8* 12.6* 12.9*    Recent Labs   Lab Test 02/08/22  1517 10/29/21  1904 04/08/21  2325   TROPONINI 0.01 0.06 0.02     Recent Labs   Lab Test 09/09/22  1440 02/08/22  1517 10/29/21  1904   BNP 72 95* 67     No results for input(s): TSH in the last 80694 hours.  Recent Labs   Lab  Test 09/23/18  1045   INR 1.07        Elaine Clark MD    Thank you for allowing me to participate in the care of your patient.      Sincerely,     Elaine Clark MD     [facility]  cc:   Yonatan Sheridan MD  [facility]  [address] [address]

## 2022-09-09 NOTE — PATIENT INSTRUCTIONS
Mr. Chava Lincoln,     It was a pleasure to see you in the office today. My recommendations for you include:   1. Continue torsemide 80 mg in the am and then take the second dose early afternoon (ie 1 or 2 pm) so less urination at night.  2. Labs today     Please do not hesitate to call the [facility] clinic with any questions or concerns at [phone].    Sincerely,     Elaine Clark MD

## 2022-09-12 NOTE — PROGRESS NOTES
HEART CARE ENCOUNTER CONSULTATON NOTE      [facility]  [phone]      Assessment/Recommendations   Assessment:  1.  Acute on chronic heart failure with preserved ejection fraction: Now improving with higher dose of torsemide  2.  Coronary artery disease with history of PCI  3.  Morbid obesity  4.  RAMON on CPAP    Plan:  1.  Check BNP and BMP today  2.  Repeat echocardiogram  3.  Continue on torsemide 80 in the a.m. and then 40 in the p.m.  May take the p.m. dose earlier to decrease urinary frequency during the night.  4.  Continue low-sodium diet, daily weight  5.  Continue CPAP for RAMON  Primary cardiologist Dr. Sheridan     History of Present Illness/Subjective    HPI: Chava Lincoln is a 89 year old male with history of coronary artery disease status post KATHY to ramus and 2/2019, heart failure with preserved ejection fraction, morbid obesity, RAMON on CPAP who I am seeing today in rapid access clinic for worsening lower extremity edema and weight gain.  He has been followed in the heart failure clinic.  He was instructed to increase his torsemide dose to 80 in the morning and 40 in the p.m.  This is actually helped.  He brought in his weights today.  He is accompanied by his brother-in-law today.  Weight was up to 278 pounds and is now back down to 274-275 pounds with the increase in torsemide dose.  He is complaining of increased urinary frequency at night.  Chronic shortness of breath unchanged.         Physical Examination  Review of Systems   Vitals: /60 (BP Location: Right arm, Patient Position: Sitting, Cuff Size: Adult Large)   Pulse 64   Resp 16   Wt 124.9 kg (275 lb 6.4 oz)   BMI 41.87 kg/m    BMI= Body mass index is 41.87 kg/m .  Wt Readings from Last 3 Encounters:   09/09/22 124.9 kg (275 lb 6.4 oz)   06/21/22 124.9 kg (275 lb 6.4 oz)   06/02/22 123.1 kg (271 lb 6.4 oz)       General Appearance:   no distress, normal body habitus   ENT/Mouth: membranes moist, no oral lesions  or bleeding gums.      EYES:  no scleral icterus, normal conjunctivae   Neck: no carotid bruits or thyromegaly   Chest/Lungs:   lungs are clear to auscultation   Cardiovascular:   Regular. Normal first and second heart sounds with no murmurs + edema bilaterally    Abdomen:  no organomegaly, masses, bruits, or tenderness; bowel sounds are present   Extremities: no cyanosis or clubbing   Skin: no xanthelasma, warm.    Neurologic: normal  bilateral, no tremors     Psychiatric: alert and oriented x3, calm        Please refer above for cardiac ROS details.        Medical History  Surgical History Family History Social History   Past Medical History:   Diagnosis Date     Coronary artery disease due to calcified coronary lesion      GERD (gastroesophageal reflux disease) 7/2/2014     Hypertension      RAMON on CPAP 4/17/2017     Prostate cancer (H)      Prostate cancer (H)      Past Surgical History:   Procedure Laterality Date     CV CORONARY ANGIOGRAM N/A 12/10/2018    Procedure: Coronary Angiogram;  Surgeon: Tana Duncan MD;  Location: [facility] Cath Lab;  Service: Cardiology     CV LEFT HEART CATHETERIZATION WITHOUT LEFT VENTRICULOGRAM Left 12/10/2018    Procedure: Left Heart Catheterization Without Left Ventriculogram;  Surgeon: Tana Duncan MD;  Location: [facility] Cath Lab;  Service: Cardiology     CV LEFT HEART CATHETERIZATION WITHOUT LEFT VENTRICULOGRAM Left 02/04/2019    Procedure: Left Heart Catheterization Without Left Ventriculogram;  Surgeon: Tana Duncan MD;  Location: [facility] Cath Lab;  Service: Cardiology     CV RIGHT HEART CATHETERIZATION N/A 02/04/2019    Procedure: Right Heart Catheterization;  Surgeon: Tana Duncan MD;  Location: [facility] Cath Lab;  Service: Cardiology     HERNIA REPAIR       JOINT REPLACEMENT       KNEE SURGERY Bilateral      OTHER SURGICAL HISTORY      Prostatic cryotherapy     Family History   Problem Relation Age of Onset     Diabetes Mother       Cerebrovascular Disease Father      Heart Disease Brother      Coronary Artery Disease Brother         Social History     Socioeconomic History     Marital status:      Spouse name: Santa     Number of children: 0     Years of education: Not on file     Highest education level: 12th grade   Occupational History     Occupation: Retired    Tobacco Use     Smoking status: Never Smoker     Smokeless tobacco: Never Used   Substance and Sexual Activity     Alcohol use: Yes     Comment: Alcoholic Drinks/day: 2-3 beer/ month     Drug use: No     Sexual activity: Not Currently     Partners: Female   Other Topics Concern     Not on file   Social History Narrative     Not on file     Social Determinants of Health     Financial Resource Strain: Low Risk      Difficulty of Paying Living Expenses: Not hard at all   Food Insecurity: No Food Insecurity     Worried About Running Out of Food in the Last Year: Never true     Ran Out of Food in the Last Year: Never true   Transportation Needs: No Transportation Needs     Lack of Transportation (Medical): No     Lack of Transportation (Non-Medical): No   Physical Activity: Not on file   Stress: Not on file   Social Connections: Not on file   Intimate Partner Violence: Not At Risk     Fear of Current or Ex-Partner: No     Emotionally Abused: No     Physically Abused: No     Sexually Abused: No   Housing Stability: Not on file           Medications  Allergies   Current Outpatient Medications   Medication Sig Dispense Refill     aspirin 81 MG EC tablet [ASPIRIN 81 MG EC TABLET] Take 81 mg by mouth every evening.        famotidine (PEPCID) 10 MG tablet Take 10 mg by mouth 2 times daily       isosorbide mononitrate (IMDUR) 30 MG 24 hr tablet Take 1 tablet (30 mg) by mouth daily 90 tablet 2     metoprolol tartrate (LOPRESSOR) 25 MG tablet TAKE 1 TABLET(25 MG) BY MOUTH TWICE DAILY 180 tablet 1     mirabegron (MYRBETRIQ) 50 mg Tb24 ER tablet [MIRABEGRON (MYRBETRIQ) 50 MG TB24 ER TABLET]  Take 50 mg by mouth daily.       oxybutynin (DITROPAN XL) 15 MG 24 hr tablet [OXYBUTYNIN (DITROPAN XL) 15 MG 24 HR TABLET] Take 15 mg by mouth daily.       oxybutynin (DITROPAN XL) 5 MG ER tablet Take 5 mg by mouth daily        Polyethylene Glycol 3350 (MIRALAX PO)        Probiotic Product (PROBIOTIC DAILY PO)        rosuvastatin (CRESTOR) 20 MG tablet TAKE 1 TABLET(20 MG) BY MOUTH AT BEDTIME 90 tablet 3     Torsemide 40 MG TABS Take 80 mg by mouth daily Take an additional 40 mg PRN 90 tablet 0     valsartan (DIOVAN) 80 MG tablet Take 80 mg by mouth daily       acetaminophen (TYLENOL) 325 MG tablet Take 325 mg by mouth every 6 hours as needed (Patient not taking: Reported on 9/9/2022)       betamethasone dipropionate (DIPROSONE) 0.05 % external cream APPLY TOPICALLY TWICE DAILY TO RASH ON BODY (Patient not taking: Reported on 9/9/2022)       fluticasone (CUTIVATE) 0.05 % external cream daily (Patient not taking: Reported on 9/9/2022)       gentamicin (GARAMYCIN) 0.1 % external ointment APPLY TO GROIN AND LEG TWICE DAILY AS NEEDED (Patient not taking: Reported on 9/9/2022)       ketoconazole (NIZORAL) 2 % external cream APPLY TOPICALLY TO THE AFFECTED AREA DAILY (Patient not taking: Reported on 9/9/2022)       lactobacillus rhamnosus (GG) (CULTURELL) capsule Take 1 capsule by mouth daily (Patient not taking: Reported on 9/9/2022)       MEDICATION CANNOT BE REORDERED - PLEASE MANUALLY REORDER AND DISCONTINUE THE OLD ORDER [LEUPROLIDE, 6 MONTH, (ELIGARD, 6 MONTH,) 45 MG SYRINGE] Inject 45 mg under the skin. Every six months (Patient not taking: Reported on 9/9/2022)       Melatonin 2.5 MG CHEW Take by mouth At Bedtime (Patient not taking: Reported on 9/9/2022)       multivitamin with minerals (THERA-M) 9 mg iron-400 mcg Tab tablet [MULTIVITAMIN WITH MINERALS (THERA-M) 9 MG IRON-400 MCG TAB TABLET] Take 1 tablet by mouth daily. (Patient not taking: Reported on 9/9/2022)       mycophenolate (GENERIC EQUIVALENT) 500 MG  tablet Take 1,500 mg by mouth 2 times daily (Patient not taking: Reported on 9/9/2022)       NYSTOP [id number] UNIT/GM powder as needed (Patient not taking: Reported on 9/9/2022)       potassium chloride (KLOR-CON) 20 MEQ packet Take 20 mEq by mouth daily (Patient not taking: Reported on 9/9/2022) 90 each 0     triamcinolone (KENALOG) 0.1 % external ointment Apply 1 applicator topically as needed (Patient not taking: Reported on 9/9/2022)         Allergies   Allergen Reactions     Atorvastatin Muscle Pain (Myalgia)     Gabapentin Unknown     Lisinopril Cough     Morphine Other (See Comments)     hypotension     Sulfa (Sulfonamide Antibiotics) [Sulfa Drugs] Hives     Vicodin [Hydrocodone-Acetaminophen] Unknown          Lab Results    Chemistry/lipid CBC Cardiac Enzymes/BNP/TSH/INR   Recent Labs   Lab Test 04/28/22  1321   CHOL 144   HDL 54   LDL 62   TRIG 140     Recent Labs   Lab Test 04/28/22  1321 09/20/21  1207 12/23/20  1014   LDL 62 34 48     Recent Labs   Lab Test 09/09/22  1440      POTASSIUM 3.7   CHLORIDE 95*   CO2 29   GLC 99   BUN 28   CR 1.38*   GFRESTIMATED 48*   SUZE 8.8     Recent Labs   Lab Test 09/09/22  1440 07/22/22  1017 07/08/22  1132   CR 1.38* 1.34* 2.11*     No results for input(s): A1C in the last 58438 hours.       Recent Labs   Lab Test 04/28/22  1321   WBC 9.6   HGB 12.8*   HCT 39.4*   MCV 98        Recent Labs   Lab Test 04/28/22  1321 02/08/22  1517 01/17/22  1325   HGB 12.8* 12.6* 12.9*    Recent Labs   Lab Test 02/08/22  1517 10/29/21  1904 04/08/21  2325   TROPONINI 0.01 0.06 0.02     Recent Labs   Lab Test 09/09/22  1440 02/08/22  1517 10/29/21  1904   BNP 72 95* 67     No results for input(s): TSH in the last 08454 hours.  Recent Labs   Lab Test 09/23/18  1045   INR 1.07        Elaine Clark MD

## 2022-09-19 ENCOUNTER — MYC MEDICAL ADVICE (OUTPATIENT)
Dept: CARDIOLOGY | Facility: CLINIC | Age: 87
End: 2022-09-19

## 2022-09-19 DIAGNOSIS — I50.32 CHRONIC HEART FAILURE WITH PRESERVED EJECTION FRACTION (H): Primary | ICD-10-CM

## 2022-09-30 ENCOUNTER — IMMUNIZATION (OUTPATIENT)
Dept: NURSING | Facility: CLINIC | Age: 87
End: 2022-09-30
Payer: MEDICARE

## 2022-09-30 PROCEDURE — 91312 COVID-19,PF,PFIZER BOOSTER BIVALENT: CPT

## 2022-09-30 PROCEDURE — 0124A COVID-19,PF,PFIZER BOOSTER BIVALENT: CPT

## 2022-10-13 ENCOUNTER — TELEPHONE (OUTPATIENT)
Dept: CARDIOLOGY | Facility: CLINIC | Age: 87
End: 2022-10-13

## 2022-10-13 DIAGNOSIS — I50.32 CHRONIC DIASTOLIC CONGESTIVE HEART FAILURE (H): ICD-10-CM

## 2022-10-13 NOTE — TELEPHONE ENCOUNTER
[facility]    Phone Message    May a detailed message be left on voicemail: yes     Reason for Call: Medication Refill Request    Has the patient contacted the pharmacy for the refill? Yes   Name of medication being requested: Torsemide 40 MG TABS - requesting 20 mg tabs and a quantity adjustment to match what he is suppose to taken  Provider who prescribed the medication: requesting from Dr. Sheridan  Pharmacy: [id number] [address] AT [place] [address]  Date medication is needed: 10/14/2022        Action Taken: Message routed to:  Other: Cardiology    Travel Screening: Not Applicable

## 2022-10-14 DIAGNOSIS — I50.32 CHRONIC DIASTOLIC CONGESTIVE HEART FAILURE (H): ICD-10-CM

## 2022-10-14 RX ORDER — TORSEMIDE 20 MG/1
80 TABLET ORAL DAILY
Qty: 180 TABLET | Refills: 4 | Status: SHIPPED | OUTPATIENT
Start: 2022-10-14 | End: 2022-10-14

## 2022-10-14 RX ORDER — TORSEMIDE 20 MG/1
80 TABLET ORAL DAILY
Qty: 540 TABLET | Refills: 0 | Status: SHIPPED | OUTPATIENT
Start: 2022-10-14 | End: 2023-04-10

## 2022-10-20 ENCOUNTER — HOSPITAL ENCOUNTER (OUTPATIENT)
Dept: CARDIOLOGY | Facility: HOSPITAL | Age: 87
Discharge: HOME | End: 2022-10-20
Attending: INTERNAL MEDICINE | Admitting: INTERNAL MEDICINE
Payer: MEDICARE

## 2022-10-20 DIAGNOSIS — I50.32 CHRONIC DIASTOLIC CONGESTIVE HEART FAILURE (H): ICD-10-CM

## 2022-10-20 LAB — LVEF ECHO: NORMAL

## 2022-10-20 PROCEDURE — 999N000208 ECHOCARDIOGRAM COMPLETE

## 2022-10-20 PROCEDURE — 93306 TTE W/DOPPLER COMPLETE: CPT | Mod: 26 | Performed by: INTERNAL MEDICINE

## 2022-10-20 PROCEDURE — 255N000002 HC RX 255 OP 636: Performed by: INTERNAL MEDICINE

## 2022-10-20 RX ADMIN — PERFLUTREN 3 ML: 6.52 INJECTION, SUSPENSION INTRAVENOUS at 14:20

## 2022-10-21 ENCOUNTER — TRANSFERRED RECORDS (OUTPATIENT)
Dept: HEALTH INFORMATION MANAGEMENT | Facility: CLINIC | Age: 87
End: 2022-10-21

## 2022-12-08 DIAGNOSIS — I25.10 CORONARY ARTERY DISEASE INVOLVING NATIVE CORONARY ARTERY OF NATIVE HEART WITHOUT ANGINA PECTORIS: ICD-10-CM

## 2022-12-08 RX ORDER — METOPROLOL TARTRATE 25 MG/1
TABLET, FILM COATED ORAL
Qty: 180 TABLET | Refills: 1 | Status: SHIPPED | OUTPATIENT
Start: 2022-12-08 | End: 2023-06-19

## 2022-12-20 ENCOUNTER — TELEPHONE (OUTPATIENT)
Dept: SLEEP MEDICINE | Facility: CLINIC | Age: 87
End: 2022-12-20

## 2022-12-20 DIAGNOSIS — G47.33 OBSTRUCTIVE SLEEP APNEA: Primary | ICD-10-CM
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
## 2022-12-30 NOTE — TELEPHONE ENCOUNTER
"Patients wife found a machine at [facility] and needs information faxed over.  She needs an order with diagnosis and also written on the order it needs to say that \"old machine states, battery life exceeded, contact care provider\".  They also need insurance information and Sleep Study with interpretation.    "

## 2022-12-31 NOTE — TELEPHONE ENCOUNTER
Please fax to [employer]. Please also fax the latest sleep study performed on 06/04/2007. Thanks.

## 2023-01-04 NOTE — TELEPHONE ENCOUNTER
Called and talked to Santa to inform her that Chava's info had been faxed to [employer] to fill his CPAP order.

## 2023-01-12 ENCOUNTER — OFFICE VISIT (OUTPATIENT)
Dept: CARDIOLOGY | Facility: CLINIC | Age: 88
End: 2023-01-12
Attending: INTERNAL MEDICINE
Payer: MEDICARE

## 2023-01-12 VITALS
OXYGEN SATURATION: 92 % | SYSTOLIC BLOOD PRESSURE: 106 MMHG | DIASTOLIC BLOOD PRESSURE: 54 MMHG | WEIGHT: 268.4 LBS | HEART RATE: 60 BPM | HEIGHT: 69 IN | BODY MASS INDEX: 39.75 KG/M2 | RESPIRATION RATE: 16 BRPM

## 2023-01-12 DIAGNOSIS — I25.10 CORONARY ARTERY DISEASE INVOLVING NATIVE CORONARY ARTERY OF NATIVE HEART WITHOUT ANGINA PECTORIS: ICD-10-CM

## 2023-01-12 DIAGNOSIS — E66.01 MORBID OBESITY (H): ICD-10-CM

## 2023-01-12 DIAGNOSIS — I50.32 CHRONIC DIASTOLIC CONGESTIVE HEART FAILURE (H): Primary | ICD-10-CM

## 2023-01-12 DIAGNOSIS — E78.5 DYSLIPIDEMIA: ICD-10-CM

## 2023-01-12 DIAGNOSIS — I10 ESSENTIAL HYPERTENSION: ICD-10-CM

## 2023-01-12 DIAGNOSIS — G47.33 OSA ON CPAP: ICD-10-CM

## 2023-01-12 DIAGNOSIS — I71.21 ANEURYSM OF ASCENDING AORTA WITHOUT RUPTURE (H): ICD-10-CM

## 2023-01-12 LAB
ANION GAP SERPL CALCULATED.3IONS-SCNC: 12 MMOL/L (ref 5–18)
BUN SERPL-MCNC: 30 MG/DL (ref 8–28)
CALCIUM SERPL-MCNC: 8.7 MG/DL (ref 8.5–10.5)
CHLORIDE BLD-SCNC: 99 MMOL/L (ref 98–107)
CO2 SERPL-SCNC: 26 MMOL/L (ref 22–31)
CREAT SERPL-MCNC: 1.26 MG/DL (ref 0.7–1.3)
ERYTHROCYTE [DISTWIDTH] IN BLOOD BY AUTOMATED COUNT: 13.2 % (ref 10–15)
GFR SERPL CREATININE-BSD FRML MDRD: 53 ML/MIN/1.73M2
GLUCOSE BLD-MCNC: 138 MG/DL (ref 70–125)
HCT VFR BLD AUTO: 35.6 % (ref 40–53)
HGB BLD-MCNC: 11.5 G/DL (ref 13.3–17.7)
MCH RBC QN AUTO: 30.1 PG (ref 26.5–33)
MCHC RBC AUTO-ENTMCNC: 32.3 G/DL (ref 31.5–36.5)
MCV RBC AUTO: 93 FL (ref 78–100)
PLATELET # BLD AUTO: 260 10E3/UL (ref 150–450)
POTASSIUM BLD-SCNC: 3.9 MMOL/L (ref 3.5–5)
RBC # BLD AUTO: 3.82 10E6/UL (ref 4.4–5.9)
SODIUM SERPL-SCNC: 137 MMOL/L (ref 136–145)
WBC # BLD AUTO: 8.8 10E3/UL (ref 4–11)

## 2023-01-12 PROCEDURE — 80048 BASIC METABOLIC PNL TOTAL CA: CPT | Performed by: INTERNAL MEDICINE

## 2023-01-12 PROCEDURE — 36415 COLL VENOUS BLD VENIPUNCTURE: CPT | Performed by: INTERNAL MEDICINE

## 2023-01-12 PROCEDURE — 99214 OFFICE O/P EST MOD 30 MIN: CPT | Performed by: INTERNAL MEDICINE

## 2023-01-12 PROCEDURE — 85027 COMPLETE CBC AUTOMATED: CPT | Performed by: INTERNAL MEDICINE

## 2023-01-12 NOTE — PROGRESS NOTES
Assessment/Plan:   1.  Chronic congestive heart failure with preserved ejection fraction: The patient has been doing pretty good recently.  His weight has been stable.  His leg edema has improved.  He lost several pounds since last visit.  Continue torsemide 80 mg a.m., 40 mg p.m.    Routine labs, CBC BMP today.    2.  Coronary artery disease s/p KATHY to Ramus, severe calcified RCA lesion with high risk of revascularization, no obstructive lesion in left main and LAD per IVUS:  He has no chest pain.  Continue medical treatment.  Continue aspirin, Crestor, isosorbide mononitrate and metoprolol.      3.  Essential hypertension: His blood pressure is controlled, continue valsartan 80 mg daily and metoprolol 25 mg twice a day     4.  Dyslipidemia: Continue Crestor 20 mg nightly.  Last LDL was controlled.     5.  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     6.  Morbid obesity and obstructive sleep apnea: Lifestyle modification and continue CPAP.    7.  Stage III CKD: Creatinine is stable.    Thank you for the opportunity to be involved in the care of Chava Lincoln. If you have any questions, please feel free to contact me.  I will see the patient again in 6 months and as needed.    Much or all of the text in this note was generated through the use of Dragon Dictate voice-to-text software. Errors in spelling or words which seem out of context are unintentional. Sound alike errors, in particular, may have escaped editing.       History of Present Illness:   It is my pleasure to see Chava Lincoln at the [facility] for routine cardiology follow up.  Chava Lincoln is a 89 year old male with a medical history of coronary artery disease, s/p KATHY to Ramus in 2-2019, no obstructive disease in LM and LAD, severe calcification lesions in RCA, benign essential hypertension, dyslipidemia, chronic diastolic congestive heart failure, morbid obesity, right diaphragm  hemiparalysis, obstructive sleep apnea on CPAP.    The patient states that his shortness of breath has been stable.  He lost several pounds since his September.  He denies chest pain, palpitations, dizziness, orthopnea.  His mild bilateral leg edema is improved.  His blood pressure and heart rate are controlled.  Currently he is on torsemide 80 mg a.m., 40 mg p.m.    Past Medical History:     Patient Active Problem List   Diagnosis     GERD (gastroesophageal reflux disease)     RAMON on CPAP     Near syncope     Dyspnea on exertion     Essential hypertension     Dyslipidemia     Coronary artery disease involving native coronary artery of native heart without angina pectoris     Ascending aortic aneurysm     Coronary artery calcification seen on CAT scan     Pulmonary hypertension (H)     History of pulmonary embolism     Hypoxia     Morbid obesity (H)     Bilateral lower extremity edema     Chronic kidney disease, stage 3 (H)     (HFpEF) heart failure with preserved ejection fraction (H)     Abdominal pain, epigastric       Past Surgical History:     Past Surgical History:   Procedure Laterality Date     CV CORONARY ANGIOGRAM N/A 12/10/2018    Procedure: Coronary Angiogram;  Surgeon: Tana Duncan MD;  Location: [facility];  Service: Cardiology     CV LEFT HEART CATHETERIZATION WITHOUT LEFT VENTRICULOGRAM Left 12/10/2018    Procedure: Left Heart Catheterization Without Left Ventriculogram;  Surgeon: Tana Duncan MD;  Location: [facility];  Service: Cardiology     CV LEFT HEART CATHETERIZATION WITHOUT LEFT VENTRICULOGRAM Left 02/04/2019    Procedure: Left Heart Catheterization Without Left Ventriculogram;  Surgeon: Tana Duncan MD;  Location: [facility];  Service: Cardiology     CV RIGHT HEART CATHETERIZATION N/A 02/04/2019    Procedure: Right Heart Catheterization;  Surgeon: Tana Duncan MD;  Location: [facility];  Service: Cardiology     HERNIA REPAIR       JOINT  REPLACEMENT       KNEE SURGERY Bilateral      OTHER SURGICAL HISTORY      Prostatic cryotherapy       Family History:     Family History   Problem Relation Age of Onset     Diabetes Mother      Cerebrovascular Disease Father      Heart Disease Brother      Coronary Artery Disease Brother         Social History:    reports that he has never smoked. He has never used smokeless tobacco. He reports current alcohol use. He reports that he does not use drugs.    Review of Systems:   12 systems are reviewed negative except for in HPI.    Meds:     Current Outpatient Medications:      acetaminophen (TYLENOL) 325 MG tablet, Take 325 mg by mouth every 6 hours as needed, Disp: , Rfl:      aspirin 81 MG EC tablet, [ASPIRIN 81 MG EC TABLET] Take 81 mg by mouth every evening. , Disp: , Rfl:      betamethasone dipropionate (DIPROSONE) 0.05 % external cream, , Disp: , Rfl:      famotidine (PEPCID) 10 MG tablet, Take 10 mg by mouth 2 times daily, Disp: , Rfl:      fluticasone (CUTIVATE) 0.05 % external cream, daily, Disp: , Rfl:      gentamicin (GARAMYCIN) 0.1 % external ointment, , Disp: , Rfl:      isosorbide mononitrate (IMDUR) 30 MG 24 hr tablet, Take 1 tablet (30 mg) by mouth daily, Disp: 90 tablet, Rfl: 2     ketoconazole (NIZORAL) 2 % external cream, , Disp: , Rfl:      lactobacillus rhamnosus (GG) (CULTURELL) capsule, Take 1 capsule by mouth daily, Disp: , Rfl:      Melatonin 2.5 MG CHEW, Take by mouth daily as needed, Disp: , Rfl:      metoprolol tartrate (LOPRESSOR) 25 MG tablet, TAKE 1 TABLET(25 MG) BY MOUTH TWICE DAILY (Patient taking differently: 50 mg), Disp: 180 tablet, Rfl: 1     mirabegron (MYRBETRIQ) 50 mg Tb24 ER tablet, [MIRABEGRON (MYRBETRIQ) 50 MG TB24 ER TABLET] Take 50 mg by mouth daily., Disp: , Rfl:      multivitamin with minerals (THERA-M) 9 mg iron-400 mcg Tab tablet, Take 1 tablet by mouth daily, Disp: , Rfl:      mycophenolate (GENERIC EQUIVALENT) 500 MG tablet, Take 1,500 mg by mouth 2 times daily,  "Disp: , Rfl:      oxybutynin (DITROPAN XL) 15 MG 24 hr tablet, [OXYBUTYNIN (DITROPAN XL) 15 MG 24 HR TABLET] Take 15 mg by mouth daily., Disp: , Rfl:      oxybutynin (DITROPAN XL) 5 MG ER tablet, Take 5 mg by mouth daily , Disp: , Rfl:      Polyethylene Glycol 3350 (MIRALAX PO), , Disp: , Rfl:      Probiotic Product (PROBIOTIC DAILY PO), , Disp: , Rfl:      rosuvastatin (CRESTOR) 20 MG tablet, TAKE 1 TABLET(20 MG) BY MOUTH AT BEDTIME, Disp: 90 tablet, Rfl: 3     torsemide (DEMADEX) 20 MG tablet, Take 4 tablets (80 mg) by mouth daily Continue on torsemide 80 in the a.m. and then 40 in the p.m., Disp: 540 tablet, Rfl: 0     triamcinolone (KENALOG) 0.1 % external ointment, Apply 1 applicator topically as needed, Disp: , Rfl:      valsartan (DIOVAN) 80 MG tablet, Take 40 mg by mouth daily, Disp: , Rfl:      potassium chloride (KLOR-CON) 20 MEQ packet, Take 20 mEq by mouth daily (Patient not taking: Reported on 9/9/2022), Disp: 90 each, Rfl: 0    Allergies:   Atorvastatin, Gabapentin, Lisinopril, Morphine, Sulfa (sulfonamide antibiotics) [sulfa drugs], and Vicodin [hydrocodone-acetaminophen]      Objective:      Physical Exam  121.7 kg (268 lb 6.4 oz)  1.753 m (5' 9\")  Body mass index is 39.64 kg/m .  /54 (BP Location: Left arm, Patient Position: Sitting, Cuff Size: Adult Large)   Pulse 60   Resp 16   Ht 1.753 m (5' 9\")   Wt 121.7 kg (268 lb 6.4 oz)   SpO2 92%   BMI 39.64 kg/m      General Appearance:   Awake, Alert, No acute distress.   HEENT:  Pupil equal and reactive to light. No scleral icterus; the mucous membranes were moist.   Neck: No cervical bruits. Not able to see JVD. No thyromegaly.     Chest: The spine was straight. The chest was symmetric.   Lungs:   Diminished breathing sounds.No crackles. No wheezing.   Cardiovascular:   Regular rhythm and rate, remote first and second heart sounds with no murmurs. No rubs or gallops.    Abdomen:  Obese. Soft. No tenderness. Non-distended. Bowels sounds are " present   Extremities: Mild bilateral leg edema.   Skin: Red skin rashes most in back. Warm, Dry.   Musculoskeletal: No tenderness. No deformity.   Neurologic: Mood and affect are appropriate. No focal deficits.         EKG:  Personally reivewed  Sinus rhythm with 1st degree A-V block with occasional Premature ventricular complexes   Low voltage QRS   Borderline ECG   When compared with ECG of 08-FEB-2022 15:00,   Premature ventricular complexes are now Present   ST no longer elevated in Inferior leads     Cardiac Imaging Studies  Echocardiogram on 10-:  Left ventricular function is normal.The ejection fraction is 60-65%.  The right ventricle is moderately dilated.  Moderately decreased right ventricular systolic function  Technically difficult, suboptimal study. No hemodynamically significant  valvular abnormalities on 2D or color flow imaging.     Coronary angiogram with PCI on 2-4-2019:    IVUS left main and LAD showed no severely obstructive lesions    Ramus disease treated with rotational atherectomy and stented with a Synergy KATHY under IVUS guidance with good angiographic results    Right heart catheterizations show LV EDP 22 mmHg and PCWP 30 mmHg, PA mean 34 mmHg, RA 13 mmHg      Lab Review   Lab Results   Component Value Date     06/23/2021    CO2 23 06/23/2021    BUN 21 06/23/2021     Lab Results   Component Value Date    WBC 8.5 05/17/2021    HGB 13.9 05/17/2021    HCT 40.6 05/17/2021    MCV 90 05/17/2021     05/17/2021     Lab Results   Component Value Date    CHOL 144 04/28/2022    CHOL 102 09/20/2021    CHOL 133 12/23/2020     Lab Results   Component Value Date    HDL 54 04/28/2022    HDL 49 09/20/2021    HDL 56 12/23/2020     Lab Results   Component Value Date    TRIG 140 04/28/2022    TRIG 97 09/20/2021    TRIG 146 12/23/2020     Lab Results   Component Value Date    TROPONINI 0.02 04/08/2021     Lab Results   Component Value Date    BNP 84 05/17/2021

## 2023-01-12 NOTE — LETTER
1/12/2023    Erasmo Michael MD  [address]  [place] [address]    RE: Chava YOUNG Salazar       Dear Colleague,     I had the pleasure of seeing Chava Lincoln in the [facility].            Assessment/Plan:   1.  Chronic congestive heart failure with preserved ejection fraction: The patient has been doing pretty good recently.  His weight has been stable.  His leg edema has improved.  He lost several pounds since last visit.  Continue torsemide 80 mg a.m., 40 mg p.m.    Routine labs, CBC BMP today.    2.  Coronary artery disease s/p KATHY to Ramus, severe calcified RCA lesion with high risk of revascularization, no obstructive lesion in left main and LAD per IVUS:  He has no chest pain.  Continue medical treatment.  Continue aspirin, Crestor, isosorbide mononitrate and metoprolol.      3.  Essential hypertension: His blood pressure is controlled, continue valsartan 80 mg daily and metoprolol 25 mg twice a day     4.  Dyslipidemia: Continue Crestor 20 mg nightly.  Last LDL was controlled.     5.  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     6.  Morbid obesity and obstructive sleep apnea: Lifestyle modification and continue CPAP.    7.  Stage III CKD: Creatinine is stable.    Thank you for the opportunity to be involved in the care of Chava Lincoln. If you have any questions, please feel free to contact me.  I will see the patient again in 6 months and as needed.    Much or all of the text in this note was generated through the use of Dragon Dictate voice-to-text software. Errors in spelling or words which seem out of context are unintentional. Sound alike errors, in particular, may have escaped editing.       History of Present Illness:   It is my pleasure to see Chava Lincoln at the [facility] for routine cardiology follow up.  Chava Lincoln is a 89 year old male with a medical history of coronary artery disease, s/p KATHY to Ramus  in 2-2019, no obstructive disease in LM and LAD, severe calcification lesions in RCA, benign essential hypertension, dyslipidemia, chronic diastolic congestive heart failure, morbid obesity, right diaphragm hemiparalysis, obstructive sleep apnea on CPAP.    The patient states that his shortness of breath has been stable.  He lost several pounds since his September.  He denies chest pain, palpitations, dizziness, orthopnea.  His mild bilateral leg edema is improved.  His blood pressure and heart rate are controlled.  Currently he is on torsemide 80 mg a.m., 40 mg p.m.    Past Medical History:     Patient Active Problem List   Diagnosis     GERD (gastroesophageal reflux disease)     RAMON on CPAP     Near syncope     Dyspnea on exertion     Essential hypertension     Dyslipidemia     Coronary artery disease involving native coronary artery of native heart without angina pectoris     Ascending aortic aneurysm     Coronary artery calcification seen on CAT scan     Pulmonary hypertension (H)     History of pulmonary embolism     Hypoxia     Morbid obesity (H)     Bilateral lower extremity edema     Chronic kidney disease, stage 3 (H)     (HFpEF) heart failure with preserved ejection fraction (H)     Abdominal pain, epigastric       Past Surgical History:     Past Surgical History:   Procedure Laterality Date     CV CORONARY ANGIOGRAM N/A 12/10/2018    Procedure: Coronary Angiogram;  Surgeon: Tana Duncan MD;  Location: [facility];  Service: Cardiology     CV LEFT HEART CATHETERIZATION WITHOUT LEFT VENTRICULOGRAM Left 12/10/2018    Procedure: Left Heart Catheterization Without Left Ventriculogram;  Surgeon: Tana Duncan MD;  Location: [facility];  Service: Cardiology     CV LEFT HEART CATHETERIZATION WITHOUT LEFT VENTRICULOGRAM Left 02/04/2019    Procedure: Left Heart Catheterization Without Left Ventriculogram;  Surgeon: Tana Duncan MD;  Location: [facility];  Service: Cardiology      CV RIGHT HEART CATHETERIZATION N/A 02/04/2019    Procedure: Right Heart Catheterization;  Surgeon: Tana Duncan MD;  Location: [facility];  Service: Cardiology     HERNIA REPAIR       JOINT REPLACEMENT       KNEE SURGERY Bilateral      OTHER SURGICAL HISTORY      Prostatic cryotherapy       Family History:     Family History   Problem Relation Age of Onset     Diabetes Mother      Cerebrovascular Disease Father      Heart Disease Brother      Coronary Artery Disease Brother         Social History:    reports that he has never smoked. He has never used smokeless tobacco. He reports current alcohol use. He reports that he does not use drugs.    Review of Systems:   12 systems are reviewed negative except for in HPI.    Meds:     Current Outpatient Medications:      acetaminophen (TYLENOL) 325 MG tablet, Take 325 mg by mouth every 6 hours as needed, Disp: , Rfl:      aspirin 81 MG EC tablet, [ASPIRIN 81 MG EC TABLET] Take 81 mg by mouth every evening. , Disp: , Rfl:      betamethasone dipropionate (DIPROSONE) 0.05 % external cream, , Disp: , Rfl:      famotidine (PEPCID) 10 MG tablet, Take 10 mg by mouth 2 times daily, Disp: , Rfl:      fluticasone (CUTIVATE) 0.05 % external cream, daily, Disp: , Rfl:      gentamicin (GARAMYCIN) 0.1 % external ointment, , Disp: , Rfl:      isosorbide mononitrate (IMDUR) 30 MG 24 hr tablet, Take 1 tablet (30 mg) by mouth daily, Disp: 90 tablet, Rfl: 2     ketoconazole (NIZORAL) 2 % external cream, , Disp: , Rfl:      lactobacillus rhamnosus (GG) (CULTURELL) capsule, Take 1 capsule by mouth daily, Disp: , Rfl:      Melatonin 2.5 MG CHEW, Take by mouth daily as needed, Disp: , Rfl:      metoprolol tartrate (LOPRESSOR) 25 MG tablet, TAKE 1 TABLET(25 MG) BY MOUTH TWICE DAILY (Patient taking differently: 50 mg), Disp: 180 tablet, Rfl: 1     mirabegron (MYRBETRIQ) 50 mg Tb24 ER tablet, [MIRABEGRON (MYRBETRIQ) 50 MG TB24 ER TABLET] Take 50 mg by mouth daily., Disp: , Rfl:      " multivitamin with minerals (THERA-M) 9 mg iron-400 mcg Tab tablet, Take 1 tablet by mouth daily, Disp: , Rfl:      mycophenolate (GENERIC EQUIVALENT) 500 MG tablet, Take 1,500 mg by mouth 2 times daily, Disp: , Rfl:      oxybutynin (DITROPAN XL) 15 MG 24 hr tablet, [OXYBUTYNIN (DITROPAN XL) 15 MG 24 HR TABLET] Take 15 mg by mouth daily., Disp: , Rfl:      oxybutynin (DITROPAN XL) 5 MG ER tablet, Take 5 mg by mouth daily , Disp: , Rfl:      Polyethylene Glycol 3350 (MIRALAX PO), , Disp: , Rfl:      Probiotic Product (PROBIOTIC DAILY PO), , Disp: , Rfl:      rosuvastatin (CRESTOR) 20 MG tablet, TAKE 1 TABLET(20 MG) BY MOUTH AT BEDTIME, Disp: 90 tablet, Rfl: 3     torsemide (DEMADEX) 20 MG tablet, Take 4 tablets (80 mg) by mouth daily Continue on torsemide 80 in the a.m. and then 40 in the p.m., Disp: 540 tablet, Rfl: 0     triamcinolone (KENALOG) 0.1 % external ointment, Apply 1 applicator topically as needed, Disp: , Rfl:      valsartan (DIOVAN) 80 MG tablet, Take 40 mg by mouth daily, Disp: , Rfl:      potassium chloride (KLOR-CON) 20 MEQ packet, Take 20 mEq by mouth daily (Patient not taking: Reported on 9/9/2022), Disp: 90 each, Rfl: 0    Allergies:   Atorvastatin, Gabapentin, Lisinopril, Morphine, Sulfa (sulfonamide antibiotics) [sulfa drugs], and Vicodin [hydrocodone-acetaminophen]      Objective:      Physical Exam  121.7 kg (268 lb 6.4 oz)  1.753 m (5' 9\")  Body mass index is 39.64 kg/m .  /54 (BP Location: Left arm, Patient Position: Sitting, Cuff Size: Adult Large)   Pulse 60   Resp 16   Ht 1.753 m (5' 9\")   Wt 121.7 kg (268 lb 6.4 oz)   SpO2 92%   BMI 39.64 kg/m      General Appearance:   Awake, Alert, No acute distress.   HEENT:  Pupil equal and reactive to light. No scleral icterus; the mucous membranes were moist.   Neck: No cervical bruits. Not able to see JVD. No thyromegaly.     Chest: The spine was straight. The chest was symmetric.   Lungs:   Diminished breathing sounds.No crackles. No " wheezing.   Cardiovascular:   Regular rhythm and rate, remote first and second heart sounds with no murmurs. No rubs or gallops.    Abdomen:  Obese. Soft. No tenderness. Non-distended. Bowels sounds are present   Extremities: Mild bilateral leg edema.   Skin: Red skin rashes most in back. Warm, Dry.   Musculoskeletal: No tenderness. No deformity.   Neurologic: Mood and affect are appropriate. No focal deficits.         EKG:  Personally reivewed  Sinus rhythm with 1st degree A-V block with occasional Premature ventricular complexes   Low voltage QRS   Borderline ECG   When compared with ECG of 08-FEB-2022 15:00,   Premature ventricular complexes are now Present   ST no longer elevated in Inferior leads     Cardiac Imaging Studies  Echocardiogram on 10-:  Left ventricular function is normal.The ejection fraction is 60-65%.  The right ventricle is moderately dilated.  Moderately decreased right ventricular systolic function  Technically difficult, suboptimal study. No hemodynamically significant  valvular abnormalities on 2D or color flow imaging.     Coronary angiogram with PCI on 2-4-2019:    IVUS left main and LAD showed no severely obstructive lesions    Ramus disease treated with rotational atherectomy and stented with a Synergy KATHY under IVUS guidance with good angiographic results    Right heart catheterizations show LV EDP 22 mmHg and PCWP 30 mmHg, PA mean 34 mmHg, RA 13 mmHg      Lab Review   Lab Results   Component Value Date     06/23/2021    CO2 23 06/23/2021    BUN 21 06/23/2021     Lab Results   Component Value Date    WBC 8.5 05/17/2021    HGB 13.9 05/17/2021    HCT 40.6 05/17/2021    MCV 90 05/17/2021     05/17/2021     Lab Results   Component Value Date    CHOL 144 04/28/2022    CHOL 102 09/20/2021    CHOL 133 12/23/2020     Lab Results   Component Value Date    HDL 54 04/28/2022    HDL 49 09/20/2021    HDL 56 12/23/2020     Lab Results   Component Value Date    TRIG 140 04/28/2022     TRIG 97 09/20/2021    TRIG 146 12/23/2020     Lab Results   Component Value Date    TROPONINI 0.02 04/08/2021     Lab Results   Component Value Date    BNP 84 05/17/2021               Thank you for allowing me to participate in the care of your patient.      Sincerely,     Yonatan Sheridan MD     [facility]  cc:   Yonatan Sheridan MD  [address] [facility]  [place] [phone]

## 2023-01-13 NOTE — TELEPHONE ENCOUNTER
"Order refaxed to [facility] [phone]. Order states \"old machine states battery life exceeded, contact care provider. \"  "

## 2023-01-30 ENCOUNTER — HEALTH MAINTENANCE LETTER (OUTPATIENT)
Age: 88
End: 2023-01-30

## 2023-01-31 ENCOUNTER — TRANSFERRED RECORDS (OUTPATIENT)
Dept: HEALTH INFORMATION MANAGEMENT | Facility: CLINIC | Age: 88
End: 2023-01-31

## 2023-02-07 ENCOUNTER — TELEPHONE (OUTPATIENT)
Dept: SLEEP MEDICINE | Facility: CLINIC | Age: 88
End: 2023-02-07
Payer: MEDICARE

## 2023-02-07 NOTE — TELEPHONE ENCOUNTER
CPAP Fax Order Form completed and send via fax to [facility] at [phone].    Document scanned into chart.      Sree Adams KARON  [facility]

## 2023-04-10 DIAGNOSIS — I50.32 CHRONIC DIASTOLIC CONGESTIVE HEART FAILURE (H): ICD-10-CM

## 2023-04-10 RX ORDER — TORSEMIDE 20 MG/1
80 TABLET ORAL DAILY
Qty: 540 TABLET | Refills: 3 | Status: SHIPPED | OUTPATIENT
Start: 2023-04-10 | End: 2023-04-10

## 2023-04-10 RX ORDER — TORSEMIDE 20 MG/1
TABLET ORAL
Qty: 540 TABLET | Refills: 3 | Status: SHIPPED | OUTPATIENT
Start: 2023-04-10 | End: 2024-03-25

## 2023-05-11 DIAGNOSIS — I25.10 CORONARY ARTERY DISEASE INVOLVING NATIVE CORONARY ARTERY OF NATIVE HEART WITHOUT ANGINA PECTORIS: ICD-10-CM

## 2023-05-11 DIAGNOSIS — R06.09 DYSPNEA ON EXERTION: ICD-10-CM

## 2023-05-11 RX ORDER — ISOSORBIDE MONONITRATE 30 MG/1
30 TABLET, EXTENDED RELEASE ORAL DAILY
Qty: 90 TABLET | Refills: 3 | Status: SHIPPED | OUTPATIENT
Start: 2023-05-11 | End: 2024-05-08

## 2023-05-11 RX ORDER — ROSUVASTATIN CALCIUM 20 MG/1
20 TABLET, COATED ORAL AT BEDTIME
Qty: 90 TABLET | Refills: 3 | Status: SHIPPED | OUTPATIENT
Start: 2023-05-11 | End: 2024-04-22

## 2023-06-19 ENCOUNTER — TELEPHONE (OUTPATIENT)
Dept: CARDIOLOGY | Facility: CLINIC | Age: 88
End: 2023-06-19
Payer: MEDICARE

## 2023-06-19 DIAGNOSIS — I25.10 CORONARY ARTERY DISEASE INVOLVING NATIVE CORONARY ARTERY OF NATIVE HEART WITHOUT ANGINA PECTORIS: ICD-10-CM

## 2023-06-19 RX ORDER — METOPROLOL TARTRATE 25 MG/1
25 TABLET, FILM COATED ORAL 2 TIMES DAILY
Qty: 180 TABLET | Refills: 1 | Status: SHIPPED | OUTPATIENT
Start: 2023-06-19 | End: 2023-12-13

## 2023-06-19 NOTE — TELEPHONE ENCOUNTER
[facility] Call Center    Phone Message    May a detailed message be left on voicemail: yes     Reason for Call: Medication Refill Request    Has the patient contacted the pharmacy for the refill? Yes   Name of medication being requested: metoprolol tartrate (LOPRESSOR) 25 MG tablet  Provider who prescribed the medication: Dr. Sheridan  Pharmacy: [facility][id number] [address] AT [place] [address]    Date medication is needed: routine        Action Taken: Other: Cardiology    Travel Screening: Not Applicable     Thank you!  [facility]

## 2023-07-24 ENCOUNTER — OFFICE VISIT (OUTPATIENT)
Dept: CARDIOLOGY | Facility: CLINIC | Age: 88
End: 2023-07-24
Payer: MEDICARE

## 2023-07-24 VITALS
HEART RATE: 55 BPM | RESPIRATION RATE: 16 BRPM | SYSTOLIC BLOOD PRESSURE: 117 MMHG | BODY MASS INDEX: 38.69 KG/M2 | DIASTOLIC BLOOD PRESSURE: 68 MMHG | WEIGHT: 262 LBS | OXYGEN SATURATION: 91 %

## 2023-07-24 DIAGNOSIS — E78.5 DYSLIPIDEMIA: ICD-10-CM

## 2023-07-24 DIAGNOSIS — E66.01 MORBID OBESITY (H): ICD-10-CM

## 2023-07-24 DIAGNOSIS — I25.10 CORONARY ARTERY DISEASE INVOLVING NATIVE CORONARY ARTERY OF NATIVE HEART WITHOUT ANGINA PECTORIS: ICD-10-CM

## 2023-07-24 DIAGNOSIS — G47.33 OSA ON CPAP: ICD-10-CM

## 2023-07-24 DIAGNOSIS — I10 ESSENTIAL HYPERTENSION: ICD-10-CM

## 2023-07-24 DIAGNOSIS — I71.21 ANEURYSM OF ASCENDING AORTA WITHOUT RUPTURE (H): ICD-10-CM

## 2023-07-24 DIAGNOSIS — I50.32 CHRONIC DIASTOLIC CONGESTIVE HEART FAILURE (H): Primary | ICD-10-CM

## 2023-07-24 LAB
ALBUMIN SERPL-MCNC: 3.4 G/DL (ref 3.5–5)
ALP SERPL-CCNC: 85 U/L (ref 45–120)
ALT SERPL W P-5'-P-CCNC: 13 U/L (ref 0–45)
ANION GAP SERPL CALCULATED.3IONS-SCNC: 13 MMOL/L (ref 5–18)
AST SERPL W P-5'-P-CCNC: 19 U/L (ref 0–40)
BILIRUB SERPL-MCNC: 0.3 MG/DL (ref 0–1)
BUN SERPL-MCNC: 37 MG/DL (ref 8–28)
CALCIUM SERPL-MCNC: 8.8 MG/DL (ref 8.5–10.5)
CHLORIDE BLD-SCNC: 100 MMOL/L (ref 98–107)
CHOLEST SERPL-MCNC: 138 MG/DL
CO2 SERPL-SCNC: 27 MMOL/L (ref 22–31)
CREAT SERPL-MCNC: 1.36 MG/DL (ref 0.7–1.3)
ERYTHROCYTE [DISTWIDTH] IN BLOOD BY AUTOMATED COUNT: 13.5 % (ref 10–15)
FASTING STATUS PATIENT QL REPORTED: NO
GFR SERPL CREATININE-BSD FRML MDRD: 48 ML/MIN/1.73M2
GLUCOSE BLD-MCNC: 160 MG/DL (ref 70–125)
HCT VFR BLD AUTO: 37.3 % (ref 40–53)
HDLC SERPL-MCNC: 44 MG/DL
HGB BLD-MCNC: 12 G/DL (ref 13.3–17.7)
LDLC SERPL CALC-MCNC: 61 MG/DL
MCH RBC QN AUTO: 29.9 PG (ref 26.5–33)
MCHC RBC AUTO-ENTMCNC: 32.2 G/DL (ref 31.5–36.5)
MCV RBC AUTO: 93 FL (ref 78–100)
PLATELET # BLD AUTO: 218 10E3/UL (ref 150–450)
POTASSIUM BLD-SCNC: 3.5 MMOL/L (ref 3.5–5)
PROT SERPL-MCNC: 6.6 G/DL (ref 6–8)
RBC # BLD AUTO: 4.01 10E6/UL (ref 4.4–5.9)
SODIUM SERPL-SCNC: 140 MMOL/L (ref 136–145)
TRIGL SERPL-MCNC: 167 MG/DL
WBC # BLD AUTO: 6.7 10E3/UL (ref 4–11)

## 2023-07-24 PROCEDURE — 85027 COMPLETE CBC AUTOMATED: CPT | Performed by: INTERNAL MEDICINE

## 2023-07-24 PROCEDURE — 80053 COMPREHEN METABOLIC PANEL: CPT | Performed by: INTERNAL MEDICINE

## 2023-07-24 PROCEDURE — 99214 OFFICE O/P EST MOD 30 MIN: CPT | Performed by: INTERNAL MEDICINE

## 2023-07-24 PROCEDURE — 80061 LIPID PANEL: CPT | Performed by: INTERNAL MEDICINE

## 2023-07-24 PROCEDURE — 36415 COLL VENOUS BLD VENIPUNCTURE: CPT | Performed by: INTERNAL MEDICINE

## 2023-07-24 NOTE — PROGRESS NOTES
Assessment/Plan:   1.  Chronic congestive heart failure with preserved ejection fraction: The patient has been doing stable.  He lost 6 pounds.  His leg edema has improved.  He lost several pounds since last visit.  Continue torsemide 80 mg a.m., 40 mg p.m.    Routine labs, CBC CMP, Lipid profile today.    2.  Coronary artery disease s/p KATHY to Ramus, severe calcified RCA lesion with high risk of revascularization, no obstructive lesion in left main and LAD per IVUS:  He has no chest pain.  Continue medical treatment.  Continue aspirin, Crestor, isosorbide mononitrate and metoprolol.      3.  Essential hypertension: His blood pressure is controlled, continue valsartan 80 mg daily and metoprolol 25 mg twice a day     4.  Dyslipidemia: Continue Crestor 20 mg nightly.  Lipid profile and liver function today.     5.  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     6.  Morbid obesity and obstructive sleep apnea: Lifestyle modification and continue CPAP.    7.  Stage III CKD: Check renal function today.    Thank you for the opportunity to be involved in the care of Chava Lincoln. If you have any questions, please feel free to contact me.  I will see the patient again in 6 months and as needed.    Much or all of the text in this note was generated through the use of Dragon Dictate voice-to-text software. Errors in spelling or words which seem out of context are unintentional. Sound alike errors, in particular, may have escaped editing.       History of Present Illness:   It is my pleasure to see Chava Lincoln at the [facility] clinic for routine cardiology follow up.  Chava Lincoln is a 89 year old male with a medical history of coronary artery disease, s/p KATHY to Ramus in 2-2019, no obstructive disease in LM and LAD, severe calcification lesions in RCA, benign essential hypertension, dyslipidemia, chronic diastolic congestive heart failure, morbid obesity, right diaphragm  hemiparalysis, obstructive sleep apnea on CPAP.    The patient states that his shortness of breath has been stable.  He lost 6 pounds since his September.  He denies chest pain, palpitations, dizziness, orthopnea.  His mild bilateral leg edema is stable.  His blood pressure and heart rate are controlled.  Currently he is on torsemide 80 mg a.m., 40 mg p.m.    Past Medical History:     Patient Active Problem List   Diagnosis    GERD (gastroesophageal reflux disease)    RAMON on CPAP    Near syncope    Dyspnea on exertion    Essential hypertension    Dyslipidemia    Coronary artery disease involving native coronary artery of native heart without angina pectoris    Ascending aortic aneurysm (H)    Coronary artery calcification seen on CAT scan    Pulmonary hypertension (H)    History of pulmonary embolism    Hypoxia    Morbid obesity (H)    Bilateral lower extremity edema    Chronic kidney disease, stage 3 (H)    (HFpEF) heart failure with preserved ejection fraction (H)    Abdominal pain, epigastric       Past Surgical History:     Past Surgical History:   Procedure Laterality Date    CV CORONARY ANGIOGRAM N/A 12/10/2018    Procedure: Coronary Angiogram;  Surgeon: Tana Duncan MD;  Location: [facility];  Service: Cardiology    CV LEFT HEART CATHETERIZATION WITHOUT LEFT VENTRICULOGRAM Left 12/10/2018    Procedure: Left Heart Catheterization Without Left Ventriculogram;  Surgeon: Tana Duncan MD;  Location: [facility];  Service: Cardiology    CV LEFT HEART CATHETERIZATION WITHOUT LEFT VENTRICULOGRAM Left 02/04/2019    Procedure: Left Heart Catheterization Without Left Ventriculogram;  Surgeon: Tana Duncan MD;  Location: [facility];  Service: Cardiology    CV RIGHT HEART CATHETERIZATION N/A 02/04/2019    Procedure: Right Heart Catheterization;  Surgeon: Tana Duncan MD;  Location: [facility];  Service: Cardiology    HERNIA REPAIR      JOINT REPLACEMENT      KNEE  SURGERY Bilateral     OTHER SURGICAL HISTORY      Prostatic cryotherapy       Family History:     Family History   Problem Relation Age of Onset    Diabetes Mother     Cerebrovascular Disease Father     Heart Disease Brother     Coronary Artery Disease Brother         Social History:    reports that he has never smoked. He has never used smokeless tobacco. He reports current alcohol use. He reports that he does not use drugs.    Review of Systems:   12 systems are reviewed negative except for in HPI.    Meds:     Current Outpatient Medications:     acetaminophen (TYLENOL) 325 MG tablet, Take 325 mg by mouth every 6 hours as needed, Disp: , Rfl:     aspirin 81 MG EC tablet, [ASPIRIN 81 MG EC TABLET] Take 81 mg by mouth every evening. , Disp: , Rfl:     betamethasone dipropionate (DIPROSONE) 0.05 % external cream, , Disp: , Rfl:     famotidine (PEPCID) 10 MG tablet, Take 10 mg by mouth 2 times daily, Disp: , Rfl:     fluticasone (CUTIVATE) 0.05 % external cream, daily, Disp: , Rfl:     gentamicin (GARAMYCIN) 0.1 % external ointment, , Disp: , Rfl:     isosorbide mononitrate (IMDUR) 30 MG 24 hr tablet, Take 1 tablet (30 mg) by mouth daily, Disp: 90 tablet, Rfl: 3    ketoconazole (NIZORAL) 2 % external cream, , Disp: , Rfl:     lactobacillus rhamnosus (GG) (CULTURELL) capsule, Take 1 capsule by mouth daily, Disp: , Rfl:     Melatonin 2.5 MG CHEW, Take by mouth daily as needed, Disp: , Rfl:     metoprolol tartrate (LOPRESSOR) 25 MG tablet, Take 1 tablet (25 mg) by mouth 2 times daily, Disp: 180 tablet, Rfl: 1    mirabegron (MYRBETRIQ) 50 mg Tb24 ER tablet, [MIRABEGRON (MYRBETRIQ) 50 MG TB24 ER TABLET] Take 50 mg by mouth daily., Disp: , Rfl:     multivitamin with minerals (THERA-M) 9 mg iron-400 mcg Tab tablet, Take 1 tablet by mouth daily, Disp: , Rfl:     mycophenolate (GENERIC EQUIVALENT) 500 MG tablet, Take 1,500 mg by mouth 2 times daily, Disp: , Rfl:     oxybutynin (DITROPAN XL) 15 MG 24 hr tablet, [OXYBUTYNIN  (DITROPAN XL) 15 MG 24 HR TABLET] Take 15 mg by mouth daily., Disp: , Rfl:     oxybutynin (DITROPAN XL) 5 MG ER tablet, Take 5 mg by mouth daily , Disp: , Rfl:     Polyethylene Glycol 3350 (MIRALAX PO), , Disp: , Rfl:     potassium chloride (KLOR-CON) 20 MEQ packet, Take 20 mEq by mouth daily, Disp: 90 each, Rfl: 0    Probiotic Product (PROBIOTIC DAILY PO), , Disp: , Rfl:     rosuvastatin (CRESTOR) 20 MG tablet, Take 1 tablet (20 mg) by mouth At Bedtime, Disp: 90 tablet, Rfl: 3    torsemide (DEMADEX) 20 MG tablet, Continue on torsemide 80 in the a.m. and then 40 in the p.m., Disp: 540 tablet, Rfl: 3    triamcinolone (KENALOG) 0.1 % external ointment, Apply 1 applicator topically as needed, Disp: , Rfl:     valsartan (DIOVAN) 80 MG tablet, Take 40 mg by mouth daily, Disp: , Rfl:     Allergies:   Atorvastatin, Gabapentin, Lisinopril, Morphine, Sulfa (sulfonamide antibiotics) [sulfa antibiotics], and Vicodin [hydrocodone-acetaminophen]      Objective:      Physical Exam  118.8 kg (262 lb)     Body mass index is 38.69 kg/m .  /68 (BP Location: Right arm, Patient Position: Sitting, Cuff Size: Adult Regular)   Pulse 55   Resp 16   Wt 118.8 kg (262 lb)   SpO2 91%   BMI 38.69 kg/m      General Appearance:   Awake, Alert, No acute distress.   HEENT:  Pupil equal and reactive to light. No scleral icterus; the mucous membranes were moist.   Neck: No cervical bruits. Not able to see JVD. No thyromegaly.     Chest: The spine was straight. The chest was symmetric.   Lungs:   Diminished breathing sounds.No crackles. No wheezing.   Cardiovascular:   Regular rhythm and rate, remote first and second heart sounds with II/VI systolic murmurs at RUSB. No rubs or gallops.    Abdomen:  Obese. Soft. No tenderness. Non-distended. Bowels sounds are present   Extremities: Mild bilateral leg edema.   Skin: Red skin rashes most in back. Warm, Dry.   Musculoskeletal: No tenderness. No deformity.   Neurologic: Mood and affect are  appropriate. No focal deficits.         EKG:  Personally reivewed  Sinus rhythm with 1st degree A-V block with occasional Premature ventricular complexes   Low voltage QRS   Borderline ECG   When compared with ECG of 08-FEB-2022 15:00,   Premature ventricular complexes are now Present   ST no longer elevated in Inferior leads     Cardiac Imaging Studies  Echocardiogram on 10-:  Left ventricular function is normal.The ejection fraction is 60-65%.  The right ventricle is moderately dilated.  Moderately decreased right ventricular systolic function  Technically difficult, suboptimal study. No hemodynamically significant  valvular abnormalities on 2D or color flow imaging.     Coronary angiogram with PCI on 2-4-2019:  IVUS left main and LAD showed no severely obstructive lesions  Ramus disease treated with rotational atherectomy and stented with a Synergy KATHY under IVUS guidance with good angiographic results  Right heart catheterizations show LV EDP 22 mmHg and PCWP 30 mmHg, PA mean 34 mmHg, RA 13 mmHg      Lab Review   Lab Results   Component Value Date     06/23/2021    CO2 23 06/23/2021    BUN 21 06/23/2021     Lab Results   Component Value Date    WBC 8.5 05/17/2021    HGB 13.9 05/17/2021    HCT 40.6 05/17/2021    MCV 90 05/17/2021     05/17/2021     Lab Results   Component Value Date    CHOL 144 04/28/2022    CHOL 102 09/20/2021    CHOL 133 12/23/2020     Lab Results   Component Value Date    HDL 54 04/28/2022    HDL 49 09/20/2021    HDL 56 12/23/2020     Lab Results   Component Value Date    TRIG 140 04/28/2022    TRIG 97 09/20/2021    TRIG 146 12/23/2020     Lab Results   Component Value Date    TROPONINI 0.02 04/08/2021     Lab Results   Component Value Date    BNP 84 05/17/2021

## 2023-07-24 NOTE — LETTER
7/24/2023    Erasmo Michael MD  [address]  [place]  [place] [address]    RE: Chava Spencesourav       Dear Colleague,     I had the pleasure of seeing Chava Lincoln in the [facility].            Assessment/Plan:   1.  Chronic congestive heart failure with preserved ejection fraction: The patient has been doing stable.  He lost 6 pounds.  His leg edema has improved.  He lost several pounds since last visit.  Continue torsemide 80 mg a.m., 40 mg p.m.    Routine labs, CBC CMP, Lipid profile today.    2.  Coronary artery disease s/p KATHY to Ramus, severe calcified RCA lesion with high risk of revascularization, no obstructive lesion in left main and LAD per IVUS:  He has no chest pain.  Continue medical treatment.  Continue aspirin, Crestor, isosorbide mononitrate and metoprolol.      3.  Essential hypertension: His blood pressure is controlled, continue valsartan 80 mg daily and metoprolol 25 mg twice a day     4.  Dyslipidemia: Continue Crestor 20 mg nightly.  Lipid profile and liver function today.     5.  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     6.  Morbid obesity and obstructive sleep apnea: Lifestyle modification and continue CPAP.    7.  Stage III CKD: Check renal function today.    Thank you for the opportunity to be involved in the care of Chava Lincoln. If you have any questions, please feel free to contact me.  I will see the patient again in 6 months and as needed.    Much or all of the text in this note was generated through the use of Dragon Dictate voice-to-text software. Errors in spelling or words which seem out of context are unintentional. Sound alike errors, in particular, may have escaped editing.       History of Present Illness:   It is my pleasure to see Chava Lincoln at the [facility] for routine cardiology follow up.  Chava Lincoln is a 89 year old male with a medical history of coronary artery disease, s/p  KATHY to Ramus in 2-2019, no obstructive disease in LM and LAD, severe calcification lesions in RCA, benign essential hypertension, dyslipidemia, chronic diastolic congestive heart failure, morbid obesity, right diaphragm hemiparalysis, obstructive sleep apnea on CPAP.    The patient states that his shortness of breath has been stable.  He lost 6 pounds since his September.  He denies chest pain, palpitations, dizziness, orthopnea.  His mild bilateral leg edema is stable.  His blood pressure and heart rate are controlled.  Currently he is on torsemide 80 mg a.m., 40 mg p.m.    Past Medical History:     Patient Active Problem List   Diagnosis    GERD (gastroesophageal reflux disease)    RAMON on CPAP    Near syncope    Dyspnea on exertion    Essential hypertension    Dyslipidemia    Coronary artery disease involving native coronary artery of native heart without angina pectoris    Ascending aortic aneurysm (H)    Coronary artery calcification seen on CAT scan    Pulmonary hypertension (H)    History of pulmonary embolism    Hypoxia    Morbid obesity (H)    Bilateral lower extremity edema    Chronic kidney disease, stage 3 (H)    (HFpEF) heart failure with preserved ejection fraction (H)    Abdominal pain, epigastric       Past Surgical History:     Past Surgical History:   Procedure Laterality Date    CV CORONARY ANGIOGRAM N/A 12/10/2018    Procedure: Coronary Angiogram;  Surgeon: Tana Duncan MD;  Location: [facility] Lab;  Service: Cardiology    CV LEFT HEART CATHETERIZATION WITHOUT LEFT VENTRICULOGRAM Left 12/10/2018    Procedure: Left Heart Catheterization Without Left Ventriculogram;  Surgeon: Tana Duncan MD;  Location: [facility] Lab;  Service: Cardiology    CV LEFT HEART CATHETERIZATION WITHOUT LEFT VENTRICULOGRAM Left 02/04/2019    Procedure: Left Heart Catheterization Without Left Ventriculogram;  Surgeon: Tana Duncan MD;  Location: [facility] Cath Lab;  Service: Cardiology    CV RIGHT  HEART CATHETERIZATION N/A 02/04/2019    Procedure: Right Heart Catheterization;  Surgeon: Tana Duncan MD;  Location: [facility] Cath Lab;  Service: Cardiology    HERNIA REPAIR      JOINT REPLACEMENT      KNEE SURGERY Bilateral     OTHER SURGICAL HISTORY      Prostatic cryotherapy       Family History:     Family History   Problem Relation Age of Onset    Diabetes Mother     Cerebrovascular Disease Father     Heart Disease Brother     Coronary Artery Disease Brother         Social History:    reports that he has never smoked. He has never used smokeless tobacco. He reports current alcohol use. He reports that he does not use drugs.    Review of Systems:   12 systems are reviewed negative except for in HPI.    Meds:     Current Outpatient Medications:     acetaminophen (TYLENOL) 325 MG tablet, Take 325 mg by mouth every 6 hours as needed, Disp: , Rfl:     aspirin 81 MG EC tablet, [ASPIRIN 81 MG EC TABLET] Take 81 mg by mouth every evening. , Disp: , Rfl:     betamethasone dipropionate (DIPROSONE) 0.05 % external cream, , Disp: , Rfl:     famotidine (PEPCID) 10 MG tablet, Take 10 mg by mouth 2 times daily, Disp: , Rfl:     fluticasone (CUTIVATE) 0.05 % external cream, daily, Disp: , Rfl:     gentamicin (GARAMYCIN) 0.1 % external ointment, , Disp: , Rfl:     isosorbide mononitrate (IMDUR) 30 MG 24 hr tablet, Take 1 tablet (30 mg) by mouth daily, Disp: 90 tablet, Rfl: 3    ketoconazole (NIZORAL) 2 % external cream, , Disp: , Rfl:     lactobacillus rhamnosus (GG) (CULTURELL) capsule, Take 1 capsule by mouth daily, Disp: , Rfl:     Melatonin 2.5 MG CHEW, Take by mouth daily as needed, Disp: , Rfl:     metoprolol tartrate (LOPRESSOR) 25 MG tablet, Take 1 tablet (25 mg) by mouth 2 times daily, Disp: 180 tablet, Rfl: 1    mirabegron (MYRBETRIQ) 50 mg Tb24 ER tablet, [MIRABEGRON (MYRBETRIQ) 50 MG TB24 ER TABLET] Take 50 mg by mouth daily., Disp: , Rfl:     multivitamin with minerals (THERA-M) 9 mg iron-400 mcg Tab  tablet, Take 1 tablet by mouth daily, Disp: , Rfl:     mycophenolate (GENERIC EQUIVALENT) 500 MG tablet, Take 1,500 mg by mouth 2 times daily, Disp: , Rfl:     oxybutynin (DITROPAN XL) 15 MG 24 hr tablet, [OXYBUTYNIN (DITROPAN XL) 15 MG 24 HR TABLET] Take 15 mg by mouth daily., Disp: , Rfl:     oxybutynin (DITROPAN XL) 5 MG ER tablet, Take 5 mg by mouth daily , Disp: , Rfl:     Polyethylene Glycol 3350 (MIRALAX PO), , Disp: , Rfl:     potassium chloride (KLOR-CON) 20 MEQ packet, Take 20 mEq by mouth daily, Disp: 90 each, Rfl: 0    Probiotic Product (PROBIOTIC DAILY PO), , Disp: , Rfl:     rosuvastatin (CRESTOR) 20 MG tablet, Take 1 tablet (20 mg) by mouth At Bedtime, Disp: 90 tablet, Rfl: 3    torsemide (DEMADEX) 20 MG tablet, Continue on torsemide 80 in the a.m. and then 40 in the p.m., Disp: 540 tablet, Rfl: 3    triamcinolone (KENALOG) 0.1 % external ointment, Apply 1 applicator topically as needed, Disp: , Rfl:     valsartan (DIOVAN) 80 MG tablet, Take 40 mg by mouth daily, Disp: , Rfl:     Allergies:   Atorvastatin, Gabapentin, Lisinopril, Morphine, Sulfa (sulfonamide antibiotics) [sulfa antibiotics], and Vicodin [hydrocodone-acetaminophen]      Objective:      Physical Exam  118.8 kg (262 lb)     Body mass index is 38.69 kg/m .  /68 (BP Location: Right arm, Patient Position: Sitting, Cuff Size: Adult Regular)   Pulse 55   Resp 16   Wt 118.8 kg (262 lb)   SpO2 91%   BMI 38.69 kg/m      General Appearance:   Awake, Alert, No acute distress.   HEENT:  Pupil equal and reactive to light. No scleral icterus; the mucous membranes were moist.   Neck: No cervical bruits. Not able to see JVD. No thyromegaly.     Chest: The spine was straight. The chest was symmetric.   Lungs:   Diminished breathing sounds.No crackles. No wheezing.   Cardiovascular:   Regular rhythm and rate, remote first and second heart sounds with II/VI systolic murmurs at RUSB. No rubs or gallops.    Abdomen:  Obese. Soft. No tenderness.  Non-distended. Bowels sounds are present   Extremities: Mild bilateral leg edema.   Skin: Red skin rashes most in back. Warm, Dry.   Musculoskeletal: No tenderness. No deformity.   Neurologic: Mood and affect are appropriate. No focal deficits.         EKG:  Personally reivewed  Sinus rhythm with 1st degree A-V block with occasional Premature ventricular complexes   Low voltage QRS   Borderline ECG   When compared with ECG of 08-FEB-2022 15:00,   Premature ventricular complexes are now Present   ST no longer elevated in Inferior leads     Cardiac Imaging Studies  Echocardiogram on 10-:  Left ventricular function is normal.The ejection fraction is 60-65%.  The right ventricle is moderately dilated.  Moderately decreased right ventricular systolic function  Technically difficult, suboptimal study. No hemodynamically significant  valvular abnormalities on 2D or color flow imaging.     Coronary angiogram with PCI on 2-4-2019:  IVUS left main and LAD showed no severely obstructive lesions  Ramus disease treated with rotational atherectomy and stented with a Synergy KATHY under IVUS guidance with good angiographic results  Right heart catheterizations show LV EDP 22 mmHg and PCWP 30 mmHg, PA mean 34 mmHg, RA 13 mmHg      Lab Review   Lab Results   Component Value Date     06/23/2021    CO2 23 06/23/2021    BUN 21 06/23/2021     Lab Results   Component Value Date    WBC 8.5 05/17/2021    HGB 13.9 05/17/2021    HCT 40.6 05/17/2021    MCV 90 05/17/2021     05/17/2021     Lab Results   Component Value Date    CHOL 144 04/28/2022    CHOL 102 09/20/2021    CHOL 133 12/23/2020     Lab Results   Component Value Date    HDL 54 04/28/2022    HDL 49 09/20/2021    HDL 56 12/23/2020     Lab Results   Component Value Date    TRIG 140 04/28/2022    TRIG 97 09/20/2021    TRIG 146 12/23/2020     Lab Results   Component Value Date    TROPONINI 0.02 04/08/2021     Lab Results   Component Value Date    BNP 84 05/17/2021                Thank you for allowing me to participate in the care of your patient.      Sincerely,     Yonatan Sheridan MD     [facility]  cc:   Yonatan Sheridan MD  [address]  [place] [address]

## 2023-08-22 ENCOUNTER — TELEPHONE (OUTPATIENT)
Dept: NURSING | Facility: CLINIC | Age: 88
End: 2023-08-22
Payer: MEDICARE

## 2023-08-22 NOTE — TELEPHONE ENCOUNTER
"Wife calling who is on \"consent to communicate\". Is not [facility] patient, has questions on COVID booster shots and timing with new one coming out. This writer asked that call be placed to [facility] to Pcp to discuss suggestions on timing.     ANGELLA RIVERA RN  [facility] nurse advisors  8/22/2023  9:00 AM  "

## 2023-09-01 ENCOUNTER — TELEPHONE (OUTPATIENT)
Dept: SLEEP MEDICINE | Facility: CLINIC | Age: 88
End: 2023-09-01
Payer: MEDICARE

## 2023-09-01 DIAGNOSIS — G47.33 OBSTRUCTIVE SLEEP APNEA: Primary | ICD-10-CM

## 2023-09-01 NOTE — TELEPHONE ENCOUNTER
Reason for Call:  Other order    Detailed comments: patients wife called and states that patient needs an order for cpap supplies.    Please contact her back if consent.  Thank you.    Phone Number Patient can be reached at: Other phone number:  [phone] *    Best Time: any    Can we leave a detailed message on this number? NO    Call taken on 9/1/2023 at 11:00 AM by Tara Bautista

## 2023-09-01 NOTE — TELEPHONE ENCOUNTER
Last ov 6/1/22  Next ov none    Patient requesting updated order for CPAP supplies prior to appointment. Order pended and routed to provider for consideration.    Zuri CROWE RN  [facility]

## 2023-09-05 ENCOUNTER — MYC MEDICAL ADVICE (OUTPATIENT)
Dept: SLEEP MEDICINE | Facility: CLINIC | Age: 88
End: 2023-09-05
Payer: MEDICARE

## 2023-09-06 NOTE — TELEPHONE ENCOUNTER
Spoke with wife. Order needs to go to [facility].   [phone].    Zuri CROWE RN  [facility]

## 2023-09-07 ENCOUNTER — HOSPITAL ENCOUNTER (EMERGENCY)
Facility: CLINIC | Age: 88
Discharge: HOME | End: 2023-09-07
Attending: EMERGENCY MEDICINE | Admitting: EMERGENCY MEDICINE
Payer: MEDICARE

## 2023-09-07 ENCOUNTER — APPOINTMENT (OUTPATIENT)
Dept: CT IMAGING | Facility: CLINIC | Age: 88
End: 2023-09-07
Attending: EMERGENCY MEDICINE
Payer: MEDICARE

## 2023-09-07 VITALS
BODY MASS INDEX: 39.71 KG/M2 | RESPIRATION RATE: 16 BRPM | HEART RATE: 61 BPM | SYSTOLIC BLOOD PRESSURE: 171 MMHG | HEIGHT: 68 IN | TEMPERATURE: 97.6 F | DIASTOLIC BLOOD PRESSURE: 81 MMHG | OXYGEN SATURATION: 94 % | WEIGHT: 262 LBS

## 2023-09-07 DIAGNOSIS — M54.50 ACUTE BILATERAL LOW BACK PAIN WITHOUT SCIATICA: ICD-10-CM

## 2023-09-07 DIAGNOSIS — N18.9 ACUTE ON CHRONIC RENAL INSUFFICIENCY: ICD-10-CM

## 2023-09-07 DIAGNOSIS — M48.061 SPINAL STENOSIS OF LUMBAR REGION WITHOUT NEUROGENIC CLAUDICATION: ICD-10-CM

## 2023-09-07 DIAGNOSIS — N28.9 ACUTE ON CHRONIC RENAL INSUFFICIENCY: ICD-10-CM

## 2023-09-07 LAB
ANION GAP SERPL CALCULATED.3IONS-SCNC: 6 MMOL/L (ref 5–18)
BASOPHILS # BLD AUTO: 0 10E3/UL (ref 0–0.2)
BASOPHILS NFR BLD AUTO: 1 %
BNP SERPL-MCNC: 65 PG/ML (ref 0–93)
BUN SERPL-MCNC: 29 MG/DL (ref 8–28)
C REACTIVE PROTEIN LHE: 2.3 MG/DL (ref 0–?)
CALCIUM SERPL-MCNC: 8.9 MG/DL (ref 8.5–10.5)
CHLORIDE BLD-SCNC: 99 MMOL/L (ref 98–107)
CO2 SERPL-SCNC: 33 MMOL/L (ref 22–31)
CREAT SERPL-MCNC: 1.47 MG/DL (ref 0.7–1.3)
EGFRCR SERPLBLD CKD-EPI 2021: 44 ML/MIN/1.73M2
EOSINOPHIL # BLD AUTO: 0.2 10E3/UL (ref 0–0.7)
EOSINOPHIL NFR BLD AUTO: 2 %
ERYTHROCYTE [DISTWIDTH] IN BLOOD BY AUTOMATED COUNT: 13.5 % (ref 10–15)
ERYTHROCYTE [SEDIMENTATION RATE] IN BLOOD BY WESTERGREN METHOD: 1 MM/HR (ref 0–20)
GLUCOSE BLD-MCNC: 158 MG/DL (ref 70–125)
HCT VFR BLD AUTO: 35.4 % (ref 40–53)
HGB BLD-MCNC: 11.3 G/DL (ref 13.3–17.7)
HOLD SPECIMEN: NORMAL
IMM GRANULOCYTES # BLD: 0 10E3/UL
IMM GRANULOCYTES NFR BLD: 0 %
LYMPHOCYTES # BLD AUTO: 1.6 10E3/UL (ref 0.8–5.3)
LYMPHOCYTES NFR BLD AUTO: 18 %
MCH RBC QN AUTO: 29.9 PG (ref 26.5–33)
MCHC RBC AUTO-ENTMCNC: 31.9 G/DL (ref 31.5–36.5)
MCV RBC AUTO: 94 FL (ref 78–100)
MONOCYTES # BLD AUTO: 0.8 10E3/UL (ref 0–1.3)
MONOCYTES NFR BLD AUTO: 9 %
NEUTROPHILS # BLD AUTO: 6.2 10E3/UL (ref 1.6–8.3)
NEUTROPHILS NFR BLD AUTO: 70 %
NRBC # BLD AUTO: 0 10E3/UL
NRBC BLD AUTO-RTO: 0 /100
PLATELET # BLD AUTO: 259 10E3/UL (ref 150–450)
POTASSIUM BLD-SCNC: 4.1 MMOL/L (ref 3.5–5)
RBC # BLD AUTO: 3.78 10E6/UL (ref 4.4–5.9)
SODIUM SERPL-SCNC: 138 MMOL/L (ref 136–145)
TROPONIN I SERPL-MCNC: 0.02 NG/ML (ref 0–0.29)
WBC # BLD AUTO: 8.8 10E3/UL (ref 4–11)

## 2023-09-07 PROCEDURE — G1010 CDSM STANSON: HCPCS

## 2023-09-07 PROCEDURE — 86140 C-REACTIVE PROTEIN: CPT | Performed by: EMERGENCY MEDICINE

## 2023-09-07 PROCEDURE — 85652 RBC SED RATE AUTOMATED: CPT | Performed by: EMERGENCY MEDICINE

## 2023-09-07 PROCEDURE — 80048 BASIC METABOLIC PNL TOTAL CA: CPT | Performed by: EMERGENCY MEDICINE

## 2023-09-07 PROCEDURE — 83880 ASSAY OF NATRIURETIC PEPTIDE: CPT | Performed by: EMERGENCY MEDICINE

## 2023-09-07 PROCEDURE — 72131 CT LUMBAR SPINE W/O DYE: CPT | Mod: MF

## 2023-09-07 PROCEDURE — 93005 ELECTROCARDIOGRAM TRACING: CPT | Performed by: EMERGENCY MEDICINE

## 2023-09-07 PROCEDURE — 84484 ASSAY OF TROPONIN QUANT: CPT | Performed by: EMERGENCY MEDICINE

## 2023-09-07 PROCEDURE — 250N000013 HC RX MED GY IP 250 OP 250 PS 637: Performed by: EMERGENCY MEDICINE

## 2023-09-07 PROCEDURE — 36415 COLL VENOUS BLD VENIPUNCTURE: CPT | Performed by: EMERGENCY MEDICINE

## 2023-09-07 PROCEDURE — 85025 COMPLETE CBC W/AUTO DIFF WBC: CPT | Performed by: EMERGENCY MEDICINE

## 2023-09-07 PROCEDURE — 99285 EMERGENCY DEPT VISIT HI MDM: CPT | Mod: 25

## 2023-09-07 RX ORDER — METHYLPREDNISOLONE 4 MG
TABLET, DOSE PACK ORAL
Qty: 21 TABLET | Refills: 0 | Status: SHIPPED | OUTPATIENT
Start: 2023-09-07 | End: 2024-01-11

## 2023-09-07 RX ORDER — OXYCODONE HYDROCHLORIDE 5 MG/1
2.5 TABLET ORAL EVERY 6 HOURS PRN
Qty: 6 TABLET | Refills: 0 | Status: SHIPPED | OUTPATIENT
Start: 2023-09-07 | End: 2023-09-10

## 2023-09-07 RX ORDER — LIDOCAINE 4 G/G
1 PATCH TOPICAL ONCE
Status: DISCONTINUED | OUTPATIENT
Start: 2023-09-07 | End: 2023-09-07 | Stop reason: HOSPADM

## 2023-09-07 RX ORDER — ACETAMINOPHEN 325 MG/1
650 TABLET ORAL ONCE
Status: COMPLETED | OUTPATIENT
Start: 2023-09-07 | End: 2023-09-07

## 2023-09-07 RX ORDER — LIDOCAINE 50 MG/G
1 PATCH TOPICAL EVERY 24 HOURS
Qty: 10 PATCH | Refills: 0 | Status: SHIPPED | OUTPATIENT
Start: 2023-09-07 | End: 2023-09-17

## 2023-09-07 RX ADMIN — LIDOCAINE 1 PATCH: 4 PATCH TOPICAL at 13:31

## 2023-09-07 RX ADMIN — ACETAMINOPHEN 650 MG: 325 TABLET ORAL at 13:26

## 2023-09-07 RX ADMIN — OXYCODONE HYDROCHLORIDE 2.5 MG: 5 TABLET ORAL at 13:26

## 2023-09-07 ASSESSMENT — ENCOUNTER SYMPTOMS
SHORTNESS OF BREATH: 1
DYSURIA: 0
BACK PAIN: 1
DIARRHEA: 0
NAUSEA: 0
VOMITING: 0
ABDOMINAL PAIN: 0
NUMBNESS: 0

## 2023-09-07 ASSESSMENT — ACTIVITIES OF DAILY LIVING (ADL)
DEPENDENT_IADLS:: CLEANING;COOKING;LAUNDRY;SHOPPING;MEAL PREPARATION;MEDICATION MANAGEMENT;MONEY MANAGEMENT;TRANSPORTATION;INCONTINENCE
ADLS_ACUITY_SCORE: 35
ADLS_ACUITY_SCORE: 33

## 2023-09-07 NOTE — TELEPHONE ENCOUNTER
Done. See phone encounter 9/1/23    Zuri CROWE RN  [facility]

## 2023-09-07 NOTE — DISCHARGE INSTRUCTIONS
Read and follow the discharge instructions.    May take Tylenol at 1000 mg 3 times a day for 3 days only.  After that take 500 mg every 8 hours.    For Severe pain you may take half of a Roxicodone this is a narcotic medication it may make you constipated to be careful not to be dizzy.    Take the Medrol dose pack as instructed    Change the Lidoderm patch every 24 hours.    Doing a referral to the spine clinic they should be giving you a call.    Also your primary care doctor tomorrow to discuss home care evaluation    Return or call 911 for worsening symptoms pain chest pain shortness of breath or any other concerns.

## 2023-09-07 NOTE — ED TRIAGE NOTES
Pt here with low back pain since the first of September. Denies injury. Pt has history of copd and chf and is in wheelchair for years. Wife does most of his cares, her name is Santa. Pain is 5/10 now. Denies bowel incontinence, has had bladder leakage for years since prostate procedure.

## 2023-09-07 NOTE — ED NOTES
ER-MD spoke to pts PMD to order follow up MRI vs home care vs pain clinic.  Pt not wanting to wait until MRI here

## 2023-09-07 NOTE — ED PROVIDER NOTES
EMERGENCY DEPARTMENT ENCOUNTER      NAME: Chava Lincoln  AGE: 89 year old male  YOB: 1929  MRN: [id number]  EVALUATION DATE & TIME: No admission date for patient encounter.    PCP: Erasmo Michael    ED PROVIDER: Shobha Wisdom M.D.      CHIEF COMPLAINT     Chief Complaint   Patient presents with    Back Pain         FINAL IMPRESSION:     1. Acute bilateral low back pain without sciatica    2. Acute on chronic renal insufficiency    3. Spinal stenosis of lumbar region without neurogenic claudication          MEDICAL DECISION MAKING:       Pertinent Labs & Imaging studies reviewed. (See chart for details)    89 year old male presents to the Emergency Department for evaluation of back pain      Medical Decision Making      History:  .  Supplemental history from wife  .  External Record(s) review as documented below    Exam  Elevated BMI  Non toxic  No localized pain  No cva ttp  B surgical scars  Awake alert    Differential diagnosis include but not limited to musculoskeletal cauda equina renal stone dissection among others.        Work up  .EKG poor anterior progression normal axis low voltage    .Labs  Crp 2.3  Cr 1.47  Over the last year 1.26 - 2.1      Clinical Impression and Decision Making  89 yo male presents c/o low back pain  No radiculopathy but admits to LE weakness  No new incontinence or perirenal anesthesia  Tall man elevated BMI  At baseline needs support to get up  CT severe degenerative disease  Initially wife concerned about narcotic medication  Renal insufficency will avooid nsaids  Declined admission for pT evaluation  Given roxicodone 2.5 mg tylenol lidoderm path  Dicussed with both NS and PCP both agree on medrol dose pack  Spine referall  Patient and wife in agreement   Discharged in stable condition declined admission  Home Health referral done for PT       Vital Signs:hypertension  EKG:  NSR  Imaging: CT no fracture ddd  Home Meds: reviewed  ED meds/abx: tylenol Roxicodone  lidoderm patch  Fluids: oral      Medical Decision Making    History:  Supplemental history from: Family Member/Significant Other  External Record(s) reviewed: Outpatient Record: [facility]    Work Up:  Chart documentation includes differential considered and any EKGs or imaging independently interpreted by provider, where specified.  In additional to work up documented, I considered the following work up: Documented in chart, if applicable.    External consultation:  Discussion of management with another provider: Neurosurgery    Complicating factors:  Care impacted by chronic illness: Chronic Kidney Disease, Heart Disease, and Hypertension  Care affected by social determinants of health: N/A    Disposition considerations: Discharge. I prescribed additional prescription strength medication(s) as charted. I recommended admission, but the patient declined.          Review of External Records  Hospital/Clinic: [facility]   Date: 7/24/2023  The patient was seen 1.5 months ago by Dr. Sheridan [facility]. He has been diagnosed with CHF, CAD, hypertension, dyslipidemia, mildly dilated ascending aorta, morbid obesity, and CKD stage 3.    External Consults  Neurosurgery  Care management  Primary care    ED COURSE   10:18 AM I met with the patient, obtained history, performed an initial exam, and discussed options and plan for diagnostics and treatment here in the ED.   1:01 PM I spoke with Angelia Levi PA-C, in neurosurgery.  1:09 PM I rechecked and updated the patient and wife.  1:26 PM I spoke with the patient's PCP, Dr. Michael.  1:46 PM We discussed the plan for discharge and the patient is agreeable. Reviewed supportive cares, symptomatic treatment, outpatient follow up, and reasons to return to the Emergency Department. All questions and concerns were addressed. Patient to be discharged by ED RN.     At the conclusion of the encounter I discussed the  "results of all of the tests and the disposition. The questions were answered. The patient and wife acknowledged understanding and was agreeable with the care plan.         MEDICATIONS GIVEN IN THE EMERGENCY:     Medications   oxyCODONE IR (ROXICODONE) half-tab 2.5 mg (2.5 mg Oral $Given 9/7/23 1326)   acetaminophen (TYLENOL) tablet 650 mg (650 mg Oral $Given 9/7/23 1326)       NEW PRESCRIPTIONS STARTED AT TODAY'S ER VISIT     Discharge Medication List as of 9/7/2023  1:52 PM        START taking these medications    Details   lidocaine (LIDODERM) 5 % patch Place 1 patch onto the skin every 24 hours for 10 daysDisp-10 patch, [id number]      methylPREDNISolone (MEDROL DOSEPAK) 4 MG tablet therapy pack Follow Package Directions, Disp-21 tablet, R-0, E-Prescribe      oxyCODONE (ROXICODONE) 5 MG tablet Take 0.5 tablets (2.5 mg) by mouth every 6 hours as needed for severe pain, Disp-6 tablet, R-0, E-Prescribe                =================================================================    HPI     Patient information was obtained from: patient     Use of : N/A       Chava YOUNG Salazar is a 89 year old male who presents with wife via wheelchair from clinic for evaluation of back pain.    Per patient,  The patient has had 1 week of lower back pain that is \"not sharp\" but extends across the back. Shifting his weight makes the pain better but moving his legs makes the pain worse (especially his left leg). The pain does not radiate to his abdomen, legs, or groin. He has chronic shortness of breath, enuresis, and leg swelling that is not worse now. He uses a walker to get around usually. He denies recent falls, genital numbness, chest pain, abdominal pain, vomiting, diarrhea, dysuria, hematuria, or any other complaints at this time.     Per wife,  He has had back pain for the last week and has been struggling to get up and move around because his back hurts when he lifts his legs. He has leg weakness that is " chronic but worse than usual. She notes that he had a similar episode 9 months ago where he had 4-5 days of back pain. He has a history of prostate cancer and has urinary leakage at night. His wife believes that the shortness of breath and leg swelling is a little worse than usual. She adds that they are here because he was too weak to stand for an x-ray at the heart clinic.      REVIEW OF SYSTEMS   Review of Systems   Respiratory:  Positive for shortness of breath (chronic).    Cardiovascular:  Positive for leg swelling (chronic).   Gastrointestinal:  Negative for abdominal pain, diarrhea, nausea and vomiting.   Genitourinary:  Positive for enuresis. Negative for dysuria.   Musculoskeletal:  Positive for back pain (lower).   Neurological:  Negative for numbness.   All other systems reviewed and are negative.       PAST MEDICAL HISTORY:     Past Medical History:   Diagnosis Date    Coronary artery disease due to calcified coronary lesion     GERD (gastroesophageal reflux disease) 7/2/2014    Hypertension     RAMON on CPAP 4/17/2017    Prostate cancer (H)     Prostate cancer (H)        PAST SURGICAL HISTORY:     Past Surgical History:   Procedure Laterality Date    CV CORONARY ANGIOGRAM N/A 12/10/2018    Procedure: Coronary Angiogram;  Surgeon: Tana Duncan MD;  Location: [facility] Cath Lab;  Service: Cardiology    CV LEFT HEART CATHETERIZATION WITHOUT LEFT VENTRICULOGRAM Left 12/10/2018    Procedure: Left Heart Catheterization Without Left Ventriculogram;  Surgeon: Tana Duncan MD;  Location: [facility] Cath Lab;  Service: Cardiology    CV LEFT HEART CATHETERIZATION WITHOUT LEFT VENTRICULOGRAM Left 02/04/2019    Procedure: Left Heart Catheterization Without Left Ventriculogram;  Surgeon: Tana Duncan MD;  Location: [facility] Cath Lab;  Service: Cardiology    CV RIGHT HEART CATHETERIZATION N/A 02/04/2019    Procedure: Right Heart Catheterization;  Surgeon: Tana Duncan MD;  Location: [facility]  Cath Lab;  Service: Cardiology    HERNIA REPAIR      JOINT REPLACEMENT      KNEE SURGERY Bilateral     OTHER SURGICAL HISTORY      Prostatic cryotherapy         CURRENT MEDICATIONS:   lidocaine (LIDODERM) 5 % patch  methylPREDNISolone (MEDROL DOSEPAK) 4 MG tablet therapy pack  oxyCODONE (ROXICODONE) 5 MG tablet  acetaminophen (TYLENOL) 325 MG tablet  aspirin 81 MG EC tablet  betamethasone dipropionate (DIPROSONE) 0.05 % external cream  famotidine (PEPCID) 10 MG tablet  fluticasone (CUTIVATE) 0.05 % external cream  gentamicin (GARAMYCIN) 0.1 % external ointment  isosorbide mononitrate (IMDUR) 30 MG 24 hr tablet  ketoconazole (NIZORAL) 2 % external cream  lactobacillus rhamnosus (GG) (CULTURELL) capsule  Melatonin 2.5 MG CHEW  metoprolol tartrate (LOPRESSOR) 25 MG tablet  mirabegron (MYRBETRIQ) 50 mg Tb24 ER tablet  multivitamin with minerals (THERA-M) 9 mg iron-400 mcg Tab tablet  mycophenolate (GENERIC EQUIVALENT) 500 MG tablet  oxybutynin (DITROPAN XL) 15 MG 24 hr tablet  oxybutynin (DITROPAN XL) 5 MG ER tablet  Polyethylene Glycol 3350 (MIRALAX PO)  potassium chloride (KLOR-CON) 20 MEQ packet  Probiotic Product (PROBIOTIC DAILY PO)  rosuvastatin (CRESTOR) 20 MG tablet  torsemide (DEMADEX) 20 MG tablet  triamcinolone (KENALOG) 0.1 % external ointment  valsartan (DIOVAN) 80 MG tablet         ALLERGIES:     Allergies   Allergen Reactions    Atorvastatin Muscle Pain (Myalgia)    Gabapentin Unknown    Lisinopril Cough    Morphine Other (See Comments)     hypotension    Sulfa (Sulfonamide Antibiotics) [Sulfa Antibiotics] Hives    Vicodin [Hydrocodone-Acetaminophen] Unknown       FAMILY HISTORY:     Family History   Problem Relation Age of Onset    Diabetes Mother     Cerebrovascular Disease Father     Heart Disease Brother     Coronary Artery Disease Brother        SOCIAL HISTORY:     Social History     Socioeconomic History    Marital status:      Spouse name: Santa    Number of children: 0    Highest education  "level: 12th grade   Occupational History    Occupation: Retired    Tobacco Use    Smoking status: Never    Smokeless tobacco: Never   Substance and Sexual Activity    Alcohol use: Yes     Comment: Alcoholic Drinks/day: 2-3 beer/ month    Drug use: No    Sexual activity: Not Currently     Partners: Female     Social Determinants of Health     Financial Resource Strain: Low Risk  (3/23/2021)    Overall Financial Resource Strain (CARDIA)     Difficulty of Paying Living Expenses: Not hard at all   Food Insecurity: No Food Insecurity (3/23/2021)    Hunger Vital Sign     Worried About Running Out of Food in the Last Year: Never true     Ran Out of Food in the Last Year: Never true   Transportation Needs: No Transportation Needs (3/23/2021)    PRAPARE - Transportation     Lack of Transportation (Medical): No     Lack of Transportation (Non-Medical): No   Physical Activity: Inactive (3/23/2021)    Exercise Vital Sign     Days of Exercise per Week: 0 days     Minutes of Exercise per Session: 0 min   Stress: No Stress Concern Present (3/23/2021)    [place] [employer] - Occupational Stress Questionnaire     Feeling of Stress : Only a little   Social Connections: Socially Isolated (3/23/2021)    Social Connection and Isolation Panel [NHANES]     Frequency of Communication with Friends and Family: Never     Frequency of Social Gatherings with Friends and Family: Never     Attends Buddhist Services: Never     Active Member of Clubs or Organizations: No     Attends Club or Organization Meetings: Never     Marital Status:    Intimate Partner Violence: Not At Risk (3/23/2021)    Humiliation, Afraid, Rape, and Kick questionnaire     Fear of Current or Ex-Partner: No     Emotionally Abused: No     Physically Abused: No     Sexually Abused: No       VITALS:   BP (!) 171/81   Pulse 61   Temp 97.6  F (36.4  C)   Resp 16   Ht 1.727 m (5' 8\")   Wt 118.8 kg (262 lb)   SpO2 94%   BMI 39.84 kg/m  "     PHYSICAL EXAM     Physical Exam  Vitals and nursing note reviewed. Exam conducted with a chaperone present.   Constitutional:       Appearance: He is obese. He is not ill-appearing.   Musculoskeletal:        Back:       Comments: Location of pain  Not reproducible   Neurological:      Mental Status: He is alert.         Physical Exam   Constitutional: non toxic elevated bmi slow moving    Head: Atraumatic.     Nose: Nose normal.     Mouth/Throat: Oropharynx is clear and dry.     Eyes: EOM are normal. Pupils are equal, round, and reactive to light.     Ears: Bilateral pearly white tympanic membranes.    Neck: Normal range of motion. Neck supple.     Cardiovascular: Normal rate, regular rhythm and normal heart sounds.      Pulmonary/Chest: Normal effort. Bibasilar crackles.     Abdominal: soft nontender. protuberant    Musculoskeletal: Left knee surgical scar. +1 pitting edema.     Neurological: Moves upper and lower extremities equally.    Lymphatics: no edema    : NA    Skin: Skin is warm and dry. Venous statis BLE L>R    Psychiatric: Normal mood and affect. Behavior is normal.       LAB:     All pertinent labs reviewed and interpreted.  Labs Ordered and Resulted from Time of ED Arrival to Time of ED Departure   BASIC METABOLIC PANEL - Abnormal       Result Value    Sodium 138      Potassium 4.1      Chloride 99      Carbon Dioxide (CO2) 33 (*)     Anion Gap 6      Urea Nitrogen 29 (*)     Creatinine 1.47 (*)     Calcium 8.9      Glucose 158 (*)     GFR Estimate 44 (*)    CRP INFLAMMATION - Abnormal    CRP 2.3 (*)    CBC WITH PLATELETS AND DIFFERENTIAL - Abnormal    WBC Count 8.8      RBC Count 3.78 (*)     Hemoglobin 11.3 (*)     Hematocrit 35.4 (*)     MCV 94      MCH 29.9      MCHC 31.9      RDW 13.5      Platelet Count 259      % Neutrophils 70      % Lymphocytes 18      % Monocytes 9      % Eosinophils 2      % Basophils 1      % Immature Granulocytes 0      NRBCs per 100 WBC 0      Absolute Neutrophils  6.2      Absolute Lymphocytes 1.6      Absolute Monocytes 0.8      Absolute Eosinophils 0.2      Absolute Basophils 0.0      Absolute Immature Granulocytes 0.0      Absolute NRBCs 0.0     B-TYPE NATRIURETIC PEPTIDE ( EAST ONLY) - Normal    BNP 65     TROPONIN I - Normal    Troponin I 0.02     ERYTHROCYTE SEDIMENTATION RATE AUTO - Normal    Erythrocyte Sedimentation Rate 1          RADIOLOGY:     Reviewed all pertinent imaging. Please see official radiology report.  Lumbar spine CT w/o contrast   Final Result   IMPRESSION:   1.  No acute abnormality.   2.  25 degree levoscoliosis apex L2-L3.   3.  Moderate L2-L3 right lateral recess stenosis with severe right neural foraminal stenosis.   4.  Moderate to severe L3-L4 spinal canal stenosis with severe right neural foraminal stenosis.   5.  Severe L4-L5 spinal canal stenosis with severe left neural foraminal stenosis.   6.  Severe left L5-S1 neural foraminal stenosis.           EKG:     EKG #1  Sinus rhythm with first-degree AV block poor anterior progression normal axis low voltage.    Time:[id number]    Ventricular rate 68 bmp  Axis normal  MN interval >120 ms  QRS duration 110 ms  QT//487 ms    Compared to previous EKG on February 8, 2022 poor anterior progression was not seen then low voltage was seen then.  First-degree AV block at a  was seen before.  I have independently reviewed and interpreted the EKG(s) documented above.      PROCEDURES:     Procedures      I, Nagi Becerra, am serving as a scribe to document services personally performed by Dr. Wisdom based on my observation and the provider's statements to me. I, Shobha Wisdom MD attest that Nagi Becerra is acting in a scribe capacity, has observed my performance of the services and has documented them in accordance with my direction.    Shobha Wisdom M.D.  Emergency Medicine  [facility] EMERGENCY ROOM  [address]  [address]  [phone]  Dept: [phone]       Shobha Wisdom MD  09/07/23 1822

## 2023-09-08 ENCOUNTER — TELEPHONE (OUTPATIENT)
Dept: CARDIOLOGY | Facility: CLINIC | Age: 88
End: 2023-09-08
Payer: MEDICARE

## 2023-09-08 LAB
ATRIAL RATE - MUSE: 68 BPM
DIASTOLIC BLOOD PRESSURE - MUSE: 72 MMHG
INTERPRETATION ECG - MUSE: NORMAL
P AXIS - MUSE: NORMAL DEGREES
PR INTERVAL - MUSE: NORMAL MS
QRS DURATION - MUSE: 110 MS
QT - MUSE: 458 MS
QTC - MUSE: 487 MS
R AXIS - MUSE: 15 DEGREES
SYSTOLIC BLOOD PRESSURE - MUSE: 111 MMHG
T AXIS - MUSE: 72 DEGREES
VENTRICULAR RATE- MUSE: 68 BPM

## 2023-09-08 NOTE — TELEPHONE ENCOUNTER
Phone call to patient's wife Santa, who provided update that she had just spoken to Mauricio's Urologist, who adjusted the medications that she was concerned about. Encouraged his wife to continue with recording the daily weights, and to reach out with further weight gain concerns. She verbalized understanding and agrees to plan.  -sea

## 2023-09-08 NOTE — CONSULTS
Care Management Initial Consult    General Information  Assessment completed with: Patient, Spouse or significant other, wife Santa at bedside  Type of CM/SW Visit: Initial Assessment    Primary Care Provider verified and updated as needed: Yes   Readmission within the last 30 days: no previous admission in last 30 days         Advance Care Planning: Advance Care Planning Reviewed: present on chart, verified with patient        Communication Assessment  Patient's communication style: spoken language (English or Bilingual)        Cognitive  Cognitive/Neuro/Behavioral: alert and appropriately oriented, able to make needs known, calm                     Living Environment:   People in home: spouse  wife Santa  Current living Arrangements: house (has chair lift for stairs)      Able to return to prior arrangements: yes     Family/Social Support:  Care provided by: self, spouse/significant other  Provides care for: no one, unable/limited ability to care for self  Marital Status:   Wife  Santa       Description of Support System: Supportive, Involved       Current Resources:   Patient receiving home care services: No  Community Resources:  None  Equipment /supplies currently used at home:  (walker, WC/transport chair, lift recliner, stair lifts, electric scooter)    Employment/Financial:  Employment Status: retired     Financial Concerns: No concerns identified   Referral to Financial Worker: No    Does the patient's insurance plan have a 3 day qualifying hospital stay waiver?  Yes   Will the waiver be used for post-acute placement? No- patient/wife requesting return home with wife and added HC services for PT. Patient/wife decline TCU placement at this time. Patient is Medicare ACO REACH eligible if TCU indicated.     Lifestyle & Psychosocial Needs:  Social Determinants of Health     Tobacco Use: Low Risk  (9/7/2023)    Patient History     Smoking Tobacco Use: Never     Smokeless Tobacco Use: Never     Passive Exposure:  Not on file   Alcohol Use: Not on file   Financial Resource Strain: Low Risk  (3/23/2021)    Overall Financial Resource Strain (CARDIA)     Difficulty of Paying Living Expenses: Not hard at all   Food Insecurity: No Food Insecurity (3/23/2021)    Hunger Vital Sign     Worried About Running Out of Food in the Last Year: Never true     Ran Out of Food in the Last Year: Never true   Transportation Needs: No Transportation Needs (3/23/2021)    PRAPARE - Transportation     Lack of Transportation (Medical): No     Lack of Transportation (Non-Medical): No   Physical Activity: Inactive (3/23/2021)    Exercise Vital Sign     Days of Exercise per Week: 0 days     Minutes of Exercise per Session: 0 min   Stress: No Stress Concern Present (3/23/2021)    [place] [employer]ational [employer] - Occupational Stress Questionnaire     Feeling of Stress : Only a little   Social Connections: Socially Isolated (3/23/2021)    Social Connection and Isolation Panel [NHANES]     Frequency of Communication with Friends and Family: Never     Frequency of Social Gatherings with Friends and Family: Never     Attends Holiness Services: Never     Active Member of Clubs or Organizations: No     Attends Club or Organization Meetings: Never     Marital Status:    Intimate Partner Violence: Not At Risk (3/23/2021)    Humiliation, Afraid, Rape, and Kick questionnaire     Fear of Current or Ex-Partner: No     Emotionally Abused: No     Physically Abused: No     Sexually Abused: No   Depression: Not on file   Housing Stability: Not on file       Functional Status:  Prior to admission patient needed assistance:   Dependent ADLs:: Ambulation-walker, Bathing, Dressing, Incontinence, Transfers, Toileting (wife assists)  Dependent IADLs:: Cleaning, Cooking, Laundry, Shopping, Meal Preparation, Medication Management, Money Management, Transportation, Incontinence (wife assists)  Assesssment of Functional Status: Not at baseline with mobility, Not  at baseline with ADL Functioning    Mental Health Status:  Mental Health Status:  (denies concerns)       Chemical Dependency Status:  Chemical Dependency Status:  (denies concerns)             Values/Beliefs:  Spiritual, Cultural Beliefs, Taoist Practices, Values that affect care: no               Additional Information:      Care Management Discharge Note    Discharge Date:         Discharge Disposition: Home, Home Care (with wife and added HC services for PT)    Discharge Services:  (added HC services for PT)    Discharge DME:  (nothing new)    Discharge Transportation: family or friend will provide (wife to transport)    Private pay costs discussed:  None indicated or discussed at this time.     Does the patient's insurance plan have a 3 day qualifying hospital stay waiver?  Yes   Will the waiver be used for post-acute placement? No    PAS Confirmation Code:  Not indicated.   Patient/family educated on Medicare website which has current facility and service quality ratings:  (patient/wife agreeable with any HC agency able to provide services and covered by insurance)    Education Provided on the Discharge Plan:  (AVS per bedside RN. CM will follow up with wife via phone to confirm HC agency secured.)  Persons Notified of Discharge Plans: patient and wife Santa at bedside.   Patient/Family in Agreement with the Plan: yes    Handoff Referral Completed: CM coordinated with patient and wife. CM coordinated with MD. CM arranged Home Care services.     Additional Information:  Chart reviewed.   MD request to meet with patient and wife for discharge planning.     CM met with patient and wife prior to discharge; assessment completed. Lives with wife in private home. Wife assists with most cares. Wife manages the finances, medications and does the transportation. Has WC/transport chair, walker, stair lifts, lift recliner chair and electric scooter. PCP confirmed. No private duty or skilled HC services.     Patient/wife  requesting discharge from ER and added HC services. Not interested in TCU placement at this time.     CM offered HC agency choice; patient/wife do not have preference and indicate agreeable with any HC agency able to provide home PT and covered by insurance. Patient and wife do not want to wait in ER while Home Care is arranged/secured. Wife indicates if CM unable to secure Home Care she will follow up with PCP. Wife to transport home. CM updated MD and bedside RN.     [facility]  Naseem confirms agency is able to accept for PT. He will call wife directly to schedule start of care visit. CM updated wife via phone.     Veda Isabel RN

## 2023-09-08 NOTE — TELEPHONE ENCOUNTER
[facility] Call Center    Phone Message    May a detailed message be left on voicemail: yes     Reason for Call: Other: Patient wife would like a call back regarding patient. Patient has gain weight. Please reach out.      Action Taken: Other: Cardiology     Travel Screening: Not Applicable    Thank you!  [facility]

## 2023-09-27 ENCOUNTER — DOCUMENTATION ONLY (OUTPATIENT)
Dept: SLEEP MEDICINE | Facility: CLINIC | Age: 88
End: 2023-09-27
Payer: MEDICARE

## 2023-09-27 NOTE — NURSING NOTE
Attempted to reach patient via phone unsuccessful left message received notice from [employer] requiring Face-to-face clinical re-evaluation for PAP device. Patient to call back to schedule a appointment or contact the sleep scheduling line.     GREG Jimenes  [facility]

## 2023-09-28 NOTE — NURSING NOTE
Patient's spouse return call. Explained to Santa (spouse) insurance is requiring face to face evaluation. Appointment has been scheduled. [place] [facility] [place] address given to Santa (spouse).     GREG Jimenes  [facility] [place]

## 2023-10-11 ENCOUNTER — TELEPHONE (OUTPATIENT)
Dept: CARDIOLOGY | Facility: CLINIC | Age: 88
End: 2023-10-11
Payer: MEDICARE

## 2023-10-11 NOTE — TELEPHONE ENCOUNTER
[facility] Call Center    Phone Message    May a detailed message be left on voicemail: yes     Reason for Call: Other: Pt wife is calling to speak to someone about her husbands weight loss and he is also having really bad back pain. Santa is an appt until 10:00am. Please call after that      Action Taken: Message routed to:  Other: Cardiology    Travel Screening: Not Applicable      Thank you!  [facility]

## 2023-10-11 NOTE — TELEPHONE ENCOUNTER
Dr Sheridan,  Please see call from patient's wife with concerns re: 9 lb weight loss over past few days. Patient on Torsemide 80 mg in am, and 40 mg in chao, with no recent dose adjustment. Any new orders or recommendations?     Leela,  Could you please review this call from patient's wife in Dr Sheridan's absence? Any new orders, or can this wait for Dr Sheridan's return?    Thanks,  Julissa  RN    ====  Phone call to patient's wife Santa. She reports patient had recent weight loss of 9 lbs over past few days. She reports his leg edema is improved, and he hasn't c/o CP or SOB. She also reports other concerns re: ongoing back pain issues, as well as about his urinary incontinence, for which he is followed by Urology for. Informed her Dr Sheridan will be updated on his recent weight loss, and strongly encouraged her to reach out to his PCP as well with weight loss, and other concerns. She verbalized understanding and had no further questions.  -sea

## 2023-10-12 NOTE — TELEPHONE ENCOUNTER
Dr Sheridan and Leela,  Sending as an FYI update on this patient.   Thanks,  Julissa      ======  Patient's wife Santa notified of recommendations per JUSTYN Swenson. She verbalizes understanding and reports his weight today was back up to 252lb (up from 248lb last report). She is less concerned today and wishes to not hold his Torsemide at this time. She will continue to monitor and call with any gain or loss of 3lbs in a day, or 5lbs in a week. No further questions or concerns at this time. -sea       ====  ----- Message -----  From: Leela Salas PA-C  Sent: 10/12/2023  12:56 PM CDT  To: Julissa Murphy RN    It doesn't sound like he is having significant symptoms from the weight loss. He can hold his evening dose of torsemide until Dr. Sheridan is able to review the case. Should report weight loss or gain of more than 3 lb in one day.                                                      =====  ----- Message -----  From: Leela Salas PA-C  Sent: 10/12/2023  12:56 PM CDT  To: Julissa Murphy RN    It doesn't sound like he is having significant symptoms from the weight loss. He can hold his evening dose of torsemide until Dr. Sheridan is able to review the case. Should report weight loss or gain of more than 3 lb in one day.

## 2023-10-23 ENCOUNTER — APPOINTMENT (OUTPATIENT)
Dept: CT IMAGING | Facility: HOSPITAL | Age: 88
End: 2023-10-23
Attending: EMERGENCY MEDICINE
Payer: MEDICARE

## 2023-10-23 ENCOUNTER — HOSPITAL ENCOUNTER (EMERGENCY)
Facility: HOSPITAL | Age: 88
Discharge: HOME | End: 2023-10-23
Attending: FAMILY MEDICINE | Admitting: FAMILY MEDICINE
Payer: MEDICARE

## 2023-10-23 VITALS
RESPIRATION RATE: 18 BRPM | SYSTOLIC BLOOD PRESSURE: 185 MMHG | WEIGHT: 262 LBS | HEIGHT: 69 IN | OXYGEN SATURATION: 90 % | TEMPERATURE: 97.2 F | HEART RATE: 70 BPM | DIASTOLIC BLOOD PRESSURE: 81 MMHG | BODY MASS INDEX: 38.8 KG/M2

## 2023-10-23 DIAGNOSIS — R10.9 ABDOMINAL PAIN: ICD-10-CM

## 2023-10-23 DIAGNOSIS — S32.000A COMPRESSION FRACTURE OF LUMBAR VERTEBRA, UNSPECIFIED LUMBAR VERTEBRAL LEVEL, INITIAL ENCOUNTER (H): ICD-10-CM

## 2023-10-23 LAB
ALBUMIN SERPL BCG-MCNC: 3.6 G/DL (ref 3.5–5.2)
ALBUMIN UR-MCNC: NEGATIVE MG/DL
ALP SERPL-CCNC: 125 U/L (ref 40–129)
ALT SERPL W P-5'-P-CCNC: 14 U/L (ref 0–70)
ANION GAP SERPL CALCULATED.3IONS-SCNC: 13 MMOL/L (ref 7–15)
APPEARANCE UR: CLEAR
AST SERPL W P-5'-P-CCNC: 24 U/L (ref 0–45)
BILIRUB DIRECT SERPL-MCNC: <0.2 MG/DL (ref 0–0.3)
BILIRUB SERPL-MCNC: 0.3 MG/DL
BILIRUB UR QL STRIP: NEGATIVE
BUN SERPL-MCNC: 33.3 MG/DL (ref 8–23)
CALCIUM SERPL-MCNC: 8.7 MG/DL (ref 8.2–9.6)
CHLORIDE SERPL-SCNC: 100 MMOL/L (ref 98–107)
COLOR UR AUTO: COLORLESS
CREAT SERPL-MCNC: 1.32 MG/DL (ref 0.67–1.17)
DEPRECATED HCO3 PLAS-SCNC: 29 MMOL/L (ref 22–29)
EGFRCR SERPLBLD CKD-EPI 2021: 50 ML/MIN/1.73M2
ERYTHROCYTE [DISTWIDTH] IN BLOOD BY AUTOMATED COUNT: 13.8 % (ref 10–15)
GLUCOSE SERPL-MCNC: 141 MG/DL (ref 70–99)
GLUCOSE UR STRIP-MCNC: NEGATIVE MG/DL
HCT VFR BLD AUTO: 38.3 % (ref 40–53)
HGB BLD-MCNC: 12.4 G/DL (ref 13.3–17.7)
HGB UR QL STRIP: NEGATIVE
HYALINE CASTS: 1 /LPF
KETONES UR STRIP-MCNC: NEGATIVE MG/DL
LEUKOCYTE ESTERASE UR QL STRIP: NEGATIVE
LIPASE SERPL-CCNC: 19 U/L (ref 13–60)
MAGNESIUM SERPL-MCNC: 2.4 MG/DL (ref 1.7–2.3)
MCH RBC QN AUTO: 30.1 PG (ref 26.5–33)
MCHC RBC AUTO-ENTMCNC: 32.4 G/DL (ref 31.5–36.5)
MCV RBC AUTO: 93 FL (ref 78–100)
NITRATE UR QL: NEGATIVE
PH UR STRIP: 7 [PH] (ref 5–7)
PLATELET # BLD AUTO: 239 10E3/UL (ref 150–450)
POTASSIUM SERPL-SCNC: 3.3 MMOL/L (ref 3.4–5.3)
PROT SERPL-MCNC: 6.5 G/DL (ref 6.4–8.3)
RBC # BLD AUTO: 4.12 10E6/UL (ref 4.4–5.9)
RBC URINE: <1 /HPF
SODIUM SERPL-SCNC: 142 MMOL/L (ref 135–145)
SP GR UR STRIP: 1.01 (ref 1–1.03)
UROBILINOGEN UR STRIP-MCNC: <2 MG/DL
WBC # BLD AUTO: 8.7 10E3/UL (ref 4–11)
WBC URINE: <1 /HPF

## 2023-10-23 PROCEDURE — 36415 COLL VENOUS BLD VENIPUNCTURE: CPT | Performed by: EMERGENCY MEDICINE

## 2023-10-23 PROCEDURE — 82248 BILIRUBIN DIRECT: CPT | Performed by: EMERGENCY MEDICINE

## 2023-10-23 PROCEDURE — 99284 EMERGENCY DEPT VISIT MOD MDM: CPT | Mod: 25

## 2023-10-23 PROCEDURE — 99204 OFFICE O/P NEW MOD 45 MIN: CPT | Performed by: NURSE PRACTITIONER

## 2023-10-23 PROCEDURE — 74176 CT ABD & PELVIS W/O CONTRAST: CPT | Mod: MG

## 2023-10-23 PROCEDURE — 250N000013 HC RX MED GY IP 250 OP 250 PS 637: Performed by: EMERGENCY MEDICINE

## 2023-10-23 PROCEDURE — 85027 COMPLETE CBC AUTOMATED: CPT | Performed by: EMERGENCY MEDICINE

## 2023-10-23 PROCEDURE — 83690 ASSAY OF LIPASE: CPT | Performed by: EMERGENCY MEDICINE

## 2023-10-23 PROCEDURE — 81001 URINALYSIS AUTO W/SCOPE: CPT | Performed by: EMERGENCY MEDICINE

## 2023-10-23 PROCEDURE — 83735 ASSAY OF MAGNESIUM: CPT | Performed by: EMERGENCY MEDICINE

## 2023-10-23 RX ORDER — OXYCODONE HYDROCHLORIDE 5 MG/1
5 TABLET ORAL ONCE
Status: COMPLETED | OUTPATIENT
Start: 2023-10-23 | End: 2023-10-23

## 2023-10-23 RX ORDER — POTASSIUM CHLORIDE 1.5 G/1.58G
40 POWDER, FOR SOLUTION ORAL ONCE
Status: COMPLETED | OUTPATIENT
Start: 2023-10-23 | End: 2023-10-23

## 2023-10-23 RX ORDER — LIDOCAINE 50 MG/G
1 PATCH TOPICAL EVERY 24 HOURS
Qty: 10 PATCH | Refills: 0 | Status: SHIPPED | OUTPATIENT
Start: 2023-10-23 | End: 2024-01-11

## 2023-10-23 RX ORDER — ACETAMINOPHEN 325 MG/1
975 TABLET ORAL ONCE
Status: COMPLETED | OUTPATIENT
Start: 2023-10-23 | End: 2023-10-23

## 2023-10-23 RX ORDER — OXYCODONE HYDROCHLORIDE 5 MG/1
5 TABLET ORAL EVERY 6 HOURS PRN
Qty: 12 TABLET | Refills: 0 | Status: SHIPPED | OUTPATIENT
Start: 2023-10-23 | End: 2023-10-23

## 2023-10-23 RX ORDER — OXYCODONE HYDROCHLORIDE 5 MG/1
5 TABLET ORAL EVERY 6 HOURS PRN
Qty: 12 TABLET | Refills: 0 | Status: SHIPPED | OUTPATIENT
Start: 2023-10-23 | End: 2024-07-30

## 2023-10-23 RX ORDER — LIDOCAINE 50 MG/G
1 PATCH TOPICAL EVERY 24 HOURS
Qty: 10 PATCH | Refills: 0 | Status: SHIPPED | OUTPATIENT
Start: 2023-10-23 | End: 2023-10-23

## 2023-10-23 RX ADMIN — POTASSIUM CHLORIDE 40 MEQ: 1.5 POWDER, FOR SOLUTION ORAL at 10:12

## 2023-10-23 RX ADMIN — ACETAMINOPHEN 975 MG: 325 TABLET ORAL at 13:43

## 2023-10-23 RX ADMIN — OXYCODONE HYDROCHLORIDE 5 MG: 5 TABLET ORAL at 13:43

## 2023-10-23 ASSESSMENT — ACTIVITIES OF DAILY LIVING (ADL)
DEPENDENT_IADLS:: CLEANING;COOKING;LAUNDRY;SHOPPING;MEAL PREPARATION;MEDICATION MANAGEMENT;MONEY MANAGEMENT;TRANSPORTATION;INCONTINENCE
ADLS_ACUITY_SCORE: 35

## 2023-10-23 NOTE — CONSULTS
[facility]    Neurosurgery Consultation     Date of Admission:  10/23/2023  Date of Consult (When I saw the patient): 10/23/23    Assessment & Plan   Chava Lincoln is a 89 year old male with history of prostate cancer who was admitted on 10/23/2023 with acute on chronic low back pain. Neurosurgery was consulted for new L1 and L2 compression fractures since comparison imaging on 9/7/2023. Patient had recent NELLY with [facility] but patient and wife would prefer to establish care with [facility] since this is closer to home.     Plan:   - Orthotics consult for TLSO brace  - Upright xray while wearing brace   - Lumbar spine MRI w/wo contrast   - Recommend to wear brace when upright and out of bed  - Avoid heavy lifting, bending, twisting  - Continue pain control measures as needed  - Follow up with NSGY clinic in 2 weeks with upright xray prior   - Follow up with [facility] for chronic pain management   - Appreciate assistance from specialties     I have discussed the following assessment and plan with Dr. Vegas.     Ofelia Quijano, CNP  [facility] [place] [address]  Tel [phone]  Pager [phone]    Reason for Consult   Reason for consult: I was asked by Julissa Ewing PA-C to evaluate this patient for low back pain, lumbar compression fractures.    Primary Care Physician   Erasmo Michael    Chief Complaint   Back pain     History is obtained from the patient, electronic health record, emergency department physician, and patient's spouse    History of Present Illness   Chava Lincoln is a 89 year old male with history of prostate cancer who was admitted on 10/23/2023 with acute on chronic low back pain. Patient reports chronic back pain x 4 months. PCP referred patient to [facility], recent NELLY on 10/18/23 did not provide relief. Patient  developed increased back pain on 10/21/23, possibly occurred after moving from recliner to bed. Patient has been sleeping in a recliner due to back pain. Patient presented to the ED for evaluation of ongoing back pain. Neurosurgery was consulted for new L1 and L2 vertebral body compression fractures since comparison imaging on 9/7/2023. Patient denies any recent falls. Denies radicular leg pain, numbness, or foot drop. Denies overt weakness but reports difficulty walking due to back pain. Patient and wife would prefer to establish care with [facility] since this is closer to home.     EXAM: CT ABDOMEN PELVIS W/O CONTRAST  LOCATION: [facility]  DATE: 10/23/2023  IMPRESSION:   1.  New L1 and L2 vertebral body simple compression fractures since 09/07/2023.  2.  No significant acute findings otherwise within the abdomen and pelvis.    Past Medical History   I have reviewed this patient's medical history and updated it with pertinent information if needed.   Past Medical History:   Diagnosis Date    Coronary artery disease due to calcified coronary lesion     GERD (gastroesophageal reflux disease) 7/2/2014    Hypertension     RAMON on CPAP 4/17/2017    Prostate cancer (H)     Prostate cancer (H)        Past Surgical History   I have reviewed this patient's surgical history and updated it with pertinent information if needed.  Past Surgical History:   Procedure Laterality Date    CV CORONARY ANGIOGRAM N/A 12/10/2018    Procedure: Coronary Angiogram;  Surgeon: Tana Duncan MD;  Location: [facility] Cath Lab;  Service: Cardiology    CV LEFT HEART CATHETERIZATION WITHOUT LEFT VENTRICULOGRAM Left 12/10/2018    Procedure: Left Heart Catheterization Without Left Ventriculogram;  Surgeon: Tana Duncan MD;  Location: [facility] Cath Lab;  Service: Cardiology    CV LEFT HEART CATHETERIZATION WITHOUT LEFT VENTRICULOGRAM Left 02/04/2019    Procedure: Left Heart  Catheterization Without Left Ventriculogram;  Surgeon: Tana Duncan MD;  Location: [facility] Cath Lab;  Service: Cardiology    CV RIGHT HEART CATHETERIZATION N/A 02/04/2019    Procedure: Right Heart Catheterization;  Surgeon: Tana Duncan MD;  Location: [facility] Cath Lab;  Service: Cardiology    HERNIA REPAIR      JOINT REPLACEMENT      KNEE SURGERY Bilateral     OTHER SURGICAL HISTORY      Prostatic cryotherapy       Prior to Admission Medications   Prior to Admission Medications   Prescriptions Last Dose Informant Patient Reported? Taking?   Melatonin 2.5 MG CHEW   Yes No   Sig: Take by mouth daily as needed   Polyethylene Glycol 3350 (MIRALAX PO)   Yes No   Probiotic Product (PROBIOTIC DAILY PO)   Yes No   acetaminophen (TYLENOL) 325 MG tablet   Yes No   Sig: Take 325 mg by mouth every 6 hours as needed   aspirin 81 MG EC tablet   Yes No   Sig: [ASPIRIN 81 MG EC TABLET] Take 81 mg by mouth every evening.    betamethasone dipropionate (DIPROSONE) 0.05 % external cream   Yes No   famotidine (PEPCID) 10 MG tablet   Yes No   Sig: Take 10 mg by mouth 2 times daily   fluticasone (CUTIVATE) 0.05 % external cream   Yes No   Sig: daily   gentamicin (GARAMYCIN) 0.1 % external ointment   Yes No   isosorbide mononitrate (IMDUR) 30 MG 24 hr tablet   No No   Sig: Take 1 tablet (30 mg) by mouth daily   ketoconazole (NIZORAL) 2 % external cream   Yes No   lactobacillus rhamnosus (GG) (CULTURELL) capsule   Yes No   Sig: Take 1 capsule by mouth daily   methylPREDNISolone (MEDROL DOSEPAK) 4 MG tablet therapy pack   No No   Sig: Follow Package Directions   metoprolol tartrate (LOPRESSOR) 25 MG tablet   No No   Sig: Take 1 tablet (25 mg) by mouth 2 times daily   mirabegron (MYRBETRIQ) 50 mg Tb24 ER tablet   Yes No   Sig: [MIRABEGRON (MYRBETRIQ) 50 MG TB24 ER TABLET] Take 50 mg by mouth daily.   multivitamin with minerals (THERA-M) 9 mg iron-400 mcg Tab tablet   Yes No   Sig: Take 1 tablet by mouth daily   mycophenolate  (GENERIC EQUIVALENT) 500 MG tablet   Yes No   Sig: Take 1,500 mg by mouth 2 times daily   oxybutynin (DITROPAN XL) 15 MG 24 hr tablet   Yes No   Sig: [OXYBUTYNIN (DITROPAN XL) 15 MG 24 HR TABLET] Take 15 mg by mouth daily.   oxybutynin (DITROPAN XL) 5 MG ER tablet   Yes No   Sig: Take 5 mg by mouth daily    potassium chloride (KLOR-CON) 20 MEQ packet   No No   Sig: Take 20 mEq by mouth daily   rosuvastatin (CRESTOR) 20 MG tablet   No No   Sig: Take 1 tablet (20 mg) by mouth At Bedtime   torsemide (DEMADEX) 20 MG tablet   No No   Sig: Continue on torsemide 80 in the a.m. and then 40 in the p.m.   triamcinolone (KENALOG) 0.1 % external ointment   Yes No   Sig: Apply 1 applicator topically as needed   valsartan (DIOVAN) 80 MG tablet   Yes No   Sig: Take 40 mg by mouth daily      Facility-Administered Medications: None     Allergies   Allergies   Allergen Reactions    Atorvastatin Muscle Pain (Myalgia)    Gabapentin Unknown    Lisinopril Cough    Morphine Other (See Comments)     hypotension    Sulfa (Sulfonamide Antibiotics) [Sulfa Antibiotics] Hives    Vicodin [Hydrocodone-Acetaminophen] Unknown       Social History   I have reviewed this patient's social history and updated it with pertinent information if needed. Chava Lincoln  reports that he has never smoked. He has never used smokeless tobacco. He reports current alcohol use. He reports that he does not use drugs.    Family History   I have reviewed this patient's family history and updated it with pertinent information if needed.   Family History   Problem Relation Age of Onset    Diabetes Mother     Cerebrovascular Disease Father     Heart Disease Brother     Coronary Artery Disease Brother        Review of Systems   10 point ROS negative other than symptoms noted in HPI.    Physical Exam   Temp: 97.2  F (36.2  C) Temp src: Tympanic BP: (!) 176/79 Pulse: 57   Resp: 12 SpO2: 90 %      Vital Signs with Ranges  Temp:  [97.2  F (36.2  C)] 97.2  F (36.2  " C)  Pulse:  [57-65] 57  Resp:  [12] 12  BP: (118-181)/(60-81) 176/79  SpO2:  [90 %-94 %] 90 %  262 lbs 0 oz     , Blood pressure (!) 176/79, pulse 57, temperature 97.2  F (36.2  C), temperature source Tympanic, resp. rate 12, height 1.753 m (5' 9\"), weight 118.8 kg (262 lb), SpO2 90%.  262 lbs 0 oz  HEENT:  Normocephalic, atraumatic  Heart:  No peripheral edema  Lungs:  No SOB  Skin:  Warm and dry, good capillary refill.    NEUROLOGICAL EXAMINATION:   Mental status:  Alert and Oriented x 3, speech is fluent.  Motor:   Generalized weakness but able to move BLE equally  Bilateral DF/PF 5/5   Sensation:  Intact to light touch in BLE   Reflexes:   Negative Clonus    Lumbar examination reveals no tenderness of the spine or paraspinous muscles.  Straight leg raise is negative bilaterally.     Data     CBC RESULTS:   Recent Labs   Lab Test 10/23/23  0857   WBC 8.7   RBC 4.12*   HGB 12.4*   HCT 38.3*   MCV 93   MCH 30.1   MCHC 32.4   RDW 13.8        Basic Metabolic Panel:  Lab Results   Component Value Date     10/23/2023      Lab Results   Component Value Date    POTASSIUM 3.3 10/23/2023    POTASSIUM 4.1 09/07/2023     Lab Results   Component Value Date    CHLORIDE 100 10/23/2023    CHLORIDE 99 09/07/2023     Lab Results   Component Value Date    SUZE 8.7 10/23/2023     Lab Results   Component Value Date    CO2 29 10/23/2023    CO2 33 09/07/2023     Lab Results   Component Value Date    BUN 33.3 10/23/2023    BUN 29 09/07/2023     Lab Results   Component Value Date    CR 1.32 10/23/2023     Lab Results   Component Value Date     10/23/2023     09/07/2023     INR:  Lab Results   Component Value Date    INR 1.07 09/23/2018         "

## 2023-10-23 NOTE — PROGRESS NOTES
S: Chava was seen at [facility], Room ED- 5, for fit and delivery of a OTS TLSO.  O: An [place] [employer] TLSO was set to the patient s waist size and fit to the patient. This brace features a low-profile belt and a back panel that facilitates spinal support with controlled retraction of the patient s shoulders to reduce/prevent spinal flexion. The brace was donned on myself to help show the patient how the brace functions.  G: The TLSO increases medial-lateral and anterior-posterior stability of the spine. The TLSO also applies compression to the patient s soft tissues. Compression of the patient s soft tissues serves to unload the vertebrae which reduces pain and promotes healing. This brace also offers controlled retraction of the patient s shoulders to reduce/prevent spinal flexion. The goal of this orthosis is to provide spinal stability, promote healing and reduce pain while the patient performs their ADLs.  A: I explained to the patient the purpose and function of the brace as well as donning and doffing. The patient was able to practice donning and doffing the brace. I then provided the  s instructions to the patient.  P: Patient to be seen PRN.    Electronically signed by Ronaldo Feng CP, Board Eligible Orthotist

## 2023-10-23 NOTE — CONSULTS
"Care Management Initial Consult    General Information  Assessment completed with: Patient, Spouse or significant other, Mauricio and jyothi Pratt  Type of CM/SW Visit: Initial Assessment    Primary Care Provider verified and updated as needed: Yes   Readmission within the last 30 days: no previous admission in last 30 days      Reason for Consult: discharge planning  Advance Care Planning: Advance Care Planning Reviewed: present on chart          Communication Assessment  Patient's communication style: spoken language (English or Bilingual)             Cognitive  Cognitive/Neuro/Behavioral:                        Living Environment:   People in home: spouse  Mauricio and wife Santa  Current living Arrangements: house (chair lift for stairs)      Able to return to prior arrangements: yes       Family/Social Support:  Care provided by: self, spouse/significant other  Provides care for: no one, unable/limited ability to care for self  Marital Status:   Wife  Santa       Description of Support System: Supportive, Involved    Support Assessment: Adequate family and caregiver support, Adequate social supports, Patient communicates needs well met    Current Resources:   Patient receiving home care services: No     Community Resources: None  Equipment currently used at home: walker, rolling, wheelchair, manual, wheelchair, power, orthosis, lift device, other (see comments) (\"stair chair lift, recliner lift chair, bed rails, 4WW, FWW, walker in the car, scooter, transfer wheelchair, wheelchair pads. Being fitted in ER with a TLSO back brace.\")  Supplies currently used at home: Incontinence Supplies, Wipes    Employment/Financial:  Employment Status: retired        Financial Concerns:     Referral to Financial Worker: No       Does the patient's insurance plan have a 3 day qualifying hospital stay waiver?  Yes     Which insurance plan 3 day waiver is available? [employer]    Will the waiver be used for post-acute placement? " Undetermined at this time    Lifestyle & Psychosocial Needs:  Social Determinants of Health     Food Insecurity: No Food Insecurity (3/23/2021)    Hunger Vital Sign     Worried About Running Out of Food in the Last Year: Never true     Ran Out of Food in the Last Year: Never true   Depression: Not on file   Housing Stability: Not on file   Tobacco Use: Low Risk  (9/7/2023)    Patient History     Smoking Tobacco Use: Never     Smokeless Tobacco Use: Never     Passive Exposure: Not on file   Financial Resource Strain: Low Risk  (3/23/2021)    Overall Financial Resource Strain (CARDIA)     Difficulty of Paying Living Expenses: Not hard at all   Alcohol Use: Not on file   Transportation Needs: No Transportation Needs (3/23/2021)    PRAPARE - Transportation     Lack of Transportation (Medical): No     Lack of Transportation (Non-Medical): No   Physical Activity: Inactive (3/23/2021)    Exercise Vital Sign     Days of Exercise per Week: 0 days     Minutes of Exercise per Session: 0 min   Interpersonal Safety: Not At Risk (3/23/2021)    Humiliation, Afraid, Rape, and Kick questionnaire     Fear of Current or Ex-Partner: No     Emotionally Abused: No     Physically Abused: No     Sexually Abused: No   Stress: No Stress Concern Present (3/23/2021)    [employer] [place] [employer] - Occupational Stress Questionnaire     Feeling of Stress : Only a little   Social Connections: Socially Isolated (3/23/2021)    Social Connection and Isolation Panel [NHANES]     Frequency of Communication with Friends and Family: Never     Frequency of Social Gatherings with Friends and Family: Never     Attends Bahai Services: Never     Active Member of Clubs or Organizations: No     Attends Club or Organization Meetings: Never     Marital Status:        Functional Status:  Prior to admission patient needed assistance:   Dependent ADLs:: Ambulation-walker, Bathing, Dressing, Incontinence, Transfers, Toileting (wife states  "\"he sits in the transfer wheelchair to eat at the table\")  Dependent IADLs:: Cleaning, Cooking, Laundry, Shopping, Meal Preparation, Medication Management, Money Management, Transportation, Incontinence       Mental Health Status:          Chemical Dependency Status:                Values/Beliefs:  Spiritual, Cultural Beliefs, Taoism Practices, Values that affect care:                 Additional Information:  See below.    Care Management Discharge Note    Discharge Date:  10/23/23       Discharge Disposition:  home with wife    Discharge Services:  following up with [facility] and Ortho.    Discharge DME:  new TLSO back brace    Discharge Transportation: family or friend will provide, wife will drive    Private pay costs discussed: Not applicable    Does the patient's insurance plan have a 3 day qualifying hospital stay waiver?  No    PAS Confirmation Code:    Patient/family educated on Medicare website which has current facility and service quality ratings:      Education Provided on the Discharge Plan:    Persons Notified of Discharge Plans: Mauricio and wife Santa  Patient/Family in Agreement with the Plan:      Handoff Referral Completed: No    Additional Information:  Mauricio lives in a house with wife. He has a stair chair lift for the steps he has to use.    Wife helps with ADLs and IADLs. He does not drive; wife does drive.    I was asked to see patient to assist with DME options at home. Already have many options. He already uses his scooter outside and the transfer wheelchair inside the house and outside to appointments. He uses the transfer wheelchair when sitting at the table for meals. He has multiple wheelchair seat pads. He also has a 4WW, FWW, and walker in the car. He has a stair chair lift on the steps. He has a recliner lift chair that he now has been using to sleep. He has a rail on the bed, but \"getting into the bed and out has just been too hard. It hurts my " "legs and back to lift them and swing them over into the bed. So I have just been sleeping in the recliner lift chair. The other problem with sleeping is that I have to get up many times a night to urinate\". I offered urinals and they stated, \"we have several of those at home already\".    He was fitted in the ED with a TLSO back brace.    Home with wife at discharge and wife to transport.    CM to follow for medical progression of care, discharge recommendations, and final discharge plan.    June Dueñas RN    "

## 2023-10-23 NOTE — DISCHARGE INSTRUCTIONS
You were seen and evaluated here in the ED for evaluation of abdominal pain. Fortunately laboratory evaluation and CT scan of the abdomen pelvis are negative for any obstruction or appendicitis.  It did however show new compression fractures of L1 and L2.  You are given a brace here and will follow-up closely with our neurosurgery team as well as our pain clinic and referrals for these were placed.  In the meantime, I will discharge you home with a small amount of oxycodone and recommend taking Tylenol 1,000 mg as well as lidocaine patches to help with symptoms. If possible you can also take ibuprofen 600 mg.    To help with constipation I recommend taking MiraLAX daily.    If you develop any loss control of bowel or bladder, numbness or tingling in the groin, lower extremity weakness, falls or other concerns return to the emergency department.  Otherwise, we will have you follow-up with our pain clinic, spinal specialist and recommend close follow-up with primary care as needed.

## 2023-10-23 NOTE — ED PROVIDER NOTES
EMERGENCY DEPARTMENT ENCOUNTER      NAME: Chava Lincoln  AGE: 89 year old male  YOB: 1929  MRN: [id number]  EVALUATION DATE & TIME: 10/23/2023  8:20 AM    PCP: Erasmo Michael    ED PROVIDER: Julissa Ewing PA-C      No chief complaint on file.      FINAL IMPRESSION:  1. Abdominal pain    2. Compression fracture of lumbar vertebra, unspecified lumbar vertebral level, initial encounter (H)          MEDICAL DECISION MAKING:    Pertinent Labs & Imaging studies reviewed. (See chart for details)  89 year old male presents to the Emergency Department for evaluation of abdominal pain and constipation. The patient was seen in our ED on 9/7/23 for back pain. At that time, patient had CT of the lumbar spine that was reassuring and he was discharged home with oxycodone. Since then, he has had mild relief with the oxycodone and has been struggling with constipation and abdominal discomfort. He called the nurse line several days ago and with description of symptoms was recommended he come to the ED, but he refused at that time. His constipation worsened as well as his abdominal pain and his wife was able to convince him to come to the ED. On my evaluation, the patient was vitally stable and well appearing. Examination with 5/5 strength and sensation in bilateral upper and lower extremities. Abdomen soft, diffuse tenderness to palpation, No masses, No flank tenderness. No rebound or guarding. Heart in regular rate and rhythm and lungs clear bilaterally. Lower thoracic and upper lumbar tenderness to palpation. Differential diagnosis included fracture, constipation, bowel obstruction, UTI, intraabdominal infection.     CBC with slightly low hemoglobin of 12.4 which is stable from prior, no other significant derangement. BMP with creatinine of 1.32 and GFR of 50 which is improved from prior at creatinine of 1.47 and GFR 44 1 month ago. LFTs without derangement. Lipase normal at 19. Magnesium slightly high at  2.4. UA without signs of infection. CT of the abdomen pelvis was independently interpreted by myself and did not show any obvious obstruction.  Formal read without any obstruction or other acute findings for his abdominal pain.  There is a new L1 and L2 vertebral simple compression fracture since recent lumbar CT on 9/7/2023.  I discussed findings with the patient and his wife.  With his new fracture and continued pain I did feel discussion with neurosurgery was indicated.  Spoke with the PA who recommended TLSO brace and will come evaluate the patient.  At this time, with normal neurologic exam she does not recommend emergent MRI in the emergency department.  Patient declined pain medications initially for his back pain however, after several hours in the emergency department he was requesting oxycodone which I feel is appropriate.  He was given a dose of oxycodone with improvement of symptoms.  I was able to speak with orthotics who will fit the brace here in the emergency department within the next few hours.  Patient and wife are reassured that CT did not show any obstruction or other acute cause of his abdominal pain and constipation.  I do feel his constipation is likely related to his opioid use and we discussed taking daily MiraLAX and using oxycodone sparingly for his pain.  After TLSO brace was fitted they are requesting discharge home which I feel is appropriate.  Patient will follow up with neurosurgery and referral was placed.  All questions were answered to the best my ability and he was discharged in stable condition.    Medical Decision Making    History:  Supplemental history from: Family Member/Significant Other  External Record(s) reviewed: Documented in chart, if applicable.    Work Up:  Chart documentation includes differential considered and any EKGs or imaging independently interpreted by provider, where specified.  In additional to work up documented, I considered the following work up:  Documented in chart, if applicable.    External consultation:  Discussion of management with another provider: Neurosurgery    Complicating factors:  Care impacted by chronic illness: Chronic Kidney Disease, Chronic Lung Disease, Heart Disease, Hyperlipidemia, and Hypertension  Care affected by social determinants of health: Access to Medical Care    Disposition considerations: Discharge. I prescribed additional prescription strength medication(s) as charted. See documentation for any additional details.         ED COURSE:  8:53 AM I met with the patient, obtained history, performed an initial exam, and discussed options and plan for diagnostics and treatment here in the ED.    10:30 AM I staffed the patient with Bob Guzman MD.     10:50 AM I spoke with Elva ALEJANDRA from neurosurgery who evaluated the patient and recommended TSLO brace and follow up in clinic. No recommendation for MRI in the ED.     11:20 AM I spoke with our orthotics team who is able to get his brace fitted in the next 1-2 hours. Discussed with patient and he is in agreement and understanding with the plan of care.     1:30 PM Patient discharged after being provided with extensive anticipatory guidance and given return precautions, importance of PCP follow-up emphasized.    At the conclusion of the encounter I discussed the results of all of the tests and the disposition. The questions were answered. The patient or family acknowledged understanding and was agreeable with the care plan.     MEDICATIONS GIVEN IN THE EMERGENCY:  Medications   potassium chloride (KLOR-CON) Packet 40 mEq (40 mEq Oral $Given 10/23/23 1012)   oxyCODONE (ROXICODONE) tablet 5 mg (5 mg Oral $Given 10/23/23 1343)   acetaminophen (TYLENOL) tablet 975 mg (975 mg Oral $Given 10/23/23 1343)       NEW PRESCRIPTIONS STARTED AT TODAY'S ER VISIT  Discharge Medication List as of 10/23/2023  1:48 PM        START taking these medications    Details   lidocaine (LIDODERM) 5 % patch  Place 1 patch onto the skin every 24 hours for 10 daysDisp-10 patch, R-0Local Print      oxyCODONE (ROXICODONE) 5 MG tablet Take 1 tablet (5 mg) by mouth every 6 hours as needed for pain, Disp-12 tablet, R-0, Local Print                  =================================================================    HPI:    Patient information was obtained from: The patient and wife    Use of Interpretor: N/A         Chava Lincoln is a 89 year old male with a pertinent history of RAMON, pulmonary hypertension, CKD, chronic diastolic heart failure, hypertension, hyperlipidemia,  CAD who presents to this ED via private vehicle with his wife for evaluation of abdominal pain.  The patient has been dealing with some chronic back pain for the past month for which she was seen in our emergency department followed by our spinal specialist with a steroid injection performed on 10/18/2023.  He was placed on opioid pain medications without significant relief of symptoms and has had constipation due to this.  He has been having intermittent abdominal pain with the constipation with his last bowel movement being 2 days ago, but was very small.  With his pain wife did call the nurse lying and it was recommended he come to the emergency department for rule out of obstruction, appendicitis or other acute cause of his symptoms.  On my evaluation, the patient denies any fevers or chills.  He states that he continues to have back pain despite the injection performed 6 days ago.  He has not had any loss of control of bowel or bladder, numbness or tingling in the groin or lower extremity weakness.  He denies any nausea or vomiting but states that he has had decreased oral intake over the last several weeks.  He denies any chest pain or difficulty breathing.  He does not voice any other concerns at this time.      REVIEW OF SYSTEMS:  Negative unless otherwise stated in the above HPI.       PAST MEDICAL HISTORY:  Past Medical History:   Diagnosis  Date    Coronary artery disease due to calcified coronary lesion     GERD (gastroesophageal reflux disease) 7/2/2014    Hypertension     RAMON on CPAP 4/17/2017    Prostate cancer (H)     Prostate cancer (H)        PAST SURGICAL HISTORY:  Past Surgical History:   Procedure Laterality Date    CV CORONARY ANGIOGRAM N/A 12/10/2018    Procedure: Coronary Angiogram;  Surgeon: Tana Duncan MD;  Location: [facility] Cath Lab;  Service: Cardiology    CV LEFT HEART CATHETERIZATION WITHOUT LEFT VENTRICULOGRAM Left 12/10/2018    Procedure: Left Heart Catheterization Without Left Ventriculogram;  Surgeon: Tana Duncan MD;  Location: [facility] Cath Lab;  Service: Cardiology    CV LEFT HEART CATHETERIZATION WITHOUT LEFT VENTRICULOGRAM Left 02/04/2019    Procedure: Left Heart Catheterization Without Left Ventriculogram;  Surgeon: Tana Duncan MD;  Location: [facility] Cath Lab;  Service: Cardiology    CV RIGHT HEART CATHETERIZATION N/A 02/04/2019    Procedure: Right Heart Catheterization;  Surgeon: Tana Duncan MD;  Location: [facility] Cath Lab;  Service: Cardiology    HERNIA REPAIR      JOINT REPLACEMENT      KNEE SURGERY Bilateral     OTHER SURGICAL HISTORY      Prostatic cryotherapy           CURRENT MEDICATIONS:    No current facility-administered medications for this encounter.    Current Outpatient Medications:     lidocaine (LIDODERM) 5 % patch, Place 1 patch onto the skin every 24 hours, Disp: 10 patch, Rfl: 0    oxyCODONE (ROXICODONE) 5 MG tablet, Take 1 tablet (5 mg) by mouth every 6 hours as needed for pain, Disp: 12 tablet, Rfl: 0    acetaminophen (TYLENOL) 325 MG tablet, Take 325 mg by mouth every 6 hours as needed, Disp: , Rfl:     aspirin 81 MG EC tablet, [ASPIRIN 81 MG EC TABLET] Take 81 mg by mouth every evening. , Disp: , Rfl:     betamethasone dipropionate (DIPROSONE) 0.05 % external cream, , Disp: , Rfl:     famotidine (PEPCID) 10 MG tablet, Take 10 mg by mouth 2 times daily, Disp: , Rfl:      fluticasone (CUTIVATE) 0.05 % external cream, daily, Disp: , Rfl:     gentamicin (GARAMYCIN) 0.1 % external ointment, , Disp: , Rfl:     isosorbide mononitrate (IMDUR) 30 MG 24 hr tablet, Take 1 tablet (30 mg) by mouth daily, Disp: 90 tablet, Rfl: 3    ketoconazole (NIZORAL) 2 % external cream, , Disp: , Rfl:     lactobacillus rhamnosus (GG) (CULTURELL) capsule, Take 1 capsule by mouth daily, Disp: , Rfl:     Melatonin 2.5 MG CHEW, Take by mouth daily as needed, Disp: , Rfl:     methylPREDNISolone (MEDROL DOSEPAK) 4 MG tablet therapy pack, Follow Package Directions, Disp: 21 tablet, Rfl: 0    metoprolol tartrate (LOPRESSOR) 25 MG tablet, Take 1 tablet (25 mg) by mouth 2 times daily, Disp: 180 tablet, Rfl: 1    mirabegron (MYRBETRIQ) 50 mg Tb24 ER tablet, [MIRABEGRON (MYRBETRIQ) 50 MG TB24 ER TABLET] Take 50 mg by mouth daily., Disp: , Rfl:     multivitamin with minerals (THERA-M) 9 mg iron-400 mcg Tab tablet, Take 1 tablet by mouth daily, Disp: , Rfl:     mycophenolate (GENERIC EQUIVALENT) 500 MG tablet, Take 1,500 mg by mouth 2 times daily, Disp: , Rfl:     oxybutynin (DITROPAN XL) 15 MG 24 hr tablet, [OXYBUTYNIN (DITROPAN XL) 15 MG 24 HR TABLET] Take 15 mg by mouth daily., Disp: , Rfl:     oxybutynin (DITROPAN XL) 5 MG ER tablet, Take 5 mg by mouth daily , Disp: , Rfl:     Polyethylene Glycol 3350 (MIRALAX PO), , Disp: , Rfl:     potassium chloride (KLOR-CON) 20 MEQ packet, Take 20 mEq by mouth daily, Disp: 90 each, Rfl: 0    Probiotic Product (PROBIOTIC DAILY PO), , Disp: , Rfl:     rosuvastatin (CRESTOR) 20 MG tablet, Take 1 tablet (20 mg) by mouth At Bedtime, Disp: 90 tablet, Rfl: 3    torsemide (DEMADEX) 20 MG tablet, Continue on torsemide 80 in the a.m. and then 40 in the p.m., Disp: 540 tablet, Rfl: 3    triamcinolone (KENALOG) 0.1 % external ointment, Apply 1 applicator topically as needed, Disp: , Rfl:     valsartan (DIOVAN) 80 MG tablet, Take 40 mg by mouth daily, Disp: , Rfl:       ALLERGIES:  Allergies    Allergen Reactions    Atorvastatin Muscle Pain (Myalgia)    Gabapentin Unknown    Lisinopril Cough    Morphine Other (See Comments)     hypotension    Sulfa (Sulfonamide Antibiotics) [Sulfa Antibiotics] Hives    Vicodin [Hydrocodone-Acetaminophen] Unknown       FAMILY HISTORY:  Family History   Problem Relation Age of Onset    Diabetes Mother     Cerebrovascular Disease Father     Heart Disease Brother     Coronary Artery Disease Brother        SOCIAL HISTORY:   Social History     Socioeconomic History    Marital status:      Spouse name: Santa    Number of children: 0    Highest education level: 12th grade   Occupational History    Occupation: Retired    Tobacco Use    Smoking status: Never    Smokeless tobacco: Never   Substance and Sexual Activity    Alcohol use: Yes     Comment: Alcoholic Drinks/day: 2-3 beer/ month    Drug use: No    Sexual activity: Not Currently     Partners: Female     Social Determinants of Health     Financial Resource Strain: Low Risk  (3/23/2021)    Overall Financial Resource Strain (CARDIA)     Difficulty of Paying Living Expenses: Not hard at all   Food Insecurity: No Food Insecurity (3/23/2021)    Hunger Vital Sign     Worried About Running Out of Food in the Last Year: Never true     Ran Out of Food in the Last Year: Never true   Transportation Needs: No Transportation Needs (3/23/2021)    PRAPARE - Transportation     Lack of Transportation (Medical): No     Lack of Transportation (Non-Medical): No   Physical Activity: Inactive (3/23/2021)    Exercise Vital Sign     Days of Exercise per Week: 0 days     Minutes of Exercise per Session: 0 min   Stress: No Stress Concern Present (3/23/2021)    [employer] [place] [employer] - Occupational Stress Questionnaire     Feeling of Stress : Only a little   Social Connections: Socially Isolated (3/23/2021)    Social Connection and Isolation Panel [NHANES]     Frequency of Communication with Friends and Family: Never      Frequency of Social Gatherings with Friends and Family: Never     Attends Shinto Services: Never     Active Member of Clubs or Organizations: No     Attends Club or Organization Meetings: Never     Marital Status:    Interpersonal Safety: Not At Risk (3/23/2021)    Humiliation, Afraid, Rape, and Kick questionnaire     Fear of Current or Ex-Partner: No     Emotionally Abused: No     Physically Abused: No     Sexually Abused: No       VITALS:  Vitals:    10/23/23 0912 10/23/23 1012 10/23/23 1200 10/23/23 1347   BP: 133/64 (!) 170/76 (!) 176/79 (!) 185/81   Pulse: 57   70   Resp:    18   Temp:       TempSrc:       SpO2: 90%   90%   Weight:       Height:           PHYSICAL EXAM    Constitutional: Well developed, Well nourished, NAD  HENT: Normocephalic, Atraumatic, Bilateral external ears normal, Oropharynx normal, mucous membranes moist, Nose normal.   Neck: Normal range of motion, No tenderness, Supple, No stridor.  Eyes: PERRL, EOMI, Conjunctiva normal, No discharge.   Respiratory: Normal breath sounds, No respiratory distress, No wheezing, Speaks full sentences easily. No cough.  Cardiovascular: Normal heart rate, Regular rhythm, No murmurs, No rubs, No gallops. Chest wall nontender.  GI: Soft, diffuse tenderness to palpation, No masses, No flank tenderness. No rebound or guarding.  Musculoskeletal: 2+ DP pulses. No edema. No cyanosis, No clubbing. Good range of motion in all major joints. No tenderness to palpation or major deformities noted. Tenderness to palpation of the midline lower thoracic and upper lumbar spine.  Integument: Warm, Dry, No erythema, No rash. No petechiae.  Neurologic: 5/5 strength and sensation in bilateral upper and lower extremities. Alert & oriented x 3, Normal motor function, Normal sensory function, No focal deficits noted. Normal gait.  Psychiatric: Affect normal, Judgment normal, Mood normal. Cooperative.    LAB:  All pertinent labs reviewed and interpreted.  No results found  for this or any previous visit (from the past 24 hour(s)).      RADIOLOGY:  Reviewed all pertinent imaging. Please see official radiology report.  CT Abdomen Pelvis w/o Contrast   Final Result   IMPRESSION:    1.  New L1 and L2 vertebral body simple compression fractures since 09/07/2023.      2.  No significant acute findings otherwise within the abdomen and pelvis.             PROCEDURES:   None.     Julissa Ewing PA-C  Emergency Medicine  [facility]  10/23/2023      Julissa Ewing PA-C  10/30/23 0843

## 2023-10-23 NOTE — ED TRIAGE NOTES
Pt is here as he's been having intermitent Right mid to lower abd pain for the the last month. Appetite has been less. No nausea or vomiting.  Pt has had some constipation  for the last month.wife is with pt.[facility]- pt has been getting steroid injections for back issues since sept.

## 2023-10-23 NOTE — ED PROVIDER NOTES
"Emergency Department Midlevel Supervisory Note     I personally saw the patient and performed a substantive portion of the visit including all aspects of the medical decision making.    ED Course:  10:40 AM Julissa Ewing PA-C staffed patient with me. I agree with their assessment and plan of management, and I will see the patient.  12:15 PM I met with the patient to introduce myself, gather additional history, perform my initial exam, and discuss the plan.       No diagnosis found.  Brief HPI:     Chava Lincoln is a 89 year old male who presents for evaluation of abdominal pain.  Patient with chronic back pain and also some intermittent abdominal pain and constipation now.    I, Edmar Pantoja , am serving as a scribe to document services personally performed by Bob Guzman MD, based on my observations and the provider's statements to me.   I, Bob Guzman MD attest that Edmar Pantoja was acting in a scribe capacity, has observed my performance of the services and has documented them in accordance with my direction.    Brief Physical Exam: BP (!) 170/76   Pulse 57   Temp 97.2  F (36.2  C) (Tympanic)   Resp 12   Ht 1.753 m (5' 9\")   Wt 118.8 kg (262 lb)   SpO2 90%   BMI 38.69 kg/m    Physical Exam  Vitals and nursing note reviewed.   Constitutional:       Appearance: Normal appearance.   HENT:      Head: Normocephalic and atraumatic.      Right Ear: External ear normal.      Left Ear: External ear normal.      Nose: Nose normal.   Eyes:      Extraocular Movements: Extraocular movements intact.      Conjunctiva/sclera: Conjunctivae normal.      Pupils: Pupils are equal, round, and reactive to light.   Pulmonary:      Effort: Pulmonary effort is normal.   Musculoskeletal:         General: No swelling or deformity. Normal range of motion.      Cervical back: Normal range of motion.   Neurological:      General: No focal deficit present.      Mental Status: He is alert and oriented to person, place, and " time. Mental status is at baseline.   Psychiatric:         Mood and Affect: Mood normal.         Behavior: Behavior normal.         Thought Content: Thought content normal.            Labs and Imaging:  Results for orders placed or performed during the hospital encounter of 10/23/23   CT Abdomen Pelvis w/o Contrast    Impression    IMPRESSION:   1.  New L1 and L2 vertebral body simple compression fractures since 09/07/2023.    2.  No significant acute findings otherwise within the abdomen and pelvis.     CBC (+ platelets, no diff)   Result Value Ref Range    WBC Count 8.7 4.0 - 11.0 10e3/uL    RBC Count 4.12 (L) 4.40 - 5.90 10e6/uL    Hemoglobin 12.4 (L) 13.3 - 17.7 g/dL    Hematocrit 38.3 (L) 40.0 - 53.0 %    MCV 93 78 - 100 fL    MCH 30.1 26.5 - 33.0 pg    MCHC 32.4 31.5 - 36.5 g/dL    RDW 13.8 10.0 - 15.0 %    Platelet Count 239 150 - 450 10e3/uL   Basic metabolic panel   Result Value Ref Range    Sodium 142 135 - 145 mmol/L    Potassium 3.3 (L) 3.4 - 5.3 mmol/L    Chloride 100 98 - 107 mmol/L    Carbon Dioxide (CO2) 29 22 - 29 mmol/L    Anion Gap 13 7 - 15 mmol/L    Urea Nitrogen 33.3 (H) 8.0 - 23.0 mg/dL    Creatinine 1.32 (H) 0.67 - 1.17 mg/dL    GFR Estimate 50 (L) >60 mL/min/1.73m2    Calcium 8.7 8.2 - 9.6 mg/dL    Glucose 141 (H) 70 - 99 mg/dL   Hepatic function panel   Result Value Ref Range    Protein Total 6.5 6.4 - 8.3 g/dL    Albumin 3.6 3.5 - 5.2 g/dL    Bilirubin Total 0.3 <=1.2 mg/dL    Alkaline Phosphatase 125 40 - 129 U/L    AST 24 0 - 45 U/L    ALT 14 0 - 70 U/L    Bilirubin Direct <0.20 0.00 - 0.30 mg/dL   Result Value Ref Range    Lipase 19 13 - 60 U/L   Result Value Ref Range    Magnesium 2.4 (H) 1.7 - 2.3 mg/dL     I have reviewed the relevant laboratory and radiology studies    Procedures:  I was present for the key portions of this procedure: none    Bob Guzman MD  [facility] EMERGENCY DEPARTMENT  [facility] [facility] [address] [phone]        Bob Guzman MD  10/23/23 3243

## 2023-10-23 NOTE — ED NOTES
Gave patient a urinal to collect urine sample. Wife is helping patient with it and will turn call light on when he actually is able to urinate.

## 2023-10-23 NOTE — ED NOTES
Sats noted to be 82% on r/a 02 applied at 2l/nc. Sats up to 98% after approx 10 min. Provider in room and turned 02 off.  Continue to monitor and reapply prn.

## 2023-10-24 ENCOUNTER — TELEPHONE (OUTPATIENT)
Dept: NEUROSURGERY | Facility: CLINIC | Age: 88
End: 2023-10-24
Payer: MEDICARE

## 2023-10-24 DIAGNOSIS — S32.000A LUMBAR COMPRESSION FRACTURE (H): Primary | ICD-10-CM

## 2023-10-24 NOTE — TELEPHONE ENCOUNTER
PATIENT NAME:  Chava Lincoln  YOB: 1929  MRN: [id number]  SURGEON: Dr. Vegas  DATE of CONSULT: 10/23/2023  Consult for L1 and L2 fractures    FOLLOW-UP PLAN:    Hospital Follow Up Visit: 2 weeks  Provider: NIKOLAI    DIAGNOSTICS:  XR and MRI  DISPOSITION:  Home same day    ADDITIONAL INSTRUCTIONS FOR MEDICAL STAFF:    Romina Henderson RN, CNRN

## 2023-12-12 DIAGNOSIS — I25.10 CORONARY ARTERY DISEASE INVOLVING NATIVE CORONARY ARTERY OF NATIVE HEART WITHOUT ANGINA PECTORIS: ICD-10-CM

## 2023-12-13 RX ORDER — METOPROLOL TARTRATE 25 MG/1
25 TABLET, FILM COATED ORAL 2 TIMES DAILY
Qty: 180 TABLET | Refills: 1 | Status: SHIPPED | OUTPATIENT
Start: 2023-12-13 | End: 2024-07-30

## 2023-12-20 ENCOUNTER — LAB REQUISITION (OUTPATIENT)
Dept: LAB | Facility: CLINIC | Age: 88
End: 2023-12-20
Payer: MEDICARE

## 2023-12-20 ENCOUNTER — TRANSFERRED RECORDS (OUTPATIENT)
Dept: HEALTH INFORMATION MANAGEMENT | Facility: CLINIC | Age: 88
End: 2023-12-20

## 2023-12-20 DIAGNOSIS — R09.89 OTHER SPECIFIED SYMPTOMS AND SIGNS INVOLVING THE CIRCULATORY AND RESPIRATORY SYSTEMS: ICD-10-CM

## 2023-12-20 PROCEDURE — 80053 COMPREHEN METABOLIC PANEL: CPT | Mod: ORL | Performed by: NURSE PRACTITIONER

## 2023-12-21 ENCOUNTER — TRANSFERRED RECORDS (OUTPATIENT)
Dept: HEALTH INFORMATION MANAGEMENT | Facility: CLINIC | Age: 88
End: 2023-12-21

## 2023-12-21 LAB
ALBUMIN SERPL BCG-MCNC: 3.9 G/DL (ref 3.5–5.2)
ALP SERPL-CCNC: 78 U/L (ref 40–150)
ALT SERPL W P-5'-P-CCNC: 13 U/L (ref 0–70)
ANION GAP SERPL CALCULATED.3IONS-SCNC: 14 MMOL/L (ref 7–15)
AST SERPL W P-5'-P-CCNC: 20 U/L (ref 0–45)
BILIRUB SERPL-MCNC: 0.2 MG/DL
BUN SERPL-MCNC: 34.9 MG/DL (ref 8–23)
CALCIUM SERPL-MCNC: 9 MG/DL (ref 8.2–9.6)
CHLORIDE SERPL-SCNC: 99 MMOL/L (ref 98–107)
CREAT SERPL-MCNC: 1.23 MG/DL (ref 0.67–1.17)
DEPRECATED HCO3 PLAS-SCNC: 29 MMOL/L (ref 22–29)
EGFRCR SERPLBLD CKD-EPI 2021: 54 ML/MIN/1.73M2
GLUCOSE SERPL-MCNC: 188 MG/DL (ref 70–99)
POTASSIUM SERPL-SCNC: 3.6 MMOL/L (ref 3.4–5.3)
PROT SERPL-MCNC: 6.5 G/DL (ref 6.4–8.3)
SODIUM SERPL-SCNC: 142 MMOL/L (ref 135–145)

## 2023-12-23 ENCOUNTER — HEALTH MAINTENANCE LETTER (OUTPATIENT)
Age: 88
End: 2023-12-23

## 2024-01-10 ASSESSMENT — SLEEP AND FATIGUE QUESTIONNAIRES
HOW LIKELY ARE YOU TO NOD OFF OR FALL ASLEEP WHILE WATCHING TV: SLIGHT CHANCE OF DOZING
HOW LIKELY ARE YOU TO NOD OFF OR FALL ASLEEP WHEN YOU ARE A PASSENGER IN A CAR FOR AN HOUR WITHOUT A BREAK: WOULD NEVER DOZE
HOW LIKELY ARE YOU TO NOD OFF OR FALL ASLEEP WHILE SITTING INACTIVE IN A PUBLIC PLACE: SLIGHT CHANCE OF DOZING
HOW LIKELY ARE YOU TO NOD OFF OR FALL ASLEEP WHILE SITTING QUIETLY AFTER LUNCH WITHOUT ALCOHOL: WOULD NEVER DOZE
HOW LIKELY ARE YOU TO NOD OFF OR FALL ASLEEP WHILE SITTING AND READING: MODERATE CHANCE OF DOZING
HOW LIKELY ARE YOU TO NOD OFF OR FALL ASLEEP WHILE LYING DOWN TO REST IN THE AFTERNOON WHEN CIRCUMSTANCES PERMIT: SLIGHT CHANCE OF DOZING
HOW LIKELY ARE YOU TO NOD OFF OR FALL ASLEEP WHILE SITTING AND TALKING TO SOMEONE: WOULD NEVER DOZE
HOW LIKELY ARE YOU TO NOD OFF OR FALL ASLEEP IN A CAR, WHILE STOPPED FOR A FEW MINUTES IN TRAFFIC: WOULD NEVER DOZE

## 2024-01-11 ENCOUNTER — MEDICAL CORRESPONDENCE (OUTPATIENT)
Dept: HEALTH INFORMATION MANAGEMENT | Facility: CLINIC | Age: 89
End: 2024-01-11

## 2024-01-11 ENCOUNTER — OFFICE VISIT (OUTPATIENT)
Dept: SLEEP MEDICINE | Facility: CLINIC | Age: 89
End: 2024-01-11
Payer: MEDICARE

## 2024-01-11 VITALS
RESPIRATION RATE: 20 BRPM | DIASTOLIC BLOOD PRESSURE: 69 MMHG | SYSTOLIC BLOOD PRESSURE: 128 MMHG | HEART RATE: 59 BPM | OXYGEN SATURATION: 93 %

## 2024-01-11 DIAGNOSIS — I10 ESSENTIAL HYPERTENSION: ICD-10-CM

## 2024-01-11 DIAGNOSIS — G47.33 OSA (OBSTRUCTIVE SLEEP APNEA): Primary | ICD-10-CM

## 2024-01-11 PROCEDURE — 99214 OFFICE O/P EST MOD 30 MIN: CPT | Performed by: INTERNAL MEDICINE

## 2024-01-11 NOTE — PROGRESS NOTES
Additional 10 minutes on the date of service was spent performing the following:    -Preparing to see the patient  -Ordering medications, tests, or procedures   -Documenting clinical information in the electronic or other health record     Thank you for the opportunity to participate in the care of Chava Lincoln.     He is a 89 year old y/o male patient who comes to the sleep medicine clinic for follow up.The patient was diagnosed with RAMON on 06/04/07 (AHI=32).  Since the patient's last clinical visit with me he continues to benefit from CPAP therapy.  He would like to switch over durable medical equipment care from [employer] back to [employer].     Assessment and Plan:  In summary Chava Lincoln is a 89 year old year old male who is here for follow up.  1. RAMON (obstructive sleep apnea)  I congratulated patient on his excellent CPAP usage.  I will keep him the same pressure settings for now.  Return to clinic annually.  - COMPREHENSIVE DME    2. Essential hypertension  Informed the patient about the relationship between untreated RAMON and HTN. Will give the patient a handout on HTN in the AVS.    Compliance Download data for 30 days:  Compliance: 100%  Pressure setting: CPAP 14 CWP  Leak: Minimal  Residual AHI: 3.1 events per hour  Mask Tolerance: Good  Skin irritation: None  [facility]    Lab reviewed: Discussed with patient.    Cpap Fu Template    Question 1/10/2024  2:47 PM CST - Filed by Patient   Do you use a CPAP Machine at home? Yes   Overall, on a scale of 0-10 how would you rate your CPAP? 7   Is your mask comfortable? Yes   Is your mask leaking?    Do you notice snoring with mask on? No   Do you notice gasping arousals with mask on? No   Are you having significant oral or nasal dryness? Yes   Is the pressure setting comfortable? Yes   What type of mask do you use? Full Face Mask   What is your typical bedtime? 10:00 PM   How long does it take you  to go to sleep on PAP therapy? Varies - could be up to one hour   What time do you typically get out of bed for the day? Varies but by 7:00 AM   How many hours on average per night are you using PAP therapy? 100 percent   How many hours are you sleeping per night? 8   Do you feel well rested in the morning? No       GINA:  GINA Total Score: 13  Total score - Colona: 5 (1/10/2024  2:50 PM)       Patient Active Problem List   Diagnosis    GERD (gastroesophageal reflux disease)    RAMON on CPAP    Near syncope    Dyspnea on exertion    Essential hypertension    Dyslipidemia    Coronary artery disease involving native coronary artery of native heart without angina pectoris    Ascending aortic aneurysm (H24)    Coronary artery calcification seen on CAT scan    Pulmonary hypertension (H)    History of pulmonary embolism    Hypoxia    Morbid obesity (H)    Bilateral lower extremity edema    Chronic kidney disease, stage 3 (H)    (HFpEF) heart failure with preserved ejection fraction (H)    Abdominal pain, epigastric    Chronic diastolic congestive heart failure (H)       Past Medical History:   Diagnosis Date    Coronary artery disease due to calcified coronary lesion     GERD (gastroesophageal reflux disease) 7/2/2014    Hypertension     RAMON on CPAP 4/17/2017    Prostate cancer (H)     Prostate cancer (H)        Past Surgical History:   Procedure Laterality Date    CV CORONARY ANGIOGRAM N/A 12/10/2018    Procedure: Coronary Angiogram;  Surgeon: Tana Duncan MD;  Location: [facility] Cath Lab;  Service: Cardiology    CV LEFT HEART CATHETERIZATION WITHOUT LEFT VENTRICULOGRAM Left 12/10/2018    Procedure: Left Heart Catheterization Without Left Ventriculogram;  Surgeon: Tana Duncan MD;  Location: [facility] Cath Lab;  Service: Cardiology    CV LEFT HEART CATHETERIZATION WITHOUT LEFT VENTRICULOGRAM Left 02/04/2019    Procedure: Left Heart Catheterization Without Left Ventriculogram;  Surgeon: Tana Duncan MD;   Location: [facility] Cath Lab;  Service: Cardiology    CV RIGHT HEART CATHETERIZATION N/A 02/04/2019    Procedure: Right Heart Catheterization;  Surgeon: Tana Duncan MD;  Location: [facility] Cath Lab;  Service: Cardiology    HERNIA REPAIR      JOINT REPLACEMENT      KNEE SURGERY Bilateral     OTHER SURGICAL HISTORY      Prostatic cryotherapy       Current Outpatient Medications   Medication Sig Dispense Refill    acetaminophen (TYLENOL) 325 MG tablet Take 325 mg by mouth every 6 hours as needed      aspirin 81 MG EC tablet [ASPIRIN 81 MG EC TABLET] Take 81 mg by mouth every evening.       betamethasone dipropionate (DIPROSONE) 0.05 % external cream       famotidine (PEPCID) 10 MG tablet Take 10 mg by mouth 2 times daily      fluticasone (CUTIVATE) 0.05 % external cream daily      gentamicin (GARAMYCIN) 0.1 % external ointment       isosorbide mononitrate (IMDUR) 30 MG 24 hr tablet Take 1 tablet (30 mg) by mouth daily 90 tablet 3    ketoconazole (NIZORAL) 2 % external cream       lactobacillus rhamnosus (GG) (CULTURELL) capsule Take 1 capsule by mouth daily      Melatonin 2.5 MG CHEW Take by mouth daily as needed      metoprolol tartrate (LOPRESSOR) 25 MG tablet TAKE 1 TABLET(25 MG) BY MOUTH TWICE DAILY 180 tablet 1    mirabegron (MYRBETRIQ) 50 mg Tb24 ER tablet [MIRABEGRON (MYRBETRIQ) 50 MG TB24 ER TABLET] Take 50 mg by mouth daily.      multivitamin with minerals (THERA-M) 9 mg iron-400 mcg Tab tablet Take 1 tablet by mouth daily      mycophenolate (GENERIC EQUIVALENT) 500 MG tablet Take 1,500 mg by mouth 2 times daily      oxybutynin (DITROPAN XL) 15 MG 24 hr tablet [OXYBUTYNIN (DITROPAN XL) 15 MG 24 HR TABLET] Take 15 mg by mouth daily.      oxybutynin (DITROPAN XL) 5 MG ER tablet Take 5 mg by mouth daily       oxyCODONE (ROXICODONE) 5 MG tablet Take 1 tablet (5 mg) by mouth every 6 hours as needed for pain 12 tablet 0    Polyethylene Glycol 3350 (MIRALAX PO)       potassium chloride (KLOR-CON) 20 MEQ  "packet Take 20 mEq by mouth daily 90 each 0    Probiotic Product (PROBIOTIC DAILY PO)       rosuvastatin (CRESTOR) 20 MG tablet Take 1 tablet (20 mg) by mouth At Bedtime 90 tablet 3    torsemide (DEMADEX) 20 MG tablet Continue on torsemide 80 in the a.m. and then 40 in the p.m. 540 tablet 3    triamcinolone (KENALOG) 0.1 % external ointment Apply 1 applicator topically as needed      valsartan (DIOVAN) 80 MG tablet Take 40 mg by mouth daily      lidocaine (LIDODERM) 5 % patch Place 1 patch onto the skin every 24 hours 10 patch 0    methylPREDNISolone (MEDROL DOSEPAK) 4 MG tablet therapy pack Follow Package Directions 21 tablet 0       Allergies   Allergen Reactions    Acetaminophen     Atorvastatin Muscle Pain (Myalgia)    Furosemide     Gabapentin Unknown    Hydrocodone     Lisinopril Cough    Morphine Other (See Comments)     hypotension    Sulfa (Sulfonamide Antibiotics) [Sulfa Antibiotics] Hives    Sulfatolamide     Vicodin [Hydrocodone-Acetaminophen] Unknown       Physical Exam:  /69   Pulse 59   Resp 20   SpO2 93%   BMI:There is no height or weight on file to calculate BMI.   GEN: NAD, patient is in a wheelchair.  Head: Normocephalic.  EYES: EOMI  Psych: normal mood, normal affect    Labs/Studies:      Lab Results   Component Value Date    PO2 31 02/04/2019    PO2 32 02/04/2019    PCO2 43.3 02/04/2019    PCO2 43.7 02/04/2019    HCO3 25.2 02/04/2019    HCO3 25.2 02/04/2019     No results found for: \"TSH\"  Lab Results   Component Value Date     (H) 12/20/2023     (H) 10/23/2023     Lab Results   Component Value Date    HGB 12.4 (L) 10/23/2023    HGB 11.3 (L) 09/07/2023     Lab Results   Component Value Date    BUN 34.9 (H) 12/20/2023    BUN 33.3 (H) 10/23/2023    CR 1.23 (H) 12/20/2023    CR 1.32 (H) 10/23/2023     Lab Results   Component Value Date    AST 20 12/20/2023    AST 24 10/23/2023    ALT 13 12/20/2023    ALT 14 10/23/2023    ALKPHOS 78 12/20/2023    ALKPHOS 125 10/23/2023    " "BILITOTAL 0.2 12/20/2023    BILITOTAL 0.3 10/23/2023     No results found for: \"UAMP\", \"UBARB\", \"BENZODIAZEUR\", \"UCANN\", \"UCOC\", \"OPIT\", \"UPCP\"    Recent Labs   Lab Test 12/20/23  0931 10/23/23  0857    142   POTASSIUM 3.6 3.3*   CHLORIDE 99 100   CO2 29 29   ANIONGAP 14 13   * 141*   BUN 34.9* 33.3*   CR 1.23* 1.32*   SUZE 9.0 8.7       No results found for: \"JUANA\"    I reviewed the efficacy and compliance report from his device. Data summarized on the HPI and the PAP compliance flow sheet.     Patient verbalized understanding of these issues, agrees with the plan and all questions were answered today. Patient was given an opportuntity to voice any other symptoms or concerns not listed above. Patient did not have any other symptoms or concerns.      Pipo Segovia DO  Board Certified in Internal Medicine and Sleep Medicine    (Note created with Dragon voice recognition and unintended spelling errors and word substitutions may occur)     Audio and visual devices were used for this virtual clinic visit with permission from patient.    "

## 2024-01-25 ENCOUNTER — MEDICAL CORRESPONDENCE (OUTPATIENT)
Dept: HEALTH INFORMATION MANAGEMENT | Facility: CLINIC | Age: 89
End: 2024-01-25
Payer: MEDICARE

## 2024-01-29 ENCOUNTER — OFFICE VISIT (OUTPATIENT)
Dept: CARDIOLOGY | Facility: CLINIC | Age: 89
End: 2024-01-29
Payer: MEDICARE

## 2024-01-29 VITALS
RESPIRATION RATE: 20 BRPM | OXYGEN SATURATION: 91 % | DIASTOLIC BLOOD PRESSURE: 50 MMHG | HEART RATE: 84 BPM | SYSTOLIC BLOOD PRESSURE: 120 MMHG

## 2024-01-29 DIAGNOSIS — E78.5 DYSLIPIDEMIA: ICD-10-CM

## 2024-01-29 DIAGNOSIS — I10 ESSENTIAL HYPERTENSION: ICD-10-CM

## 2024-01-29 DIAGNOSIS — I25.10 CORONARY ARTERY DISEASE INVOLVING NATIVE CORONARY ARTERY OF NATIVE HEART WITHOUT ANGINA PECTORIS: ICD-10-CM

## 2024-01-29 DIAGNOSIS — E66.01 MORBID OBESITY (H): ICD-10-CM

## 2024-01-29 DIAGNOSIS — N18.32 STAGE 3B CHRONIC KIDNEY DISEASE (H): ICD-10-CM

## 2024-01-29 DIAGNOSIS — I50.32 CHRONIC DIASTOLIC CONGESTIVE HEART FAILURE (H): Primary | ICD-10-CM

## 2024-01-29 DIAGNOSIS — I71.21 ANEURYSM OF ASCENDING AORTA WITHOUT RUPTURE (H): ICD-10-CM

## 2024-01-29 LAB
ALBUMIN SERPL BCG-MCNC: 3.9 G/DL (ref 3.5–5.2)
ALP SERPL-CCNC: 84 U/L (ref 40–150)
ALT SERPL W P-5'-P-CCNC: 12 U/L (ref 0–70)
ANION GAP SERPL CALCULATED.3IONS-SCNC: 8 MMOL/L (ref 7–15)
AST SERPL W P-5'-P-CCNC: 21 U/L (ref 0–45)
BILIRUB SERPL-MCNC: 0.2 MG/DL
BUN SERPL-MCNC: 32.5 MG/DL (ref 8–23)
CALCIUM SERPL-MCNC: 8.8 MG/DL (ref 8.2–9.6)
CHLORIDE SERPL-SCNC: 99 MMOL/L (ref 98–107)
CHOLEST SERPL-MCNC: 136 MG/DL
CREAT SERPL-MCNC: 1.28 MG/DL (ref 0.67–1.17)
DEPRECATED HCO3 PLAS-SCNC: 31 MMOL/L (ref 22–29)
EGFRCR SERPLBLD CKD-EPI 2021: 52 ML/MIN/1.73M2
ERYTHROCYTE [DISTWIDTH] IN BLOOD BY AUTOMATED COUNT: 13.4 % (ref 10–15)
FASTING STATUS PATIENT QL REPORTED: NO
GLUCOSE SERPL-MCNC: 149 MG/DL (ref 70–99)
HCT VFR BLD AUTO: 37.9 % (ref 40–53)
HDLC SERPL-MCNC: 46 MG/DL
HGB BLD-MCNC: 12.4 G/DL (ref 13.3–17.7)
LDLC SERPL CALC-MCNC: 56 MG/DL
MAGNESIUM SERPL-MCNC: 2.1 MG/DL (ref 1.7–2.3)
MCH RBC QN AUTO: 30.2 PG (ref 26.5–33)
MCHC RBC AUTO-ENTMCNC: 32.7 G/DL (ref 31.5–36.5)
MCV RBC AUTO: 92 FL (ref 78–100)
NONHDLC SERPL-MCNC: 90 MG/DL
PLATELET # BLD AUTO: 222 10E3/UL (ref 150–450)
POTASSIUM SERPL-SCNC: 3.5 MMOL/L (ref 3.4–5.3)
PROT SERPL-MCNC: 6.4 G/DL (ref 6.4–8.3)
RBC # BLD AUTO: 4.11 10E6/UL (ref 4.4–5.9)
SODIUM SERPL-SCNC: 138 MMOL/L (ref 135–145)
TRIGL SERPL-MCNC: 168 MG/DL
WBC # BLD AUTO: 9.1 10E3/UL (ref 4–11)

## 2024-01-29 PROCEDURE — G2211 COMPLEX E/M VISIT ADD ON: HCPCS | Performed by: INTERNAL MEDICINE

## 2024-01-29 PROCEDURE — 83735 ASSAY OF MAGNESIUM: CPT | Performed by: INTERNAL MEDICINE

## 2024-01-29 PROCEDURE — 36415 COLL VENOUS BLD VENIPUNCTURE: CPT | Performed by: INTERNAL MEDICINE

## 2024-01-29 PROCEDURE — 85027 COMPLETE CBC AUTOMATED: CPT | Performed by: INTERNAL MEDICINE

## 2024-01-29 PROCEDURE — 80053 COMPREHEN METABOLIC PANEL: CPT | Performed by: INTERNAL MEDICINE

## 2024-01-29 PROCEDURE — 80061 LIPID PANEL: CPT | Performed by: INTERNAL MEDICINE

## 2024-01-29 PROCEDURE — 99214 OFFICE O/P EST MOD 30 MIN: CPT | Performed by: INTERNAL MEDICINE

## 2024-01-29 NOTE — LETTER
1/29/2024    Erasmo Michael MD  [id number]  [place] [address]    RE: Chava Lincoln       Dear Colleague,     I had the pleasure of seeing Chava Lincoln in the [facility].            Assessment/Plan:   1.  Chronic congestive heart failure with preserved ejection fraction: The patient has been doing stable.  He lost more than 10 pounds over last 2 months.  His leg edema has improved.  Continue torsemide 80 mg a.m., 40 mg p.m.    Routine labs, CBC CMP, Lipid profile today.  We will follow-up his laboratory test the report and discuss the findings with the patient to decide if he needs to modify his diuretics because he continues to lose weight.  He did not eat as much as before.    2.  Coronary artery disease s/p KATHY to Ramus, severe calcified RCA lesion with high risk of revascularization, no obstructive lesion in left main and LAD per IVUS:  He has no chest pain.  Continue medical treatment.  Continue aspirin, Crestor, isosorbide mononitrate and metoprolol.      3.  Essential hypertension: His blood pressure is controlled, continue valsartan 80 mg daily and metoprolol 25 mg twice a day     4.  Dyslipidemia: Continue Crestor 20 mg nightly.  Lipid profile and liver function today.     5.  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     6.  Morbid obesity and obstructive sleep apnea: Lifestyle modification and continue CPAP.    7.  Stage III CKD: Check renal function today.    Thank you for the opportunity to be involved in the care of Chava Lincoln. If you have any questions, please feel free to contact me.  I will see the patient again in 6 months and as needed.    Much or all of the text in this note was generated through the use of Dragon Dictate voice-to-text software. Errors in spelling or words which seem out of context are unintentional. Sound alike errors, in particular, may have escaped editing.       History of Present Illness:   It is my pleasure  to see Chava Lincoln at the [facility] for routine cardiology follow up.  Chava Lincoln is a 89 year old male with a medical history of coronary artery disease, s/p KATHY to Ramus in 2-2019, no obstructive disease in LM and LAD, severe calcification lesions in RCA, benign essential hypertension, dyslipidemia, chronic diastolic congestive heart failure, morbid obesity, right diaphragm hemiparalysis, obstructive sleep apnea on CPAP.    The patient states that his shortness of breath has been stable.  He lost more than 10 pounds since December 2023.  He denies chest pain, palpitations, dizziness, orthopnea.  His mild bilateral leg edema is stable.  His blood pressure and heart rate are controlled.  Currently he is on torsemide 80 mg a.m., 40 mg p.m.    Past Medical History:     Patient Active Problem List   Diagnosis    GERD (gastroesophageal reflux disease)    RAMON on CPAP    Near syncope    Dyspnea on exertion    Essential hypertension    Dyslipidemia    Coronary artery disease involving native coronary artery of native heart without angina pectoris    Ascending aortic aneurysm (H24)    Coronary artery calcification seen on CAT scan    Pulmonary hypertension (H)    History of pulmonary embolism    Hypoxia    Morbid obesity (H)    Bilateral lower extremity edema    Chronic kidney disease, stage 3 (H)    (HFpEF) heart failure with preserved ejection fraction (H)    Abdominal pain, epigastric    Chronic diastolic congestive heart failure (H)       Past Surgical History:     Past Surgical History:   Procedure Laterality Date    CV CORONARY ANGIOGRAM N/A 12/10/2018    Procedure: Coronary Angiogram;  Surgeon: Tana Duncan MD;  Location: [facility] Cath Lab;  Service: Cardiology    CV LEFT HEART CATHETERIZATION WITHOUT LEFT VENTRICULOGRAM Left 12/10/2018    Procedure: Left Heart Catheterization Without Left Ventriculogram;  Surgeon: Tana Duncan MD;  Location: [facility] Cath Lab;   Service: Cardiology    CV LEFT HEART CATHETERIZATION WITHOUT LEFT VENTRICULOGRAM Left 02/04/2019    Procedure: Left Heart Catheterization Without Left Ventriculogram;  Surgeon: Tana Duncan MD;  Location: [facility] Cath Lab;  Service: Cardiology    CV RIGHT HEART CATHETERIZATION N/A 02/04/2019    Procedure: Right Heart Catheterization;  Surgeon: Tana Duncan MD;  Location: [facility] Cath Lab;  Service: Cardiology    HERNIA REPAIR      JOINT REPLACEMENT      KNEE SURGERY Bilateral     OTHER SURGICAL HISTORY      Prostatic cryotherapy       Family History:     Family History   Problem Relation Age of Onset    Diabetes Mother     Cerebrovascular Disease Father     Heart Disease Brother     Coronary Artery Disease Brother         Social History:    reports that he has never smoked. He has never used smokeless tobacco. He reports current alcohol use. He reports that he does not use drugs.    Review of Systems:   12 systems are reviewed negative except for in HPI.    Meds:     Current Outpatient Medications:     acetaminophen (TYLENOL) 325 MG tablet, Take 325 mg by mouth every 6 hours as needed, Disp: , Rfl:     aspirin 81 MG EC tablet, [ASPIRIN 81 MG EC TABLET] Take 81 mg by mouth every evening. , Disp: , Rfl:     betamethasone dipropionate (DIPROSONE) 0.05 % external cream, , Disp: , Rfl:     famotidine (PEPCID) 10 MG tablet, Take 10 mg by mouth 2 times daily, Disp: , Rfl:     gentamicin (GARAMYCIN) 0.1 % external ointment, , Disp: , Rfl:     isosorbide mononitrate (IMDUR) 30 MG 24 hr tablet, Take 1 tablet (30 mg) by mouth daily, Disp: 90 tablet, Rfl: 3    ketoconazole (NIZORAL) 2 % external cream, , Disp: , Rfl:     lactobacillus rhamnosus (GG) (CULTURELL) capsule, Take 1 capsule by mouth daily, Disp: , Rfl:     Melatonin 2.5 MG CHEW, Take by mouth daily as needed, Disp: , Rfl:     metoprolol tartrate (LOPRESSOR) 25 MG tablet, TAKE 1 TABLET(25 MG) BY MOUTH TWICE DAILY, Disp: 180 tablet, Rfl: 1     multivitamin with minerals (THERA-M) 9 mg iron-400 mcg Tab tablet, Take 1 tablet by mouth daily, Disp: , Rfl:     mycophenolate (GENERIC EQUIVALENT) 500 MG tablet, Take 1,500 mg by mouth 2 times daily, Disp: , Rfl:     oxybutynin (DITROPAN XL) 15 MG 24 hr tablet, [OXYBUTYNIN (DITROPAN XL) 15 MG 24 HR TABLET] Take 15 mg by mouth daily., Disp: , Rfl:     oxybutynin (DITROPAN XL) 5 MG ER tablet, Take 5 mg by mouth daily , Disp: , Rfl:     Polyethylene Glycol 3350 (MIRALAX PO), , Disp: , Rfl:     Probiotic Product (PROBIOTIC DAILY PO), , Disp: , Rfl:     rosuvastatin (CRESTOR) 20 MG tablet, Take 1 tablet (20 mg) by mouth At Bedtime, Disp: 90 tablet, Rfl: 3    torsemide (DEMADEX) 20 MG tablet, Continue on torsemide 80 in the a.m. and then 40 in the p.m., Disp: 540 tablet, Rfl: 3    valsartan (DIOVAN) 80 MG tablet, Take 40 mg by mouth daily, Disp: , Rfl:     fluticasone (CUTIVATE) 0.05 % external cream, daily (Patient not taking: Reported on 1/29/2024), Disp: , Rfl:     oxyCODONE (ROXICODONE) 5 MG tablet, Take 1 tablet (5 mg) by mouth every 6 hours as needed for pain (Patient not taking: Reported on 1/29/2024), Disp: 12 tablet, Rfl: 0    potassium chloride (KLOR-CON) 20 MEQ packet, Take 20 mEq by mouth daily (Patient not taking: Reported on 1/29/2024), Disp: 90 each, Rfl: 0    triamcinolone (KENALOG) 0.1 % external ointment, Apply 1 applicator topically as needed (Patient not taking: Reported on 1/29/2024), Disp: , Rfl:     Allergies:   Acetaminophen, Atorvastatin, Furosemide, Gabapentin, Hydrocodone, Lisinopril, Morphine, Sulfa (sulfonamide antibiotics) [sulfa antibiotics], Sulfatolamide, and Vicodin [hydrocodone-acetaminophen]      Objective:      Physical Exam        There is no height or weight on file to calculate BMI.  /50 (BP Location: Left arm, Patient Position: Sitting, Cuff Size: Adult Large)   Pulse 84   Resp 20   SpO2 91%     General Appearance:   Awake, Alert, No acute distress.   HEENT:  Pupil equal  and reactive to light. No scleral icterus; the mucous membranes were moist.   Neck: No cervical bruits. Not able to see JVD. No thyromegaly.     Chest: The spine was straight. The chest was symmetric.   Lungs:   Diminished breathing sounds.No crackles. No wheezing.   Cardiovascular:   Regular rhythm and rate, remote first and second heart sounds with II/VI systolic murmurs at RUSB. No rubs or gallops.    Abdomen:  Obese. Soft. No tenderness. Non-distended. Bowels sounds are present   Extremities: Mild bilateral leg edema.   Skin: Red skin rashes most in back. Warm, Dry.   Musculoskeletal: No tenderness. No deformity.   Neurologic: Mood and affect are appropriate. No focal deficits.         EKG:  Personally reivewed  Sinus rhythm with 1st degree A-V block with occasional Premature ventricular complexes   Low voltage QRS   Borderline ECG   When compared with ECG of 08-FEB-2022 15:00,   Premature ventricular complexes are now Present   ST no longer elevated in Inferior leads     Cardiac Imaging Studies  Echocardiogram on 10-:  Left ventricular function is normal.The ejection fraction is 60-65%.  The right ventricle is moderately dilated.  Moderately decreased right ventricular systolic function  Technically difficult, suboptimal study. No hemodynamically significant  valvular abnormalities on 2D or color flow imaging.     Coronary angiogram with PCI on 2-4-2019:  IVUS left main and LAD showed no severely obstructive lesions  Ramus disease treated with rotational atherectomy and stented with a [employer] KATHY under IVUS guidance with good angiographic results  Right heart catheterizations show LV EDP 22 mmHg and PCWP 30 mmHg, PA mean 34 mmHg, RA 13 mmHg      Lab Review   Lab Results   Component Value Date     06/23/2021    CO2 23 06/23/2021    BUN 21 06/23/2021     Lab Results   Component Value Date    WBC 8.5 05/17/2021    HGB 13.9 05/17/2021    HCT 40.6 05/17/2021    MCV 90 05/17/2021     05/17/2021      Lab Results   Component Value Date    CHOL 138 07/24/2023    CHOL 144 04/28/2022    CHOL 102 09/20/2021     Lab Results   Component Value Date    HDL 44 07/24/2023    HDL 54 04/28/2022    HDL 49 09/20/2021     Lab Results   Component Value Date    TRIG 167 (H) 07/24/2023    TRIG 140 04/28/2022    TRIG 97 09/20/2021     Lab Results   Component Value Date    TROPONINI 0.02 04/08/2021     Lab Results   Component Value Date    BNP 84 05/17/2021                 Thank you for allowing me to participate in the care of your patient.      Sincerely,     Yonatan Sheridan MD     [facility]  cc:   Yonatan Sheridan MD  [facility] [facility] [address]  [place] [address]

## 2024-01-29 NOTE — PROGRESS NOTES
Assessment/Plan:   1.  Chronic congestive heart failure with preserved ejection fraction: The patient has been doing stable.  He lost more than 10 pounds over last 2 months.  His leg edema has improved.  Continue torsemide 80 mg a.m., 40 mg p.m.    Routine labs, CBC CMP, Lipid profile today.  We will follow-up his laboratory test the report and discuss the findings with the patient to decide if he needs to modify his diuretics because he continues to lose weight.  He did not eat as much as before.    2.  Coronary artery disease s/p KATHY to Ramus, severe calcified RCA lesion with high risk of revascularization, no obstructive lesion in left main and LAD per IVUS:  He has no chest pain.  Continue medical treatment.  Continue aspirin, Crestor, isosorbide mononitrate and metoprolol.      3.  Essential hypertension: His blood pressure is controlled, continue valsartan 80 mg daily and metoprolol 25 mg twice a day     4.  Dyslipidemia: Continue Crestor 20 mg nightly.  Lipid profile and liver function today.     5.  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     6.  Morbid obesity and obstructive sleep apnea: Lifestyle modification and continue CPAP.    7.  Stage III CKD: Check renal function today.    Thank you for the opportunity to be involved in the care of Chava Lincoln. If you have any questions, please feel free to contact me.  I will see the patient again in 6 months and as needed.    Much or all of the text in this note was generated through the use of Dragon Dictate voice-to-text software. Errors in spelling or words which seem out of context are unintentional. Sound alike errors, in particular, may have escaped editing.       History of Present Illness:   It is my pleasure to see Chava Lincoln at the [facility] [place] clinic for routine cardiology follow up.  Chava Lincoln is a 89 year old male with a medical history of coronary artery disease, s/p KATHY to Ramus in 2-2019,  no obstructive disease in LM and LAD, severe calcification lesions in RCA, benign essential hypertension, dyslipidemia, chronic diastolic congestive heart failure, morbid obesity, right diaphragm hemiparalysis, obstructive sleep apnea on CPAP.    The patient states that his shortness of breath has been stable.  He lost more than 10 pounds since December 2023.  He denies chest pain, palpitations, dizziness, orthopnea.  His mild bilateral leg edema is stable.  His blood pressure and heart rate are controlled.  Currently he is on torsemide 80 mg a.m., 40 mg p.m.    Past Medical History:     Patient Active Problem List   Diagnosis    GERD (gastroesophageal reflux disease)    RAMON on CPAP    Near syncope    Dyspnea on exertion    Essential hypertension    Dyslipidemia    Coronary artery disease involving native coronary artery of native heart without angina pectoris    Ascending aortic aneurysm (H24)    Coronary artery calcification seen on CAT scan    Pulmonary hypertension (H)    History of pulmonary embolism    Hypoxia    Morbid obesity (H)    Bilateral lower extremity edema    Chronic kidney disease, stage 3 (H)    (HFpEF) heart failure with preserved ejection fraction (H)    Abdominal pain, epigastric    Chronic diastolic congestive heart failure (H)       Past Surgical History:     Past Surgical History:   Procedure Laterality Date    CV CORONARY ANGIOGRAM N/A 12/10/2018    Procedure: Coronary Angiogram;  Surgeon: Tana Duncan MD;  Location: [facility] Cath Lab;  Service: Cardiology    CV LEFT HEART CATHETERIZATION WITHOUT LEFT VENTRICULOGRAM Left 12/10/2018    Procedure: Left Heart Catheterization Without Left Ventriculogram;  Surgeon: Tana Duncan MD;  Location: [facility] Cath Lab;  Service: Cardiology    CV LEFT HEART CATHETERIZATION WITHOUT LEFT VENTRICULOGRAM Left 02/04/2019    Procedure: Left Heart Catheterization Without Left Ventriculogram;  Surgeon: Tana Duncan MD;  Location: [facility]  Cath Lab;  Service: Cardiology    CV RIGHT HEART CATHETERIZATION N/A 02/04/2019    Procedure: Right Heart Catheterization;  Surgeon: Tana Duncan MD;  Location: [facility] Cath Lab;  Service: Cardiology    HERNIA REPAIR      JOINT REPLACEMENT      KNEE SURGERY Bilateral     OTHER SURGICAL HISTORY      Prostatic cryotherapy       Family History:     Family History   Problem Relation Age of Onset    Diabetes Mother     Cerebrovascular Disease Father     Heart Disease Brother     Coronary Artery Disease Brother         Social History:    reports that he has never smoked. He has never used smokeless tobacco. He reports current alcohol use. He reports that he does not use drugs.    Review of Systems:   12 systems are reviewed negative except for in HPI.    Meds:     Current Outpatient Medications:     acetaminophen (TYLENOL) 325 MG tablet, Take 325 mg by mouth every 6 hours as needed, Disp: , Rfl:     aspirin 81 MG EC tablet, [ASPIRIN 81 MG EC TABLET] Take 81 mg by mouth every evening. , Disp: , Rfl:     betamethasone dipropionate (DIPROSONE) 0.05 % external cream, , Disp: , Rfl:     famotidine (PEPCID) 10 MG tablet, Take 10 mg by mouth 2 times daily, Disp: , Rfl:     gentamicin (GARAMYCIN) 0.1 % external ointment, , Disp: , Rfl:     isosorbide mononitrate (IMDUR) 30 MG 24 hr tablet, Take 1 tablet (30 mg) by mouth daily, Disp: 90 tablet, Rfl: 3    ketoconazole (NIZORAL) 2 % external cream, , Disp: , Rfl:     lactobacillus rhamnosus (GG) (CULTURELL) capsule, Take 1 capsule by mouth daily, Disp: , Rfl:     Melatonin 2.5 MG CHEW, Take by mouth daily as needed, Disp: , Rfl:     metoprolol tartrate (LOPRESSOR) 25 MG tablet, TAKE 1 TABLET(25 MG) BY MOUTH TWICE DAILY, Disp: 180 tablet, Rfl: 1    multivitamin with minerals (THERA-M) 9 mg iron-400 mcg Tab tablet, Take 1 tablet by mouth daily, Disp: , Rfl:     mycophenolate (GENERIC EQUIVALENT) 500 MG tablet, Take 1,500 mg by mouth 2 times daily, Disp: , Rfl:     oxybutynin  (DITROPAN XL) 15 MG 24 hr tablet, [OXYBUTYNIN (DITROPAN XL) 15 MG 24 HR TABLET] Take 15 mg by mouth daily., Disp: , Rfl:     oxybutynin (DITROPAN XL) 5 MG ER tablet, Take 5 mg by mouth daily , Disp: , Rfl:     Polyethylene Glycol 3350 (MIRALAX PO), , Disp: , Rfl:     Probiotic Product (PROBIOTIC DAILY PO), , Disp: , Rfl:     rosuvastatin (CRESTOR) 20 MG tablet, Take 1 tablet (20 mg) by mouth At Bedtime, Disp: 90 tablet, Rfl: 3    torsemide (DEMADEX) 20 MG tablet, Continue on torsemide 80 in the a.m. and then 40 in the p.m., Disp: 540 tablet, Rfl: 3    valsartan (DIOVAN) 80 MG tablet, Take 40 mg by mouth daily, Disp: , Rfl:     fluticasone (CUTIVATE) 0.05 % external cream, daily (Patient not taking: Reported on 1/29/2024), Disp: , Rfl:     oxyCODONE (ROXICODONE) 5 MG tablet, Take 1 tablet (5 mg) by mouth every 6 hours as needed for pain (Patient not taking: Reported on 1/29/2024), Disp: 12 tablet, Rfl: 0    potassium chloride (KLOR-CON) 20 MEQ packet, Take 20 mEq by mouth daily (Patient not taking: Reported on 1/29/2024), Disp: 90 each, Rfl: 0    triamcinolone (KENALOG) 0.1 % external ointment, Apply 1 applicator topically as needed (Patient not taking: Reported on 1/29/2024), Disp: , Rfl:     Allergies:   Acetaminophen, Atorvastatin, Furosemide, Gabapentin, Hydrocodone, Lisinopril, Morphine, Sulfa (sulfonamide antibiotics) [sulfa antibiotics], Sulfatolamide, and Vicodin [hydrocodone-acetaminophen]      Objective:      Physical Exam        There is no height or weight on file to calculate BMI.  /50 (BP Location: Left arm, Patient Position: Sitting, Cuff Size: Adult Large)   Pulse 84   Resp 20   SpO2 91%     General Appearance:   Awake, Alert, No acute distress.   HEENT:  Pupil equal and reactive to light. No scleral icterus; the mucous membranes were moist.   Neck: No cervical bruits. Not able to see JVD. No thyromegaly.     Chest: The spine was straight. The chest was symmetric.   Lungs:   Diminished  breathing sounds.No crackles. No wheezing.   Cardiovascular:   Regular rhythm and rate, remote first and second heart sounds with II/VI systolic murmurs at RUSB. No rubs or gallops.    Abdomen:  Obese. Soft. No tenderness. Non-distended. Bowels sounds are present   Extremities: Mild bilateral leg edema.   Skin: Red skin rashes most in back. Warm, Dry.   Musculoskeletal: No tenderness. No deformity.   Neurologic: Mood and affect are appropriate. No focal deficits.         EKG:  Personally reivewed  Sinus rhythm with 1st degree A-V block with occasional Premature ventricular complexes   Low voltage QRS   Borderline ECG   When compared with ECG of 08-FEB-2022 15:00,   Premature ventricular complexes are now Present   ST no longer elevated in Inferior leads     Cardiac Imaging Studies  Echocardiogram on 10-:  Left ventricular function is normal.The ejection fraction is 60-65%.  The right ventricle is moderately dilated.  Moderately decreased right ventricular systolic function  Technically difficult, suboptimal study. No hemodynamically significant  valvular abnormalities on 2D or color flow imaging.     Coronary angiogram with PCI on 2-4-2019:  IVUS left main and LAD showed no severely obstructive lesions  Ramus disease treated with rotational atherectomy and stented with a Synergy KATHY under IVUS guidance with good angiographic results  Right heart catheterizations show LV EDP 22 mmHg and PCWP 30 mmHg, PA mean 34 mmHg, RA 13 mmHg      Lab Review   Lab Results   Component Value Date     06/23/2021    CO2 23 06/23/2021    BUN 21 06/23/2021     Lab Results   Component Value Date    WBC 8.5 05/17/2021    HGB 13.9 05/17/2021    HCT 40.6 05/17/2021    MCV 90 05/17/2021     05/17/2021     Lab Results   Component Value Date    CHOL 138 07/24/2023    CHOL 144 04/28/2022    CHOL 102 09/20/2021     Lab Results   Component Value Date    HDL 44 07/24/2023    HDL 54 04/28/2022    HDL 49 09/20/2021     Lab Results    Component Value Date    TRIG 167 (H) 07/24/2023    TRIG 140 04/28/2022    TRIG 97 09/20/2021     Lab Results   Component Value Date    TROPONINI 0.02 04/08/2021     Lab Results   Component Value Date    BNP 84 05/17/2021

## 2024-03-25 DIAGNOSIS — I50.32 CHRONIC DIASTOLIC CONGESTIVE HEART FAILURE (H): ICD-10-CM

## 2024-03-25 RX ORDER — TORSEMIDE 20 MG/1
TABLET ORAL
Qty: 540 TABLET | Refills: 2 | Status: SHIPPED | OUTPATIENT
Start: 2024-03-25 | End: 2024-07-30

## 2024-04-21 DIAGNOSIS — I25.10 CORONARY ARTERY DISEASE INVOLVING NATIVE CORONARY ARTERY OF NATIVE HEART WITHOUT ANGINA PECTORIS: ICD-10-CM

## 2024-04-22 RX ORDER — ROSUVASTATIN CALCIUM 20 MG/1
20 TABLET, COATED ORAL AT BEDTIME
Qty: 90 TABLET | Refills: 1 | Status: SHIPPED | OUTPATIENT
Start: 2024-04-22

## 2024-05-08 DIAGNOSIS — R06.09 DYSPNEA ON EXERTION: ICD-10-CM

## 2024-05-08 RX ORDER — ISOSORBIDE MONONITRATE 30 MG/1
30 TABLET, EXTENDED RELEASE ORAL DAILY
Qty: 90 TABLET | Refills: 1 | Status: SHIPPED | OUTPATIENT
Start: 2024-05-08 | End: 2024-08-02

## 2024-05-24 ENCOUNTER — LAB REQUISITION (OUTPATIENT)
Dept: LAB | Facility: CLINIC | Age: 89
End: 2024-05-24
Payer: MEDICARE

## 2024-05-24 DIAGNOSIS — N40.0 BENIGN PROSTATIC HYPERPLASIA WITHOUT LOWER URINARY TRACT SYMPTOMS: ICD-10-CM

## 2024-05-24 DIAGNOSIS — I13.0 HYPERTENSIVE HEART AND CHRONIC KIDNEY DISEASE WITH HEART FAILURE AND STAGE 1 THROUGH STAGE 4 CHRONIC KIDNEY DISEASE, OR UNSPECIFIED CHRONIC KIDNEY DISEASE (H): ICD-10-CM

## 2024-05-24 LAB
ERYTHROCYTE [DISTWIDTH] IN BLOOD BY AUTOMATED COUNT: 13.3 % (ref 10–15)
HCT VFR BLD AUTO: 36.3 % (ref 40–53)
HGB BLD-MCNC: 11.6 G/DL (ref 13.3–17.7)
MCH RBC QN AUTO: 30.3 PG (ref 26.5–33)
MCHC RBC AUTO-ENTMCNC: 32 G/DL (ref 31.5–36.5)
MCV RBC AUTO: 95 FL (ref 78–100)
PLATELET # BLD AUTO: 250 10E3/UL (ref 150–450)
RBC # BLD AUTO: 3.83 10E6/UL (ref 4.4–5.9)
WBC # BLD AUTO: 7.4 10E3/UL (ref 4–11)

## 2024-05-24 PROCEDURE — 85027 COMPLETE CBC AUTOMATED: CPT | Mod: ORL | Performed by: FAMILY MEDICINE

## 2024-05-24 PROCEDURE — 84153 ASSAY OF PSA TOTAL: CPT | Mod: ORL | Performed by: FAMILY MEDICINE

## 2024-05-24 PROCEDURE — 84443 ASSAY THYROID STIM HORMONE: CPT | Mod: ORL | Performed by: FAMILY MEDICINE

## 2024-05-24 PROCEDURE — 80048 BASIC METABOLIC PNL TOTAL CA: CPT | Mod: ORL | Performed by: FAMILY MEDICINE

## 2024-05-25 LAB
ANION GAP SERPL CALCULATED.3IONS-SCNC: 17 MMOL/L (ref 7–15)
BUN SERPL-MCNC: 37.4 MG/DL (ref 8–23)
CALCIUM SERPL-MCNC: 8.9 MG/DL (ref 8.2–9.6)
CHLORIDE SERPL-SCNC: 100 MMOL/L (ref 98–107)
CREAT SERPL-MCNC: 1.27 MG/DL (ref 0.67–1.17)
DEPRECATED HCO3 PLAS-SCNC: 21 MMOL/L (ref 22–29)
EGFRCR SERPLBLD CKD-EPI 2021: 52 ML/MIN/1.73M2
GLUCOSE SERPL-MCNC: 152 MG/DL (ref 70–99)
POTASSIUM SERPL-SCNC: 4.2 MMOL/L (ref 3.4–5.3)
PSA SERPL DL<=0.01 NG/ML-MCNC: 0.1 NG/ML
SODIUM SERPL-SCNC: 138 MMOL/L (ref 135–145)
TSH SERPL DL<=0.005 MIU/L-ACNC: 8.77 UIU/ML (ref 0.3–4.2)

## 2024-06-24 ENCOUNTER — LAB REQUISITION (OUTPATIENT)
Dept: LAB | Facility: CLINIC | Age: 89
End: 2024-06-24
Payer: MEDICARE

## 2024-06-24 ENCOUNTER — TRANSFERRED RECORDS (OUTPATIENT)
Dept: HEALTH INFORMATION MANAGEMENT | Facility: CLINIC | Age: 89
End: 2024-06-24

## 2024-06-24 DIAGNOSIS — I13.0 HYPERTENSIVE HEART AND CHRONIC KIDNEY DISEASE WITH HEART FAILURE AND STAGE 1 THROUGH STAGE 4 CHRONIC KIDNEY DISEASE, OR UNSPECIFIED CHRONIC KIDNEY DISEASE (H): ICD-10-CM

## 2024-06-24 PROCEDURE — 80048 BASIC METABOLIC PNL TOTAL CA: CPT | Mod: ORL | Performed by: FAMILY MEDICINE

## 2024-06-24 PROCEDURE — 84443 ASSAY THYROID STIM HORMONE: CPT | Mod: ORL | Performed by: FAMILY MEDICINE

## 2024-06-25 LAB
ANION GAP SERPL CALCULATED.3IONS-SCNC: 13 MMOL/L (ref 7–15)
BUN SERPL-MCNC: 30.7 MG/DL (ref 8–23)
CALCIUM SERPL-MCNC: 8.7 MG/DL (ref 8.2–9.6)
CHLORIDE SERPL-SCNC: 100 MMOL/L (ref 98–107)
CREAT SERPL-MCNC: 1.16 MG/DL (ref 0.67–1.17)
DEPRECATED HCO3 PLAS-SCNC: 25 MMOL/L (ref 22–29)
EGFRCR SERPLBLD CKD-EPI 2021: 58 ML/MIN/1.73M2
GLUCOSE SERPL-MCNC: 158 MG/DL (ref 70–99)
POTASSIUM SERPL-SCNC: 3.7 MMOL/L (ref 3.4–5.3)
SODIUM SERPL-SCNC: 138 MMOL/L (ref 135–145)
TSH SERPL DL<=0.005 MIU/L-ACNC: 6.26 UIU/ML (ref 0.3–4.2)

## 2024-07-30 ENCOUNTER — APPOINTMENT (OUTPATIENT)
Dept: CARDIOLOGY | Facility: CLINIC | Age: 89
End: 2024-07-30
Attending: STUDENT IN AN ORGANIZED HEALTH CARE EDUCATION/TRAINING PROGRAM
Payer: MEDICARE

## 2024-07-30 ENCOUNTER — APPOINTMENT (OUTPATIENT)
Dept: RADIOLOGY | Facility: CLINIC | Age: 89
End: 2024-07-30
Attending: EMERGENCY MEDICINE
Payer: MEDICARE

## 2024-07-30 ENCOUNTER — HOSPITAL ENCOUNTER (OUTPATIENT)
Facility: CLINIC | Age: 89
Setting detail: OBSERVATION
Discharge: HOME | End: 2024-07-31
Attending: EMERGENCY MEDICINE | Admitting: STUDENT IN AN ORGANIZED HEALTH CARE EDUCATION/TRAINING PROGRAM
Payer: MEDICARE

## 2024-07-30 DIAGNOSIS — R07.9 CHEST PAIN, UNSPECIFIED TYPE: ICD-10-CM

## 2024-07-30 LAB
ALBUMIN UR-MCNC: NEGATIVE MG/DL
ANION GAP SERPL CALCULATED.3IONS-SCNC: 16 MMOL/L (ref 7–15)
APPEARANCE UR: CLEAR
ATRIAL RATE - MUSE: 97 BPM
BASOPHILS # BLD AUTO: 0 10E3/UL (ref 0–0.2)
BASOPHILS NFR BLD AUTO: 0 %
BILIRUB UR QL STRIP: NEGATIVE
BUN SERPL-MCNC: 29.2 MG/DL (ref 8–23)
CALCIUM SERPL-MCNC: 8.9 MG/DL (ref 8.8–10.4)
CHLORIDE SERPL-SCNC: 99 MMOL/L (ref 98–107)
COLOR UR AUTO: NORMAL
CREAT SERPL-MCNC: 1.05 MG/DL (ref 0.67–1.17)
DIASTOLIC BLOOD PRESSURE - MUSE: 64 MMHG
EGFRCR SERPLBLD CKD-EPI 2021: 65 ML/MIN/1.73M2
EOSINOPHIL # BLD AUTO: 0.1 10E3/UL (ref 0–0.7)
EOSINOPHIL NFR BLD AUTO: 1 %
ERYTHROCYTE [DISTWIDTH] IN BLOOD BY AUTOMATED COUNT: 12.9 % (ref 10–15)
FLUAV RNA SPEC QL NAA+PROBE: NEGATIVE
FLUBV RNA RESP QL NAA+PROBE: NEGATIVE
GLUCOSE SERPL-MCNC: 146 MG/DL (ref 70–99)
GLUCOSE UR STRIP-MCNC: NEGATIVE MG/DL
HCO3 SERPL-SCNC: 24 MMOL/L (ref 22–29)
HCT VFR BLD AUTO: 38 % (ref 40–53)
HGB BLD-MCNC: 12.2 G/DL (ref 13.3–17.7)
HGB UR QL STRIP: NEGATIVE
IMM GRANULOCYTES # BLD: 0.1 10E3/UL
IMM GRANULOCYTES NFR BLD: 1 %
INTERPRETATION ECG - MUSE: NORMAL
KETONES UR STRIP-MCNC: NEGATIVE MG/DL
LEUKOCYTE ESTERASE UR QL STRIP: NEGATIVE
LVEF ECHO: NORMAL
LYMPHOCYTES # BLD AUTO: 2 10E3/UL (ref 0.8–5.3)
LYMPHOCYTES NFR BLD AUTO: 15 %
MCH RBC QN AUTO: 29.7 PG (ref 26.5–33)
MCHC RBC AUTO-ENTMCNC: 32.1 G/DL (ref 31.5–36.5)
MCV RBC AUTO: 93 FL (ref 78–100)
MONOCYTES # BLD AUTO: 1.1 10E3/UL (ref 0–1.3)
MONOCYTES NFR BLD AUTO: 8 %
NEUTROPHILS # BLD AUTO: 10.3 10E3/UL (ref 1.6–8.3)
NEUTROPHILS NFR BLD AUTO: 76 %
NITRATE UR QL: NEGATIVE
NRBC # BLD AUTO: 0 10E3/UL
NRBC BLD AUTO-RTO: 0 /100
NT-PROBNP SERPL-MCNC: 592 PG/ML (ref 0–1800)
P AXIS - MUSE: NORMAL DEGREES
PH UR STRIP: 6.5 [PH] (ref 5–7)
PLATELET # BLD AUTO: 218 10E3/UL (ref 150–450)
POTASSIUM SERPL-SCNC: 3.8 MMOL/L (ref 3.4–5.3)
PR INTERVAL - MUSE: NORMAL MS
QRS DURATION - MUSE: 102 MS
QT - MUSE: 414 MS
QTC - MUSE: 483 MS
R AXIS - MUSE: -24 DEGREES
RBC # BLD AUTO: 4.11 10E6/UL (ref 4.4–5.9)
RBC URINE: 1 /HPF
RSV RNA SPEC NAA+PROBE: NEGATIVE
SARS-COV-2 RNA RESP QL NAA+PROBE: NEGATIVE
SODIUM SERPL-SCNC: 139 MMOL/L (ref 135–145)
SP GR UR STRIP: 1.01 (ref 1–1.03)
SYSTOLIC BLOOD PRESSURE - MUSE: 140 MMHG
T AXIS - MUSE: 77 DEGREES
TROPONIN T SERPL HS-MCNC: 41 NG/L
TROPONIN T SERPL HS-MCNC: 43 NG/L
UROBILINOGEN UR STRIP-MCNC: <2 MG/DL
VENTRICULAR RATE- MUSE: 82 BPM
WBC # BLD AUTO: 13.6 10E3/UL (ref 4–11)
WBC URINE: 0 /HPF

## 2024-07-30 PROCEDURE — 36415 COLL VENOUS BLD VENIPUNCTURE: CPT | Performed by: EMERGENCY MEDICINE

## 2024-07-30 PROCEDURE — 84484 ASSAY OF TROPONIN QUANT: CPT | Performed by: EMERGENCY MEDICINE

## 2024-07-30 PROCEDURE — 71046 X-RAY EXAM CHEST 2 VIEWS: CPT

## 2024-07-30 PROCEDURE — 250N000013 HC RX MED GY IP 250 OP 250 PS 637: Performed by: EMERGENCY MEDICINE

## 2024-07-30 PROCEDURE — G0378 HOSPITAL OBSERVATION PER HR: HCPCS

## 2024-07-30 PROCEDURE — 87637 SARSCOV2&INF A&B&RSV AMP PRB: CPT | Performed by: EMERGENCY MEDICINE

## 2024-07-30 PROCEDURE — 81001 URINALYSIS AUTO W/SCOPE: CPT | Performed by: STUDENT IN AN ORGANIZED HEALTH CARE EDUCATION/TRAINING PROGRAM

## 2024-07-30 PROCEDURE — 93005 ELECTROCARDIOGRAM TRACING: CPT | Performed by: EMERGENCY MEDICINE

## 2024-07-30 PROCEDURE — 250N000012 HC RX MED GY IP 250 OP 636 PS 637: Performed by: STUDENT IN AN ORGANIZED HEALTH CARE EDUCATION/TRAINING PROGRAM

## 2024-07-30 PROCEDURE — 85041 AUTOMATED RBC COUNT: CPT | Performed by: EMERGENCY MEDICINE

## 2024-07-30 PROCEDURE — C8929 TTE W OR WO FOL WCON,DOPPLER: HCPCS

## 2024-07-30 PROCEDURE — 36415 COLL VENOUS BLD VENIPUNCTURE: CPT | Performed by: STUDENT IN AN ORGANIZED HEALTH CARE EDUCATION/TRAINING PROGRAM

## 2024-07-30 PROCEDURE — 255N000002 HC RX 255 OP 636: Performed by: STUDENT IN AN ORGANIZED HEALTH CARE EDUCATION/TRAINING PROGRAM

## 2024-07-30 PROCEDURE — 84484 ASSAY OF TROPONIN QUANT: CPT | Mod: 91 | Performed by: STUDENT IN AN ORGANIZED HEALTH CARE EDUCATION/TRAINING PROGRAM

## 2024-07-30 PROCEDURE — 99222 1ST HOSP IP/OBS MODERATE 55: CPT | Mod: AI | Performed by: STUDENT IN AN ORGANIZED HEALTH CARE EDUCATION/TRAINING PROGRAM

## 2024-07-30 PROCEDURE — 83880 ASSAY OF NATRIURETIC PEPTIDE: CPT | Performed by: EMERGENCY MEDICINE

## 2024-07-30 PROCEDURE — 93306 TTE W/DOPPLER COMPLETE: CPT | Mod: 26 | Performed by: INTERNAL MEDICINE

## 2024-07-30 PROCEDURE — 99285 EMERGENCY DEPT VISIT HI MDM: CPT | Mod: 25

## 2024-07-30 PROCEDURE — 94660 CPAP INITIATION&MGMT: CPT

## 2024-07-30 PROCEDURE — 999N000208 ECHOCARDIOGRAM COMPLETE

## 2024-07-30 PROCEDURE — 80048 BASIC METABOLIC PNL TOTAL CA: CPT | Performed by: EMERGENCY MEDICINE

## 2024-07-30 PROCEDURE — 96372 THER/PROPH/DIAG INJ SC/IM: CPT

## 2024-07-30 PROCEDURE — 250N000013 HC RX MED GY IP 250 OP 250 PS 637: Performed by: STUDENT IN AN ORGANIZED HEALTH CARE EDUCATION/TRAINING PROGRAM

## 2024-07-30 PROCEDURE — 999N000157 HC STATISTIC RCP TIME EA 10 MIN

## 2024-07-30 PROCEDURE — 250N000011 HC RX IP 250 OP 636

## 2024-07-30 RX ORDER — METOPROLOL SUCCINATE 50 MG/1
50 TABLET, EXTENDED RELEASE ORAL DAILY
COMMUNITY

## 2024-07-30 RX ORDER — AMOXICILLIN 250 MG
2 CAPSULE ORAL 2 TIMES DAILY PRN
Status: DISCONTINUED | OUTPATIENT
Start: 2024-07-30 | End: 2024-07-31 | Stop reason: HOSPADM

## 2024-07-30 RX ORDER — CALCIUM CARBONATE 500 MG/1
1000 TABLET, CHEWABLE ORAL 4 TIMES DAILY PRN
Status: DISCONTINUED | OUTPATIENT
Start: 2024-07-30 | End: 2024-07-31 | Stop reason: HOSPADM

## 2024-07-30 RX ORDER — NYSTATIN 100000 U/G
CREAM TOPICAL 2 TIMES DAILY
COMMUNITY
End: 2024-07-30

## 2024-07-30 RX ORDER — ASPIRIN 81 MG/1
81 TABLET ORAL EVERY EVENING
Status: DISCONTINUED | OUTPATIENT
Start: 2024-07-31 | End: 2024-07-31 | Stop reason: HOSPADM

## 2024-07-30 RX ORDER — TORSEMIDE 20 MG/1
80 TABLET ORAL DAILY
COMMUNITY

## 2024-07-30 RX ORDER — LEVOTHYROXINE SODIUM 25 UG/1
25 TABLET ORAL DAILY
Status: DISCONTINUED | OUTPATIENT
Start: 2024-07-30 | End: 2024-07-31 | Stop reason: HOSPADM

## 2024-07-30 RX ORDER — AMOXICILLIN 250 MG
1 CAPSULE ORAL 2 TIMES DAILY PRN
Status: DISCONTINUED | OUTPATIENT
Start: 2024-07-30 | End: 2024-07-31 | Stop reason: HOSPADM

## 2024-07-30 RX ORDER — ENOXAPARIN SODIUM 100 MG/ML
40 INJECTION SUBCUTANEOUS EVERY 24 HOURS
Status: DISCONTINUED | OUTPATIENT
Start: 2024-07-30 | End: 2024-07-31 | Stop reason: HOSPADM

## 2024-07-30 RX ORDER — TORSEMIDE 20 MG/1
80 TABLET ORAL ONCE
Status: COMPLETED | OUTPATIENT
Start: 2024-07-30 | End: 2024-07-30

## 2024-07-30 RX ORDER — LIDOCAINE 40 MG/G
CREAM TOPICAL
Status: DISCONTINUED | OUTPATIENT
Start: 2024-07-30 | End: 2024-07-31 | Stop reason: HOSPADM

## 2024-07-30 RX ORDER — FAMOTIDINE 10 MG
10 TABLET ORAL DAILY
Status: DISCONTINUED | OUTPATIENT
Start: 2024-07-30 | End: 2024-07-31 | Stop reason: HOSPADM

## 2024-07-30 RX ORDER — ASPIRIN 81 MG/1
324 TABLET, CHEWABLE ORAL ONCE
Status: COMPLETED | OUTPATIENT
Start: 2024-07-30 | End: 2024-07-30

## 2024-07-30 RX ORDER — METOPROLOL SUCCINATE 50 MG/1
50 TABLET, EXTENDED RELEASE ORAL DAILY
Status: DISCONTINUED | OUTPATIENT
Start: 2024-07-31 | End: 2024-07-31 | Stop reason: HOSPADM

## 2024-07-30 RX ORDER — ROSUVASTATIN CALCIUM 10 MG/1
20 TABLET, COATED ORAL AT BEDTIME
Status: DISCONTINUED | OUTPATIENT
Start: 2024-07-30 | End: 2024-07-31 | Stop reason: HOSPADM

## 2024-07-30 RX ORDER — NYSTATIN 100000 [USP'U]/G
POWDER TOPICAL 2 TIMES DAILY
COMMUNITY

## 2024-07-30 RX ORDER — PROCHLORPERAZINE 25 MG
12.5 SUPPOSITORY, RECTAL RECTAL EVERY 12 HOURS PRN
Status: DISCONTINUED | OUTPATIENT
Start: 2024-07-30 | End: 2024-07-31 | Stop reason: HOSPADM

## 2024-07-30 RX ORDER — VALSARTAN 40 MG/1
40 TABLET ORAL DAILY
Status: DISCONTINUED | OUTPATIENT
Start: 2024-07-31 | End: 2024-07-31 | Stop reason: HOSPADM

## 2024-07-30 RX ORDER — OXYBUTYNIN CHLORIDE 5 MG/1
15 TABLET, EXTENDED RELEASE ORAL DAILY
Status: DISCONTINUED | OUTPATIENT
Start: 2024-07-31 | End: 2024-07-31 | Stop reason: HOSPADM

## 2024-07-30 RX ORDER — PROCHLORPERAZINE MALEATE 5 MG
5 TABLET ORAL EVERY 6 HOURS PRN
Status: DISCONTINUED | OUTPATIENT
Start: 2024-07-30 | End: 2024-07-31 | Stop reason: HOSPADM

## 2024-07-30 RX ORDER — TORSEMIDE 20 MG/1
80 TABLET ORAL DAILY
Status: DISCONTINUED | OUTPATIENT
Start: 2024-07-31 | End: 2024-07-31 | Stop reason: HOSPADM

## 2024-07-30 RX ORDER — ACETAMINOPHEN 500 MG
1000 TABLET ORAL AT BEDTIME
Status: DISCONTINUED | OUTPATIENT
Start: 2024-07-30 | End: 2024-07-31 | Stop reason: HOSPADM

## 2024-07-30 RX ORDER — OXYBUTYNIN CHLORIDE 5 MG/1
5 TABLET, EXTENDED RELEASE ORAL DAILY
Status: DISCONTINUED | OUTPATIENT
Start: 2024-07-31 | End: 2024-07-31 | Stop reason: HOSPADM

## 2024-07-30 RX ORDER — ISOSORBIDE MONONITRATE 30 MG/1
30 TABLET, EXTENDED RELEASE ORAL DAILY
Status: DISCONTINUED | OUTPATIENT
Start: 2024-07-31 | End: 2024-07-31 | Stop reason: HOSPADM

## 2024-07-30 RX ORDER — KETOCONAZOLE 20 MG/G
CREAM TOPICAL DAILY PRN
Status: DISCONTINUED | OUTPATIENT
Start: 2024-07-30 | End: 2024-07-31 | Stop reason: HOSPADM

## 2024-07-30 RX ORDER — MUPIROCIN 20 MG/G
OINTMENT TOPICAL 2 TIMES DAILY PRN
COMMUNITY
Start: 2024-06-19

## 2024-07-30 RX ORDER — MYCOPHENOLATE MOFETIL 500 MG/1
1000 TABLET ORAL 2 TIMES DAILY
Status: DISCONTINUED | OUTPATIENT
Start: 2024-07-30 | End: 2024-07-31 | Stop reason: HOSPADM

## 2024-07-30 RX ORDER — VALSARTAN 80 MG/1
80 TABLET ORAL ONCE
Status: COMPLETED | OUTPATIENT
Start: 2024-07-30 | End: 2024-07-30

## 2024-07-30 RX ORDER — LEVOTHYROXINE SODIUM 25 UG/1
25 TABLET ORAL DAILY
COMMUNITY
End: 2024-10-04 | Stop reason: DRUGHIGH

## 2024-07-30 RX ADMIN — MYCOPHENOLATE MOFETIL 1000 MG: 500 TABLET ORAL at 22:00

## 2024-07-30 RX ADMIN — ACETAMINOPHEN 1000 MG: 500 TABLET ORAL at 22:00

## 2024-07-30 RX ADMIN — TORSEMIDE 80 MG: 20 TABLET ORAL at 09:33

## 2024-07-30 RX ADMIN — ASPIRIN 81 MG CHEWABLE TABLET 324 MG: 81 TABLET CHEWABLE at 08:14

## 2024-07-30 RX ADMIN — VALSARTAN 80 MG: 80 TABLET, FILM COATED ORAL at 09:35

## 2024-07-30 RX ADMIN — PERFLUTREN 3 ML: 6.52 INJECTION, SUSPENSION INTRAVENOUS at 15:25

## 2024-07-30 RX ADMIN — MICONAZOLE NITRATE: 2 POWDER TOPICAL at 21:52

## 2024-07-30 RX ADMIN — ENOXAPARIN SODIUM 40 MG: 100 INJECTION SUBCUTANEOUS at 13:45

## 2024-07-30 RX ADMIN — FAMOTIDINE 10 MG: 10 TABLET ORAL at 17:26

## 2024-07-30 RX ADMIN — ROSUVASTATIN CALCIUM 20 MG: 10 TABLET, FILM COATED ORAL at 22:00

## 2024-07-30 ASSESSMENT — ACTIVITIES OF DAILY LIVING (ADL)
ADLS_ACUITY_SCORE: 35
ADLS_ACUITY_SCORE: 37
ADLS_ACUITY_SCORE: 45
ADLS_ACUITY_SCORE: 45
ADLS_ACUITY_SCORE: 35
ADLS_ACUITY_SCORE: 35
ADLS_ACUITY_SCORE: 37
ADLS_ACUITY_SCORE: 35
ADLS_ACUITY_SCORE: 45
ADLS_ACUITY_SCORE: 45
ADLS_ACUITY_SCORE: 35
ADLS_ACUITY_SCORE: 35
ADLS_ACUITY_SCORE: 45
ADLS_ACUITY_SCORE: 45
ADLS_ACUITY_SCORE: 37
ADLS_ACUITY_SCORE: 45

## 2024-07-30 ASSESSMENT — COLUMBIA-SUICIDE SEVERITY RATING SCALE - C-SSRS
2. HAVE YOU ACTUALLY HAD ANY THOUGHTS OF KILLING YOURSELF IN THE PAST MONTH?: NO
6. HAVE YOU EVER DONE ANYTHING, STARTED TO DO ANYTHING, OR PREPARED TO DO ANYTHING TO END YOUR LIFE?: NO
1. IN THE PAST MONTH, HAVE YOU WISHED YOU WERE DEAD OR WISHED YOU COULD GO TO SLEEP AND NOT WAKE UP?: NO

## 2024-07-30 NOTE — PLAN OF CARE
Pt admitted to floor. Oriented and plan of care reviewed. Denies pain. Incont of urine, ext cath in place. Numerous skin issues.     PRIMARY DIAGNOSIS: CHEST PAIN  OUTPATIENT/OBSERVATION GOALS TO BE MET BEFORE DISCHARGE:  1. Ruled out acute coronary syndrome (negative or stable Troponin):  Yes  2. Pain Status: Pain free.  3. Appropriate provocative testing performed: Yes  - Stress Test Procedure: Echo  - Interpretation of cardiac rhythm per telemetry tech: SR w/1AVB    4. Cleared by Consultants (if applicable):N/A  5. Return to near baseline physical activity: No  Discharge Planner Nurse   Safe discharge environment identified: Yes  Barriers to discharge: Yes, weakness       Entered by: TREE ATKINSON RN 07/30/2024 4:55 PM     Please review provider order for any additional goals.   Nurse to notify provider when observation goals have been met and patient is ready for discharge.      Goal Outcome Evaluation:  Problem: Chest Pain  Goal: Resolution of Chest Pain Symptoms     Problem: Skin Injury Risk Increased  Goal: Skin Health and Integrity

## 2024-07-30 NOTE — ED TRIAGE NOTES
Arrived via EMS with chief complaint of chest pain. Pain started at 10 pm 7/29/2024; he got up and sat up in the chair before returning back to bed; Claims chest pain was gone by 6:30 am 7/30/2024; Describes chest pain as not sharp at 8/10; claims usual o2 saturation runs at 90% with history of CHF and COPD

## 2024-07-30 NOTE — ED NOTES
Received from EMS directly roomed to Room 15; transferred and made comfortable in bed, attached to pulse oximeter and continuous cardiac monitoring; Introduced self to patient. Whiteboard updated. Plan of care and length of time discussed with patient. Pain assessed. Side rails raised, call light placed within reach, encouraged verbalization of concerns.

## 2024-07-30 NOTE — PHARMACY-ADMISSION MEDICATION HISTORY
Pharmacist Admission Medication History    Admission medication history is complete. The information provided in this note is only as accurate as the sources available at the time of the update.    Information Source(s): Caregiver, Clinic records, Hospital records, and CareEverywhere/SureScripts via in-person    Pertinent Information: Spoke with Santa, she had a medication list.     Changes made to PTA medication list:  Added: APAP, Levothyroxine, Mupirocin, Nystatin powder,   Deleted: Oxycodone prn, KCL 20mg daily, TMC cream, Fluticasone cream, Gentamicin ointment,   Changed:    Allergies reviewed with patient and updates made in EHR: yes    Medication History Completed By: Ludmila Pearson PharmD 7/30/2024 11:28 AM    PTA Med List   Medication Sig Last Dose    acetaminophen (TYLENOL) 500 MG tablet Take 1,000 mg by mouth at bedtime 7/29/2024 at pm    aspirin 81 MG EC tablet [ASPIRIN 81 MG EC TABLET] Take 81 mg by mouth every evening.  7/29/2024 at pm    betamethasone dipropionate (DIPROSONE) 0.05 % external cream 2 times daily as needed Unknown at prn    famotidine (PEPCID) 10 MG tablet Take 10 mg by mouth 2 times daily Noon and 9pm 7/29/2024 at noon and 2100    isosorbide mononitrate (IMDUR) 30 MG 24 hr tablet Take 1 tablet (30 mg) by mouth daily 7/30/2024 at am    ketoconazole (NIZORAL) 2 % external cream daily as needed Unknown at prn    lactobacillus rhamnosus (GG) (CULTURELL) capsule Take 1 capsule by mouth daily noon 7/29/2024 at noon    levothyroxine (SYNTHROID/LEVOTHROID) 25 MCG tablet Take 25 mcg by mouth daily 7/30/2024 at am    Melatonin 2.5 MG CHEW Take by mouth daily as needed Unknown at prn    metoprolol succinate ER (TOPROL XL) 50 MG 24 hr tablet Take 50 mg by mouth daily 7/30/2024 at am    multivitamin with minerals (THERA-M) 9 mg iron-400 mcg Tab tablet Take 1 tablet by mouth daily 7/30/2024 at noon    mupirocin (BACTROBAN) 2 % external ointment Apply topically 2 times daily as needed     mycophenolate  (GENERIC EQUIVALENT) 500 MG tablet Take 1,000 mg by mouth 2 times daily 7/30/2024 at am    nystatin (MYCOSTATIN) 288501 UNIT/GM external powder Apply topically 2 times daily 7/29/2024 at pm    oxybutynin (DITROPAN XL) 15 MG 24 hr tablet Take 15 mg by mouth daily Take with 5mg tablet to =20 mg 7/29/2024 at am    oxybutynin (DITROPAN XL) 5 MG ER tablet Take 5 mg by mouth daily Take with the 15 mg tablet to equal to 20mg/day 7/29/2024 at am    polyethylene glycol (MIRALAX) 17 GM/Dose powder Take 17 g by mouth daily as needed Unknown at prn    rosuvastatin (CRESTOR) 20 MG tablet TAKE 1 TABLET(20 MG) BY MOUTH AT BEDTIME 7/29/2024 at pm    torsemide (DEMADEX) 20 MG tablet Take 80 mg by mouth daily 7/30/2024 at am    valsartan (DIOVAN) 40 MG tablet Take 40 mg by mouth daily 7/30/2024 at am

## 2024-07-30 NOTE — H&P
[facility]    History and Physical - Hospitalist Service       Date of Admission:  7/30/2024    Assessment & Plan      Chava Lincoln is a 89 year old male admitted on 7/30/2024. 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    Chest heaviness/pressure  Hx CAD s/p KATHY to Ramus in 02/2019   Elevated troponin  Presented with about 12 hours of gradual onset chest heaviness that spontaneously resolved. EKG without ischemic changes. Trop 41-->43. CXR unremarkable. Flu/COVID/RSV neg. VSS on RA. Trop elevation likely demand ischemia related to pulm HTN from COPD. Do not suspect ACS. With his symptoms of cough, congestion and rhinorrhea can also consider viral URI.   -PTA torsemide 80 mg daily  -PTA crestor 20 mg  -PTA metoprolol succinate ER 50 mg   -PTA aspirin 81 mg daily  -PTA Imdur 30 mg   -echo ordered     COPD  Normal WOB and SpO2 on RA. No new cough or change in sputum. No wheeze heard. PFT done in Jan 2019 with mild obstruction. 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 Not on inhalers.   -keep sats 88-92  -prn O2   -vitals q8h    RAMON  -Cpap at bedtime     HTN  -PTA valsartan 40 mg    GERD  -PTA pepcid    Hypothyroidism  -PTA synthroid  mcg    Bullous pemphigoid  -PTA mycophenolate mofetil 1 g BID    Urinary retention  Hx prostate Cancer  Had been retaining up to 800 ml per ED RN despite torsemide 80 mg x 1   -PTA oxybutynin  -follow up UA  -RN bladder protocol            Diet: Combination Diet Regular Diet Adult    DVT Prophylaxis: Enoxaparin (Lovenox) SQ  Montano Catheter: Not present  Lines: None     Cardiac Monitoring: ACTIVE order. Indication: Chest pain/ ACS rule out (24 hours)  Code Status: No CPR- Do NOT  "Intubate      Clinically Significant Risk Factors Present on Admission                # Drug Induced Platelet Defect: home medication list includes an antiplatelet medication   # Hypertension: Noted on problem list  # Chronic heart failure with preserved ejection fraction: heart failure noted on problem list and last echo with EF >50%        # Obesity: Estimated body mass index is 33.97 kg/m  as calculated from the following:    Height as of this encounter: 1.753 m (5' 9\").    Weight as of this encounter: 104.3 kg (230 lb).              Disposition Plan     Medically Ready for Discharge: Anticipated Tomorrow         The patient's care was discussed with the Attending Physician, Dr. Dunne .    Valdemar Langford MD PGY2  Hospitalist Service  [facility]  Securely message with [employer] (more info)  Text page via [facility] Paging/Directory     ______________________________________________________________________    Chief Complaint   Chest heaviness    History is obtained from the patient and wife    History of Present Illness   Chava Lincoln is a 89 year old male who has a past medical hx of prostate cancer, COPD, RAMON on CPAP, CAD, HTN, GERD, bullous pemphigoid on mycophenolate mofetil, hypothyroidism who presented initially to ED from home due to chest heaviness. He points to mid sternum. Says it does not radiate anywhere. Started last night gradually over course of 2 hours after he ate dinner. For dinner he says he didn't eat much but a nectarine and some old candy. The chest heaviness persisted until about 6am then resolved. He is currently without chest heaviness or CP. Denies an exertional component. No PND or orthopnea. Denies fever, chills, emesis, diarrhea.     He has SOB baseline from COPD. He is not on home O2. He endorses subacute cough for 2 months and some clear sputum but no change in color or amount. He does endorse congestion and runny nose. No sore throat. No recent illnesses or " hospitalizations. His wife manages his medications.     He normally wears compression stockings but does not have them on today.        ED course:    Vitals  Within normal limits     Labs  Trop 41-->43  WBC 13.6 with PMN 10.3  Hgb 12.2  Gap 16  Glu 146  BUN 29.2    Imaging  CXR Low lung volumes with bibasilar atelectasis     Interventions  Aspirin 324 mg, torsemide 80 mg, valsartan 80 mg     Past Medical History    Past Medical History:   Diagnosis Date    Coronary artery disease due to calcified coronary lesion     GERD (gastroesophageal reflux disease) 7/2/2014    Hypertension     RAMON on CPAP 4/17/2017    Prostate cancer (H)     Prostate cancer (H)        Past Surgical History   Past Surgical History:   Procedure Laterality Date    CV CORONARY ANGIOGRAM N/A 12/10/2018    Procedure: Coronary Angiogram;  Surgeon: Tana Duncan MD;  Location: [facility];  Service: Cardiology    CV LEFT HEART CATHETERIZATION WITHOUT LEFT VENTRICULOGRAM Left 12/10/2018    Procedure: Left Heart Catheterization Without Left Ventriculogram;  Surgeon: Tana Duncan MD;  Location: [facility];  Service: Cardiology    CV LEFT HEART CATHETERIZATION WITHOUT LEFT VENTRICULOGRAM Left 02/04/2019    Procedure: Left Heart Catheterization Without Left Ventriculogram;  Surgeon: Tana Duncan MD;  Location: [facility];  Service: Cardiology    CV RIGHT HEART CATHETERIZATION N/A 02/04/2019    Procedure: Right Heart Catheterization;  Surgeon: Tana Duncan MD;  Location: [facility];  Service: Cardiology    HERNIA REPAIR      JOINT REPLACEMENT      KNEE SURGERY Bilateral     OTHER SURGICAL HISTORY      Prostatic cryotherapy       Prior to Admission Medications   Prior to Admission Medications   Prescriptions Last Dose Informant Patient Reported? Taking?   Melatonin 2.5 MG CHEW   Yes No   Sig: Take by mouth daily as needed   Polyethylene Glycol 3350 (MIRALAX PO)   Yes No   Probiotic Product  (PROBIOTIC DAILY PO)   Yes No   acetaminophen (TYLENOL) 325 MG tablet   Yes No   Sig: Take 325 mg by mouth every 6 hours as needed   aspirin 81 MG EC tablet   Yes No   Sig: [ASPIRIN 81 MG EC TABLET] Take 81 mg by mouth every evening.    betamethasone dipropionate (DIPROSONE) 0.05 % external cream   Yes No   famotidine (PEPCID) 10 MG tablet   Yes No   Sig: Take 10 mg by mouth 2 times daily   fluticasone (CUTIVATE) 0.05 % external cream   Yes No   Sig: daily   Patient not taking: Reported on 1/29/2024   gentamicin (GARAMYCIN) 0.1 % external ointment   Yes No   isosorbide mononitrate (IMDUR) 30 MG 24 hr tablet   No No   Sig: Take 1 tablet (30 mg) by mouth daily   ketoconazole (NIZORAL) 2 % external cream   Yes No   lactobacillus rhamnosus (GG) (CULTURELL) capsule   Yes No   Sig: Take 1 capsule by mouth daily   metoprolol tartrate (LOPRESSOR) 25 MG tablet   No No   Sig: TAKE 1 TABLET(25 MG) BY MOUTH TWICE DAILY   multivitamin with minerals (THERA-M) 9 mg iron-400 mcg Tab tablet   Yes No   Sig: Take 1 tablet by mouth daily   mycophenolate (GENERIC EQUIVALENT) 500 MG tablet   Yes No   Sig: Take 1,500 mg by mouth 2 times daily   oxyCODONE (ROXICODONE) 5 MG tablet   No No   Sig: Take 1 tablet (5 mg) by mouth every 6 hours as needed for pain   Patient not taking: Reported on 1/29/2024   oxybutynin (DITROPAN XL) 15 MG 24 hr tablet   Yes No   Sig: [OXYBUTYNIN (DITROPAN XL) 15 MG 24 HR TABLET] Take 15 mg by mouth daily.   oxybutynin (DITROPAN XL) 5 MG ER tablet   Yes No   Sig: Take 5 mg by mouth daily    potassium chloride (KLOR-CON) 20 MEQ packet   No No   Sig: Take 20 mEq by mouth daily   Patient not taking: Reported on 1/29/2024   rosuvastatin (CRESTOR) 20 MG tablet   No No   Sig: TAKE 1 TABLET(20 MG) BY MOUTH AT BEDTIME   torsemide (DEMADEX) 20 MG tablet   No No   Sig: Continue on torsemide 80 in the a.m. and then 40 in the p.m.   triamcinolone (KENALOG) 0.1 % external ointment   Yes No   Sig: Apply 1 applicator topically  as needed   Patient not taking: Reported on 1/29/2024   valsartan (DIOVAN) 80 MG tablet   Yes No   Sig: Take 40 mg by mouth daily      Facility-Administered Medications: None        Review of Systems    The 10 point Review of Systems is negative other than noted in the HPI or here.      Physical Exam   Vital Signs: Temp: 97.6  F (36.4  C)   BP: 134/61 Pulse: 79   Resp: 21 SpO2: 92 % O2 Device: None (Room air)    Weight: 230 lbs 0 oz    Constitutional: awake, alert, cooperative, no apparent distress, and appears stated age  Eyes: Lids and lashes normal, pupils equal, round and reactive to light, extra ocular muscles intact, sclera clear, conjunctiva normal  ENT: Normocephalic, without obvious abnormality, atraumatic, external ears without lesions, oral pharynx with moist mucous membranes  Respiratory: No increased work of breathing, decreased air exchange, clear to auscultation bilaterally, no crackles or wheezing  Cardiovascular: Distant heart sounds but regular rate and rhythm, normal S1 and S2, no S3 or S4, and no murmur noted  GI: Normal bowel sounds, soft, non-distended, non-tender, no masses palpated, no hepatosplenomegally  Skin: there is an area of erythema on medial left leg above ankle roughly 8x8 cm without any fluctuance and nontender. Not warm.   Musculoskeletal: 2+ pitting edema of BLE up to mid shin  Neurologic: Awake, alert, oriented to name, place and time.  Cranial nerves II-XII are grossly intact.  Motor is 5 out of 5 bilaterally.      Medical Decision Making             Data   Imaging results reviewed over the past 24 hrs:   Recent Results (from the past 24 hour(s))   XR Chest 2 Views    Narrative    EXAM: XR CHEST 2 VIEWS  LOCATION: [facility]  DATE: 7/30/2024    INDICATION: chest pain  COMPARISON: Chest radiograph 2/8/2022, CTA chest 4/8/2021      Impression    IMPRESSION: Low lung volumes with bibasilar atelectasis. No pleural effusion or pneumothorax. Cardiac  silhouette and mediastinal contours are stable.     Most Recent 3 CBC's:  Recent Labs   Lab Test 07/30/24  0804 05/24/24  1135 01/29/24  1114   WBC 13.6* 7.4 9.1   HGB 12.2* 11.6* 12.4*   MCV 93 95 92    250 222     Most Recent 3 BMP's:  Recent Labs   Lab Test 07/30/24  0804 06/24/24  1526 05/24/24  1135    138 138   POTASSIUM 3.8 3.7 4.2   CHLORIDE 99 100 100   CO2 24 25 21*   BUN 29.2* 30.7* 37.4*   CR 1.05 1.16 1.27*   ANIONGAP 16* 13 17*   SUZE 8.9 8.7 8.9   * 158* 152*     Most Recent 3 Troponin's:No lab results found.  Most Recent 3 BNP's:  Recent Labs   Lab Test 07/30/24  0804   NTBNPI 592     Most Recent D-dimer:  Recent Labs   Lab Test 02/08/22  1517   DD 3.08*     Most Recent Cholesterol Panel:  Recent Labs   Lab Test 01/29/24  1114   CHOL 136   LDL 56   HDL 46   TRIG 168*     Most Recent TSH and T4:  Recent Labs   Lab Test 06/24/24  1526   TSH 6.26*

## 2024-07-30 NOTE — ED NOTES
Patient unable to void even after 80 mg Torsemide. Bladder scan 800 ml. MD was notified new orders to start bladder management

## 2024-07-30 NOTE — ED NOTES
Pt had a run of possible v tach. Strip on chart, shown to Dr Pierson. See VS. Pt did not feel anything, was talking with pharmacist about meds. States he heard the alarm is all.

## 2024-07-30 NOTE — ED PROVIDER NOTES
EMERGENCY DEPARTMENT ENCOUNTER      NAME: Chava Lincoln  AGE: 89 year old male  YOB: 1929  MRN: [id number]  EVALUATION DATE & TIME: 7/30/2024  7:47 AM    PCP: Erasmo Michael    ED PROVIDER: Marivel Pierson MD      Chief Complaint   Patient presents with    Chest Pain         FINAL IMPRESSION:  1. Chest pain, unspecified type          ED COURSE & MEDICAL DECISION MAKING:    Pertinent Labs & Imaging studies reviewed. (See chart for details)  89 year old male presents to the Emergency Department for evaluation of chest tightness that started at 10:00 last evening.  Patient has a history of hypertension, dyslipidemia, CAD, CHF, ascending aortic aneurysm.  Patient currently is chest pain free upon arrival to the emergency department.  He reports that his oxygen saturations are usually in the upper 80s to 90%.  His wife at the bedside confirms this.  She denies any recent URI symptoms, constitutional symptoms or appearing more short of breath.  He is not usually on supplemental oxygen.  Chest x-ray with no acute infiltrate.  Troponin is in the intermediate range.  ECG with no acute ischemia.  No findings of CHF exacerbation.  Given the patient's symptoms and significant cardiac history, the patient will be admitted for chest pain rule out.    At the conclusion of the encounter I discussed the results of all of the tests and the disposition. The questions were answered. The patient or family acknowledged understanding and was agreeable with the care plan.     Additional ED course Timeline:  7:49 AM I met with the patient, obtained history, performed an initial exam, and discussed options and plan for diagnostics and treatment here in the ED.   9:29 AM I updated the patient and his family on their lab and imaging results.  9:48 AM I Spoke with Dr. Dunne, Hospitalist. We further discussed the patient's case, reviewed ED work-up so far, and accepted the patient for admit.      Medical Decision  Making  Obtained supplemental history:Supplemental history obtained?: Documented in chart and Family Member/Significant Other  Reviewed external records: External records reviewed?: Documented in chart  Care impacted by chronic illness:Chronic Lung Disease, Chronic Pain, Heart Disease, and Hypertension  Care significantly affected by social determinants of health:N/A  Did you consider but not order tests?: Work up considered but not performed and documented in chart, if applicable  Did you interpret images independently?: Independent interpretation of ECG and images noted in documentation, when applicable.  Consultation discussion with other provider:Did you involve another provider (consultant, , pharmacy, etc.)?: I discussed the care with another health care provider, see documentation for details.  Admit.      MEDICATIONS GIVEN IN THE EMERGENCY:  Medications   aspirin (ASA) chewable tablet 324 mg (324 mg Oral $Given 7/30/24 0814)   torsemide (DEMADEX) tablet 80 mg (80 mg Oral $Given 7/30/24 0933)   valsartan (DIOVAN) tablet 80 mg (80 mg Oral $Given 7/30/24 0935)       NEW PRESCRIPTIONS STARTED AT TODAY'S ER VISIT  New Prescriptions    No medications on file          =================================================================    HPI    Patient information was obtained from: The patient    Use of : N/A         Chava Lincoln is a 89 year old male with a pertinent history of GERD, HTN, CAD who presents to this ED via ambulance for evaluation of chest pain.    The patient developed aching mid-chest pain since last night approximately 2200 that remained continuous until 0600 this morning. He woke up in the middle of the night to sit on his chair. His chest pain has subsided at this time in the ER. He has a history of COPD and is chronically short of breath but denies any increasing shortness of breath at this time. He notes his O2 sats ranges in the upper 80s at baseline. He is not on daily  supplemental oxygen at rest. His wife notes he has not been eating well. His home medications consists of ASA 81mg, Levothyroxine, Torsemide, Valsartan, and Oxybutynin. He has not taken his blood pressure medications yet this morning.    Otherwise, the patient denied having leg swelling, nausea, vomiting, light-headedness, cough, congestion, and any other medical complaints at this time.        PAST MEDICAL HISTORY:  Past Medical History:   Diagnosis Date    Coronary artery disease due to calcified coronary lesion     GERD (gastroesophageal reflux disease) 7/2/2014    Hypertension     RAMON on CPAP 4/17/2017    Prostate cancer (H)     Prostate cancer (H)        PAST SURGICAL HISTORY:  Past Surgical History:   Procedure Laterality Date    CV CORONARY ANGIOGRAM N/A 12/10/2018    Procedure: Coronary Angiogram;  Surgeon: Tana Duncan MD;  Location: [facility];  Service: Cardiology    CV LEFT HEART CATHETERIZATION WITHOUT LEFT VENTRICULOGRAM Left 12/10/2018    Procedure: Left Heart Catheterization Without Left Ventriculogram;  Surgeon: Tana Duncan MD;  Location: [facility];  Service: Cardiology    CV LEFT HEART CATHETERIZATION WITHOUT LEFT VENTRICULOGRAM Left 02/04/2019    Procedure: Left Heart Catheterization Without Left Ventriculogram;  Surgeon: Tana Duncan MD;  Location: [facility];  Service: Cardiology    CV RIGHT HEART CATHETERIZATION N/A 02/04/2019    Procedure: Right Heart Catheterization;  Surgeon: Tana Duncan MD;  Location: [facility];  Service: Cardiology    HERNIA REPAIR      JOINT REPLACEMENT      KNEE SURGERY Bilateral     OTHER SURGICAL HISTORY      Prostatic cryotherapy           CURRENT MEDICATIONS:    acetaminophen (TYLENOL) 325 MG tablet  aspirin 81 MG EC tablet  betamethasone dipropionate (DIPROSONE) 0.05 % external cream  famotidine (PEPCID) 10 MG tablet  fluticasone (CUTIVATE) 0.05 % external cream  gentamicin (GARAMYCIN) 0.1 % external  ointment  isosorbide mononitrate (IMDUR) 30 MG 24 hr tablet  ketoconazole (NIZORAL) 2 % external cream  lactobacillus rhamnosus (GG) (CULTURELL) capsule  Melatonin 2.5 MG CHEW  metoprolol tartrate (LOPRESSOR) 25 MG tablet  multivitamin with minerals (THERA-M) 9 mg iron-400 mcg Tab tablet  mycophenolate (GENERIC EQUIVALENT) 500 MG tablet  oxybutynin (DITROPAN XL) 15 MG 24 hr tablet  oxybutynin (DITROPAN XL) 5 MG ER tablet  oxyCODONE (ROXICODONE) 5 MG tablet  Polyethylene Glycol 3350 (MIRALAX PO)  potassium chloride (KLOR-CON) 20 MEQ packet  Probiotic Product (PROBIOTIC DAILY PO)  rosuvastatin (CRESTOR) 20 MG tablet  torsemide (DEMADEX) 20 MG tablet  triamcinolone (KENALOG) 0.1 % external ointment  valsartan (DIOVAN) 80 MG tablet        ALLERGIES:  Allergies   Allergen Reactions    Acetaminophen     Atorvastatin Muscle Pain (Myalgia)    Furosemide     Gabapentin Unknown    Hydrocodone     Lisinopril Cough    Morphine Other (See Comments)     hypotension    Sulfa (Sulfonamide Antibiotics) [Sulfa Antibiotics] Hives    Sulfatolamide     Vicodin [Hydrocodone-Acetaminophen] Unknown       FAMILY HISTORY:  Family History   Problem Relation Age of Onset    Diabetes Mother     Cerebrovascular Disease Father     Heart Disease Brother     Coronary Artery Disease Brother        SOCIAL HISTORY:   Social History     Socioeconomic History    Marital status:      Spouse name: Santa    Number of children: 0    Highest education level: 12th grade   Occupational History    Occupation: Retired    Tobacco Use    Smoking status: Never    Smokeless tobacco: Never   Substance and Sexual Activity    Alcohol use: Yes     Comment: Alcoholic Drinks/day: 2-3 beer/ month    Drug use: No    Sexual activity: Not Currently     Partners: Female     Social Determinants of Health     Financial Resource Strain: Low Risk  (3/23/2021)    Overall Financial Resource Strain (CARDIA)     Difficulty of Paying Living Expenses: Not hard at all   Food  "Insecurity: No Food Insecurity (3/23/2021)    Hunger Vital Sign     Worried About Running Out of Food in the Last Year: Never true     Ran Out of Food in the Last Year: Never true   Transportation Needs: No Transportation Needs (3/23/2021)    PRAPARE - Transportation     Lack of Transportation (Medical): No     Lack of Transportation (Non-Medical): No   Physical Activity: Inactive (3/23/2021)    Exercise Vital Sign     Days of Exercise per Week: 0 days     Minutes of Exercise per Session: 0 min   Stress: No Stress Concern Present (3/23/2021)    [employer] - Occupational Stress Questionnaire     Feeling of Stress : Only a little   Social Connections: Socially Isolated (3/23/2021)    Social Connection and Isolation Panel [NHANES]     Frequency of Communication with Friends and Family: Never     Frequency of Social Gatherings with Friends and Family: Never     Attends Christianity Services: Never     Active Member of Clubs or Organizations: No     Attends Club or Organization Meetings: Never     Marital Status:    Interpersonal Safety: Not At Risk (3/23/2021)    Humiliation, Afraid, Rape, and Kick questionnaire     Fear of Current or Ex-Partner: No     Emotionally Abused: No     Physically Abused: No     Sexually Abused: No       VITALS:  /61   Pulse 79   Temp 97.6  F (36.4  C)   Resp 21   Ht 1.753 m (5' 9\")   Wt 104.3 kg (230 lb)   SpO2 92%   BMI 33.97 kg/m      PHYSICAL EXAM    Constitutional: Well developed, Well nourished, NAD  HENT: Normocephalic, Atraumatic, Bilateral external ears normal, Oropharynx normal, mucous membranes moist, Nose normal.   Neck- Normal range of motion, No tenderness, Supple, No stridor.  Eyes: PERRL, EOMI, Conjunctiva normal, No discharge.   Respiratory: Coarse breath sounds biltateral, No respiratory distress  Cardiovascular: Normal heart rate, Regular rhythm  GI: Bowel sounds normal, Soft, No tenderness,   Musculoskeletal: Trace edema to " bilateral lower extremities. Good range of motion in all major joints. No tenderness to palpation or major deformities noted.   Integument: Warm, Dry, No erythema, No rash  Neurologic: Alert & oriented x 3, Normal motor function, Normal sensory function, No focal deficits noted. Normal gait.   Psychiatric: Affect normal, Judgment normal, Mood normal.      LAB:  All pertinent labs reviewed and interpreted.  Results for orders placed or performed during the hospital encounter of 07/30/24   XR Chest 2 Views    Impression    IMPRESSION: Low lung volumes with bibasilar atelectasis. No pleural effusion or pneumothorax. Cardiac silhouette and mediastinal contours are stable.   Basic metabolic panel   Result Value Ref Range    Sodium 139 135 - 145 mmol/L    Potassium 3.8 3.4 - 5.3 mmol/L    Chloride 99 98 - 107 mmol/L    Carbon Dioxide (CO2) 24 22 - 29 mmol/L    Anion Gap 16 (H) 7 - 15 mmol/L    Urea Nitrogen 29.2 (H) 8.0 - 23.0 mg/dL    Creatinine 1.05 0.67 - 1.17 mg/dL    GFR Estimate 65 >60 mL/min/1.73m2    Calcium 8.9 8.8 - 10.4 mg/dL    Glucose 146 (H) 70 - 99 mg/dL   Result Value Ref Range    Troponin T, High Sensitivity 41 (H) <=22 ng/L   Nt probnp inpatient (BNP)   Result Value Ref Range    N terminal Pro BNP Inpatient 592 0 - 1,800 pg/mL   CBC with platelets and differential   Result Value Ref Range    WBC Count 13.6 (H) 4.0 - 11.0 10e3/uL    RBC Count 4.11 (L) 4.40 - 5.90 10e6/uL    Hemoglobin 12.2 (L) 13.3 - 17.7 g/dL    Hematocrit 38.0 (L) 40.0 - 53.0 %    MCV 93 78 - 100 fL    MCH 29.7 26.5 - 33.0 pg    MCHC 32.1 31.5 - 36.5 g/dL    RDW 12.9 10.0 - 15.0 %    Platelet Count 218 150 - 450 10e3/uL    % Neutrophils 76 %    % Lymphocytes 15 %    % Monocytes 8 %    % Eosinophils 1 %    % Basophils 0 %    % Immature Granulocytes 1 %    NRBCs per 100 WBC 0 <1 /100    Absolute Neutrophils 10.3 (H) 1.6 - 8.3 10e3/uL    Absolute Lymphocytes 2.0 0.8 - 5.3 10e3/uL    Absolute Monocytes 1.1 0.0 - 1.3 10e3/uL    Absolute  Eosinophils 0.1 0.0 - 0.7 10e3/uL    Absolute Basophils 0.0 0.0 - 0.2 10e3/uL    Absolute Immature Granulocytes 0.1 <=0.4 10e3/uL    Absolute NRBCs 0.0 10e3/uL       RADIOLOGY:  Reviewed all pertinent imaging. Please see official radiology report.  XR Chest 2 Views   Final Result   IMPRESSION: Low lung volumes with bibasilar atelectasis. No pleural effusion or pneumothorax. Cardiac silhouette and mediastinal contours are stable.          EKG:    Performed at: 07/30/2024 at 07:55    Impression: Accelerated Junctional rhythm. Low voltage QRS. Inferior infarct, age undetermined. No acute ST changes when compared with ECG of 09/07/2023 at 11:26.    Rate: 82 bpm  Rhythm: Accelerated Junctional rhythm.    QRS Interval: 102 ms  QTc Interval: 483 ms  ST Changes: No acute ST changes  Comparison: ECG of 09/07/2023 at 11:26    I have independently reviewed and interpreted the EKG(s) documented above.      I, Hernandez Fleming, am serving as a scribe to document services personally performed by Marivel Pierson, based on my observation and the provider's statements to me. I, Marivel Pierson MD, attest that Hernandez Fleming is acting in a scribe capacity, has observed my performance of the services and has documented them in accordance with my direction.    Marivel Pierson MD  Emergency Medicine  [facility] EMERGENCY ROOM  [address] [place] [id number] [phone]       Marivel Pierson MD  07/30/24 0941

## 2024-07-31 ENCOUNTER — APPOINTMENT (OUTPATIENT)
Dept: PHYSICAL THERAPY | Facility: CLINIC | Age: 89
End: 2024-07-31
Payer: MEDICARE

## 2024-07-31 ENCOUNTER — APPOINTMENT (OUTPATIENT)
Dept: OCCUPATIONAL THERAPY | Facility: CLINIC | Age: 89
End: 2024-07-31
Payer: MEDICARE

## 2024-07-31 ENCOUNTER — TELEPHONE (OUTPATIENT)
Dept: CARDIOLOGY | Facility: CLINIC | Age: 89
End: 2024-07-31
Payer: MEDICARE

## 2024-07-31 VITALS
RESPIRATION RATE: 18 BRPM | HEIGHT: 69 IN | DIASTOLIC BLOOD PRESSURE: 62 MMHG | WEIGHT: 229.94 LBS | SYSTOLIC BLOOD PRESSURE: 143 MMHG | BODY MASS INDEX: 34.06 KG/M2 | HEART RATE: 69 BPM | OXYGEN SATURATION: 91 % | TEMPERATURE: 97.3 F

## 2024-07-31 LAB
ANION GAP SERPL CALCULATED.3IONS-SCNC: 13 MMOL/L (ref 7–15)
BUN SERPL-MCNC: 26.3 MG/DL (ref 8–23)
CALCIUM SERPL-MCNC: 9 MG/DL (ref 8.8–10.4)
CHLORIDE SERPL-SCNC: 101 MMOL/L (ref 98–107)
CREAT SERPL-MCNC: 1.27 MG/DL (ref 0.67–1.17)
EGFRCR SERPLBLD CKD-EPI 2021: 52 ML/MIN/1.73M2
ERYTHROCYTE [DISTWIDTH] IN BLOOD BY AUTOMATED COUNT: 13.1 % (ref 10–15)
GLUCOSE SERPL-MCNC: 125 MG/DL (ref 70–99)
HCO3 SERPL-SCNC: 27 MMOL/L (ref 22–29)
HCT VFR BLD AUTO: 35.5 % (ref 40–53)
HGB BLD-MCNC: 11.7 G/DL (ref 13.3–17.7)
MCH RBC QN AUTO: 30.2 PG (ref 26.5–33)
MCHC RBC AUTO-ENTMCNC: 33 G/DL (ref 31.5–36.5)
MCV RBC AUTO: 92 FL (ref 78–100)
PLATELET # BLD AUTO: 217 10E3/UL (ref 150–450)
POTASSIUM SERPL-SCNC: 3.4 MMOL/L (ref 3.4–5.3)
RBC # BLD AUTO: 3.88 10E6/UL (ref 4.4–5.9)
SODIUM SERPL-SCNC: 141 MMOL/L (ref 135–145)
WBC # BLD AUTO: 9.6 10E3/UL (ref 4–11)

## 2024-07-31 PROCEDURE — 250N000012 HC RX MED GY IP 250 OP 636 PS 637: Performed by: STUDENT IN AN ORGANIZED HEALTH CARE EDUCATION/TRAINING PROGRAM

## 2024-07-31 PROCEDURE — 250N000011 HC RX IP 250 OP 636

## 2024-07-31 PROCEDURE — 97535 SELF CARE MNGMENT TRAINING: CPT | Mod: GO

## 2024-07-31 PROCEDURE — G0378 HOSPITAL OBSERVATION PER HR: HCPCS

## 2024-07-31 PROCEDURE — 250N000013 HC RX MED GY IP 250 OP 250 PS 637: Performed by: STUDENT IN AN ORGANIZED HEALTH CARE EDUCATION/TRAINING PROGRAM

## 2024-07-31 PROCEDURE — 99239 HOSP IP/OBS DSCHRG MGMT >30: CPT | Mod: GC | Performed by: STUDENT IN AN ORGANIZED HEALTH CARE EDUCATION/TRAINING PROGRAM

## 2024-07-31 PROCEDURE — 80048 BASIC METABOLIC PNL TOTAL CA: CPT

## 2024-07-31 PROCEDURE — 96372 THER/PROPH/DIAG INJ SC/IM: CPT

## 2024-07-31 PROCEDURE — 97116 GAIT TRAINING THERAPY: CPT | Mod: GP

## 2024-07-31 PROCEDURE — 36415 COLL VENOUS BLD VENIPUNCTURE: CPT

## 2024-07-31 PROCEDURE — 97161 PT EVAL LOW COMPLEX 20 MIN: CPT | Mod: GP

## 2024-07-31 PROCEDURE — 85027 COMPLETE CBC AUTOMATED: CPT

## 2024-07-31 PROCEDURE — 97166 OT EVAL MOD COMPLEX 45 MIN: CPT | Mod: GO

## 2024-07-31 RX ADMIN — OXYBUTYNIN CHLORIDE 15 MG: 5 TABLET, EXTENDED RELEASE ORAL at 10:18

## 2024-07-31 RX ADMIN — LEVOTHYROXINE SODIUM 25 MCG: 0.03 TABLET ORAL at 08:44

## 2024-07-31 RX ADMIN — METOPROLOL SUCCINATE 50 MG: 50 TABLET, EXTENDED RELEASE ORAL at 08:44

## 2024-07-31 RX ADMIN — ENOXAPARIN SODIUM 40 MG: 100 INJECTION SUBCUTANEOUS at 10:17

## 2024-07-31 RX ADMIN — ISOSORBIDE MONONITRATE 30 MG: 30 TABLET, EXTENDED RELEASE ORAL at 08:44

## 2024-07-31 RX ADMIN — MICONAZOLE NITRATE: 2 POWDER TOPICAL at 08:49

## 2024-07-31 RX ADMIN — FAMOTIDINE 10 MG: 10 TABLET ORAL at 10:17

## 2024-07-31 RX ADMIN — MYCOPHENOLATE MOFETIL 1000 MG: 500 TABLET ORAL at 08:44

## 2024-07-31 RX ADMIN — OXYBUTYNIN CHLORIDE 5 MG: 5 TABLET, EXTENDED RELEASE ORAL at 10:18

## 2024-07-31 ASSESSMENT — ACTIVITIES OF DAILY LIVING (ADL)
ADLS_ACUITY_SCORE: 47
ADLS_ACUITY_SCORE: 45
ADLS_ACUITY_SCORE: 47
ADLS_ACUITY_SCORE: 45
ADLS_ACUITY_SCORE: 47

## 2024-07-31 NOTE — PROGRESS NOTES
Patient bladder scanned for greater than 550 mL at beginning of shift (see flowsheets). Patient up to bathroom and unable to void. Patient states he does not feel the urge to void. Patient refusing straight catheterization at this time. Patient educated on importance of emptying bladder, pt continues to refuse.    Patient able to empty bladder some overnight. Bladder scan this morning for 411 mL. Pt states he doesn't feel the urge to void at this time. Patient re-educated on straight catheterization and importance of emptying bladder. Pt continues to refuse straight cath.

## 2024-07-31 NOTE — PROGRESS NOTES
PRIMARY DIAGNOSIS: CHEST PAIN  OUTPATIENT/OBSERVATION GOALS TO BE MET BEFORE DISCHARGE:  1. Ruled out acute coronary syndrome (negative or stable Troponin):  Yes  2. Pain Status: Pain free.  3. Appropriate provocative testing performed: Yes  - Stress Test Procedure: Echo  - Interpretation of cardiac rhythm per telemetry tech: SR with 1st degree heart block    4. Cleared by Consultants (if applicable):N/A  5. Return to near baseline physical activity: No  Discharge Planner Nurse   Safe discharge environment identified: Yes  Barriers to discharge: Yes       Entered by: Shilpa Galvez RN 07/31/2024 3:17 AM     Please review provider order for any additional goals.   Nurse to notify provider when observation goals have been met and patient is ready for discharge.

## 2024-07-31 NOTE — PROGRESS NOTES
07/31/24 0930   Appointment Info   Signing Clinician's Name / Credentials (OT) Rose Marie Paris OTR/L   Quick Adds   Quick Adds Certification   Living Environment   People in Home spouse   Current Living Arrangements house  (2 levels.)   Living Environment Comments Able to live on one level.  Uses stair lift from the garage to get into the house   Self-Care   Usual Activity Tolerance fair   Current Activity Tolerance poor   Equipment Currently Used at Home grab bar, toilet;raised toilet seat;walker, rolling   Instrumental Activities of Daily Living (IADL)   IADL Comments Wife cares for all IADLs.  Helps Pt with his ADLs   General Information   Onset of Illness/Injury or Date of Surgery 07/30/24   Referring Physician Dr. Denny Dunne   Patient/Family Therapy Goal Statement (OT) To return home with wife's help   Additional Occupational Profile Info/Pertinent History of Current Problem Adm for chest heaviness.  PMH:  Prostate CA, COPD, RAMON, CAD, HTN, GERD   Existing Precautions/Restrictions oxygen therapy device and L/min  (Currently on 4L.  Pt reports no O2 device used at home.)   Cognitive Status Examination   Orientation Status person;place;time   Follows Commands WFL   Memory Deficit short-term memory   Visual Perception   Visual Impairment/Limitations corrective lenses for reading   Range of Motion Comprehensive   General Range of Motion upper extremity range of motion deficits identified   General Upper Extremity Assessment (Range of Motion)   Comment: Upper Extremity ROM Severely limited L shoulder flex and abd.   Upper Extremity: Range of Motion shoulder, left: UE ROM   Coordination   Fine Motor Coordination Able to oppose thumb to each fingretip.   Bed Mobility   Bed Mobility supine-sit   Supine-Sit [place] (Bed Mobility) maximum assist (25% patient effort)   Assistive Device (Bed Mobility) bed rails;draw sheet;other (see comments)  (HOB elevated)   Transfers   Transfers sit-stand transfer;bed-chair  transfer   Transfer Skill: Bed to Chair/Chair to Bed   Bed-Chair [place] (Transfers) moderate assist (50% patient effort)   Assistive Device (Bed-Chair Transfers) rolling walker   Transfer Comments Needed bed ht elevated in order to stand.   Sit-Stand Transfer   Sit-Stand [place] (Transfers) moderate assist (50% patient effort)   Assistive Device (Sit-Stand Transfers) walker, front-wheeled   Sit/Stand Transfer Comments Max A of 2 for sit to stand from chair.   Balance   Balance Assessment standing dynamic balance   Standing Balance: Dynamic moderate assist   Position/Device Used, Standing Balance walker, front-wheeled   Activities of Daily Living   BADL Assessment/Intervention feeding   Eating/Self Feeding   Position (Feeding) supported sitting   [place] Level (Feeding) supervision;verbal cues;set up   Clinical Impression   Criteria for Skilled Therapeutic Interventions Met (OT) Yes, treatment indicated   OT Diagnosis decreased indep with ADLs and trsfs due to Chest Heaviness and SOB.   OT Problem List-Impairments impacting ADL problems related to;activity tolerance impaired;balance;cognition;mobility;strength   Assessment of Occupational Performance 5 or more Performance Deficits   Identified Performance Deficits dressing, toileting, bathing, G/H, trsfs, cognition.   Planned Therapy Interventions (OT) ADL retraining;cognition;strengthening;transfer training;progressive activity/exercise   Clinical Decision Making Complexity (OT) detailed assessment/moderate complexity   Risk & Benefits of therapy have been explained evaluation/treatment results reviewed;participants included;patient;spouse/significant other   OT Total Evaluation Time   OT Eval, Moderate Complexity Minutes (45515) 15   Therapy Certification   Medical Diagnosis Chest Heaviness; shortness of breath.   Start of Care Date 07/31/24   Certification date from 07/31/24   Certification date to 08/31/24   OT Goals   Therapy Frequency (OT) 5  times/week   OT Predicted Duration/Target Date for Goal Attainment 08/07/24   OT Goals Hygiene/Grooming;Toilet Transfer/Toileting;Cognition   OT: Hygiene/Grooming modified independent;using adaptive equipment;from wheelchair   OT: Toilet Transfer/Toileting Modified independent;using adaptive equipment   OT: Cognitive Patient/caregiver will verbalize understanding of cognitive assessment results/recommendations as needed for safe discharge planning  (with VC as needed.)   Interventions   Interventions Quick Adds Self-Care/Home Management   Self-Care/Home Management   Self-Care/Home Mgmt/ADL, Compensatory, Meal Prep Minutes (81632) 30   Symptoms Noted During/After Treatment (Meal Preparation/Planning Training) fatigue;shortness of breath   Treatment Detail/Skilled Intervention Pt resting in bed with wife present.  Pt on 4L NC O2.  O2 sats 93%.  Pt worked on bed mobility.  Attempted supine to sitting EOB on the Left side.  Pt unsuccessful due to pain on his buttock.  Returned to supine.  Attempted agian going to his R side.  Pt needed the HOB elevated in order to sit up.  At home, Pt reports his bed is flat.  Needed Mod - MaxA of one to scoot forward to EOB.  In order to stand, Needed bed ht increased to stand using FWW.  VC needed for hand placement.  Took 6 steps to the chair with min A of one.  Once in the chair, needed max A of 2 to stand up from the chair as chair ht not able to elevate.  At home, Pt uses a lift chair.  Pt set up with breakfast tray, SBA to min A of one for preping food.  Pt able to feed himself.  Wife present and assisting.  Asking NA for chair alarm.   RN aware.   OT Discharge Planning   OT Plan Bed mobility, trsfs to chair or commode.  G/H in sitting.  SLUMS   OT Discharge Recommendation (DC Rec) (S)  Transitional Care Facility   OT Rationale for DC Rec Currently on 4L NC O2.  Mod to Max A with bed mobility and trsfs.  Will benefit from further OT to increase indep with ADLs and trsfs.   OT  Brief overview of current status Mod to Max A with bed mobility and trsfs.   Total Session Time   Timed Code Treatment Minutes 30   Total Session Time (sum of timed and untimed services) 45    [facility]  OUTPATIENT OCCUPATIONAL THERAPY  EVALUATION  PLAN OF TREATMENT FOR OUTPATIENT REHABILITATION  (COMPLETE FOR INITIAL CLAIMS ONLY)  Patient's Last Name, First Name, M.I.  YOB: 1929  Chava Lincoln                          Provider's Name  [facility] Medical Record No.  [id number]                             Onset Date:  07/30/24   Start of Care Date:  07/31/24   Type:     ___PT   _X_OT   ___SLP Medical Diagnosis:  Chest Heaviness; shortness of breath.                    OT Diagnosis:  decreased indep with ADLs and trsfs due to Chest Heaviness and SOB. Visits from SOC:  1     See note for plan of treatment, functional goals and certification details    I CERTIFY THE NEED FOR THESE SERVICES FURNISHED UNDER        THIS PLAN OF TREATMENT AND WHILE UNDER MY CARE     (Physician co-signature of this document indicates review and certification of the therapy plan).

## 2024-07-31 NOTE — CONSULTS
Care Management Discharge Note    Discharge Date: 07/31/2024       Discharge Disposition: Home      Discharge Transportation: family or friend will provide    Private pay costs discussed: Not applicable    Does the patient's insurance plan have a 3 day qualifying hospital stay waiver?  Yes     Which insurance plan 3 day waiver is available? [employer]    Will the waiver be used for post-acute placement? No    PAS Confirmation Code:    Patient/family educated on Medicare website which has current facility and service quality ratings:      Education Provided on the Discharge Plan:    Persons Notified of Discharge Plans: pt and wife  Patient/Family in Agreement with the Plan:  yes    Handoff Referral Completed: Yes    Additional Information:  Initial assessment and GRAHAM completed with spouse.  Pt lives in private residence with spouse.  Wife assists pt with ADL's as needed.  No home care services at this time.  Plan is for pt to return home with wife, wife denied any discharge plans at this time.  Wife will transport at discharge.    DENISE Martinez

## 2024-07-31 NOTE — TELEPHONE ENCOUNTER
Spoke with pt's wife, Santa (C2C on file). Advised for pt to follow instructions per hospital MD regarding when creatinine lab needs to be redrawn. If needed upon discharge may add to lab draw in clinic for follow up with  on Friday, 8/2/24.

## 2024-07-31 NOTE — DISCHARGE SUMMARY
"[facility]  Hospitalist Discharge Summary      Date of Admission:  7/30/2024  Date of Discharge:  7/31/2024  Discharging Provider: aVldemar Langford MD and Denny Dunne MD  Discharge Service: Hospitalist Service    Discharge Diagnoses   Chest pain  Hx CAD s/p KATHY to Ramus in 02/2019   Elevated troponin  Elevated creatinine     Clinically Significant Risk Factors     # Obesity: Estimated body mass index is 33.96 kg/m  as calculated from the following:    Height as of this encounter: 1.753 m (5' 9\").    Weight as of this encounter: 104.3 kg (229 lb 15 oz).       Follow-ups Needed After Discharge   Follow-up Appointments     Follow-up and recommended labs and tests       Follow up with primary care provider, Erasmo Michael, within 7 days for   hospital follow- up.  No follow up labs or test are needed.    Follow up with cardiology on 08/2/24 as scheduled, the following labs are   needed: BMP            Unresulted Labs Ordered in the Past 30 Days of this Admission       No orders found for last 31 day(s).            Discharge Disposition   Discharged to home  Condition at discharge: Fair    Hospital Course   Chava Lincoln is a 89 year old male admitted on 7/30/2024. 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 Vitals stable and exam notable for decreased air exchange and distant heart sounds. Also noted area of erythema along bilateral medial leg worse on Left. And 2+ pitting edema to mid shin. Troponin flat and EKG without ischemic changes. His CP resolved prior to arrival in ED. Echo was unremarkable. Telemetry did not reveal evidence of ACS or arrhythmia. Had small bump in Cr in setting of chronic retention and double dose of ARB in ED so will have cardiology get BMP this Friday at their visit. " PT/OT recommended TCU however patient elected to return home with wife. His home meds were resumed with exception of Valsartan held at discharge due to bump in Cr and he was discharged with PCP follow up in 1-2 weeks. He has appt with cardiology this Friday where he can get a repeat Cr level.     Consultations This Hospital Stay   PHYSICAL THERAPY ADULT IP CONSULT  OCCUPATIONAL THERAPY ADULT IP CONSULT  CARE MANAGEMENT / SOCIAL WORK IP CONSULT    Code Status   No CPR- Do NOT Intubate    Time Spent on this Encounter          Patient was seen and discussed with attending, Dr. Dunne.    Valdemar Langford MD PGY2  [facility]  [address] [address]  Phone: [phone]  Fax: [phone]  ______________________________________________________________________  No events overnight. Continues to refuse straight cath for retention. Tolerating PO. No CP or SOB.     Physical Exam   Vital Signs: Temp: 97.8  F (36.6  C) Temp src: Oral BP: (!) 143/62 Pulse: 69   Resp: 20 SpO2: 91 % O2 Device: Nasal cannula Oxygen Delivery: 1/2 LPM  Weight: 229 lbs 15.04 oz  Constitutional: awake, alert, cooperative, no apparent distress, and appears stated age  Eyes: Lids and lashes normal, pupils equal, round and reactive to light, extra ocular muscles intact, sclera clear, conjunctiva normal  ENT: Normocephalic, without obvious abnormality, atraumatic, external ears without lesions, oral pharynx with moist mucous membranes  Respiratory: No increased work of breathing, decreased air exchange, clear to auscultation bilaterally, no crackles or wheezing  Cardiovascular: Distant heart sounds but regular rate and rhythm, normal S1 and S2, no S3 or S4, and no murmur noted  GI: Normal bowel sounds, soft, non-distended, non-tender, no masses palpated, no hepatosplenomegally  Skin: there is an area of erythema on medial left leg above ankle roughly 8x8 cm without any fluctuance and nontender.  Not warm.   Musculoskeletal: 2+ pitting edema of BLE up to mid shin  Neurologic: Awake, alert, oriented to name, place and time.  Cranial nerves II-XII are grossly intact.  Motor is 5 out of 5 bilaterally.       Primary Care Physician   Erasmo Michael    Discharge Orders      Primary Care - Care Coordination Referral      Reason for your hospital stay    Chest pain, ACS rule out     Follow-up and recommended labs and tests     Follow up with primary care provider, Erasmo Michael, within 7 days for hospital follow- up.  No follow up labs or test are needed.    Follow up with cardiology on 08/2/24 as scheduled, the following labs are needed: BMP     Activity    Your activity upon discharge: activity as tolerated     Discharge Instructions    Hold your Valsartan until after your creatinine is checked and you discuss with your primary care doc or cardiologist. If his BP at home is greater than 180 you can call his PCP or give him his valsartan back.     Diet    Follow this diet upon discharge: Orders Placed This Encounter      Combination Diet Regular Diet Adult       Significant Results and Procedures   Most Recent 3 CBC's:  Recent Labs   Lab Test 07/31/24  0514 07/30/24  0804 05/24/24  1135   WBC 9.6 13.6* 7.4   HGB 11.7* 12.2* 11.6*   MCV 92 93 95    218 250     Most Recent 3 BMP's:  Recent Labs   Lab Test 07/31/24  0514 07/30/24  0804 06/24/24  1526    139 138   POTASSIUM 3.4 3.8 3.7   CHLORIDE 101 99 100   CO2 27 24 25   BUN 26.3* 29.2* 30.7*   CR 1.27* 1.05 1.16   ANIONGAP 13 16* 13   SUZE 9.0 8.9 8.7   * 146* 158*     Most Recent 3 Troponin's:No lab results found.  Most Recent 3 BNP's:  Recent Labs   Lab Test 07/30/24  0804   NTBNPI 592   ,   Results for orders placed or performed during the hospital encounter of 07/30/24   XR Chest 2 Views    Narrative    EXAM: XR CHEST 2 VIEWS  LOCATION: [facility]  DATE: 7/30/2024    INDICATION: chest pain  COMPARISON: Chest  radiograph 2022, CTA chest 2021      Impression    IMPRESSION: Low lung volumes with bibasilar atelectasis. No pleural effusion or pneumothorax. Cardiac silhouette and mediastinal contours are stable.   Echocardiogram Complete     Value    LVEF  60-65%    Narrative    [id number]  [id number]  [id number]  [id number]^[place] [address]     Name: CHRISTIANO MUSA  MRN: [id number]  : 1929  Study Date: 2024 02:17 PM  Age: 89 yrs  Gender: Male  Patient Location: [facility]  Reason For Study: SOB  Ordering Physician: BON HERNANDEZ  Performed By: TAJ     BSA: 2.2 m2  Height: 69 in  Weight: 230 lb  HR: 72  BP: 149/66 mmHg  ______________________________________________________________________________  Procedure  Complete Portable Echo Adult. Definity (NDC #77813-316) given intravenously.  Poor acoustic windows.  ______________________________________________________________________________  Interpretation Summary     Poor acoustic windows.     1. The left ventricle is normal in size.Left ventricular function is  normal.The ejection fraction is 60-65%. No regional wall motion abnormalities  noted.  2. The right ventricle is not well visualized. Normal right ventricle size and  systolic function.     No hemodynamically significant valvular abnormalities on 2D or color flow  imaging.  ______________________________________________________________________________  Left Ventricle  The left ventricle is normal in size. Left ventricular function is normal.The  ejection fraction is 60-65%. The left ventricle is not well visualized. No  regional wall motion abnormalities noted.     Right Ventricle  Normal right ventricle size and systolic function. The right ventricle is not  well visualized.     Atria  The left atrium is not well visualized. Right atrial size is normal. There is  no color Doppler evidence of an atrial shunt.     Mitral Valve  The mitral valve is not  well visualized.     Tricuspid Valve  The tricuspid valve is not well visualized.     Aortic Valve  The aortic valve is not well visualized.     Pulmonic Valve  The pulmonic valve is not well visualized.     Pericardium  There is no pericardial effusion.     ______________________________________________________________________________  MMode/2D Measurements & Calculations  IVSd: 1.9 cm  LVIDd: 3.9 cm  LVIDs: 2.6 cm  LVPWd: 1.7 cm  FS: 34.2 %  LV mass(C)d: 305.7 grams  LV mass(C)dI: 139.4 grams/m2  Ao root diam: 3.6 cm  LVOT diam: 1.9 cm  LVOT area: 3.0 cm2     Ao root diam index Ht(cm/m): 2.1  Ao root diam index BSA (cm/m2): 1.6  RWT: 0.88  TAPSE: 2.3 cm     Doppler Measurements & Calculations  MV E max jayson: 83.2 cm/sec  MV A max jayson: 150.6 cm/sec  MV E/A: 0.55  MV max P.8 mmHg  MV mean P.6 mmHg  MV V2 VTI: 40.6 cm  MVA(VTI): 1.4 cm2  MV dec time: 0.23 sec  Ao V2 max: 118.2 cm/sec  Ao max P.0 mmHg  Ao V2 mean: 84.1 cm/sec  Ao mean PG: 3.3 mmHg  Ao V2 VTI: 23.8 cm  ARLEEN(I,D): 2.4 cm2  ARLEEN(V,D): 2.3 cm2  LV V1 max PG: 3.5 mmHg  LV V1 max: 93.3 cm/sec  LV V1 VTI: 19.6 cm  SV(LVOT): 58.2 ml  SI(LVOT): 26.5 ml/m2  AV Jayson Ratio (DI): 0.79  ARLEEN Index (cm2/m2): 1.1  E/E': 11.1  E/E' av.5     Lateral E/e': 11.1  Medial E/e': 15.9  Peak E' Jayson: 7.5 cm/sec  RV S Jayson: 14.0 cm/sec     ______________________________________________________________________________  Report approved by: Meenakshi Dallas 2024 04:23 PM             Discharge Medications   Current Discharge Medication List        CONTINUE these medications which have NOT CHANGED    Details   acetaminophen (TYLENOL) 500 MG tablet Take 1,000 mg by mouth at bedtime      aspirin 81 MG EC tablet [ASPIRIN 81 MG EC TABLET] Take 81 mg by mouth every evening.       betamethasone dipropionate (DIPROSONE) 0.05 % external cream 2 times daily as needed      famotidine (PEPCID) 10 MG tablet Take 10 mg by mouth 2 times daily Noon and 9pm      isosorbide  mononitrate (IMDUR) 30 MG 24 hr tablet Take 1 tablet (30 mg) by mouth daily  Qty: 90 tablet, Refills: 1    Associated Diagnoses: Dyspnea on exertion      ketoconazole (NIZORAL) 2 % external cream daily as needed      lactobacillus rhamnosus (GG) (CULTURELL) capsule Take 1 capsule by mouth daily noon      levothyroxine (SYNTHROID/LEVOTHROID) 25 MCG tablet Take 25 mcg by mouth daily      Melatonin 2.5 MG CHEW Take by mouth daily as needed      metoprolol succinate ER (TOPROL XL) 50 MG 24 hr tablet Take 50 mg by mouth daily      multivitamin with minerals (THERA-M) 9 mg iron-400 mcg Tab tablet Take 1 tablet by mouth daily      mupirocin (BACTROBAN) 2 % external ointment Apply topically 2 times daily as needed      mycophenolate (GENERIC EQUIVALENT) 500 MG tablet Take 1,000 mg by mouth 2 times daily      nystatin (MYCOSTATIN) 604693 UNIT/GM external powder Apply topically 2 times daily      !! oxybutynin (DITROPAN XL) 15 MG 24 hr tablet Take 15 mg by mouth daily Take with 5mg tablet to =20 mg      !! oxybutynin (DITROPAN XL) 5 MG ER tablet Take 5 mg by mouth daily Take with the 15 mg tablet to equal to 20mg/day      polyethylene glycol (MIRALAX) 17 GM/Dose powder Take 17 g by mouth daily as needed      rosuvastatin (CRESTOR) 20 MG tablet TAKE 1 TABLET(20 MG) BY MOUTH AT BEDTIME  Qty: 90 tablet, Refills: 1    Associated Diagnoses: Coronary artery disease involving native coronary artery of native heart without angina pectoris      torsemide (DEMADEX) 20 MG tablet Take 80 mg by mouth daily       !! - Potential duplicate medications found. Please discuss with provider.        STOP taking these medications       valsartan (DIOVAN) 40 MG tablet Comments:   Reason for Stopping:             Allergies   Allergies   Allergen Reactions    Acetaminophen     Atorvastatin Muscle Pain (Myalgia)    Furosemide     Gabapentin Unknown    Hydrocodone     Lisinopril Cough    Morphine Other (See Comments)     hypotension    Sulfa  (Sulfonamide Antibiotics) [Sulfa Antibiotics] Hives    Sulfatolamide     Vicodin [Hydrocodone-Acetaminophen] Unknown

## 2024-07-31 NOTE — PROGRESS NOTES
Occupational Therapy Discharge Summary    Reason for therapy discharge:    Discharged to home.    Progress towards therapy goal(s). See goals on Care Plan in [facility] electronic health record for goal details.  Goals not met.  Barriers to achieving goals:   discharge from facility.    Therapy recommendation(s):    Continued therapy is recommended.  Rationale/Recommendations:  Recommended TCU for further therapy to increase indep with ADLs and trsfs.  Pt discharged home with family.

## 2024-07-31 NOTE — PROGRESS NOTES
07/31/24 1114   Appointment Info   Signing Clinician's Name / Credentials (PT) Tyree Simmons, PT   Quick Adds   Quick Adds Certification   Living Environment   People in Home spouse   Current Living Arrangements house   Home Accessibility no concerns   Self-Care   Usual Activity Tolerance fair   Current Activity Tolerance fair   Equipment Currently Used at Home grab bar, toilet;raised toilet seat;walker, rolling;wheelchair, manual;other (see comments)  (electric scoooter)   Fall history within last six months no   Activity/Exercise/Self-Care Comment Indep with all I ADL's and ADL's   General Information   Onset of Illness/Injury or Date of Surgery 07/30/24   Pertinent History of Current Problem (include personal factors and/or comorbidities that impact the POC) Chest pain, unspecified type   Cognition   Affect/Mental Status (Cognition) WNL   Range of Motion (ROM)   Range of Motion ROM is WFL   Strength (Manual Muscle Testing)   Strength Comments General weakness   Transfers   Transfers sit-stand transfer   Sit-Stand Transfer   Sit-Stand [place] (Transfers) verbal cues;maximum assist (25% patient effort)   Assistive Device (Sit-Stand Transfers) walker, front-wheeled   Gait/Stairs (Locomotion)   [place] Level (Gait) verbal cues;minimum assist (75% patient effort);1 person assist   Assistive Device (Gait) walker, front-wheeled   Distance in Feet (Gait) 5   Pattern (Gait) step-through   Deviations/Abnormal Patterns (Gait) gait speed decreased;agnes decreased   Balance   Balance no deficits were identified   Clinical Impression   Criteria for Skilled Therapeutic Intervention Yes, treatment indicated   PT Diagnosis (PT) Impaired functional mobility   Influenced by the following impairments weakness, pain   Functional limitations due to impairments transfers, gait   Clinical Presentation (PT Evaluation Complexity) stable   Clinical Presentation Rationale Patient presents as medically diagnosed   Clinical  Decision Making (Complexity) low complexity   Planned Therapy Interventions (PT) gait training;transfer training;patient/family education   Risk & Benefits of therapy have been explained patient;spouse/significant other   PT Total Evaluation Time   PT Eval, Low Complexity Minutes (67302) 15   Therapy Certification   Start of care date 07/31/24   Certification date from 07/31/24   Certification date to 08/31/24   Physical Therapy Goals   PT Frequency One time eval and treatment only   PT Predicted Duration/Target Date for Goal Attainment 07/31/24   PT Goals Transfers;Gait;PT Goal 1   PT: Transfers Minimal assist;Sit to/from stand;Assistive device;Completed   PT: Gait Supervision/stand-by assist;Rolling walker;25 feet;Completed   PT: Goal 1 Indep with HEP,goal completed   Interventions   Interventions Quick Adds Gait Training;Therapeutic Activity   Gait Training   Gait Training Minutes (31400) 15   Symptoms Noted During/After Treatment (Gait Training) fatigue   Treatment Detail/Skilled Intervention Patient needing max assist of 2 for STS from recliner chairs.  Patient cued to scoot forward and then rocking 1-2-3 to power upwards and then ableto stand with mod assist of 2.  Patient able to stand from recliner total of 6x during session. Ambulated with slow short steps, but stable.  Walks short household distances at home per patient.  Educated patient on continuing to  progress with basic walking program and doing STS on regular basis.  Patient declining HH PT or TCU at this time   Distance in Feet 45   Branch Level (Gait Training) contact guard   Physical Assistance Level (Gait Training) 1 person assist;supervision   Weight Bearing (Gait Training) full weight-bearing   Assistive Device (Gait Training) rolling walker   Pattern Analysis (Gait Training) swing-through gait   Gait Analysis Deviations decreased agnes;decreased step length;decreased stride length   Impairments (Gait Analysis/Training) strength  decreased   PT Discharge Planning   PT Plan will be discharging to home today per MD and patient   PT Discharge Recommendation (DC Rec) home with home care physical therapy   PT Rationale for DC Rec Patient would benefit from further strengthening/gait/transfer training to improve safety and activity level   PT Brief overview of current status Paitent mod assist of 2 from regular height recliner, CGA for ambulation.  Does have lift chair and equipment at home   PT Equipment Needed at Discharge walker, rolling   Total Session Time   Timed Code Treatment Minutes 15   Total Session Time (sum of timed and untimed services) 30   [facility]  OUTPATIENT PHYSICAL THERAPY EVALUATION  PLAN OF TREATMENT FOR OUTPATIENT REHABILITATION  (COMPLETE FOR INITIAL CLAIMS ONLY)  Patient's Last Name, First Name, M.I.  YOB: 1929  Chava Lincoln                        Provider's Name  [facility] Medical Record No.  [id number]                             Onset Date:  07/30/24   Start of Care Date:  07/31/24   Type:     _X_PT   ___OT   ___SLP Medical Diagnosis:                 PT Diagnosis:  Impaired functional mobility Visits from SOC:  1     See note for plan of treatment, functional goals and certification details    I CERTIFY THE NEED FOR THESE SERVICES FURNISHED UNDER        THIS PLAN OF TREATMENT AND WHILE UNDER MY CARE     (Physician co-signature of this document indicates review and certification of the therapy plan).

## 2024-07-31 NOTE — TELEPHONE ENCOUNTER
Health Call Center    Phone Message    May a detailed message be left on voicemail: yes     Reason for Call: Other: Mauricio is currently in the hospital and they are saying that they would like him to stay another night just so they can test his creatinine level.  Mauricio wishes to go home, so Santa is wondering if Dr. Sheridan could order a creatinine check to be done in-clinic so that Mauricio doesn't have to stay over another night.  Please call Santa back to further discuss.     Action Taken: Other: Cardiology    Travel Screening: Not Applicable    Thank you!  [facility]

## 2024-07-31 NOTE — PLAN OF CARE
PRIMARY DIAGNOSIS: CHEST PAIN  OUTPATIENT/OBSERVATION GOALS TO BE MET BEFORE DISCHARGE:  1. Ruled out acute coronary syndrome (negative or stable Troponin):  Yes  2. Pain Status: Pain free.  3. Appropriate provocative testing performed: Yes  - Stress Test Procedure: Echo  - Interpretation of cardiac rhythm per telemetry tech: SR with 1st degree block    4. Cleared by Consultants (if applicable):N/A  5. Return to near baseline physical activity: No  Discharge Planner Nurse   Safe discharge environment identified: Yes  Barriers to discharge: Yes       Entered by: Shilpa Galvez RN 07/31/2024 4:41 AM     Please review provider order for any additional goals.   Nurse to notify provider when observation goals have been met and patient is ready for discharge.    Problem: Adult Inpatient Plan of Care  Goal: Optimal Comfort and Wellbeing  Outcome: Progressing     Problem: Chest Pain  Goal: Resolution of Chest Pain Symptoms  Outcome: Progressing   Goal Outcome Evaluation:       A/Ox4. Patient denies chest pain. Repositioning as tolerated by patient. Up A1-2 with gb/fww.

## 2024-07-31 NOTE — PROVIDER NOTIFICATION
Patient's heart sounds distant upon assessment at beginning of shift. VSS, patient asymptomatic. Dr. Byrnes notified. No new orders.

## 2024-08-02 ENCOUNTER — OFFICE VISIT (OUTPATIENT)
Dept: CARDIOLOGY | Facility: CLINIC | Age: 89
End: 2024-08-02
Attending: INTERNAL MEDICINE
Payer: MEDICARE

## 2024-08-02 ENCOUNTER — APPOINTMENT (OUTPATIENT)
Dept: LAB | Facility: CLINIC | Age: 89
End: 2024-08-02
Payer: MEDICARE

## 2024-08-02 VITALS
RESPIRATION RATE: 16 BRPM | DIASTOLIC BLOOD PRESSURE: 56 MMHG | OXYGEN SATURATION: 94 % | SYSTOLIC BLOOD PRESSURE: 122 MMHG | HEART RATE: 61 BPM

## 2024-08-02 DIAGNOSIS — G47.33 OSA ON CPAP: ICD-10-CM

## 2024-08-02 DIAGNOSIS — E78.5 DYSLIPIDEMIA: ICD-10-CM

## 2024-08-02 DIAGNOSIS — I25.10 CORONARY ARTERY DISEASE INVOLVING NATIVE CORONARY ARTERY OF NATIVE HEART WITHOUT ANGINA PECTORIS: Primary | ICD-10-CM

## 2024-08-02 DIAGNOSIS — E66.01 MORBID OBESITY (H): ICD-10-CM

## 2024-08-02 DIAGNOSIS — I10 ESSENTIAL HYPERTENSION: ICD-10-CM

## 2024-08-02 DIAGNOSIS — I50.32 CHRONIC DIASTOLIC CONGESTIVE HEART FAILURE (H): ICD-10-CM

## 2024-08-02 DIAGNOSIS — I71.21 ANEURYSM OF ASCENDING AORTA WITHOUT RUPTURE (H): ICD-10-CM

## 2024-08-02 LAB
ANION GAP SERPL CALCULATED.3IONS-SCNC: 9 MMOL/L (ref 7–15)
BUN SERPL-MCNC: 38.6 MG/DL (ref 8–23)
CALCIUM SERPL-MCNC: 8.6 MG/DL (ref 8.8–10.4)
CHLORIDE SERPL-SCNC: 99 MMOL/L (ref 98–107)
CREAT SERPL-MCNC: 1.26 MG/DL (ref 0.67–1.17)
EGFRCR SERPLBLD CKD-EPI 2021: 53 ML/MIN/1.73M2
GLUCOSE SERPL-MCNC: 153 MG/DL (ref 70–99)
HCO3 SERPL-SCNC: 31 MMOL/L (ref 22–29)
POTASSIUM SERPL-SCNC: 3.7 MMOL/L (ref 3.4–5.3)
SODIUM SERPL-SCNC: 139 MMOL/L (ref 135–145)

## 2024-08-02 PROCEDURE — 99214 OFFICE O/P EST MOD 30 MIN: CPT | Performed by: INTERNAL MEDICINE

## 2024-08-02 PROCEDURE — 80048 BASIC METABOLIC PNL TOTAL CA: CPT | Performed by: INTERNAL MEDICINE

## 2024-08-02 PROCEDURE — 36415 COLL VENOUS BLD VENIPUNCTURE: CPT | Performed by: INTERNAL MEDICINE

## 2024-08-02 RX ORDER — SOLIFENACIN SUCCINATE 10 MG/1
1 TABLET, FILM COATED ORAL DAILY
COMMUNITY

## 2024-08-02 RX ORDER — ISOSORBIDE MONONITRATE 60 MG/1
60 TABLET, EXTENDED RELEASE ORAL DAILY
Qty: 90 TABLET | Refills: 3 | Status: SHIPPED | OUTPATIENT
Start: 2024-08-02

## 2024-08-02 RX ORDER — VIBEGRON 75 MG/1
1 TABLET, FILM COATED ORAL DAILY
COMMUNITY
End: 2024-08-02

## 2024-08-02 NOTE — PROGRESS NOTES
Assessment/Plan:   1.  Chronic congestive heart failure with preserved ejection fraction: The patient is compensated.  His weight has been going down.  His leg edema has been stable.  Continue torsemide 80 mg a.m. BMP today to make sure creatinine back to his baseline, potassium stable.  Continue low-salt diet.    2.  Coronary artery disease s/p KATHY to Ramus, severe calcified RCA lesion with high risk of revascularization, no obstructive lesion in left main and LAD per IVUS, chest pain:  He developed 1 episode of mild chest pain 1 day ago, lasted the whole night, no evidence of acute myocardial infarction.  The patient has very high risk lesion in RCA with severe stenosis and calcification.  It's better to treat him medically at this age and a high risk lesion.  The patient and his wife agreed with the plan.  Increase isosorbide mononitrate from 30 mg daily to 60 mg daily.  Continue aspirin, Crestor, and metoprolol XL.      3.  Essential hypertension: His blood pressure is controlled, continue metoprolol XL 50 mg daily, torsemide 80 mg daily.     4.  Dyslipidemia: Continue Crestor 20 mg nightly.  LDL was controlled.     5.  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     6.  Morbid obesity and obstructive sleep apnea: Lifestyle modification and continue CPAP.    7.  Stage III CKD: Check renal function today.    Thank you for the opportunity to be involved in the care of Chava Lincoln. If you have any questions, please feel free to contact me.  I will see the patient again in 6 months and as needed.    Much or all of the text in this note was generated through the use of Dragon Dictate voice-to-text software. Errors in spelling or words which seem out of context are unintentional. Sound alike errors, in particular, may have escaped editing.       History of Present Illness:   It is my pleasure to see Chava Lincoln at the [facility] clinic for the evaluation of chest  pain.  Chava Lincoln is a 89 year old male with a medical history of coronary artery disease, s/p KATHY to Ramus in 2-2019, no obstructive disease in LM and LAD, severe calcification lesions in RCA, benign essential hypertension, dyslipidemia, chronic diastolic congestive heart failure, morbid obesity, right diaphragm hemiparalysis, obstructive sleep apnea on CPAP.    The patient went to emergency room 1 day ago due to chest pain.  He complains of mild anterior dull chest pain, lasted whole night.  He had a ER evaluation.  ECG had no significant interval change.  Troponin stable 43, no dynamic elevation.  Since then, he did not have recurrent chest pain episode.  His shortness of breath on exertion has been stable.  He had no palpitations, dizziness, orthopnea, PND.  He has mild bilateral leg edema stable.  He is not very active at home.  His blood pressure and heart rate are in normal range.    Past Medical History:     Patient Active Problem List   Diagnosis    GERD (gastroesophageal reflux disease)    RAMON on CPAP    Near syncope    Dyspnea on exertion    Essential hypertension    Dyslipidemia    Coronary artery disease involving native coronary artery of native heart without angina pectoris    Ascending aortic aneurysm (H24)    Coronary artery calcification seen on CAT scan    Pulmonary hypertension (H)    History of pulmonary embolism    Hypoxia    Morbid obesity (H)    Bilateral lower extremity edema    Chronic kidney disease, stage 3 (H)    (HFpEF) heart failure with preserved ejection fraction (H)    Abdominal pain, epigastric    Chronic diastolic congestive heart failure (H)    Chest pain, unspecified type       Past Surgical History:     Past Surgical History:   Procedure Laterality Date    CV CORONARY ANGIOGRAM N/A 12/10/2018    Procedure: Coronary Angiogram;  Surgeon: Tana Duncan MD;  Location: [facility] Cath Lab;  Service: Cardiology    CV LEFT HEART CATHETERIZATION WITHOUT LEFT VENTRICULOGRAM  Left 12/10/2018    Procedure: Left Heart Catheterization Without Left Ventriculogram;  Surgeon: Tana Duncan MD;  Location: [facility] Cath Lab;  Service: Cardiology    CV LEFT HEART CATHETERIZATION WITHOUT LEFT VENTRICULOGRAM Left 02/04/2019    Procedure: Left Heart Catheterization Without Left Ventriculogram;  Surgeon: Tana Duncan MD;  Location: [facility] Cath Lab;  Service: Cardiology    CV RIGHT HEART CATHETERIZATION N/A 02/04/2019    Procedure: Right Heart Catheterization;  Surgeon: Tana Duncan MD;  Location: [facility];  Service: Cardiology    HERNIA REPAIR      JOINT REPLACEMENT      KNEE SURGERY Bilateral     OTHER SURGICAL HISTORY      Prostatic cryotherapy       Family History:     Family History   Problem Relation Age of Onset    Diabetes Mother     Cerebrovascular Disease Father     Heart Disease Brother     Coronary Artery Disease Brother         Social History:    reports that he has never smoked. He has never used smokeless tobacco. He reports current alcohol use. He reports that he does not use drugs.    Review of Systems:   12 systems are reviewed negative except for in HPI.    Meds:     Current Outpatient Medications:     acetaminophen (TYLENOL) 500 MG tablet, Take 1,000 mg by mouth at bedtime, Disp: , Rfl:     aspirin 81 MG EC tablet, [ASPIRIN 81 MG EC TABLET] Take 81 mg by mouth every evening. , Disp: , Rfl:     betamethasone dipropionate (DIPROSONE) 0.05 % external cream, 2 times daily as needed, Disp: , Rfl:     famotidine (PEPCID) 10 MG tablet, Take 10 mg by mouth 2 times daily Noon and 9pm, Disp: , Rfl:     isosorbide mononitrate (IMDUR) 60 MG 24 hr tablet, Take 1 tablet (60 mg) by mouth daily, Disp: 90 tablet, Rfl: 3    ketoconazole (NIZORAL) 2 % external cream, daily as needed, Disp: , Rfl:     lactobacillus rhamnosus (GG) (CULTURELL) capsule, Take 1 capsule by mouth daily noon, Disp: , Rfl:     leuprolide (ELIGARD) 45 MG kit, Inject 45 mg subcutaneously every 6  months, Disp: , Rfl:     levothyroxine (SYNTHROID/LEVOTHROID) 25 MCG tablet, Take 25 mcg by mouth daily, Disp: , Rfl:     Melatonin 2.5 MG CHEW, Take by mouth daily as needed, Disp: , Rfl:     metoprolol succinate ER (TOPROL XL) 50 MG 24 hr tablet, Take 50 mg by mouth daily, Disp: , Rfl:     multivitamin with minerals (THERA-M) 9 mg iron-400 mcg Tab tablet, Take 1 tablet by mouth daily, Disp: , Rfl:     mupirocin (BACTROBAN) 2 % external ointment, Apply topically 2 times daily as needed, Disp: , Rfl:     mycophenolate (GENERIC EQUIVALENT) 500 MG tablet, Take 1,000 mg by mouth 2 times daily, Disp: , Rfl:     nystatin (MYCOSTATIN) 702627 UNIT/GM external powder, Apply topically 2 times daily, Disp: , Rfl:     polyethylene glycol (MIRALAX) 17 GM/Dose powder, Take 17 g by mouth daily as needed, Disp: , Rfl:     rosuvastatin (CRESTOR) 20 MG tablet, TAKE 1 TABLET(20 MG) BY MOUTH AT BEDTIME, Disp: 90 tablet, Rfl: 1    torsemide (DEMADEX) 20 MG tablet, Take 80 mg by mouth daily, Disp: , Rfl:     oxybutynin (DITROPAN XL) 15 MG 24 hr tablet, Take 15 mg by mouth daily Take with 5mg tablet to =20 mg (Patient not taking: Reported on 8/2/2024), Disp: , Rfl:     oxybutynin (DITROPAN XL) 5 MG ER tablet, Take 5 mg by mouth daily Take with the 15 mg tablet to equal to 20mg/day (Patient not taking: Reported on 8/2/2024), Disp: , Rfl:     solifenacin (VESICARE) 10 MG tablet, Take 1 tablet by mouth daily (Patient not taking: Reported on 8/2/2024), Disp: , Rfl:     Allergies:   Atorvastatin, Elemental sulfur, Furosemide, Gabapentin, Hydrocodone, Hydrocodone-acetaminophen, Lisinopril, Morphine, Sulfa (sulfonamide antibiotics) [sulfa antibiotics], Sulfatolamide, and Vicodin [hydrocodone-acetaminophen]      Objective:      Physical Exam        There is no height or weight on file to calculate BMI.  /56 (BP Location: Right arm, Patient Position: Sitting, Cuff Size: Adult Regular)   Pulse 61   Resp 16   SpO2 94%     General  Appearance:   Awake, Alert, No acute distress.   HEENT:  Pupil equal and reactive to light. No scleral icterus; the mucous membranes were moist.   Neck: No cervical bruits. Not able to see JVD. No thyromegaly.     Chest: The spine was straight. The chest was symmetric.   Lungs:   Diminished breathing sounds. No crackles. No wheezing.   Cardiovascular:   Regular rhythm and rate, remote first and second heart sounds with II/VI systolic murmurs at RUSB. No rubs or gallops.    Abdomen:  Obese. Soft. No tenderness. Non-distended. Bowels sounds are present   Extremities: Mild bilateral leg edema.   Skin: Red skin rashes most in back. Warm, Dry.   Musculoskeletal: No tenderness. No deformity.   Neurologic: Mood and affect are appropriate. No focal deficits.         EKG:  Personally reivewed  Sinus rhythm with 1st degree A-V block  Low voltage QRS   Inferior infarct , age undetermined   Cannot rule out Anteroseptal infarct (cited on or before 07-Sep-2023)   Abnormal ECG   When compared with ECG of 07-Sep-2023 11:26,   Inferior infarct is now Present      Cardiac Imaging Studies  Echocardiogram on 10-:  1. The left ventricle is normal in size.Left ventricular function is  normal.The ejection fraction is 60-65%. No regional wall motion abnormalities noted.  2. The right ventricle is not well visualized. Normal right ventricle size and systolic function.   3.  No hemodynamically significant valvular abnormalities on 2D or color flow imaging.     Coronary angiogram with PCI on 2-4-2019:  IVUS left main and LAD showed no severely obstructive lesions  Ramus disease treated with rotational atherectomy and stented with a Synergy KATHY under IVUS guidance with good angiographic results  Right heart catheterizations show LV EDP 22 mmHg and PCWP 30 mmHg, PA mean 34 mmHg, RA 13 mmHg      Lab Review   Lab Results   Component Value Date     06/23/2021    CO2 23 06/23/2021    BUN 21 06/23/2021     Lab Results   Component Value  Date    WBC 8.5 05/17/2021    HGB 13.9 05/17/2021    HCT 40.6 05/17/2021    MCV 90 05/17/2021     05/17/2021     Lab Results   Component Value Date    CHOL 136 01/29/2024    CHOL 138 07/24/2023    CHOL 144 04/28/2022     Lab Results   Component Value Date    HDL 46 01/29/2024    HDL 44 07/24/2023    HDL 54 04/28/2022     Lab Results   Component Value Date    TRIG 168 (H) 01/29/2024    TRIG 167 (H) 07/24/2023    TRIG 140 04/28/2022     Lab Results   Component Value Date    TROPONINI 0.02 04/08/2021     Lab Results   Component Value Date    BNP 84 05/17/2021

## 2024-08-02 NOTE — LETTER
8/2/2024    Erasmo Michael MD  [address]  [place] [address]    RE: Chava Lincoln       Dear Colleague,     I had the pleasure of seeing Chava Lincoln in the [facility].            Assessment/Plan:   1.  Chronic congestive heart failure with preserved ejection fraction: The patient is compensated.  His weight has been going down.  His leg edema has been stable.  Continue torsemide 80 mg a.m. BMP today to make sure creatinine back to his baseline, potassium stable.  Continue low-salt diet.    2.  Coronary artery disease s/p KATHY to Ramus, severe calcified RCA lesion with high risk of revascularization, no obstructive lesion in left main and LAD per IVUS, chest pain:  He developed 1 episode of mild chest pain 1 day ago, lasted the whole night, no evidence of acute myocardial infarction.  The patient has very high risk lesion in RCA with severe stenosis and calcification.  It's better to treat him medically at this age and a high risk lesion.  The patient and his wife agreed with the plan.  Increase isosorbide mononitrate from 30 mg daily to 60 mg daily.  Continue aspirin, Crestor, and metoprolol XL.      3.  Essential hypertension: His blood pressure is controlled, continue metoprolol XL 50 mg daily, torsemide 80 mg daily.     4.  Dyslipidemia: Continue Crestor 20 mg nightly.  LDL was controlled.     5.  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     6.  Morbid obesity and obstructive sleep apnea: Lifestyle modification and continue CPAP.    7.  Stage III CKD: Check renal function today.    Thank you for the opportunity to be involved in the care of Chava Lincoln. If you have any questions, please feel free to contact me.  I will see the patient again in 6 months and as needed.    Much or all of the text in this note was generated through the use of Dragon Dictate voice-to-text software. Errors in spelling or words which seem out of context are  unintentional. Sound alike errors, in particular, may have escaped editing.       History of Present Illness:   It is my pleasure to see Chava Lincoln at the [facility] clinic for the evaluation of chest pain.  Chava Lincoln is a 89 year old male with a medical history of coronary artery disease, s/p KATHY to Ramus in 2-2019, no obstructive disease in LM and LAD, severe calcification lesions in RCA, benign essential hypertension, dyslipidemia, chronic diastolic congestive heart failure, morbid obesity, right diaphragm hemiparalysis, obstructive sleep apnea on CPAP.    The patient went to emergency room 1 day ago due to chest pain.  He complains of mild anterior dull chest pain, lasted whole night.  He had a ER evaluation.  ECG had no significant interval change.  Troponin stable 43, no dynamic elevation.  Since then, he did not have recurrent chest pain episode.  His shortness of breath on exertion has been stable.  He had no palpitations, dizziness, orthopnea, PND.  He has mild bilateral leg edema stable.  He is not very active at home.  His blood pressure and heart rate are in normal range.    Past Medical History:     Patient Active Problem List   Diagnosis     GERD (gastroesophageal reflux disease)     RMAON on CPAP     Near syncope     Dyspnea on exertion     Essential hypertension     Dyslipidemia     Coronary artery disease involving native coronary artery of native heart without angina pectoris     Ascending aortic aneurysm (H24)     Coronary artery calcification seen on CAT scan     Pulmonary hypertension (H)     History of pulmonary embolism     Hypoxia     Morbid obesity (H)     Bilateral lower extremity edema     Chronic kidney disease, stage 3 (H)     (HFpEF) heart failure with preserved ejection fraction (H)     Abdominal pain, epigastric     Chronic diastolic congestive heart failure (H)     Chest pain, unspecified type       Past Surgical History:     Past Surgical History:    Procedure Laterality Date     CV CORONARY ANGIOGRAM N/A 12/10/2018    Procedure: Coronary Angiogram;  Surgeon: Tana Duncan MD;  Location: [facility] Lab;  Service: Cardiology     CV LEFT HEART CATHETERIZATION WITHOUT LEFT VENTRICULOGRAM Left 12/10/2018    Procedure: Left Heart Catheterization Without Left Ventriculogram;  Surgeon: Tana Duncan MD;  Location: [facility] Lab;  Service: Cardiology     CV LEFT HEART CATHETERIZATION WITHOUT LEFT VENTRICULOGRAM Left 02/04/2019    Procedure: Left Heart Catheterization Without Left Ventriculogram;  Surgeon: Tana Duncan MD;  Location: [facility] Lab;  Service: Cardiology     CV RIGHT HEART CATHETERIZATION N/A 02/04/2019    Procedure: Right Heart Catheterization;  Surgeon: Tana Duncan MD;  Location: [facility] Lab;  Service: Cardiology     HERNIA REPAIR       JOINT REPLACEMENT       KNEE SURGERY Bilateral      OTHER SURGICAL HISTORY      Prostatic cryotherapy       Family History:     Family History   Problem Relation Age of Onset     Diabetes Mother      Cerebrovascular Disease Father      Heart Disease Brother      Coronary Artery Disease Brother         Social History:    reports that he has never smoked. He has never used smokeless tobacco. He reports current alcohol use. He reports that he does not use drugs.    Review of Systems:   12 systems are reviewed negative except for in HPI.    Meds:     Current Outpatient Medications:      acetaminophen (TYLENOL) 500 MG tablet, Take 1,000 mg by mouth at bedtime, Disp: , Rfl:      aspirin 81 MG EC tablet, [ASPIRIN 81 MG EC TABLET] Take 81 mg by mouth every evening. , Disp: , Rfl:      betamethasone dipropionate (DIPROSONE) 0.05 % external cream, 2 times daily as needed, Disp: , Rfl:      famotidine (PEPCID) 10 MG tablet, Take 10 mg by mouth 2 times daily Noon and 9pm, Disp: , Rfl:      isosorbide mononitrate (IMDUR) 60 MG 24 hr tablet, Take 1 tablet (60 mg) by mouth daily, Disp: 90  tablet, Rfl: 3     ketoconazole (NIZORAL) 2 % external cream, daily as needed, Disp: , Rfl:      lactobacillus rhamnosus (GG) (CULTURELL) capsule, Take 1 capsule by mouth daily noon, Disp: , Rfl:      leuprolide (ELIGARD) 45 MG kit, Inject 45 mg subcutaneously every 6 months, Disp: , Rfl:      levothyroxine (SYNTHROID/LEVOTHROID) 25 MCG tablet, Take 25 mcg by mouth daily, Disp: , Rfl:      Melatonin 2.5 MG CHEW, Take by mouth daily as needed, Disp: , Rfl:      metoprolol succinate ER (TOPROL XL) 50 MG 24 hr tablet, Take 50 mg by mouth daily, Disp: , Rfl:      multivitamin with minerals (THERA-M) 9 mg iron-400 mcg Tab tablet, Take 1 tablet by mouth daily, Disp: , Rfl:      mupirocin (BACTROBAN) 2 % external ointment, Apply topically 2 times daily as needed, Disp: , Rfl:      mycophenolate (GENERIC EQUIVALENT) 500 MG tablet, Take 1,000 mg by mouth 2 times daily, Disp: , Rfl:      nystatin (MYCOSTATIN) 014595 UNIT/GM external powder, Apply topically 2 times daily, Disp: , Rfl:      polyethylene glycol (MIRALAX) 17 GM/Dose powder, Take 17 g by mouth daily as needed, Disp: , Rfl:      rosuvastatin (CRESTOR) 20 MG tablet, TAKE 1 TABLET(20 MG) BY MOUTH AT BEDTIME, Disp: 90 tablet, Rfl: 1     torsemide (DEMADEX) 20 MG tablet, Take 80 mg by mouth daily, Disp: , Rfl:      oxybutynin (DITROPAN XL) 15 MG 24 hr tablet, Take 15 mg by mouth daily Take with 5mg tablet to =20 mg (Patient not taking: Reported on 8/2/2024), Disp: , Rfl:      oxybutynin (DITROPAN XL) 5 MG ER tablet, Take 5 mg by mouth daily Take with the 15 mg tablet to equal to 20mg/day (Patient not taking: Reported on 8/2/2024), Disp: , Rfl:      solifenacin (VESICARE) 10 MG tablet, Take 1 tablet by mouth daily (Patient not taking: Reported on 8/2/2024), Disp: , Rfl:     Allergies:   Atorvastatin, Elemental sulfur, Furosemide, Gabapentin, Hydrocodone, Hydrocodone-acetaminophen, Lisinopril, Morphine, Sulfa (sulfonamide antibiotics) [sulfa antibiotics], Sulfatolamide,  and Vicodin [hydrocodone-acetaminophen]      Objective:      Physical Exam        There is no height or weight on file to calculate BMI.  /56 (BP Location: Right arm, Patient Position: Sitting, Cuff Size: Adult Regular)   Pulse 61   Resp 16   SpO2 94%     General Appearance:   Awake, Alert, No acute distress.   HEENT:  Pupil equal and reactive to light. No scleral icterus; the mucous membranes were moist.   Neck: No cervical bruits. Not able to see JVD. No thyromegaly.     Chest: The spine was straight. The chest was symmetric.   Lungs:   Diminished breathing sounds. No crackles. No wheezing.   Cardiovascular:   Regular rhythm and rate, remote first and second heart sounds with II/VI systolic murmurs at RUSB. No rubs or gallops.    Abdomen:  Obese. Soft. No tenderness. Non-distended. Bowels sounds are present   Extremities: Mild bilateral leg edema.   Skin: Red skin rashes most in back. Warm, Dry.   Musculoskeletal: No tenderness. No deformity.   Neurologic: Mood and affect are appropriate. No focal deficits.         EKG:  Personally reivewed  Sinus rhythm with 1st degree A-V block  Low voltage QRS   Inferior infarct , age undetermined   Cannot rule out Anteroseptal infarct (cited on or before 07-Sep-2023)   Abnormal ECG   When compared with ECG of 07-Sep-2023 11:26,   Inferior infarct is now Present      Cardiac Imaging Studies  Echocardiogram on 10-:  1. The left ventricle is normal in size.Left ventricular function is  normal.The ejection fraction is 60-65%. No regional wall motion abnormalities noted.  2. The right ventricle is not well visualized. Normal right ventricle size and systolic function.   3.  No hemodynamically significant valvular abnormalities on 2D or color flow imaging.     Coronary angiogram with PCI on 2-4-2019:  IVUS left main and LAD showed no severely obstructive lesions  Ramus disease treated with rotational atherectomy and stented with a Synergy KATHY under IVUS guidance with  good angiographic results  Right heart catheterizations show LV EDP 22 mmHg and PCWP 30 mmHg, PA mean 34 mmHg, RA 13 mmHg      Lab Review   Lab Results   Component Value Date     06/23/2021    CO2 23 06/23/2021    BUN 21 06/23/2021     Lab Results   Component Value Date    WBC 8.5 05/17/2021    HGB 13.9 05/17/2021    HCT 40.6 05/17/2021    MCV 90 05/17/2021     05/17/2021     Lab Results   Component Value Date    CHOL 136 01/29/2024    CHOL 138 07/24/2023    CHOL 144 04/28/2022     Lab Results   Component Value Date    HDL 46 01/29/2024    HDL 44 07/24/2023    HDL 54 04/28/2022     Lab Results   Component Value Date    TRIG 168 (H) 01/29/2024    TRIG 167 (H) 07/24/2023    TRIG 140 04/28/2022     Lab Results   Component Value Date    TROPONINI 0.02 04/08/2021     Lab Results   Component Value Date    BNP 84 05/17/2021                   Thank you for allowing me to participate in the care of your patient.      Sincerely,     Yonatan Sheridan MD     [facility]  cc:   Yonatan Sheridan MD  [facility] [facility] [address]  [place] [address]

## 2024-10-02 ENCOUNTER — TRANSFERRED RECORDS (OUTPATIENT)
Dept: HEALTH INFORMATION MANAGEMENT | Facility: CLINIC | Age: 89
End: 2024-10-02
Payer: MEDICARE

## 2024-10-04 ENCOUNTER — OFFICE VISIT (OUTPATIENT)
Dept: CARDIOLOGY | Facility: CLINIC | Age: 89
End: 2024-10-04
Attending: INTERNAL MEDICINE
Payer: MEDICARE

## 2024-10-04 VITALS
RESPIRATION RATE: 16 BRPM | SYSTOLIC BLOOD PRESSURE: 116 MMHG | HEIGHT: 69 IN | BODY MASS INDEX: 33.96 KG/M2 | DIASTOLIC BLOOD PRESSURE: 54 MMHG | HEART RATE: 64 BPM

## 2024-10-04 DIAGNOSIS — E66.01 MORBID OBESITY (H): ICD-10-CM

## 2024-10-04 DIAGNOSIS — I50.32 CHRONIC DIASTOLIC CONGESTIVE HEART FAILURE (H): Primary | ICD-10-CM

## 2024-10-04 DIAGNOSIS — I25.10 CORONARY ARTERY DISEASE INVOLVING NATIVE CORONARY ARTERY OF NATIVE HEART WITHOUT ANGINA PECTORIS: ICD-10-CM

## 2024-10-04 DIAGNOSIS — I10 ESSENTIAL HYPERTENSION: ICD-10-CM

## 2024-10-04 DIAGNOSIS — E78.5 DYSLIPIDEMIA: ICD-10-CM

## 2024-10-04 DIAGNOSIS — N18.32 STAGE 3B CHRONIC KIDNEY DISEASE (H): ICD-10-CM

## 2024-10-04 DIAGNOSIS — G47.33 OSA ON CPAP: ICD-10-CM

## 2024-10-04 DIAGNOSIS — I71.21 ANEURYSM OF ASCENDING AORTA WITHOUT RUPTURE (H): ICD-10-CM

## 2024-10-04 LAB
ANION GAP SERPL CALCULATED.3IONS-SCNC: 14 MMOL/L (ref 7–15)
BUN SERPL-MCNC: 35.6 MG/DL (ref 8–23)
CALCIUM SERPL-MCNC: 8.9 MG/DL (ref 8.8–10.4)
CHLORIDE SERPL-SCNC: 95 MMOL/L (ref 98–107)
CREAT SERPL-MCNC: 1.27 MG/DL (ref 0.67–1.17)
EGFRCR SERPLBLD CKD-EPI 2021: 52 ML/MIN/1.73M2
GLUCOSE SERPL-MCNC: 157 MG/DL (ref 70–99)
HCO3 SERPL-SCNC: 31 MMOL/L (ref 22–29)
POTASSIUM SERPL-SCNC: 4.1 MMOL/L (ref 3.4–5.3)
SODIUM SERPL-SCNC: 140 MMOL/L (ref 135–145)

## 2024-10-04 PROCEDURE — 36415 COLL VENOUS BLD VENIPUNCTURE: CPT | Performed by: INTERNAL MEDICINE

## 2024-10-04 PROCEDURE — 99214 OFFICE O/P EST MOD 30 MIN: CPT | Performed by: INTERNAL MEDICINE

## 2024-10-04 PROCEDURE — 80048 BASIC METABOLIC PNL TOTAL CA: CPT | Performed by: INTERNAL MEDICINE

## 2024-10-04 PROCEDURE — G2211 COMPLEX E/M VISIT ADD ON: HCPCS | Performed by: INTERNAL MEDICINE

## 2024-10-04 RX ORDER — TRIAMCINOLONE ACETONIDE 1 MG/G
1 CREAM TOPICAL PRN
COMMUNITY

## 2024-10-04 RX ORDER — DOXYCYCLINE HYCLATE 100 MG
TABLET ORAL
COMMUNITY
Start: 2024-09-30 | End: 2024-10-07

## 2024-10-04 RX ORDER — LEVOTHYROXINE SODIUM 50 UG/1
50 TABLET ORAL DAILY
COMMUNITY
Start: 2024-08-19

## 2024-10-04 NOTE — PROGRESS NOTES
Assessment/Plan:   1.  Chronic congestive heart failure with preserved ejection fraction: The patient is compensated.  His weight has been going down.  His leg edema has been stable.  Continue torsemide 80 mg a.m. BMP today to make sure creatinine back to his baseline, potassium stable.  Continue low-salt diet.    2.  Coronary artery disease s/p KATHY to Ramus, severe calcified RCA lesion with high risk of revascularization, no obstructive lesion in left main and LAD per IVUS:  He had no chest pain.  His echocardiogram in July 2024 was reported normal left ventricular size, wall motion and systolic function, LVEF of 60 to 65%, no significant valvular disease.  Continue isosorbide mononitrate 60 mg daily.  Continue aspirin, Crestor, and metoprolol XL.      3.  Essential hypertension: His blood pressure is controlled, continue metoprolol XL 50 mg daily, torsemide 80 mg daily.     4.  Dyslipidemia: Continue Crestor 20 mg nightly.  LDL was controlled.     5.  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     6.  Morbid obesity and obstructive sleep apnea: Lifestyle modification and continue CPAP.    7.  Stage III CKD: Check renal function today.    Thank you for the opportunity to be involved in the care of Chava Lincoln. If you have any questions, please feel free to contact me.  I will see the patient again in 6 months and as needed.    Much or all of the text in this note was generated through the use of Dragon Dictate voice-to-text software. Errors in spelling or words which seem out of context are unintentional. Sound alike errors, in particular, may have escaped editing.       History of Present Illness:   It is my pleasure to see Chava Lincoln at the [facility] [place] [facility] for routine cardiology follow-up.  Chava Lincoln is a 89 year old male with a medical history of coronary artery disease, s/p KATHY to Ramus in 2-2019, no obstructive disease in LM and LAD, severe  calcification lesions in RCA, benign essential hypertension, dyslipidemia, chronic diastolic congestive heart failure, morbid obesity, stage III CKD right diaphragm hemiparalysis, obstructive sleep apnea on CPAP.    The patient states that he has been doing pretty stable.  He denies chest pain.  His shortness of breath has been improved.  He has been gradually losing weight.  Since last January to now, he lost about 40 pounds.  He had no palpitations, dizziness.  He has some mild bilateral leg edema which has been stable.  His blood pressure and heart rate are controlled.  No side effects from current medications.    Past Medical History:     Patient Active Problem List   Diagnosis    GERD (gastroesophageal reflux disease)    RAMON on CPAP    Near syncope    Dyspnea on exertion    Essential hypertension    Dyslipidemia    Coronary artery disease involving native coronary artery of native heart without angina pectoris    Ascending aortic aneurysm (H)    Coronary artery calcification seen on CAT scan    Pulmonary hypertension (H)    History of pulmonary embolism    Hypoxia    Morbid obesity (H)    Bilateral lower extremity edema    Chronic kidney disease, stage 3 (H)    (HFpEF) heart failure with preserved ejection fraction (H)    Abdominal pain, epigastric    Chronic diastolic congestive heart failure (H)    Chest pain, unspecified type       Past Surgical History:     Past Surgical History:   Procedure Laterality Date    CV CORONARY ANGIOGRAM N/A 12/10/2018    Procedure: Coronary Angiogram;  Surgeon: Tana Duncan MD;  Location: [facility] Cath Lab;  Service: Cardiology    CV LEFT HEART CATHETERIZATION WITHOUT LEFT VENTRICULOGRAM Left 12/10/2018    Procedure: Left Heart Catheterization Without Left Ventriculogram;  Surgeon: Tana Duncan MD;  Location: [facility] Cath Lab;  Service: Cardiology    CV LEFT HEART CATHETERIZATION WITHOUT LEFT VENTRICULOGRAM Left 02/04/2019    Procedure: Left Heart Catheterization  Without Left Ventriculogram;  Surgeon: Tana Duncan MD;  Location: [facility] Cath Lab;  Service: Cardiology    CV RIGHT HEART CATHETERIZATION N/A 02/04/2019    Procedure: Right Heart Catheterization;  Surgeon: Tana Duncan MD;  Location: [facility] Cath Lab;  Service: Cardiology    HERNIA REPAIR      JOINT REPLACEMENT      KNEE SURGERY Bilateral     OTHER SURGICAL HISTORY      Prostatic cryotherapy       Family History:     Family History   Problem Relation Age of Onset    Diabetes Mother     Cerebrovascular Disease Father     Heart Disease Brother     Coronary Artery Disease Brother         Social History:    reports that he has never smoked. He has never used smokeless tobacco. He reports current alcohol use. He reports that he does not use drugs.    Review of Systems:   12 systems are reviewed negative except for in HPI.    Meds:     Current Outpatient Medications:     acetaminophen (TYLENOL) 500 MG tablet, Take 1,000 mg by mouth at bedtime, Disp: , Rfl:     aspirin 81 MG EC tablet, [ASPIRIN 81 MG EC TABLET] Take 81 mg by mouth every evening. , Disp: , Rfl:     betamethasone dipropionate (DIPROSONE) 0.05 % external cream, 2 times daily as needed, Disp: , Rfl:     doxycycline hyclate (VIBRA-TABS) 100 MG tablet, 1 tablet Orally Twice a day for 7 days, Disp: , Rfl:     famotidine (PEPCID) 10 MG tablet, Take 10 mg by mouth 2 times daily Noon and 9pm, Disp: , Rfl:     isosorbide mononitrate (IMDUR) 60 MG 24 hr tablet, Take 1 tablet (60 mg) by mouth daily, Disp: 90 tablet, Rfl: 3    ketoconazole (NIZORAL) 2 % external cream, daily as needed, Disp: , Rfl:     lactobacillus rhamnosus (GG) (CULTURELL) capsule, Take 1 capsule by mouth daily noon, Disp: , Rfl:     leuprolide (ELIGARD) 45 MG kit, Inject 45 mg subcutaneously every 6 months, Disp: , Rfl:     levothyroxine (SYNTHROID/LEVOTHROID) 50 MCG tablet, Take 50 mcg by mouth daily., Disp: , Rfl:     Melatonin 2.5 MG CHEW, Take by mouth daily as needed, Disp: ,  "Rfl:     metoprolol succinate ER (TOPROL XL) 50 MG 24 hr tablet, Take 50 mg by mouth daily, Disp: , Rfl:     multivitamin with minerals (THERA-M) 9 mg iron-400 mcg Tab tablet, Take 1 tablet by mouth daily, Disp: , Rfl:     mupirocin (BACTROBAN) 2 % external ointment, Apply topically 2 times daily as needed, Disp: , Rfl:     mycophenolate (GENERIC EQUIVALENT) 500 MG tablet, Take 1,000 mg by mouth 2 times daily, Disp: , Rfl:     nystatin (MYCOSTATIN) 988165 UNIT/GM external powder, Apply topically 2 times daily, Disp: , Rfl:     polyethylene glycol (MIRALAX) 17 GM/Dose powder, Take 17 g by mouth daily as needed, Disp: , Rfl:     rosuvastatin (CRESTOR) 20 MG tablet, TAKE 1 TABLET(20 MG) BY MOUTH AT BEDTIME, Disp: 90 tablet, Rfl: 1    torsemide (DEMADEX) 20 MG tablet, Take 80 mg by mouth daily, Disp: , Rfl:     triamcinolone (KENALOG) 0.1 % external cream, Apply 1 Application topically as needed., Disp: , Rfl:     solifenacin (VESICARE) 10 MG tablet, Take 1 tablet by mouth daily (Patient not taking: Reported on 8/2/2024), Disp: , Rfl:     Allergies:   Atorvastatin, Elemental sulfur, Furosemide, Gabapentin, Hydrocodone, Hydrocodone-acetaminophen, Lisinopril, Morphine, Sulfa (sulfonamide antibiotics) [sulfa antibiotics], Sulfatolamide, and Vicodin [hydrocodone-acetaminophen]      Objective:      Physical Exam     1.753 m (5' 9\")  Body mass index is 33.96 kg/m .  /54 (BP Location: Left arm, Patient Position: Sitting, Cuff Size: Adult Large)   Pulse 64   Resp 16   Ht 1.753 m (5' 9\")   BMI 33.96 kg/m      General Appearance:   Awake, Alert, No acute distress.   HEENT:  Pupil equal and reactive to light. No scleral icterus; the mucous membranes were moist.   Neck: No cervical bruits. Not able to see JVD. No thyromegaly.     Chest: The spine was straight. The chest was symmetric.   Lungs:   Diminished breathing sounds. No crackles. No wheezing.   Cardiovascular:   Regular rhythm and rate, remote first and second " heart sounds with II/VI systolic murmurs at RUSB. No rubs or gallops.    Abdomen:  Obese. Soft. No tenderness. Non-distended. Bowels sounds are present   Extremities: Mild bilateral leg edema.   Skin: Red skin rashes most in back. Warm, Dry.   Musculoskeletal: No tenderness. No deformity.   Neurologic: Mood and affect are appropriate. No focal deficits.         EKG:  Personally reivewed  Sinus rhythm with 1st degree A-V block  Low voltage QRS   Inferior infarct , age undetermined   Cannot rule out Anteroseptal infarct (cited on or before 07-Sep-2023)   Abnormal ECG   When compared with ECG of 07-Sep-2023 11:26,   Inferior infarct is now Present      Cardiac Imaging Studies  Echocardiogram on 7-:  1. The left ventricle is normal in size.Left ventricular function is  normal.The ejection fraction is 60-65%. No regional wall motion abnormalities noted.  2. The right ventricle is not well visualized. Normal right ventricle size andsystolic function.     No hemodynamically significant valvular abnormalities on 2D or color flow  imaging.     Coronary angiogram with PCI on 2-4-2019:  IVUS left main and LAD showed no severely obstructive lesions  Ramus disease treated with rotational atherectomy and stented with a Synergy KATHY under IVUS guidance with good angiographic results  Right heart catheterizations show LV EDP 22 mmHg and PCWP 30 mmHg, PA mean 34 mmHg, RA 13 mmHg      Lab Review   Lab Results   Component Value Date     06/23/2021    CO2 23 06/23/2021    BUN 21 06/23/2021     Lab Results   Component Value Date    WBC 8.5 05/17/2021    HGB 13.9 05/17/2021    HCT 40.6 05/17/2021    MCV 90 05/17/2021     05/17/2021     Lab Results   Component Value Date    CHOL 136 01/29/2024    CHOL 138 07/24/2023    CHOL 144 04/28/2022     Lab Results   Component Value Date    HDL 46 01/29/2024    HDL 44 07/24/2023    HDL 54 04/28/2022     Lab Results   Component Value Date    TRIG 168 (H) 01/29/2024    TRIG 167 (H)  07/24/2023    TRIG 140 04/28/2022     Lab Results   Component Value Date    TROPONINI 0.02 04/08/2021     Lab Results   Component Value Date    BNP 84 05/17/2021

## 2024-10-04 NOTE — LETTER
10/4/2024    Erasmo Michael MD  [address]  [place] [address]    RE: Chava Lincoln       Dear Colleague,     I had the pleasure of seeing Chava Lincoln in the [facility].            Assessment/Plan:   1.  Chronic congestive heart failure with preserved ejection fraction: The patient is compensated.  His weight has been going down.  His leg edema has been stable.  Continue torsemide 80 mg a.m. BMP today to make sure creatinine back to his baseline, potassium stable.  Continue low-salt diet.    2.  Coronary artery disease s/p KATHY to Ramus, severe calcified RCA lesion with high risk of revascularization, no obstructive lesion in left main and LAD per IVUS:  He had no chest pain.  His echocardiogram in July 2024 was reported normal left ventricular size, wall motion and systolic function, LVEF of 60 to 65%, no significant valvular disease.  Continue isosorbide mononitrate 60 mg daily.  Continue aspirin, Crestor, and metoprolol XL.      3.  Essential hypertension: His blood pressure is controlled, continue metoprolol XL 50 mg daily, torsemide 80 mg daily.     4.  Dyslipidemia: Continue Crestor 20 mg nightly.  LDL was controlled.     5.  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     6.  Morbid obesity and obstructive sleep apnea: Lifestyle modification and continue CPAP.    7.  Stage III CKD: Check renal function today.    Thank you for the opportunity to be involved in the care of Chava Lincoln. If you have any questions, please feel free to contact me.  I will see the patient again in 6 months and as needed.    Much or all of the text in this note was generated through the use of Dragon Dictate voice-to-text software. Errors in spelling or words which seem out of context are unintentional. Sound alike errors, in particular, may have escaped editing.       History of Present Illness:   It is my pleasure to see Chava Lincoln at the [facility] for routine cardiology follow-up.  Chava Lincoln is a 89 year old male with a medical history of coronary artery disease, s/p KATHY to Ramus in 2-2019, no obstructive disease in LM and LAD, severe calcification lesions in RCA, benign essential hypertension, dyslipidemia, chronic diastolic congestive heart failure, morbid obesity, stage III CKD right diaphragm hemiparalysis, obstructive sleep apnea on CPAP.    The patient states that he has been doing pretty stable.  He denies chest pain.  His shortness of breath has been improved.  He has been gradually losing weight.  Since last January to now, he lost about 40 pounds.  He had no palpitations, dizziness.  He has some mild bilateral leg edema which has been stable.  His blood pressure and heart rate are controlled.  No side effects from current medications.    Past Medical History:     Patient Active Problem List   Diagnosis     GERD (gastroesophageal reflux disease)     RAMON on CPAP     Near syncope     Dyspnea on exertion     Essential hypertension     Dyslipidemia     Coronary artery disease involving native coronary artery of native heart without angina pectoris     Ascending aortic aneurysm (H)     Coronary artery calcification seen on CAT scan     Pulmonary hypertension (H)     History of pulmonary embolism     Hypoxia     Morbid obesity (H)     Bilateral lower extremity edema     Chronic kidney disease, stage 3 (H)     (HFpEF) heart failure with preserved ejection fraction (H)     Abdominal pain, epigastric     Chronic diastolic congestive heart failure (H)     Chest pain, unspecified type       Past Surgical History:     Past Surgical History:   Procedure Laterality Date     CV CORONARY ANGIOGRAM N/A 12/10/2018    Procedure: Coronary Angiogram;  Surgeon: Tana Duncan MD;  Location: [facility] Cath Lab;  Service: Cardiology     CV LEFT HEART CATHETERIZATION WITHOUT LEFT VENTRICULOGRAM Left 12/10/2018    Procedure: Left Heart  Catheterization Without Left Ventriculogram;  Surgeon: Tana Duncan MD;  Location: [facility] Cath Lab;  Service: Cardiology     CV LEFT HEART CATHETERIZATION WITHOUT LEFT VENTRICULOGRAM Left 02/04/2019    Procedure: Left Heart Catheterization Without Left Ventriculogram;  Surgeon: Tana Duncan MD;  Location: [facility] Cath Lab;  Service: Cardiology     CV RIGHT HEART CATHETERIZATION N/A 02/04/2019    Procedure: Right Heart Catheterization;  Surgeon: Tana Duncan MD;  Location: [facility] Cath Lab;  Service: Cardiology     HERNIA REPAIR       JOINT REPLACEMENT       KNEE SURGERY Bilateral      OTHER SURGICAL HISTORY      Prostatic cryotherapy       Family History:     Family History   Problem Relation Age of Onset     Diabetes Mother      Cerebrovascular Disease Father      Heart Disease Brother      Coronary Artery Disease Brother         Social History:    reports that he has never smoked. He has never used smokeless tobacco. He reports current alcohol use. He reports that he does not use drugs.    Review of Systems:   12 systems are reviewed negative except for in HPI.    Meds:     Current Outpatient Medications:      acetaminophen (TYLENOL) 500 MG tablet, Take 1,000 mg by mouth at bedtime, Disp: , Rfl:      aspirin 81 MG EC tablet, [ASPIRIN 81 MG EC TABLET] Take 81 mg by mouth every evening. , Disp: , Rfl:      betamethasone dipropionate (DIPROSONE) 0.05 % external cream, 2 times daily as needed, Disp: , Rfl:      doxycycline hyclate (VIBRA-TABS) 100 MG tablet, 1 tablet Orally Twice a day for 7 days, Disp: , Rfl:      famotidine (PEPCID) 10 MG tablet, Take 10 mg by mouth 2 times daily Noon and 9pm, Disp: , Rfl:      isosorbide mononitrate (IMDUR) 60 MG 24 hr tablet, Take 1 tablet (60 mg) by mouth daily, Disp: 90 tablet, Rfl: 3     ketoconazole (NIZORAL) 2 % external cream, daily as needed, Disp: , Rfl:      lactobacillus rhamnosus (GG) (CULTURELL) capsule, Take 1 capsule by mouth daily noon,  "Disp: , Rfl:      leuprolide (ELIGARD) 45 MG kit, Inject 45 mg subcutaneously every 6 months, Disp: , Rfl:      levothyroxine (SYNTHROID/LEVOTHROID) 50 MCG tablet, Take 50 mcg by mouth daily., Disp: , Rfl:      Melatonin 2.5 MG CHEW, Take by mouth daily as needed, Disp: , Rfl:      metoprolol succinate ER (TOPROL XL) 50 MG 24 hr tablet, Take 50 mg by mouth daily, Disp: , Rfl:      multivitamin with minerals (THERA-M) 9 mg iron-400 mcg Tab tablet, Take 1 tablet by mouth daily, Disp: , Rfl:      mupirocin (BACTROBAN) 2 % external ointment, Apply topically 2 times daily as needed, Disp: , Rfl:      mycophenolate (GENERIC EQUIVALENT) 500 MG tablet, Take 1,000 mg by mouth 2 times daily, Disp: , Rfl:      nystatin (MYCOSTATIN) 028056 UNIT/GM external powder, Apply topically 2 times daily, Disp: , Rfl:      polyethylene glycol (MIRALAX) 17 GM/Dose powder, Take 17 g by mouth daily as needed, Disp: , Rfl:      rosuvastatin (CRESTOR) 20 MG tablet, TAKE 1 TABLET(20 MG) BY MOUTH AT BEDTIME, Disp: 90 tablet, Rfl: 1     torsemide (DEMADEX) 20 MG tablet, Take 80 mg by mouth daily, Disp: , Rfl:      triamcinolone (KENALOG) 0.1 % external cream, Apply 1 Application topically as needed., Disp: , Rfl:      solifenacin (VESICARE) 10 MG tablet, Take 1 tablet by mouth daily (Patient not taking: Reported on 8/2/2024), Disp: , Rfl:     Allergies:   Atorvastatin, Elemental sulfur, Furosemide, Gabapentin, Hydrocodone, Hydrocodone-acetaminophen, Lisinopril, Morphine, Sulfa (sulfonamide antibiotics) [sulfa antibiotics], Sulfatolamide, and Vicodin [hydrocodone-acetaminophen]      Objective:      Physical Exam     1.753 m (5' 9\")  Body mass index is 33.96 kg/m .  /54 (BP Location: Left arm, Patient Position: Sitting, Cuff Size: Adult Large)   Pulse 64   Resp 16   Ht 1.753 m (5' 9\")   BMI 33.96 kg/m      General Appearance:   Awake, Alert, No acute distress.   HEENT:  Pupil equal and reactive to light. No scleral icterus; the mucous " membranes were moist.   Neck: No cervical bruits. Not able to see JVD. No thyromegaly.     Chest: The spine was straight. The chest was symmetric.   Lungs:   Diminished breathing sounds. No crackles. No wheezing.   Cardiovascular:   Regular rhythm and rate, remote first and second heart sounds with II/VI systolic murmurs at RUSB. No rubs or gallops.    Abdomen:  Obese. Soft. No tenderness. Non-distended. Bowels sounds are present   Extremities: Mild bilateral leg edema.   Skin: Red skin rashes most in back. Warm, Dry.   Musculoskeletal: No tenderness. No deformity.   Neurologic: Mood and affect are appropriate. No focal deficits.         EKG:  Personally reivewed  Sinus rhythm with 1st degree A-V block  Low voltage QRS   Inferior infarct , age undetermined   Cannot rule out Anteroseptal infarct (cited on or before 07-Sep-2023)   Abnormal ECG   When compared with ECG of 07-Sep-2023 11:26,   Inferior infarct is now Present      Cardiac Imaging Studies  Echocardiogram on 7-:  1. The left ventricle is normal in size.Left ventricular function is  normal.The ejection fraction is 60-65%. No regional wall motion abnormalities noted.  2. The right ventricle is not well visualized. Normal right ventricle size andsystolic function.     No hemodynamically significant valvular abnormalities on 2D or color flow  imaging.     Coronary angiogram with PCI on 2-4-2019:  IVUS left main and LAD showed no severely obstructive lesions  Ramus disease treated with rotational atherectomy and stented with a Synergy KATHY under IVUS guidance with good angiographic results  Right heart catheterizations show LV EDP 22 mmHg and PCWP 30 mmHg, PA mean 34 mmHg, RA 13 mmHg      Lab Review   Lab Results   Component Value Date     06/23/2021    CO2 23 06/23/2021    BUN 21 06/23/2021     Lab Results   Component Value Date    WBC 8.5 05/17/2021    HGB 13.9 05/17/2021    HCT 40.6 05/17/2021    MCV 90 05/17/2021     05/17/2021     Lab  Results   Component Value Date    CHOL 136 01/29/2024    CHOL 138 07/24/2023    CHOL 144 04/28/2022     Lab Results   Component Value Date    HDL 46 01/29/2024    HDL 44 07/24/2023    HDL 54 04/28/2022     Lab Results   Component Value Date    TRIG 168 (H) 01/29/2024    TRIG 167 (H) 07/24/2023    TRIG 140 04/28/2022     Lab Results   Component Value Date    TROPONINI 0.02 04/08/2021     Lab Results   Component Value Date    BNP 84 05/17/2021                   Thank you for allowing me to participate in the care of your patient.      Sincerely,     Yonatan Sheridan MD     [facility]  cc:   Yonatan Sheridan MD  [facility] JONNA DAVIS [address] [address]

## 2024-10-15 DIAGNOSIS — I25.10 CORONARY ARTERY DISEASE INVOLVING NATIVE CORONARY ARTERY OF NATIVE HEART WITHOUT ANGINA PECTORIS: ICD-10-CM

## 2024-10-15 RX ORDER — ROSUVASTATIN CALCIUM 20 MG/1
20 TABLET, COATED ORAL AT BEDTIME
Qty: 90 TABLET | Refills: 0 | Status: SHIPPED | OUTPATIENT
Start: 2024-10-15

## 2024-11-04 ENCOUNTER — LAB REQUISITION (OUTPATIENT)
Dept: LAB | Facility: CLINIC | Age: 89
End: 2024-11-04
Payer: MEDICARE

## 2024-11-04 DIAGNOSIS — R73.9 HYPERGLYCEMIA, UNSPECIFIED: ICD-10-CM

## 2024-11-04 DIAGNOSIS — I13.0 HYPERTENSIVE HEART AND CHRONIC KIDNEY DISEASE WITH HEART FAILURE AND STAGE 1 THROUGH STAGE 4 CHRONIC KIDNEY DISEASE, OR UNSPECIFIED CHRONIC KIDNEY DISEASE (H): ICD-10-CM

## 2024-11-04 DIAGNOSIS — N40.0 BENIGN PROSTATIC HYPERPLASIA WITHOUT LOWER URINARY TRACT SYMPTOMS: ICD-10-CM

## 2024-11-04 LAB
ERYTHROCYTE [DISTWIDTH] IN BLOOD BY AUTOMATED COUNT: 13.2 % (ref 10–15)
EST. AVERAGE GLUCOSE BLD GHB EST-MCNC: 171 MG/DL
HBA1C MFR BLD: 7.6 %
HCT VFR BLD AUTO: 41.3 % (ref 40–53)
HGB BLD-MCNC: 13.2 G/DL (ref 13.3–17.7)
MCH RBC QN AUTO: 30.1 PG (ref 26.5–33)
MCHC RBC AUTO-ENTMCNC: 32 G/DL (ref 31.5–36.5)
MCV RBC AUTO: 94 FL (ref 78–100)
PLATELET # BLD AUTO: 237 10E3/UL (ref 150–450)
RBC # BLD AUTO: 4.39 10E6/UL (ref 4.4–5.9)
WBC # BLD AUTO: 7.5 10E3/UL (ref 4–11)

## 2024-11-04 PROCEDURE — 83036 HEMOGLOBIN GLYCOSYLATED A1C: CPT | Mod: ORL | Performed by: FAMILY MEDICINE

## 2024-11-04 PROCEDURE — 85027 COMPLETE CBC AUTOMATED: CPT | Mod: ORL | Performed by: FAMILY MEDICINE

## 2024-11-04 PROCEDURE — 84443 ASSAY THYROID STIM HORMONE: CPT | Mod: ORL | Performed by: FAMILY MEDICINE

## 2024-11-04 PROCEDURE — 84153 ASSAY OF PSA TOTAL: CPT | Mod: ORL | Performed by: FAMILY MEDICINE

## 2024-11-04 PROCEDURE — 80048 BASIC METABOLIC PNL TOTAL CA: CPT | Mod: ORL | Performed by: FAMILY MEDICINE

## 2024-11-05 LAB
ANION GAP SERPL CALCULATED.3IONS-SCNC: 16 MMOL/L (ref 7–15)
BUN SERPL-MCNC: 30.9 MG/DL (ref 8–23)
CALCIUM SERPL-MCNC: 8.8 MG/DL (ref 8.8–10.4)
CHLORIDE SERPL-SCNC: 99 MMOL/L (ref 98–107)
CREAT SERPL-MCNC: 1.18 MG/DL (ref 0.67–1.17)
EGFRCR SERPLBLD CKD-EPI 2021: 57 ML/MIN/1.73M2
GLUCOSE SERPL-MCNC: 167 MG/DL (ref 70–99)
HCO3 SERPL-SCNC: 26 MMOL/L (ref 22–29)
POTASSIUM SERPL-SCNC: 3.7 MMOL/L (ref 3.4–5.3)
PSA SERPL DL<=0.01 NG/ML-MCNC: 0.12 NG/ML
SODIUM SERPL-SCNC: 141 MMOL/L (ref 135–145)
TSH SERPL DL<=0.005 MIU/L-ACNC: 3.67 UIU/ML (ref 0.3–4.2)

## 2025-01-12 DIAGNOSIS — I25.10 CORONARY ARTERY DISEASE INVOLVING NATIVE CORONARY ARTERY OF NATIVE HEART WITHOUT ANGINA PECTORIS: ICD-10-CM

## 2025-01-13 RX ORDER — ROSUVASTATIN CALCIUM 20 MG/1
20 TABLET, COATED ORAL AT BEDTIME
Qty: 90 TABLET | Refills: 0 | Status: SHIPPED | OUTPATIENT
Start: 2025-01-13

## 2025-02-15 ENCOUNTER — HEALTH MAINTENANCE LETTER (OUTPATIENT)
Age: OVER 89
End: 2025-02-15

## 2025-03-15 ENCOUNTER — HEALTH MAINTENANCE LETTER (OUTPATIENT)
Age: OVER 89
End: 2025-03-15

## 2025-04-09 ENCOUNTER — OFFICE VISIT (OUTPATIENT)
Dept: CARDIOLOGY | Facility: CLINIC | Age: OVER 89
End: 2025-04-09
Payer: MEDICARE

## 2025-04-09 VITALS
DIASTOLIC BLOOD PRESSURE: 72 MMHG | HEIGHT: 68 IN | OXYGEN SATURATION: 92 % | WEIGHT: 227 LBS | BODY MASS INDEX: 34.4 KG/M2 | SYSTOLIC BLOOD PRESSURE: 151 MMHG | RESPIRATION RATE: 16 BRPM | HEART RATE: 55 BPM

## 2025-04-09 DIAGNOSIS — I50.32 CHRONIC DIASTOLIC CONGESTIVE HEART FAILURE (H): Primary | ICD-10-CM

## 2025-04-09 DIAGNOSIS — N18.32 STAGE 3B CHRONIC KIDNEY DISEASE (H): ICD-10-CM

## 2025-04-09 DIAGNOSIS — I71.21 ANEURYSM OF ASCENDING AORTA WITHOUT RUPTURE: ICD-10-CM

## 2025-04-09 DIAGNOSIS — G47.33 OSA ON CPAP: ICD-10-CM

## 2025-04-09 DIAGNOSIS — E78.5 DYSLIPIDEMIA: ICD-10-CM

## 2025-04-09 DIAGNOSIS — I25.10 CORONARY ARTERY DISEASE INVOLVING NATIVE CORONARY ARTERY OF NATIVE HEART WITHOUT ANGINA PECTORIS: ICD-10-CM

## 2025-04-09 DIAGNOSIS — I10 ESSENTIAL HYPERTENSION: ICD-10-CM

## 2025-04-09 LAB
ALBUMIN SERPL BCG-MCNC: 4 G/DL (ref 3.5–5.2)
ALP SERPL-CCNC: 83 U/L (ref 40–150)
ALT SERPL W P-5'-P-CCNC: 32 U/L (ref 0–70)
ANION GAP SERPL CALCULATED.3IONS-SCNC: 14 MMOL/L (ref 7–15)
AST SERPL W P-5'-P-CCNC: 47 U/L (ref 0–45)
BILIRUB SERPL-MCNC: 0.3 MG/DL
BUN SERPL-MCNC: 34 MG/DL (ref 8–23)
CALCIUM SERPL-MCNC: 9.4 MG/DL (ref 8.8–10.4)
CHLORIDE SERPL-SCNC: 102 MMOL/L (ref 98–107)
CHOLEST SERPL-MCNC: 135 MG/DL
CREAT SERPL-MCNC: 1.21 MG/DL (ref 0.67–1.17)
EGFRCR SERPLBLD CKD-EPI 2021: 55 ML/MIN/1.73M2
ERYTHROCYTE [DISTWIDTH] IN BLOOD BY AUTOMATED COUNT: 14.3 % (ref 10–15)
FASTING STATUS PATIENT QL REPORTED: NO
GLUCOSE SERPL-MCNC: 128 MG/DL (ref 70–99)
HCO3 SERPL-SCNC: 30 MMOL/L (ref 22–29)
HCT VFR BLD AUTO: 41.6 % (ref 40–53)
HDLC SERPL-MCNC: 45 MG/DL
HGB BLD-MCNC: 13.3 G/DL (ref 13.3–17.7)
LDLC SERPL CALC-MCNC: 58 MG/DL
MCH RBC QN AUTO: 30 PG (ref 26.5–33)
MCHC RBC AUTO-ENTMCNC: 32 G/DL (ref 31.5–36.5)
MCV RBC AUTO: 94 FL (ref 78–100)
NONHDLC SERPL-MCNC: 90 MG/DL
PLATELET # BLD AUTO: 195 10E3/UL (ref 150–450)
POTASSIUM SERPL-SCNC: 3.8 MMOL/L (ref 3.4–5.3)
PROT SERPL-MCNC: 6.9 G/DL (ref 6.4–8.3)
RBC # BLD AUTO: 4.43 10E6/UL (ref 4.4–5.9)
SODIUM SERPL-SCNC: 146 MMOL/L (ref 135–145)
TRIGL SERPL-MCNC: 162 MG/DL
WBC # BLD AUTO: 6.4 10E3/UL (ref 4–11)

## 2025-04-09 PROCEDURE — 3077F SYST BP >= 140 MM HG: CPT | Performed by: INTERNAL MEDICINE

## 2025-04-09 PROCEDURE — 36415 COLL VENOUS BLD VENIPUNCTURE: CPT | Performed by: INTERNAL MEDICINE

## 2025-04-09 PROCEDURE — 80053 COMPREHEN METABOLIC PANEL: CPT | Performed by: INTERNAL MEDICINE

## 2025-04-09 PROCEDURE — G2211 COMPLEX E/M VISIT ADD ON: HCPCS | Performed by: INTERNAL MEDICINE

## 2025-04-09 PROCEDURE — 3078F DIAST BP <80 MM HG: CPT | Performed by: INTERNAL MEDICINE

## 2025-04-09 PROCEDURE — 99214 OFFICE O/P EST MOD 30 MIN: CPT | Performed by: INTERNAL MEDICINE

## 2025-04-09 PROCEDURE — 80061 LIPID PANEL: CPT | Performed by: INTERNAL MEDICINE

## 2025-04-09 PROCEDURE — 85027 COMPLETE CBC AUTOMATED: CPT | Performed by: INTERNAL MEDICINE

## 2025-04-09 RX ORDER — IPRATROPIUM BROMIDE 42 UG/1
SPRAY, METERED NASAL
COMMUNITY
Start: 2025-03-25

## 2025-04-09 NOTE — LETTER
4/9/2025    Erasmo Michael MD  [address]  [place] [address]    RE: Chava Lincoln       Dear Colleague,     I had the pleasure of seeing Chava Lincoln in the [facility].            Assessment/Plan:   1.  Chronic congestive heart failure with preserved ejection fraction: The patient has no worsening shortness of breath.  His weight has been going down.  His leg edema has been stable.  Continue torsemide 80 mg a.m. Continue low-salt diet.  Routine labs including CBC, CMP and lipid profile today.    2.  Coronary artery disease s/p KATHY to Ramus, severe calcified RCA lesion with high risk of revascularization, no obstructive lesion in left main and LAD per IVUS:  He had no chest pain.  His echocardiogram in July 2024 was reported normal left ventricular size, wall motion and systolic function, LVEF of 60 to 65%, no significant valvular disease.  Continue isosorbide mononitrate 60 mg daily.  Continue aspirin, Crestor, and metoprolol XL.      3.  Essential hypertension: His blood pressure is controlled, continue metoprolol XL 50 mg daily, torsemide 80 mg daily.     4.  Dyslipidemia: Continue Crestor 20 mg nightly.  Check lipid profile and liver function today as mentioned above.     5.  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     6.  Morbid obesity and obstructive sleep apnea: Lifestyle modification and continue CPAP.    7.  Stage III CKD: Check renal function today.    Thank you for the opportunity to be involved in the care of Chava Lincoln. If you have any questions, please feel free to contact me.  I will see the patient again in 6 months and as needed.    Much or all of the text in this note was generated through the use of Dragon Dictate voice-to-text software. Errors in spelling or words which seem out of context are unintentional. Sound alike errors, in particular, may have escaped editing.       History of Present Illness:   It is my pleasure to  see Chava Lincoln at the [facility] clinic for routine cardiology follow-up.  Chava Lincoln is a 89 year old male with a medical history of coronary artery disease, s/p KATHY to Ramus in 2-2019, no obstructive disease in LM and LAD, severe calcification lesions in RCA, benign essential hypertension, dyslipidemia, chronic diastolic congestive heart failure, morbid obesity, stage III CKD right diaphragm hemiparalysis, obstructive sleep apnea on CPAP.    The patient states that he has been doing stable.  There is no significant interval change from a cardiology standpoint.  He denies chest pain.  His shortness of breath has been stable.  He has been gradually losing weight. He had no palpitations, dizziness.  He has mild bilateral leg edema which has been stable.  His blood pressure and heart rate are controlled.  No side effects from current medications.    Past Medical History:     Patient Active Problem List   Diagnosis     GERD (gastroesophageal reflux disease)     RAMON on CPAP     Near syncope     Dyspnea on exertion     Essential hypertension     Dyslipidemia     Coronary artery disease involving native coronary artery of native heart without angina pectoris     Ascending aortic aneurysm     Coronary artery calcification seen on CAT scan     Pulmonary hypertension (H)     History of pulmonary embolism     Hypoxia     Morbid obesity (H)     Bilateral lower extremity edema     Chronic kidney disease, stage 3 (H)     (HFpEF) heart failure with preserved ejection fraction (H)     Abdominal pain, epigastric     Chronic diastolic congestive heart failure (H)     Chest pain, unspecified type       Past Surgical History:     Past Surgical History:   Procedure Laterality Date     CV CORONARY ANGIOGRAM N/A 12/10/2018    Procedure: Coronary Angiogram;  Surgeon: Tana Duncan MD;  Location: [facility] Lab;  Service: Cardiology     CV LEFT HEART CATHETERIZATION WITHOUT LEFT VENTRICULOGRAM Left  12/10/2018    Procedure: Left Heart Catheterization Without Left Ventriculogram;  Surgeon: Tana Duncan MD;  Location: [facility] Cath Lab;  Service: Cardiology     CV LEFT HEART CATHETERIZATION WITHOUT LEFT VENTRICULOGRAM Left 02/04/2019    Procedure: Left Heart Catheterization Without Left Ventriculogram;  Surgeon: Tana Duncan MD;  Location: [facility] Cath Lab;  Service: Cardiology     CV RIGHT HEART CATHETERIZATION N/A 02/04/2019    Procedure: Right Heart Catheterization;  Surgeon: Tana Duncan MD;  Location: [facility];  Service: Cardiology     HERNIA REPAIR       JOINT REPLACEMENT       KNEE SURGERY Bilateral      OTHER SURGICAL HISTORY      Prostatic cryotherapy       Family History:     Family History   Problem Relation Age of Onset     Diabetes Mother      Cerebrovascular Disease Father      Heart Disease Brother      Coronary Artery Disease Brother         Social History:    reports that he has never smoked. He has never used smokeless tobacco. He reports current alcohol use. He reports that he does not use drugs.    Review of Systems:   12 systems are reviewed negative except for in HPI.    Meds:     Current Outpatient Medications:      acetaminophen (TYLENOL) 500 MG tablet, Take 1,000 mg by mouth at bedtime, Disp: , Rfl:      aspirin 81 MG EC tablet, [ASPIRIN 81 MG EC TABLET] Take 81 mg by mouth every evening. , Disp: , Rfl:      betamethasone dipropionate (DIPROSONE) 0.05 % external cream, 2 times daily as needed, Disp: , Rfl:      famotidine (PEPCID) 10 MG tablet, Take 10 mg by mouth 2 times daily Noon and 9pm, Disp: , Rfl:      ipratropium (ATROVENT) 0.06 % nasal spray, , Disp: , Rfl:      isosorbide mononitrate (IMDUR) 60 MG 24 hr tablet, Take 1 tablet (60 mg) by mouth daily, Disp: 90 tablet, Rfl: 3     ketoconazole (NIZORAL) 2 % external cream, daily as needed, Disp: , Rfl:      lactobacillus rhamnosus (GG) (CULTURELL) capsule, Take 1 capsule by mouth daily noon, Disp: ,  "Rfl:      leuprolide (ELIGARD) 45 MG kit, Inject 45 mg subcutaneously every 6 months, Disp: , Rfl:      levothyroxine (SYNTHROID/LEVOTHROID) 50 MCG tablet, Take 50 mcg by mouth daily., Disp: , Rfl:      Melatonin 2.5 MG CHEW, Take by mouth daily as needed, Disp: , Rfl:      metoprolol succinate ER (TOPROL XL) 50 MG 24 hr tablet, Take 50 mg by mouth daily, Disp: , Rfl:      multivitamin with minerals (THERA-M) 9 mg iron-400 mcg Tab tablet, Take 1 tablet by mouth daily, Disp: , Rfl:      mupirocin (BACTROBAN) 2 % external ointment, Apply topically 2 times daily as needed, Disp: , Rfl:      mycophenolate (GENERIC EQUIVALENT) 500 MG tablet, Take 1,000 mg by mouth 2 times daily, Disp: , Rfl:      nystatin (MYCOSTATIN) 980863 UNIT/GM external powder, Apply topically 2 times daily, Disp: , Rfl:      polyethylene glycol (MIRALAX) 17 GM/Dose powder, Take 17 g by mouth daily as needed, Disp: , Rfl:      rosuvastatin (CRESTOR) 20 MG tablet, TAKE 1 TABLET(20 MG) BY MOUTH AT BEDTIME, Disp: 90 tablet, Rfl: 0     solifenacin (VESICARE) 10 MG tablet, Take 1 tablet by mouth daily., Disp: , Rfl:      torsemide (DEMADEX) 20 MG tablet, Take 80 mg by mouth daily, Disp: , Rfl:      triamcinolone (KENALOG) 0.1 % external cream, Apply 1 Application topically as needed., Disp: , Rfl:     Allergies:   Atorvastatin, Elemental sulfur, Furosemide, Gabapentin, Hydrocodone, Hydrocodone-acetaminophen, Lisinopril, Morphine, Sulfa (sulfonamide antibiotics) [sulfa antibiotics], Sulfatolamide, and Vicodin [hydrocodone-acetaminophen]      Objective:      Physical Exam  103 kg (227 lb)  1.727 m (5' 8\")  Body mass index is 34.52 kg/m .  BP (!) 151/72 (BP Location: Right arm, Patient Position: Sitting, Cuff Size: Adult Large)   Pulse 55   Resp 16   Ht 1.727 m (5' 8\")   Wt 103 kg (227 lb)   SpO2 92%   BMI 34.52 kg/m      General Appearance:   Awake, Alert, No acute distress.   HEENT:  Pupil equal and reactive to light. No scleral icterus; the mucous " membranes were moist.   Neck: No cervical bruits. Not able to see JVD. No thyromegaly.     Chest: The spine was straight. The chest was symmetric.   Lungs:   Few crackles in left lung base. No wheezing.   Cardiovascular:   Regular rhythm and rate, remote first and second heart sounds with II/VI systolic murmurs at RUSB. No rubs or gallops.    Abdomen:  Obese. Soft. No tenderness. Non-distended. Bowels sounds are present   Extremities: Mild bilateral leg edema.   Skin: Red skin rashes most in back. Warm, Dry.   Musculoskeletal: No tenderness. No deformity.   Neurologic: Mood and affect are appropriate. No focal deficits.         EKG:  Personally reivewed  Sinus rhythm with 1st degree A-V block  Low voltage QRS   Inferior infarct , age undetermined   Cannot rule out Anteroseptal infarct (cited on or before 07-Sep-2023)   Abnormal ECG   When compared with ECG of 07-Sep-2023 11:26,   Inferior infarct is now Present      Cardiac Imaging Studies  Echocardiogram on 7-:  1. The left ventricle is normal in size.Left ventricular function is  normal.The ejection fraction is 60-65%. No regional wall motion abnormalities noted.  2. The right ventricle is not well visualized. Normal right ventricle size andsystolic function.     No hemodynamically significant valvular abnormalities on 2D or color flow  imaging.     Coronary angiogram with PCI on 2-4-2019:  IVUS left main and LAD showed no severely obstructive lesions  Ramus disease treated with rotational atherectomy and stented with a Synergy KATHY under IVUS guidance with good angiographic results  Right heart catheterizations show LV EDP 22 mmHg and PCWP 30 mmHg, PA mean 34 mmHg, RA 13 mmHg      Lab Review   Lab Results   Component Value Date     06/23/2021    CO2 23 06/23/2021    BUN 21 06/23/2021     Lab Results   Component Value Date    WBC 8.5 05/17/2021    HGB 13.9 05/17/2021    HCT 40.6 05/17/2021    MCV 90 05/17/2021     05/17/2021     Lab Results    Component Value Date    CHOL 136 01/29/2024    CHOL 138 07/24/2023    CHOL 144 04/28/2022     Lab Results   Component Value Date    HDL 46 01/29/2024    HDL 44 07/24/2023    HDL 54 04/28/2022     Lab Results   Component Value Date    TRIG 168 (H) 01/29/2024    TRIG 167 (H) 07/24/2023    TRIG 140 04/28/2022     Lab Results   Component Value Date    TROPONINI 0.02 04/08/2021     Lab Results   Component Value Date    BNP 84 05/17/2021                   Thank you for allowing me to participate in the care of your patient.      Sincerely,     Yonatan Sheridan MD     [facility]  cc:   Yonatan Sheridan MD  [facility]5 JONNA DAVIS [address] [address]

## 2025-04-09 NOTE — PROGRESS NOTES
Assessment/Plan:   1.  Chronic congestive heart failure with preserved ejection fraction: The patient has no worsening shortness of breath.  His weight has been going down.  His leg edema has been stable.  Continue torsemide 80 mg a.m. Continue low-salt diet.  Routine labs including CBC, CMP and lipid profile today.    2.  Coronary artery disease s/p KATHY to Ramus, severe calcified RCA lesion with high risk of revascularization, no obstructive lesion in left main and LAD per IVUS:  He had no chest pain.  His echocardiogram in July 2024 was reported normal left ventricular size, wall motion and systolic function, LVEF of 60 to 65%, no significant valvular disease.  Continue isosorbide mononitrate 60 mg daily.  Continue aspirin, Crestor, and metoprolol XL.      3.  Essential hypertension: His blood pressure is controlled, continue metoprolol XL 50 mg daily, torsemide 80 mg daily.     4.  Dyslipidemia: Continue Crestor 20 mg nightly.  Check lipid profile and liver function today as mentioned above.     5.  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     6.  Morbid obesity and obstructive sleep apnea: Lifestyle modification and continue CPAP.    7.  Stage III CKD: Check renal function today.    Thank you for the opportunity to be involved in the care of Chava Lincoln. If you have any questions, please feel free to contact me.  I will see the patient again in 6 months and as needed.    Much or all of the text in this note was generated through the use of Dragon Dictate voice-to-text software. Errors in spelling or words which seem out of context are unintentional. Sound alike errors, in particular, may have escaped editing.       History of Present Illness:   It is my pleasure to see Chava Lincoln at the [facility] clinic for routine cardiology follow-up.  Chava Lincoln is a 89 year old male with a medical history of coronary artery disease, s/p KATHY to Ramus in 2-2019,  no obstructive disease in LM and LAD, severe calcification lesions in RCA, benign essential hypertension, dyslipidemia, chronic diastolic congestive heart failure, morbid obesity, stage III CKD right diaphragm hemiparalysis, obstructive sleep apnea on CPAP.    The patient states that he has been doing stable.  There is no significant interval change from a cardiology standpoint.  He denies chest pain.  His shortness of breath has been stable.  He has been gradually losing weight. He had no palpitations, dizziness.  He has mild bilateral leg edema which has been stable.  His blood pressure and heart rate are controlled.  No side effects from current medications.    Past Medical History:     Patient Active Problem List   Diagnosis    GERD (gastroesophageal reflux disease)    RAMON on CPAP    Near syncope    Dyspnea on exertion    Essential hypertension    Dyslipidemia    Coronary artery disease involving native coronary artery of native heart without angina pectoris    Ascending aortic aneurysm    Coronary artery calcification seen on CAT scan    Pulmonary hypertension (H)    History of pulmonary embolism    Hypoxia    Morbid obesity (H)    Bilateral lower extremity edema    Chronic kidney disease, stage 3 (H)    (HFpEF) heart failure with preserved ejection fraction (H)    Abdominal pain, epigastric    Chronic diastolic congestive heart failure (H)    Chest pain, unspecified type       Past Surgical History:     Past Surgical History:   Procedure Laterality Date    CV CORONARY ANGIOGRAM N/A 12/10/2018    Procedure: Coronary Angiogram;  Surgeon: Tana Duncan MD;  Location: [facility] Cath Lab;  Service: Cardiology    CV LEFT HEART CATHETERIZATION WITHOUT LEFT VENTRICULOGRAM Left 12/10/2018    Procedure: Left Heart Catheterization Without Left Ventriculogram;  Surgeon: Tana Duncan MD;  Location: [facility] Cath Lab;  Service: Cardiology    CV LEFT HEART CATHETERIZATION WITHOUT LEFT VENTRICULOGRAM Left  02/04/2019    Procedure: Left Heart Catheterization Without Left Ventriculogram;  Surgeon: Tana Duncan MD;  Location: [facility] Cath Lab;  Service: Cardiology    CV RIGHT HEART CATHETERIZATION N/A 02/04/2019    Procedure: Right Heart Catheterization;  Surgeon: Tana Duncan MD;  Location: [facility] Cath Lab;  Service: Cardiology    HERNIA REPAIR      JOINT REPLACEMENT      KNEE SURGERY Bilateral     OTHER SURGICAL HISTORY      Prostatic cryotherapy       Family History:     Family History   Problem Relation Age of Onset    Diabetes Mother     Cerebrovascular Disease Father     Heart Disease Brother     Coronary Artery Disease Brother         Social History:    reports that he has never smoked. He has never used smokeless tobacco. He reports current alcohol use. He reports that he does not use drugs.    Review of Systems:   12 systems are reviewed negative except for in HPI.    Meds:     Current Outpatient Medications:     acetaminophen (TYLENOL) 500 MG tablet, Take 1,000 mg by mouth at bedtime, Disp: , Rfl:     aspirin 81 MG EC tablet, [ASPIRIN 81 MG EC TABLET] Take 81 mg by mouth every evening. , Disp: , Rfl:     betamethasone dipropionate (DIPROSONE) 0.05 % external cream, 2 times daily as needed, Disp: , Rfl:     famotidine (PEPCID) 10 MG tablet, Take 10 mg by mouth 2 times daily Noon and 9pm, Disp: , Rfl:     ipratropium (ATROVENT) 0.06 % nasal spray, , Disp: , Rfl:     isosorbide mononitrate (IMDUR) 60 MG 24 hr tablet, Take 1 tablet (60 mg) by mouth daily, Disp: 90 tablet, Rfl: 3    ketoconazole (NIZORAL) 2 % external cream, daily as needed, Disp: , Rfl:     lactobacillus rhamnosus (GG) (CULTURELL) capsule, Take 1 capsule by mouth daily noon, Disp: , Rfl:     leuprolide (ELIGARD) 45 MG kit, Inject 45 mg subcutaneously every 6 months, Disp: , Rfl:     levothyroxine (SYNTHROID/LEVOTHROID) 50 MCG tablet, Take 50 mcg by mouth daily., Disp: , Rfl:     Melatonin 2.5 MG CHEW, Take by mouth daily as needed,  "Disp: , Rfl:     metoprolol succinate ER (TOPROL XL) 50 MG 24 hr tablet, Take 50 mg by mouth daily, Disp: , Rfl:     multivitamin with minerals (THERA-M) 9 mg iron-400 mcg Tab tablet, Take 1 tablet by mouth daily, Disp: , Rfl:     mupirocin (BACTROBAN) 2 % external ointment, Apply topically 2 times daily as needed, Disp: , Rfl:     mycophenolate (GENERIC EQUIVALENT) 500 MG tablet, Take 1,000 mg by mouth 2 times daily, Disp: , Rfl:     nystatin (MYCOSTATIN) 282190 UNIT/GM external powder, Apply topically 2 times daily, Disp: , Rfl:     polyethylene glycol (MIRALAX) 17 GM/Dose powder, Take 17 g by mouth daily as needed, Disp: , Rfl:     rosuvastatin (CRESTOR) 20 MG tablet, TAKE 1 TABLET(20 MG) BY MOUTH AT BEDTIME, Disp: 90 tablet, Rfl: 0    solifenacin (VESICARE) 10 MG tablet, Take 1 tablet by mouth daily., Disp: , Rfl:     torsemide (DEMADEX) 20 MG tablet, Take 80 mg by mouth daily, Disp: , Rfl:     triamcinolone (KENALOG) 0.1 % external cream, Apply 1 Application topically as needed., Disp: , Rfl:     Allergies:   Atorvastatin, Elemental sulfur, Furosemide, Gabapentin, Hydrocodone, Hydrocodone-acetaminophen, Lisinopril, Morphine, Sulfa (sulfonamide antibiotics) [sulfa antibiotics], Sulfatolamide, and Vicodin [hydrocodone-acetaminophen]      Objective:      Physical Exam  103 kg (227 lb)  1.727 m (5' 8\")  Body mass index is 34.52 kg/m .  BP (!) 151/72 (BP Location: Right arm, Patient Position: Sitting, Cuff Size: Adult Large)   Pulse 55   Resp 16   Ht 1.727 m (5' 8\")   Wt 103 kg (227 lb)   SpO2 92%   BMI 34.52 kg/m      General Appearance:   Awake, Alert, No acute distress.   HEENT:  Pupil equal and reactive to light. No scleral icterus; the mucous membranes were moist.   Neck: No cervical bruits. Not able to see JVD. No thyromegaly.     Chest: The spine was straight. The chest was symmetric.   Lungs:   Few crackles in left lung base. No wheezing.   Cardiovascular:   Regular rhythm and rate, remote first and " second heart sounds with II/VI systolic murmurs at RUSB. No rubs or gallops.    Abdomen:  Obese. Soft. No tenderness. Non-distended. Bowels sounds are present   Extremities: Mild bilateral leg edema.   Skin: Red skin rashes most in back. Warm, Dry.   Musculoskeletal: No tenderness. No deformity.   Neurologic: Mood and affect are appropriate. No focal deficits.         EKG:  Personally reivewed  Sinus rhythm with 1st degree A-V block  Low voltage QRS   Inferior infarct , age undetermined   Cannot rule out Anteroseptal infarct (cited on or before 07-Sep-2023)   Abnormal ECG   When compared with ECG of 07-Sep-2023 11:26,   Inferior infarct is now Present      Cardiac Imaging Studies  Echocardiogram on 7-:  1. The left ventricle is normal in size.Left ventricular function is  normal.The ejection fraction is 60-65%. No regional wall motion abnormalities noted.  2. The right ventricle is not well visualized. Normal right ventricle size andsystolic function.     No hemodynamically significant valvular abnormalities on 2D or color flow  imaging.     Coronary angiogram with PCI on 2-4-2019:  IVUS left main and LAD showed no severely obstructive lesions  Ramus disease treated with rotational atherectomy and stented with a Synergy KATHY under IVUS guidance with good angiographic results  Right heart catheterizations show LV EDP 22 mmHg and PCWP 30 mmHg, PA mean 34 mmHg, RA 13 mmHg      Lab Review   Lab Results   Component Value Date     06/23/2021    CO2 23 06/23/2021    BUN 21 06/23/2021     Lab Results   Component Value Date    WBC 8.5 05/17/2021    HGB 13.9 05/17/2021    HCT 40.6 05/17/2021    MCV 90 05/17/2021     05/17/2021     Lab Results   Component Value Date    CHOL 136 01/29/2024    CHOL 138 07/24/2023    CHOL 144 04/28/2022     Lab Results   Component Value Date    HDL 46 01/29/2024    HDL 44 07/24/2023    HDL 54 04/28/2022     Lab Results   Component Value Date    TRIG 168 (H) 01/29/2024    TRIG  167 (H) 07/24/2023    TRIG 140 04/28/2022     Lab Results   Component Value Date    TROPONINI 0.02 04/08/2021     Lab Results   Component Value Date    BNP 84 05/17/2021

## 2025-04-12 DIAGNOSIS — I25.10 CORONARY ARTERY DISEASE INVOLVING NATIVE CORONARY ARTERY OF NATIVE HEART WITHOUT ANGINA PECTORIS: ICD-10-CM

## 2025-04-14 RX ORDER — ROSUVASTATIN CALCIUM 20 MG/1
20 TABLET, COATED ORAL AT BEDTIME
Qty: 90 TABLET | Refills: 3 | Status: SHIPPED | OUTPATIENT
Start: 2025-04-14

## 2025-05-27 DIAGNOSIS — I50.32 CHRONIC DIASTOLIC CONGESTIVE HEART FAILURE (H): Primary | ICD-10-CM

## 2025-05-27 RX ORDER — TORSEMIDE 20 MG/1
TABLET ORAL
Qty: 540 TABLET | Refills: 3 | Status: SHIPPED | OUTPATIENT
Start: 2025-05-27

## 2025-06-17 ENCOUNTER — LAB REQUISITION (OUTPATIENT)
Dept: LAB | Facility: CLINIC | Age: OVER 89
End: 2025-06-17
Payer: MEDICARE

## 2025-06-17 DIAGNOSIS — I13.0 HYPERTENSIVE HEART AND CHRONIC KIDNEY DISEASE WITH HEART FAILURE AND STAGE 1 THROUGH STAGE 4 CHRONIC KIDNEY DISEASE, OR UNSPECIFIED CHRONIC KIDNEY DISEASE (H): ICD-10-CM

## 2025-06-17 LAB
ERYTHROCYTE [DISTWIDTH] IN BLOOD BY AUTOMATED COUNT: 14.8 % (ref 10–15)
HCT VFR BLD AUTO: 40.2 % (ref 40–53)
HGB BLD-MCNC: 13 G/DL (ref 13.3–17.7)
MCH RBC QN AUTO: 31.2 PG (ref 26.5–33)
MCHC RBC AUTO-ENTMCNC: 32.3 G/DL (ref 31.5–36.5)
MCV RBC AUTO: 96 FL (ref 78–100)
PLATELET # BLD AUTO: 179 10E3/UL (ref 150–450)
RBC # BLD AUTO: 4.17 10E6/UL (ref 4.4–5.9)
WBC # BLD AUTO: 5.8 10E3/UL (ref 4–11)

## 2025-06-18 LAB
ANION GAP SERPL CALCULATED.3IONS-SCNC: 11 MMOL/L (ref 7–15)
BUN SERPL-MCNC: 32.6 MG/DL (ref 8–23)
CALCIUM SERPL-MCNC: 8.6 MG/DL (ref 8.8–10.4)
CHLORIDE SERPL-SCNC: 101 MMOL/L (ref 98–107)
CREAT SERPL-MCNC: 1.15 MG/DL (ref 0.67–1.17)
EGFRCR SERPLBLD CKD-EPI 2021: 58 ML/MIN/1.73M2
GLUCOSE SERPL-MCNC: 130 MG/DL (ref 70–99)
HCO3 SERPL-SCNC: 28 MMOL/L (ref 22–29)
POTASSIUM SERPL-SCNC: 4.2 MMOL/L (ref 3.4–5.3)
SODIUM SERPL-SCNC: 140 MMOL/L (ref 135–145)

## 2025-07-16 DIAGNOSIS — I10 ESSENTIAL HYPERTENSION: Primary | ICD-10-CM

## 2025-07-16 RX ORDER — ISOSORBIDE MONONITRATE 60 MG/1
60 TABLET, EXTENDED RELEASE ORAL DAILY
Qty: 90 TABLET | Refills: 0 | Status: SHIPPED | OUTPATIENT
Start: 2025-07-16